# Patient Record
Sex: FEMALE | Race: WHITE | NOT HISPANIC OR LATINO | Employment: FULL TIME | URBAN - METROPOLITAN AREA
[De-identification: names, ages, dates, MRNs, and addresses within clinical notes are randomized per-mention and may not be internally consistent; named-entity substitution may affect disease eponyms.]

---

## 2017-01-13 ENCOUNTER — OFFICE VISIT (OUTPATIENT)
Dept: FAMILY MEDICINE | Facility: CLINIC | Age: 37
End: 2017-01-13
Payer: COMMERCIAL

## 2017-01-13 VITALS
TEMPERATURE: 99.4 F | WEIGHT: 178 LBS | HEART RATE: 95 BPM | SYSTOLIC BLOOD PRESSURE: 134 MMHG | OXYGEN SATURATION: 97 % | DIASTOLIC BLOOD PRESSURE: 84 MMHG | BODY MASS INDEX: 29.16 KG/M2

## 2017-01-13 DIAGNOSIS — M25.562 LEFT KNEE PAIN, UNSPECIFIED CHRONICITY: Primary | ICD-10-CM

## 2017-01-13 DIAGNOSIS — M54.50 RIGHT-SIDED LOW BACK PAIN WITHOUT SCIATICA, UNSPECIFIED CHRONICITY: ICD-10-CM

## 2017-01-13 PROBLEM — R01.1 HEART MURMUR: Status: ACTIVE | Noted: 2017-01-13

## 2017-01-13 PROCEDURE — 99213 OFFICE O/P EST LOW 20 MIN: CPT | Performed by: PHYSICIAN ASSISTANT

## 2017-01-13 RX ORDER — HYDROCODONE BITARTRATE AND ACETAMINOPHEN 5; 325 MG/1; MG/1
1 TABLET ORAL
Qty: 20 TABLET | Refills: 0 | Status: SHIPPED | OUTPATIENT
Start: 2017-01-13 | End: 2017-06-06

## 2017-01-13 RX ORDER — PREDNISONE 20 MG/1
40 TABLET ORAL DAILY
Qty: 10 TABLET | Refills: 0 | Status: SHIPPED | OUTPATIENT
Start: 2017-01-13 | End: 2017-01-18

## 2017-01-13 NOTE — PROGRESS NOTES
SUBJECTIVE:                                                    Christine Hu is a 36 year old female who presents to clinic today for the following health issues:        Musculoskeletal problem/pain      Duration: 3 weeks    Description  Location: left knee    Intensity:  moderate    Accompanying signs and symptoms: swelling    History  Previous similar problem: YES  Previous evaluation:  MRI    Precipitating or alleviating factors:  Trauma or overuse: YES- many years ago  Aggravating factors include: climbing stairs, exercise and overuse    Therapies tried and outcome: rest/inactivity, heat, ice and NSAID -          Had MRI in 2015 of left knee, nothing recent.  Mri 2015 showed IT band issues and ligament sprain and moderate joint effusion.     Follows at [facility] clinic for this. But has not seen them for awhile.   appt next week with them, already has MRI scheduled there also.    Has had that effusion drained previously in her knee.  Now its starting to build up again causing more pain. No fevers or redness to suggest infection.  strength is decreased.     Started again when she started exercising. This caused it before.    Took 2 norco last week that she had leftover, took advil also recently.   Swelling has become worse with walking. Stairs is worse.       Pain discussion --patient is requesting short term pain medications today. We discussed that these are used sparingly.  Last year per registry she filled only 2 Rx.  The year before however there were multiples.  If there is another request this year, she understands that we will send her to a pain clinic for best management.     Not sure if she has used prednisone previously.         Problem list and histories reviewed & adjusted, as indicated.  Additional history: as documented    Patient Active Problem List   Diagnosis     Degeneration of cervical intervertebral disc     Contraceptive management     History of abnormal Pap smear      Depressive disorder, not elsewhere classified     Thyrotoxicosis     Low back pain     Generalized anxiety disorder     Mastoiditis     Overdose     CARDIOVASCULAR SCREENING; LDL GOAL LESS THAN 160     ASCUS favor benign     Tobacco use     Primary insomnia     History of Clostridium difficile     Benign essential hypertension     Heart murmur     Past Surgical History   Procedure Laterality Date     C/section, low transverse  2005     , Low Transverse     Tonsillectomy       Mastoidectomy         Social History   Substance Use Topics     Smoking status: Current Some Day Smoker -- 0.50 packs/day     Smokeless tobacco: Never Used     Alcohol Use: Yes      Comment: 2 x a year     Family History   Problem Relation Age of Onset     C.A.D. No family hx of      DIABETES No family hx of      Hypertension No family hx of      CEREBROVASCULAR DISEASE No family hx of      Breast Cancer No family hx of      Cancer - colorectal No family hx of      Prostate Cancer No family hx of      Asthma Other      Allergies Mother      Allergies Sister      Allergies Sister      Psychotic Disorder Father      depression     Psychotic Disorder Sister      anxiety     Psychotic Disorder Sister      depression     Psychotic Disorder Maternal Uncle      anxiety         Current Outpatient Prescriptions   Medication Sig Dispense Refill     predniSONE (DELTASONE) 20 MG tablet Take 2 tablets (40 mg) by mouth daily for 5 days The in morning food 10 tablet 0     HYDROcodone-acetaminophen (NORCO) 5-325 MG per tablet Take 1 tablet by mouth nightly as needed for moderate to severe pain Do not mix with alcohol or drive after taking 20 tablet 0     cyclobenzaprine (FLEXERIL) 10 MG tablet Take 1 tablet (10 mg) by mouth 3 times daily as needed for muscle spasms 90 tablet 1     metoprolol (TOPROL-XL) 25 MG 24 hr tablet Take 1 tablet (25 mg) by mouth daily Take at night. 30 tablet 5     escitalopram (LEXAPRO) 20 MG tablet Take 1 tablet (20 mg) by  mouth daily 90 tablet 0     traZODone (DESYREL) 50 MG tablet 1-2 tabs as needed for sleep 60 tablet 5     ibuprofen (ADVIL,MOTRIN) 100 MG/5ML suspension Take 10 mg/kg by mouth every 4 hours as needed for fever or moderate pain       Allergies   Allergen Reactions     Codeine GI Disturbance     Nausea with use of Tylenol #3     No Clinical Screening - See Comments      Unknown Diuretic-patient complain about blisters on her legs.     Penicillins Rash       ROS:  Constitutional, HEENT, cardiovascular, pulmonary, gi and gu systems are negative, except as otherwise noted.    OBJECTIVE:                                                    /84 mmHg  Pulse 95  Temp(Src) 99.4  F (37.4  C) (Oral)  Wt 178 lb (80.74 kg)  SpO2 97%  Body mass index is 29.16 kg/(m^2).  GENERAL: healthy, alert and no distress  RESP: lungs clear to auscultation - no rales, rhonchi or wheezes  CV: regular rate and rhythm, normal S1 S2, no S3 or S4, no murmur, click or rub, no peripheral edema and peripheral pulses strong  MS: left knee-mild edema.  No erythema.  strength decreased some compared to right quad. Pain with extension mostly.  Tender everywhere on knee but mainly laterally and patellar insertion.   SKIN: no suspicious lesions or rashes  NEURO: Normal strength and tone, mentation intact and speech normal         ASSESSMENT/PLAN:                                                    ASSESSMENT / PLAN:  (M25.562) Left knee pain, unspecified chronicity  (primary encounter diagnosis)  Comment: already seeing specialist  Plan: predniSONE (DELTASONE) 20 MG tablet        See below  Stop ibuprofen, prednisone for anti-inflammatory until she sees ortho    Plan: HYDROcodone-acetaminophen (NORCO) 5-325 MG per         tablet       \  Take prednisone with food  In the morning  Side effects discussed    -Narcotic medications can be addicting and therefore are used for short term pain control only.  They are not intended for long-term use.    -Take  them only for severe pain as needed.    -Never mix with alcohol.    -Do not drive after taking this medication.    -No refills without an office visit. No replacements will be given.    -Keep these medications kept in a safe location.  You are responsible for them.  -Do not give them to anyone else.  They are prescribed to you only.     Angi Marroquin PA-C  [facility]

## 2017-01-13 NOTE — MR AVS SNAPSHOT
"              After Visit Summary   2017    Christine Hu    MRN: [id number]           Patient Information     Date Of Birth          1980        Visit Information        Provider Department      2017 2:40 PM Angi Marroquin PA-C [facility]        Today's Diagnoses     Left knee pain, unspecified chronicity    -  1     Right-sided low back pain without sciatica, unspecified chronicity            Follow-ups after your visit        Who to contact     If you have questions or need follow up information about today's clinic visit or your schedule please contact [facility] directly at [phone].  Normal or non-critical lab and imaging results will be communicated to you by MyChart, letter or phone within 4 business days after the clinic has received the results. If you do not hear from us within 7 days, please contact the clinic through MyChart or phone. If you have a critical or abnormal lab result, we will notify you by phone as soon as possible.  Submit refill requests through [employer] or call your pharmacy and they will forward the refill request to us. Please allow 3 business days for your refill to be completed.          Additional Information About Your Visit        MyChart Information     [employer] lets you send messages to your doctor, view your test results, renew your prescriptions, schedule appointments and more. To sign up, go to [email or web address] . Click on \"Log in\" on the left side of the screen, which will take you to the Welcome page. Then click on \"Sign up Now\" on the right side of the page.     You will be asked to enter the access code listed below, as well as some personal information. Please follow the directions to create your username and password.     Your access code is: [email or web address]  Expires: 2017  3:18 PM     Your access code will  in 90 days. If you need help or a new code, please call your [facility] or " [phone].        Care EveryWhere ID     This is your Care EveryWhere ID. This could be used by other organizations to access your [facility] medical records  [id number]        Your Vitals Were     Pulse Temperature Pulse Oximetry             95 99.4  F (37.4  C) (Oral) 97%          Blood Pressure from Last 3 Encounters:   01/13/17 134/84   10/14/16 138/86   04/01/16 150/88    Weight from Last 3 Encounters:   01/13/17 178 lb (80.74 kg)   10/14/16 177 lb (80.287 kg)   04/12/16 164 lb (74.39 kg)              Today, you had the following     No orders found for display         Today's Medication Changes          These changes are accurate as of: 1/13/17  3:18 PM.  If you have any questions, ask your nurse or doctor.               Start taking these medicines.        Dose/Directions    predniSONE 20 MG tablet   Commonly known as:  DELTASONE   Used for:  Left knee pain, unspecified chronicity        Dose:  40 mg   Take 2 tablets (40 mg) by mouth daily for 5 days The in morning food   Quantity:  10 tablet   Refills:  0            Where to get your medicines      These medications were sent to [facility]/pharmacy #[id number] - [place] - [address] [place] [address] [address] [address]     Phone:  [phone]    - predniSONE 20 MG tablet      Some of these will need a paper prescription and others can be bought over the counter.  Ask your nurse if you have questions.     Bring a paper prescription for each of these medications    - HYDROcodone-acetaminophen 5-325 MG per tablet             Primary Care Provider Office Phone # Fax #    Angi Marroquin PA-C [phone]       [facility] [address] [facility] [address]        Thank you!     Thank you for choosing [facility]  for your care. Our goal is always to provide you with excellent care. Hearing back from our patients is one way we can continue to improve our services. Please  take a few minutes to complete the written survey that you may receive in the mail after your visit with us. Thank you!             Your Updated Medication List - Protect others around you: Learn how to safely use, store and throw away your medicines at www.disposemymeds.org.          This list is accurate as of: 1/13/17  3:18 PM.  Always use your most recent med list.                   Brand Name Dispense Instructions for use    cyclobenzaprine 10 MG tablet    FLEXERIL    90 tablet    Take 1 tablet (10 mg) by mouth 3 times daily as needed for muscle spasms       escitalopram 20 MG tablet    LEXAPRO    90 tablet    Take 1 tablet (20 mg) by mouth daily       HYDROcodone-acetaminophen 5-325 MG per tablet    NORCO    20 tablet    Take 1 tablet by mouth nightly as needed for moderate to severe pain Do not mix with alcohol or drive after taking       ibuprofen 100 MG/5ML suspension    ADVIL/MOTRIN     Take 10 mg/kg by mouth every 4 hours as needed for fever or moderate pain       metoprolol 25 MG 24 hr tablet    TOPROL-XL    30 tablet    Take 1 tablet (25 mg) by mouth daily Take at night.       predniSONE 20 MG tablet    DELTASONE    10 tablet    Take 2 tablets (40 mg) by mouth daily for 5 days The in morning food       traZODone 50 MG tablet    DESYREL    60 tablet    1-2 tabs as needed for sleep

## 2017-01-13 NOTE — NURSING NOTE
"Chief Complaint   Patient presents with     Knee Pain     left knee - seen at [facility] - Had MRI - had fluid drained -        Initial /95 mmHg  Pulse 105  Temp(Src) 99.4  F (37.4  C) (Oral)  Wt 178 lb (80.74 kg)  SpO2 97% Estimated body mass index is 29.16 kg/(m^2) as calculated from the following:    Height as of 4/12/16: 5' 5.5\" (1.664 m).    Weight as of this encounter: 178 lb (80.74 kg)..  BP completed using cuff size: bunny Jain M.A.      "

## 2017-06-06 ENCOUNTER — OFFICE VISIT (OUTPATIENT)
Dept: FAMILY MEDICINE | Facility: CLINIC | Age: 37
End: 2017-06-06
Payer: COMMERCIAL

## 2017-06-06 VITALS
BODY MASS INDEX: 27.48 KG/M2 | HEIGHT: 66 IN | WEIGHT: 171 LBS | SYSTOLIC BLOOD PRESSURE: 126 MMHG | OXYGEN SATURATION: 98 % | HEART RATE: 105 BPM | TEMPERATURE: 99.1 F | DIASTOLIC BLOOD PRESSURE: 84 MMHG

## 2017-06-06 DIAGNOSIS — H72.91 PERFORATION OF RIGHT TYMPANIC MEMBRANE: ICD-10-CM

## 2017-06-06 DIAGNOSIS — J31.0 CHRONIC RHINITIS: ICD-10-CM

## 2017-06-06 DIAGNOSIS — H92.01 EAR PAIN, RIGHT: ICD-10-CM

## 2017-06-06 DIAGNOSIS — L02.92 BOIL: Primary | ICD-10-CM

## 2017-06-06 PROCEDURE — 99214 OFFICE O/P EST MOD 30 MIN: CPT | Performed by: PHYSICIAN ASSISTANT

## 2017-06-06 RX ORDER — HYDROCODONE BITARTRATE AND ACETAMINOPHEN 10; 325 MG/1; MG/1
TABLET ORAL
Qty: 30 TABLET | Refills: 0 | Status: SHIPPED | OUTPATIENT
Start: 2017-06-06 | End: 2018-01-18

## 2017-06-06 RX ORDER — FLUTICASONE PROPIONATE 50 MCG
1-2 SPRAY, SUSPENSION (ML) NASAL DAILY
Qty: 1 BOTTLE | Refills: 11 | Status: SHIPPED | OUTPATIENT
Start: 2017-06-06 | End: 2018-01-18

## 2017-06-06 RX ORDER — DOXYCYCLINE 100 MG/1
100 CAPSULE ORAL 2 TIMES DAILY
Qty: 20 CAPSULE | Refills: 0 | Status: SHIPPED | OUTPATIENT
Start: 2017-06-06 | End: 2017-06-16

## 2017-06-06 ASSESSMENT — ANXIETY QUESTIONNAIRES
1. FEELING NERVOUS, ANXIOUS, OR ON EDGE: SEVERAL DAYS
7. FEELING AFRAID AS IF SOMETHING AWFUL MIGHT HAPPEN: NOT AT ALL
2. NOT BEING ABLE TO STOP OR CONTROL WORRYING: NOT AT ALL
3. WORRYING TOO MUCH ABOUT DIFFERENT THINGS: SEVERAL DAYS
6. BECOMING EASILY ANNOYED OR IRRITABLE: NOT AT ALL
GAD7 TOTAL SCORE: 2
5. BEING SO RESTLESS THAT IT IS HARD TO SIT STILL: NOT AT ALL
IF YOU CHECKED OFF ANY PROBLEMS ON THIS QUESTIONNAIRE, HOW DIFFICULT HAVE THESE PROBLEMS MADE IT FOR YOU TO DO YOUR WORK, TAKE CARE OF THINGS AT HOME, OR GET ALONG WITH OTHER PEOPLE: NOT DIFFICULT AT ALL

## 2017-06-06 ASSESSMENT — PATIENT HEALTH QUESTIONNAIRE - PHQ9: 5. POOR APPETITE OR OVEREATING: NOT AT ALL

## 2017-06-06 NOTE — PATIENT INSTRUCTIONS
No swimming underneath water and use ear precautions in shower    Schedule with ENT as soon as possible

## 2017-06-06 NOTE — MR AVS SNAPSHOT
After Visit Summary   6/6/2017    Christine Hu    MRN: [id number]           Patient Information     Date Of Birth          1980        Visit Information        Provider Department      6/6/2017 12:40 PM Angi Marroquin PA-C [facility]        Today's Diagnoses     Boil    -  1    Chronic rhinitis        Perforation of right tympanic membrane        Ear pain, right          Care Instructions    No swimming underneath water and use ear precautions in shower    Schedule with ENT as soon as possible              Follow-ups after your visit        Additional Services     OTOLARYNGOLOGY REFERRAL       Your provider has referred you to: [facility]: [facility] [phone]   [email or web address]  Dr. Dalton    Please be aware that coverage of these services is subject to the terms and limitations of your health insurance plan.  Call member services at your health plan with any benefit or coverage questions.      Please bring the following with you to your appointment:    (1) Any X-Rays, CTs or MRIs which have been performed.  Contact the facility where they were done to arrange for  prior to your scheduled appointment.   (2) List of current medications  (3) This referral request   (4) Any documents/labs given to you for this referral                  Who to contact     If you have questions or need follow up information about today's clinic visit or your schedule please contact [facility] directly at [phone].  Normal or non-critical lab and imaging results will be communicated to you by MyChart, letter or phone within 4 business days after the clinic has received the results. If you do not hear from us within 7 days, please contact the clinic through MyChart or phone. If you have a critical or abnormal lab result, we will notify you by phone as soon as possible.  Submit refill requests through  "[employer] or call your pharmacy and they will forward the refill request to us. Please allow 3 business days for your refill to be completed.          Additional Information About Your Visit        Care EveryWhere ID     This is your Care EveryWhere ID. This could be used by other organizations to access your [facility] medical records  [id number]        Your Vitals Were     Pulse Temperature Height Pulse Oximetry Breastfeeding? BMI (Body Mass Index)    105 99.1  F (37.3  C) (Oral) 5' 6\" (1.676 m) 98% No 27.6 kg/m2       Blood Pressure from Last 3 Encounters:   06/06/17 126/84   01/13/17 134/84   10/14/16 138/86    Weight from Last 3 Encounters:   06/06/17 171 lb (77.6 kg)   01/13/17 178 lb (80.7 kg)   10/14/16 177 lb (80.3 kg)              We Performed the Following     DEPRESSION ACTION PLAN (DAP)     OTOLARYNGOLOGY REFERRAL          Today's Medication Changes          These changes are accurate as of: 6/6/17  1:42 PM.  If you have any questions, ask your nurse or doctor.               Start taking these medicines.        Dose/Directions    doxycycline Monohydrate 100 MG Caps   Used for:  Boil   Started by:  Angi Marroquin PA-C        Dose:  100 mg   Take 1 capsule (100 mg) by mouth 2 times daily for 10 days With food.   Quantity:  20 capsule   Refills:  0       fluticasone 50 MCG/ACT spray   Commonly known as:  FLONASE   Used for:  Chronic rhinitis   Started by:  Angi Marroquin PA-C        Dose:  1-2 spray   Spray 1-2 sprays into both nostrils daily   Quantity:  1 Bottle   Refills:  11       HYDROcodone-acetaminophen  MG per tablet   Commonly known as:  NORCO   Used for:  Perforation of right tympanic membrane, Ear pain, right   Started by:  Angi Marroquin PA-C        Take one at night as needed for ear pain.  Do not drive or drink alcohol with this.   Quantity:  30 tablet   Refills:  0            Where to get your medicines      These medications were sent to [facility]/pharmacy " #[id number] - [place] - [address]  [address], [place] [address]     Phone:  [phone]     doxycycline Monohydrate 100 MG Caps    fluticasone 50 MCG/ACT spray         Some of these will need a paper prescription and others can be bought over the counter.  Ask your nurse if you have questions.     Bring a paper prescription for each of these medications     HYDROcodone-acetaminophen  MG per tablet                Primary Care Provider Office Phone # Fax #    Angi Marroquin PA-C [phone]       [facility] [address] [facility] [address]        Thank you!     Thank you for choosing [facility]  for your care. Our goal is always to provide you with excellent care. Hearing back from our patients is one way we can continue to improve our services. Please take a few minutes to complete the written survey that you may receive in the mail after your visit with us. Thank you!             Your Updated Medication List - Protect others around you: Learn how to safely use, store and throw away your medicines at www.disposemymeds.org.          This list is accurate as of: 6/6/17  1:42 PM.  Always use your most recent med list.                   Brand Name Dispense Instructions for use    doxycycline Monohydrate 100 MG Caps     20 capsule    Take 1 capsule (100 mg) by mouth 2 times daily for 10 days With food.       fluticasone 50 MCG/ACT spray    FLONASE    1 Bottle    Spray 1-2 sprays into both nostrils daily       HYDROcodone-acetaminophen  MG per tablet    NORCO    30 tablet    Take one at night as needed for ear pain.  Do not drive or drink alcohol with this.       ibuprofen 100 MG/5ML suspension    ADVIL/MOTRIN     Take 10 mg/kg by mouth every 4 hours as needed for fever or moderate pain

## 2017-06-06 NOTE — NURSING NOTE
"Chief Complaint   Patient presents with     Ear Problem     R ear pain per pt x 1 week      Derm Problem     cyst on R leg inner thigh per pt x 2-4 months ago.       Initial BP (!) 150/92  Pulse 105  Temp 99.1  F (37.3  C) (Oral)  Ht 5' 6\" (1.676 m)  Wt 171 lb (77.6 kg)  SpO2 98%  Breastfeeding? No  BMI 27.6 kg/m2 Estimated body mass index is 27.6 kg/(m^2) as calculated from the following:    Height as of this encounter: 5' 6\" (1.676 m).    Weight as of this encounter: 171 lb (77.6 kg).  Medication Reconciliation: complete      Amisha Lion MA    "

## 2017-06-06 NOTE — PROGRESS NOTES
SUBJECTIVE:                                                    Christine Hu is a 36 year old female who presents to clinic today for the following health issues:    ENT Symptoms             Symptoms: cc Present Absent Comment   Fever/Chills  x  Low grade only off and on per patient   Fatigue   x    Muscle Aches   x    Eye Irritation  x     Sneezing  x     Nasal Kenn/Drg  x     Sinus Pressure/Pain  x     Loss of smell   x    Dental pain   x    Sore Throat  x  Tender to touch per pt   Swollen Glands  x     Ear Pain/Fullness  x  R ear pain more so when she leans back/reclines   Cough  x     Wheeze   x    Chest Pain   x    Shortness of breath   x    Rash   x    Other   x      Symptom duration:  about 10 days   Symptom severity:  moderate   Treatments tried:  none   Contacts:  none     Multiple complaints today, mainly severe right ear pain again and boil she wants to discuss:    1) recent allergies or cold possibly now making her ear worse again:  Has seasonal allergies.  Has tried claritin/zyrtec which is what she usually takes. Has helped some.  Does have a tickle in her throat. Worse after mowing lawn.   Cough started on Friday.  Some head pressure.  [place] ill on Saturday, body aches etc.  Is still tired. No green or yellow drainage.  Lots of PND,  No green or yellow.    Has to sleep upright in a chair otherwise her ear hurts her too much.  Takes advil for pain also.  Also took leftover pain medications which were 5 mg hydrocodone and she felt the 10 mg worked better for her prreviously.  She is asking for more today.   Previous pain medication discussion---please see last OV for our discussion. Patient has only filled 2 narcotics in the past year, but prior to that she had filled several the year before. I do not suspect her being a seeker or abuser, but if her pain becomes more chronic then we will need to have her see pain management for best management.        Long-standing History of ear issues:  Saw  "Dr. Dalton in ENT last year for recurrent chronic ear pain. He diagnosed her with TMJ but ordered CT scans of face/sinuses as she was concerned.  She did not have these scans performed.  \"Feels like someone is poking knife or pin in her ear\" for the past week per patient, no new trauma to her ear.  Also has history of tonsillectomy and mastoidectomy following right ear pain after a bad sinus infection in , she followed at a different ENT specialty clinic for that. No mastoid pain today.       Daily smoker.       2)  Cyst on R Inner leg per pt x 2-4 months, would like to have that looked at.  Thought it was an ingrown hair at first. Looked like a pimple, tried warm compress initially. Was small at first. Got better but yesterday morning it was much worse.  Is painful also.  Is draining  A small amount of pus.  She is worried about infection.  Is red in color.  No antibiotics in several months.  Low grade fevers lately. No h/o boils.          Problem list and histories reviewed & adjusted, as indicated.  Additional history: as documented    Patient Active Problem List   Diagnosis     Degeneration of cervical intervertebral disc     Contraceptive management     History of abnormal Pap smear     Depressive disorder, not elsewhere classified     Thyrotoxicosis     Low back pain     Generalized anxiety disorder     Mastoiditis     Overdose     CARDIOVASCULAR SCREENING; LDL GOAL LESS THAN 160     ASCUS favor benign     Tobacco use     Primary insomnia     History of Clostridium difficile     Benign essential hypertension     Heart murmur     Past Surgical History:   Procedure Laterality Date     C/SECTION, LOW TRANSVERSE  2005    , Low Transverse     MASTOIDECTOMY       TONSILLECTOMY         Social History   Substance Use Topics     Smoking status: Current Some Day Smoker     Packs/day: 0.50     Smokeless tobacco: Never Used     Alcohol use Yes      Comment: 2 x a year     Family History   Problem Relation Age " "of Onset     Allergies Mother      Psychotic Disorder Father      depression     Heart Failure Father      heart attack in NOV. 2015?     Allergies Sister      Allergies Sister      Asthma Other      Psychotic Disorder Sister      anxiety     Psychotic Disorder Sister      depression     Psychotic Disorder Maternal Uncle      anxiety     C.A.D. No family hx of      DIABETES No family hx of      Hypertension No family hx of      CEREBROVASCULAR DISEASE No family hx of      Breast Cancer No family hx of      Cancer - colorectal No family hx of      Prostate Cancer No family hx of          Current Outpatient Prescriptions   Medication Sig Dispense Refill     ibuprofen (ADVIL,MOTRIN) 100 MG/5ML suspension Take 10 mg/kg by mouth every 4 hours as needed for fever or moderate pain       Allergies   Allergen Reactions     Codeine GI Disturbance     Nausea with use of Tylenol #3     No Clinical Screening - See Comments      Unknown Diuretic-patient complain about blisters on her legs.     Penicillins Rash       ROS:  Constitutional, HEENT, cardiovascular, pulmonary, GI, , musculoskeletal, neuro, skin, endocrine and psych systems are negative, except as otherwise noted.    OBJECTIVE:                                                    BP (!) 150/92  Pulse 105  Temp 99.1  F (37.3  C) (Oral)  Ht 5' 6\" (1.676 m)  Wt 171 lb (77.6 kg)  SpO2 98%  Breastfeeding? No  BMI 27.6 kg/m2  Body mass index is 27.6 kg/(m^2).  GENERAL:  No acute distress.  Interacts appropriately.  Breathing without difficulty.  Alert.  HEENT:  L TM-appears WNL.  R TM--? Perforation with bright blue ? Tube located in middle ear (not in canal).  Canals looks ok but she does have tragal tenderness. No odor or drainage.  Oral mucosa moist. Posterior pharynx has mild erythema.  Posterior pharynx has no exudate. Without edema. Some PND present.   NECK:  Soft and supple.  with tenderness.  Without lymphadenopathy.  Normal range of motion.    CARDIAC:   " Regular rate and rhythm.  No murmurs, rubs, or gallops.   PULMONARY: Clear to auscultation bilaterally.  No  wheezes, crackles, or rhonchi.  Normal air exchange/breath sounds.  No use of accessory muscles.    PSYCH: Normal affect.  SKIN: R inner thigh-dime sized pustule present, warm and tender to touch. Does not seem amenable to I and D.  Look consistent with Early boil. No evidence of cellulitis. Is not actively draining.                ASSESSMENT/PLAN:                                                    ASSESSMENT / PLAN:  (L02.92) Boil  (primary encounter diagnosis)  Comment:   Plan: doxycycline Monohydrate 100 MG CAPS        Take with food. Side effects discussed.  Call with worsening symptoms or if no improvement in 3 days.  Analgesics for pain with food as needed.  Warm compresses multiple times a day  Return for I and D if needed/not improving  To emergency room if high fevers/sigificant worsening symptoms    (J31.0) Chronic rhinitis  Comment:   Plan: fluticasone (FLONASE) 50 MCG/ACT spray            (H72.91) Perforation of right tympanic membrane  Comment:  Per patient her tube was removed from this ear, however that is not what I see on exam today.  I will message Dr. Dalton also regarding this. I am wondering if the retained tube went inside/behind her TM instead of out.     Plan: OTOLARYNGOLOGY REFERRAL,         HYDROcodone-acetaminophen (NORCO)  MG per        tablet            (H92.01) Ear pain, right  Comment:   Plan: HYDROcodone-acetaminophen (NORCO)  MG per        tablet            If you have ongoing pain issues, we will have to have you see pain clinic as discussed    -Narcotic medications can be addicting and therefore are used for short term pain control only.  They are not intended for long-term use.    -Take them only for severe pain as needed.    -Never mix with alcohol.    -Do not drive after taking this medication.    -No refills without an office visit. No replacements will be  given.    -Keep these medications kept in a safe location.  You are responsible for them.  -Do not give them to anyone else.  They are prescribed to you only.       Patient Instructions   No swimming underneath water and use ear precautions in shower    Schedule with ENT as soon as possible                Angi Marroquin PA-C  [facility]

## 2017-06-07 ASSESSMENT — PATIENT HEALTH QUESTIONNAIRE - PHQ9: SUM OF ALL RESPONSES TO PHQ QUESTIONS 1-9: 3

## 2017-06-07 ASSESSMENT — ANXIETY QUESTIONNAIRES: GAD7 TOTAL SCORE: 2

## 2017-06-08 ENCOUNTER — TELEPHONE (OUTPATIENT)
Dept: FAMILY MEDICINE | Facility: CLINIC | Age: 37
End: 2017-06-08

## 2017-06-08 NOTE — TELEPHONE ENCOUNTER
Please call patient, I see she has not scheduled with ENT yet.  I do want her to see them asap. Please help her schedule with Dr. Dalton or if he is booked very far out another ENT provider in the next 1-2 weeks.     Thanks, yudi

## 2017-06-15 ENCOUNTER — TELEPHONE (OUTPATIENT)
Dept: FAMILY MEDICINE | Facility: CLINIC | Age: 37
End: 2017-06-15

## 2017-06-15 NOTE — TELEPHONE ENCOUNTER
Please call patient,    I see that she unfortunately did not show to her ENT appt (no showed Dr. Hernandez).  I do not see her scheduled with any ENT at this time.  I would like an update on her symptoms and her next plan of action. Has she scheduled with ent outside of [place] perhaps?  She needs to see one asap even if her pain is better.     Thanksyudi

## 2017-06-16 NOTE — TELEPHONE ENCOUNTER
Left message on answering machine for patient to call back to [phone].  Sofia Pierce RN

## 2017-06-19 NOTE — TELEPHONE ENCOUNTER
Left message on answering machine for patient to call back to [phone].  Sofia Pierce RN

## 2017-06-20 NOTE — TELEPHONE ENCOUNTER
Patient informed of provider note as below. Patient states something had come up and was not able to make to appointment. Patient still having symptoms and will reschedule with Dr. Frederick. .Antonella SANCHEZN, RN, CPN

## 2017-06-23 ENCOUNTER — TELEPHONE (OUTPATIENT)
Dept: FAMILY MEDICINE | Facility: CLINIC | Age: 37
End: 2017-06-23

## 2017-06-23 NOTE — TELEPHONE ENCOUNTER
"Received note that \"something came up\" for her ENT appointment.  Please make a note that patient will not be receiving any more narcotics for her ear pain, do not send me refill requests for this as they will be declined.  I would recommend other providers not give her narcotics for ear pain as well.    She needs to address the problem and not mask symptoms with pain medications.     Angi      "

## 2017-07-21 ENCOUNTER — TELEPHONE (OUTPATIENT)
Dept: FAMILY MEDICINE | Facility: CLINIC | Age: 37
End: 2017-07-21

## 2017-07-21 NOTE — TELEPHONE ENCOUNTER
See ov notes from 1-13-17 and 6-6-17.      Controlled Substance Refill Request for hydrocodone  Problem List Complete:  No     PROVIDER TO CONSIDER COMPLETION OF PROBLEM LIST AND OVERVIEW/CONTROLLED SUBSTANCE AGREEMENT    Last Written Prescription Date:  6-6-17  Last Fill Quantity: 30,   # refills: 0    Last Office Visit with [facility] primary care provider: 6-26-17  Pt advised multiple times to see ENT.     Future Office visit:   Next 5 appointments (look out 90 days)     Aug 08, 2017  9:30 AM CDT   Return Visit with Yves Dalton MD   [facility] ([facility]    [address] [place] [id number]   [phone]                  Controlled substance agreement on file: No.     Processing:  Patient will  in clinic     checked in past 6 months?  Yes 7/21/17   Sofia Pierce RN

## 2017-07-21 NOTE — TELEPHONE ENCOUNTER
Patient states she would like a refill on hydrocodone until she can see Dr Dalton on 8-8-17.  Please call when ready for .    Thank you.

## 2018-01-09 NOTE — PROGRESS NOTES
SUBJECTIVE:                                                    Christine Hu is a 37 year old female who presents to clinic today for the following health issues:    Family HX of liver issues and would like to have livers check, blood work or any testing that needs to be done.  norweigen decent family per patient.  Autoimmune disease/liver per patient in her family.   Not alcohol induced she states.  28 year old cousin had liver transplant, he tested for positive for a gene but she is not sure which one. Cousin also had hep C at the same time.   Sister got tested also. Sister is a carrier for this. She  Had genetic testing.  Patient may benefit from this, she will call and let me know what gene specifically her family has tested positive for.     Tylenol---not much, takes more advil.   Alcohol---once every 6 months or so, not much per patient.    PAP is due per pt have not had it done elsewhere. She will return for this.       Problem list and histories reviewed & adjusted, as indicated.  Additional history: as documented    Patient Active Problem List   Diagnosis     Degeneration of cervical intervertebral disc     Contraceptive management     History of abnormal Pap smear     Depressive disorder, not elsewhere classified     Thyrotoxicosis     Low back pain     Generalized anxiety disorder     Mastoiditis     Overdose     CARDIOVASCULAR SCREENING; LDL GOAL LESS THAN 160     ASCUS favor benign     Tobacco use     Primary insomnia     History of Clostridium difficile     Benign essential hypertension     Heart murmur     Past Surgical History:   Procedure Laterality Date     C/SECTION, LOW TRANSVERSE  2005    , Low Transverse     MASTOIDECTOMY       TONSILLECTOMY         Social History   Substance Use Topics     Smoking status: Current Some Day Smoker     Packs/day: 0.50     Smokeless tobacco: Never Used     Alcohol use Yes      Comment: 2 x a year     Family History   Problem Relation Age of  "Onset     Allergies Mother      Psychotic Disorder Father      depression     Heart Failure Father      heart attack in NOV. 2015?     Allergies Sister      Allergies Sister      Asthma Other      Psychotic Disorder Sister      anxiety     Psychotic Disorder Sister      depression     Psychotic Disorder Maternal Uncle      anxiety     C.A.D. No family hx of      DIABETES No family hx of      Hypertension No family hx of      CEREBROVASCULAR DISEASE No family hx of      Breast Cancer No family hx of      Cancer - colorectal No family hx of      Prostate Cancer No family hx of          Current Outpatient Prescriptions   Medication Sig Dispense Refill     ibuprofen (ADVIL,MOTRIN) 100 MG/5ML suspension Take 10 mg/kg by mouth every 4 hours as needed for fever or moderate pain       Allergies   Allergen Reactions     Codeine GI Disturbance     Nausea with use of Tylenol #3     Clindamycin Other (See Comments)     C diff, hospitalized      No Clinical Screening - See Comments      Unknown Diuretic-patient complain about blisters on her legs.     Penicillins Rash         ROS:  Constitutional, HEENT, cardiovascular, pulmonary, gi and gu systems are negative, except as otherwise noted.      OBJECTIVE:   /86  Pulse 89  Temp 98.7  F (37.1  C) (Oral)  Ht 5' 6\" (1.676 m)  Wt 163 lb (73.9 kg)  SpO2 98%  Breastfeeding? No  BMI 26.31 kg/m2  Body mass index is 26.31 kg/(m^2).  GENERAL: healthy, alert and no distress  RESP: {non-labored  MS: no gross musculoskeletal defects noted, no edema  SKIN: no suspicious lesions or rashes  PSYCH: mentation appears normal, affect normal/bright    Labs-pending    ASSESSMENT/PLAN:   ASSESSMENT / PLAN:  (Z83.79) Family history of liver disease  (primary encounter diagnosis)  Comment:    Patient with no h/o this, will start with testing and consider US and/or gastroenterology referral after that depending on results.   Plan: CBC with platelets differential, Comprehensive         " "metabolic panel, ESR: Erythrocyte sedimentation        rate, CRP inflammation, Anti Nuclear Nichole IgG by        IFA with Reflex, Hepatitis C antibody,         Hepatitis B surface antigen, Hepatitis C RNA,         quantitative, GGT            (H92.09) Otalgia, unspecified laterality  Comment:   Plan: OTOLARYNGOLOGY REFERRAL        At end of visit, patient casually request refill of vicodin for her on/off ear pain where she can't sleep at night.  She states she has a \"mild cold and ear has some pressure again\".  She is sitting comfortably in chair. Ibuprofen helps some, we discussed she can take 800 mg of this PRN every 8 hours with food. She has tried sleep medications in the past to take when her ear \"flares\" but these failed and she wants the pain medication she states.  We discussed that SEVERAL times she needs to go back to ENT if the pain is that bad, she no-showed her last ENT appointment and never rescheduled and has requested and been declined narcotics by me since then.  I again discussed the reasoning I cannot keep giving this to her.  If ENT does not believe she needs them for her ear issue I will not be Rx them.  We could consider pain clinic referral if she has further pain concerns.     Patient was scheduled with Dr. Dalton at the end of the month.      Billin min spent face-to-face with patient. 15 min on history, 5 on exam, 5 on discussing diagnosis and treatment plan.       Angi Marroquin PA-C  [facility]    "

## 2018-01-18 ENCOUNTER — OFFICE VISIT (OUTPATIENT)
Dept: FAMILY MEDICINE | Facility: CLINIC | Age: 38
End: 2018-01-18
Payer: COMMERCIAL

## 2018-01-18 VITALS
BODY MASS INDEX: 26.2 KG/M2 | SYSTOLIC BLOOD PRESSURE: 138 MMHG | DIASTOLIC BLOOD PRESSURE: 86 MMHG | HEIGHT: 66 IN | WEIGHT: 163 LBS | OXYGEN SATURATION: 98 % | TEMPERATURE: 98.7 F | HEART RATE: 89 BPM

## 2018-01-18 DIAGNOSIS — H92.09 OTALGIA, UNSPECIFIED LATERALITY: ICD-10-CM

## 2018-01-18 DIAGNOSIS — Z83.79 FAMILY HISTORY OF LIVER DISEASE: Primary | ICD-10-CM

## 2018-01-18 LAB
ALBUMIN SERPL-MCNC: 4.2 G/DL (ref 3.4–5)
ALP SERPL-CCNC: 56 U/L (ref 40–150)
ALT SERPL W P-5'-P-CCNC: 20 U/L (ref 0–50)
ANION GAP SERPL CALCULATED.3IONS-SCNC: 9 MMOL/L (ref 3–14)
AST SERPL W P-5'-P-CCNC: 16 U/L (ref 0–45)
BASOPHILS # BLD AUTO: 0 10E9/L (ref 0–0.2)
BASOPHILS NFR BLD AUTO: 0.4 %
BILIRUB SERPL-MCNC: 0.2 MG/DL (ref 0.2–1.3)
BUN SERPL-MCNC: 8 MG/DL (ref 7–30)
CALCIUM SERPL-MCNC: 9 MG/DL (ref 8.5–10.1)
CHLORIDE SERPL-SCNC: 104 MMOL/L (ref 94–109)
CO2 SERPL-SCNC: 26 MMOL/L (ref 20–32)
CREAT SERPL-MCNC: 0.58 MG/DL (ref 0.52–1.04)
CRP SERPL-MCNC: <2.9 MG/L (ref 0–8)
DIFFERENTIAL METHOD BLD: ABNORMAL
EOSINOPHIL # BLD AUTO: 0 10E9/L (ref 0–0.7)
EOSINOPHIL NFR BLD AUTO: 0.6 %
ERYTHROCYTE [DISTWIDTH] IN BLOOD BY AUTOMATED COUNT: 12.4 % (ref 10–15)
ERYTHROCYTE [SEDIMENTATION RATE] IN BLOOD BY WESTERGREN METHOD: 10 MM/H (ref 0–20)
GFR SERPL CREATININE-BSD FRML MDRD: >90 ML/MIN/1.7M2
GGT SERPL-CCNC: 27 U/L (ref 0–40)
GLUCOSE SERPL-MCNC: 97 MG/DL (ref 70–99)
HCT VFR BLD AUTO: 40.3 % (ref 35–47)
HGB BLD-MCNC: 13.6 G/DL (ref 11.7–15.7)
LYMPHOCYTES # BLD AUTO: 1.4 10E9/L (ref 0.8–5.3)
LYMPHOCYTES NFR BLD AUTO: 27 %
MCH RBC QN AUTO: 33.5 PG (ref 26.5–33)
MCHC RBC AUTO-ENTMCNC: 33.7 G/DL (ref 31.5–36.5)
MCV RBC AUTO: 99 FL (ref 78–100)
MONOCYTES # BLD AUTO: 0.6 10E9/L (ref 0–1.3)
MONOCYTES NFR BLD AUTO: 10.6 %
NEUTROPHILS # BLD AUTO: 3.2 10E9/L (ref 1.6–8.3)
NEUTROPHILS NFR BLD AUTO: 61.4 %
PLATELET # BLD AUTO: 277 10E9/L (ref 150–450)
POTASSIUM SERPL-SCNC: 3.7 MMOL/L (ref 3.4–5.3)
PROT SERPL-MCNC: 7.3 G/DL (ref 6.8–8.8)
RBC # BLD AUTO: 4.06 10E12/L (ref 3.8–5.2)
SODIUM SERPL-SCNC: 139 MMOL/L (ref 133–144)
WBC # BLD AUTO: 5.3 10E9/L (ref 4–11)

## 2018-01-18 PROCEDURE — 86803 HEPATITIS C AB TEST: CPT | Performed by: PHYSICIAN ASSISTANT

## 2018-01-18 PROCEDURE — 85025 COMPLETE CBC W/AUTO DIFF WBC: CPT | Performed by: PHYSICIAN ASSISTANT

## 2018-01-18 PROCEDURE — 87522 HEPATITIS C REVRS TRNSCRPJ: CPT | Performed by: PHYSICIAN ASSISTANT

## 2018-01-18 PROCEDURE — 87340 HEPATITIS B SURFACE AG IA: CPT | Performed by: PHYSICIAN ASSISTANT

## 2018-01-18 PROCEDURE — 85652 RBC SED RATE AUTOMATED: CPT | Performed by: PHYSICIAN ASSISTANT

## 2018-01-18 PROCEDURE — 86140 C-REACTIVE PROTEIN: CPT | Performed by: PHYSICIAN ASSISTANT

## 2018-01-18 PROCEDURE — 99214 OFFICE O/P EST MOD 30 MIN: CPT | Performed by: PHYSICIAN ASSISTANT

## 2018-01-18 PROCEDURE — 80053 COMPREHEN METABOLIC PANEL: CPT | Performed by: PHYSICIAN ASSISTANT

## 2018-01-18 PROCEDURE — 86038 ANTINUCLEAR ANTIBODIES: CPT | Performed by: PHYSICIAN ASSISTANT

## 2018-01-18 PROCEDURE — 36415 COLL VENOUS BLD VENIPUNCTURE: CPT | Performed by: PHYSICIAN ASSISTANT

## 2018-01-18 PROCEDURE — 82977 ASSAY OF GGT: CPT | Performed by: PHYSICIAN ASSISTANT

## 2018-01-18 ASSESSMENT — ANXIETY QUESTIONNAIRES
2. NOT BEING ABLE TO STOP OR CONTROL WORRYING: SEVERAL DAYS
1. FEELING NERVOUS, ANXIOUS, OR ON EDGE: SEVERAL DAYS
7. FEELING AFRAID AS IF SOMETHING AWFUL MIGHT HAPPEN: NOT AT ALL
GAD7 TOTAL SCORE: 4
IF YOU CHECKED OFF ANY PROBLEMS ON THIS QUESTIONNAIRE, HOW DIFFICULT HAVE THESE PROBLEMS MADE IT FOR YOU TO DO YOUR WORK, TAKE CARE OF THINGS AT HOME, OR GET ALONG WITH OTHER PEOPLE: NOT DIFFICULT AT ALL
3. WORRYING TOO MUCH ABOUT DIFFERENT THINGS: SEVERAL DAYS
6. BECOMING EASILY ANNOYED OR IRRITABLE: NOT AT ALL
5. BEING SO RESTLESS THAT IT IS HARD TO SIT STILL: NOT AT ALL

## 2018-01-18 ASSESSMENT — PATIENT HEALTH QUESTIONNAIRE - PHQ9
SUM OF ALL RESPONSES TO PHQ QUESTIONS 1-9: 1
5. POOR APPETITE OR OVEREATING: SEVERAL DAYS

## 2018-01-18 NOTE — NURSING NOTE
"Chief Complaint   Patient presents with     Liver     Family Hx of liver issues, would like to get blood or lab test done to check       Initial /86  Pulse 89  Temp 98.7  F (37.1  C) (Oral)  Ht 5' 6\" (1.676 m)  Wt 163 lb (73.9 kg)  SpO2 98%  Breastfeeding? No  BMI 26.31 kg/m2 Estimated body mass index is 26.31 kg/(m^2) as calculated from the following:    Height as of this encounter: 5' 6\" (1.676 m).    Weight as of this encounter: 163 lb (73.9 kg).  Medication Reconciliation: complete      Amisha Lion MA    "

## 2018-01-18 NOTE — MR AVS SNAPSHOT
After Visit Summary   1/18/2018    Christine Hu    MRN: [id number]           Patient Information     Date Of Birth          1980        Visit Information        Provider Department      1/18/2018 11:00 AM Angi Marroquin PA-C [facility]        Today's Diagnoses     Family history of liver disease    -  1    Otalgia, unspecified laterality           Follow-ups after your visit        Additional Services     OTOLARYNGOLOGY REFERRAL       Your provider has referred you to: [facility]: [facility] [phone]  [email or web address]    Please be aware that coverage of these services is subject to the terms and limitations of your health insurance plan.  Call member services at your health plan with any benefit or coverage questions.      Please bring the following with you to your appointment:    (1) Any X-Rays, CTs or MRIs which have been performed.  Contact the facility where they were done to arrange for  prior to your scheduled appointment.   (2) List of current medications  (3) This referral request   (4) Any documents/labs given to you for this referral                  Who to contact     If you have questions or need follow up information about today's clinic visit or your schedule please contact [facility] directly at [phone].  Normal or non-critical lab and imaging results will be communicated to you by MyChart, letter or phone within 4 business days after the clinic has received the results. If you do not hear from us within 7 days, please contact the clinic through MyChart or phone. If you have a critical or abnormal lab result, we will notify you by phone as soon as possible.  Submit refill requests through [employer] or call your pharmacy and they will forward the refill request to us. Please allow 3 business days for your refill to be completed.          Additional Information About Your Visit    "     Care EveryWhere ID     This is your Care EveryWhere ID. This could be used by other organizations to access your [facility] medical records  [id number]        Your Vitals Were     Pulse Temperature Height Pulse Oximetry Breastfeeding? BMI (Body Mass Index)    89 98.7  F (37.1  C) (Oral) 5' 6\" (1.676 m) 98% No 26.31 kg/m2       Blood Pressure from Last 3 Encounters:   01/18/18 138/86   06/06/17 126/84   01/13/17 134/84    Weight from Last 3 Encounters:   01/18/18 163 lb (73.9 kg)   06/06/17 171 lb (77.6 kg)   01/13/17 178 lb (80.7 kg)              We Performed the Following     Anti Nuclear Nichole IgG by IFA with Reflex     CBC with platelets differential     Comprehensive metabolic panel     CRP inflammation     DEPRESSION ACTION PLAN (DAP)     ESR: Erythrocyte sedimentation rate     Hepatitis B surface antigen     Hepatitis C antibody     Hepatitis C RNA, quantitative     OTOLARYNGOLOGY REFERRAL        Primary Care Provider Office Phone # Fax #    Angi Marroquin PA-C [phone] [id number] [facility] [address]        Equal Access to Services     [facility] : Hadii aad ku hadasho Soomaali, waaxda luqadaha, qaybta kaalmada adeegyada, simona thorne . So [facility] [phone].    ATENCIÓN: Si habla español, tiene a gallo disposición servicios gratuitos de asistencia lingüística. Llame al [phone].    We comply with applicable federal civil rights laws and [place] laws. We do not discriminate on the basis of race, color, national origin, age, disability, sex, sexual orientation, or gender identity.            Thank you!     Thank you for choosing [facility]  for your care. Our goal is always to provide you with excellent care. Hearing back from our patients is one way we can continue to improve our services. Please take a few minutes to complete the written survey that you may receive in the mail after your visit with us. Thank you!      "        Your Updated Medication List - Protect others around you: Learn how to safely use, store and throw away your medicines at www.disposemymeds.org.          This list is accurate as of: 1/18/18 11:36 AM.  Always use your most recent med list.                   Brand Name Dispense Instructions for use Diagnosis    ibuprofen 100 MG/5ML suspension    ADVIL/MOTRIN     Take 10 mg/kg by mouth every 4 hours as needed for fever or moderate pain

## 2018-01-18 NOTE — LETTER
January 22, 2018    Christine HEARN Shruti Hu  [address] [place] [address]        Dear Christine,     It was a pleasure to see you at your recent office visit.  Your test results are listed below.  All labs normal. No autoimmune markers, hepatitis C, B, or inflammation. Kidney and liver function normal.  Liver enzymes normal.        If you have any questions or concerns, please call the clinic at [phone].    Sincerely,  Angi Marroquin PA-C    Results for orders placed or performed in visit on 01/18/18   CBC with platelets differential   Result Value Ref Range    WBC 5.3 4.0 - 11.0 10e9/L    RBC Count 4.06 3.8 - 5.2 10e12/L    Hemoglobin 13.6 11.7 - 15.7 g/dL    Hematocrit 40.3 35.0 - 47.0 %    MCV 99 78 - 100 fl    MCH 33.5 (H) 26.5 - 33.0 pg    MCHC 33.7 31.5 - 36.5 g/dL    RDW 12.4 10.0 - 15.0 %    Platelet Count 277 150 - 450 10e9/L    Diff Method Automated Method     % Neutrophils 61.4 %    % Lymphocytes 27.0 %    % Monocytes 10.6 %    % Eosinophils 0.6 %    % Basophils 0.4 %    Absolute Neutrophil 3.2 1.6 - 8.3 10e9/L    Absolute Lymphocytes 1.4 0.8 - 5.3 10e9/L    Absolute Monocytes 0.6 0.0 - 1.3 10e9/L    Absolute Eosinophils 0.0 0.0 - 0.7 10e9/L    Absolute Basophils 0.0 0.0 - 0.2 10e9/L   Comprehensive metabolic panel   Result Value Ref Range    Sodium 139 133 - 144 mmol/L    Potassium 3.7 3.4 - 5.3 mmol/L    Chloride 104 94 - 109 mmol/L    Carbon Dioxide 26 20 - 32 mmol/L    Anion Gap 9 3 - 14 mmol/L    Glucose 97 70 - 99 mg/dL    Urea Nitrogen 8 7 - 30 mg/dL    Creatinine 0.58 0.52 - 1.04 mg/dL    GFR Estimate >90 >60 mL/min/1.7m2    GFR Estimate If Black >90 >60 mL/min/1.7m2    Calcium 9.0 8.5 - 10.1 mg/dL    Bilirubin Total 0.2 0.2 - 1.3 mg/dL    Albumin 4.2 3.4 - 5.0 g/dL    Protein Total 7.3 6.8 - 8.8 g/dL    Alkaline Phosphatase 56 40 - 150 U/L    ALT 20 0 - 50 U/L    AST 16 0 - 45 U/L   ESR: Erythrocyte sedimentation rate   Result Value Ref Range    Sed Rate 10 0 - 20 mm/h   CRP  inflammation   Result Value Ref Range    CRP Inflammation <2.9 0.0 - 8.0 mg/L   Anti Nuclear Nichole IgG by IFA with Reflex   Result Value Ref Range    RONAL interpretation Negative NEG^Negative   Hepatitis C antibody   Result Value Ref Range    Hepatitis C Antibody Nonreactive NR^Nonreactive   Hepatitis B surface antigen   Result Value Ref Range    Hep B Surface Agn Nonreactive NR^Nonreactive   Hepatitis C RNA, quantitative   Result Value Ref Range    HCV RNA Quant IU/ml HCV RNA Not Detected HCVND^HCV RNA Not Detected [IU]/mL    Log of HCV RNA Qt Not Calculated <1.2 Log IU/mL   GGT   Result Value Ref Range    GGT 27 0 - 40 U/L

## 2018-01-19 LAB
ANA SER QL IF: NEGATIVE
HBV SURFACE AG SERPL QL IA: NONREACTIVE
HCV AB SERPL QL IA: NONREACTIVE

## 2018-01-19 ASSESSMENT — ANXIETY QUESTIONNAIRES: GAD7 TOTAL SCORE: 4

## 2018-01-22 LAB
HCV RNA SERPL NAA+PROBE-ACNC: NORMAL [IU]/ML
HCV RNA SERPL NAA+PROBE-LOG IU: NORMAL LOG IU/ML

## 2018-01-22 NOTE — PROGRESS NOTES
Dear Christine,      It was a pleasure to see you at your recent office visit.  Your test results are listed below.  All labs normal. No autoimmune markers, hepatitis C, B, or inflammation. Kidney and liver function normal.  Liver enzymes normal.        If you have any questions or concerns, please call the clinic at [phone].    Sincerely,  Angi Marroquin PA-C

## 2018-04-01 ENCOUNTER — HEALTH MAINTENANCE LETTER (OUTPATIENT)
Age: 38
End: 2018-04-01

## 2018-05-22 NOTE — PATIENT INSTRUCTIONS
Call [facility] to schedule breast imaging.  Call .    Please return fasting for cholesterol recheck, you can make a lab only appointment for this.  No food or drink other than water for 10 hours.        Preventive Health Recommendations  Female Ages 26 - 39  Yearly exam:   See your health care provider every year in order to    Review health changes.     Discuss preventive care.      Review your medicines if you your doctor has prescribed any.    Until age 30: Get a Pap test every three years (more often if you have had an abnormal result).    After age 30: Talk to your doctor about whether you should have a Pap test every 3 years or have a Pap test with HPV screening every 5 years.   You do not need a Pap test if your uterus was removed (hysterectomy) and you have not had cancer.  You should be tested each year for STDs (sexually transmitted diseases), if you're at risk.   Talk to your provider about how often to have your cholesterol checked.  If you are at risk for diabetes, you should have a diabetes test (fasting glucose).  Shots: Get a flu shot each year. Get a tetanus shot every 10 years.   Nutrition:     Eat at least 5 servings of fruits and vegetables each day.    Eat whole-grain bread, whole-wheat pasta and brown rice instead of white grains and rice.    Talk to your provider about Calcium and Vitamin D.     Lifestyle    Exercise at least 150 minutes a week (30 minutes a day, 5 days of the week). This will help you control your weight and prevent disease.    Limit alcohol to one drink per day.    No smoking.     Wear sunscreen to prevent skin cancer.    See your dentist every six months for an exam and cleaning.

## 2018-05-22 NOTE — PROGRESS NOTES
SUBJECTIVE:   CC: Christine NADIYA Hu is an 37 year old woman who presents for preventive health visit.     Healthy Habits:    Answers for HPI/ROS submitted by the patient on 5/31/2018   Annual Exam:  Getting at least 3 servings of Calcium per day:: Yes  Bi-annual eye exam:: Yes  Dental care twice a year:: Yes  Sleep apnea or symptoms of sleep apnea:: None  Diet:: Regular (no restrictions)  Frequency of exercise:: 2-3 days/week  Taking medications regularly:: Not Applicable  Medication side effects:: Not applicable  Additional concerns today:: YES  PHQ-2 Score: 0  Duration of exercise:: 30-45 minutes    Not fasting.     Only concern is bilateral breast pain off and on. No nipple drainage, constitutional symptoms, or lumps palpated. Does drink caffeine.   Has no pain today.     [id number]}    Today's PHQ-2 Score:   PHQ-2 ( 1999 Pf[employer]) 5/31/2018 1/18/2018   Q1: Little interest or pleasure in doing things 0 -   Q2: Feeling down, depressed or hopeless 0 0   PHQ-2 Score 0 -   Q1: Little interest or pleasure in doing things Not at all -   Q2: Feeling down, depressed or hopeless Not at all -   PHQ-2 Score 0 -       Abuse: Current or Past(Physical, Sexual or Emotional)- No  Do you feel safe in your environment - Yes    Social History   Substance Use Topics     Smoking status: Current Some Day Smoker     Packs/day: 0.50     Smokeless tobacco: Never Used     Alcohol use Yes      Comment: 2 x a year     If you drink alcohol do you typically have >3 drinks per day or >7 drinks per week? No                     Reviewed orders with patient.  Reviewed health maintenance and updated orders accordingly - Yes  Labs reviewed in EPIC  BP Readings from Last 3 Encounters:   05/31/18 137/74   01/18/18 138/86   06/06/17 126/84    Wt Readings from Last 3 Encounters:   05/31/18 151 lb (68.5 kg)   01/18/18 163 lb (73.9 kg)   06/06/17 171 lb (77.6 kg)                  Patient Active Problem List   Diagnosis     Degeneration of cervical  intervertebral disc     Contraceptive management     History of abnormal Pap smear     Depressive disorder, not elsewhere classified     Thyrotoxicosis     Low back pain     Generalized anxiety disorder     Mastoiditis     Overdose     CARDIOVASCULAR SCREENING; LDL GOAL LESS THAN 160     ASCUS favor benign     Tobacco use     Primary insomnia     History of Clostridium difficile     Benign essential hypertension     Heart murmur     Past Surgical History:   Procedure Laterality Date     C/SECTION, LOW TRANSVERSE  2005    , Low Transverse     COLONOSCOPY      5 yrs     MASTOIDECTOMY       TONSILLECTOMY         Social History   Substance Use Topics     Smoking status: Current Some Day Smoker     Packs/day: 0.50     Smokeless tobacco: Never Used     Alcohol use Yes      Comment: 2 x a year     Family History   Problem Relation Age of Onset     Allergies Mother      Psychotic Disorder Father      depression     Heart Failure Father      heart attack in 2015?     Allergies Sister      Allergies Sister      Asthma Other      Psychotic Disorder Sister      anxiety     Psychotic Disorder Sister      depression     Psychotic Disorder Maternal Uncle      anxiety     C.A.D. No family hx of      DIABETES No family hx of      Hypertension No family hx of      CEREBROVASCULAR DISEASE No family hx of      Breast Cancer No family hx of      Cancer - colorectal No family hx of      Prostate Cancer No family hx of                Pertinent mammograms are reviewed under the imaging tab.  History of abnormal Pap smear: YES - updated in Problem List and Health Maintenance accordingly    Reviewed and updated as needed this visit by clinical staff  Tobacco  Allergies  Meds  Med Hx  Surg Hx  Fam Hx  Soc Hx        Reviewed and updated as needed this visit by Provider        Past Medical History:   Diagnosis Date     ASCUS favor benign 2015    Neg HPV     Degeneration of cervical intervertebral disc      Knee effusion,  left      Lumbago      Ménière's disease      Overdose     TRAZADONE      Past Surgical History:   Procedure Laterality Date     C/SECTION, LOW TRANSVERSE  2005    , Low Transverse     COLONOSCOPY      5 yrs     MASTOIDECTOMY       TONSILLECTOMY       Obstetric History       T2      L2     SAB0   TAB0   Ectopic0   Multiple0   Live Births0       # Outcome Date GA Lbr Que/2nd Weight Sex Delivery Anes PTL Lv   2 Term 07 37w0d  6 lb 6 oz (2.892 kg) M Vag-Spont         Name: Anastacio   1 Term 05 41w2d  7 lb 6 oz (3.345 kg) F CS-Unspec         Name: Monet          ROS:  CONSTITUTIONAL: NEGATIVE for fever, chills, change in weight  INTEGUMENTARY/SKIN: NEGATIVE for worrisome rashes, moles or lesions  EYES: NEGATIVE for vision changes or irritation  ENT: NEGATIVE for ear, mouth and throat problems  RESP: NEGATIVE for significant cough or SOB  CV: NEGATIVE for chest pain, palpitations or peripheral edema  GI: NEGATIVE for nausea, abdominal pain, heartburn, or change in bowel habits  : NEGATIVE for unusual urinary or vaginal symptoms. No vaginal bleeding.  MUSCULOSKELETAL: NEGATIVE for significant arthralgias or myalgia  NEURO: NEGATIVE for weakness, dizziness or paresthesias  PSYCHIATRIC: NEGATIVE for changes in mood or affect     OBJECTIVE:   /74  Pulse 91  Temp 99.1  F (37.3  C) (Oral)  Resp 16  Wt 151 lb (68.5 kg)  LMP 2018 (Approximate)  SpO2 98%  Breastfeeding? No  BMI 24.37 kg/m2  EXAM:  GENERAL: healthy, alert and no distress  EYES: Eyes grossly normal to inspection, PERRL and conjunctivae and sclerae normal  HENT: ear canals and TM's normal, nose and mouth without ulcers or lesions  NECK: no adenopathy, no asymmetry, masses, or scars and thyroid normal to palpation  RESP: lungs clear to auscultation - no rales, rhonchi or wheezes  BREAST: normal without masses, nipple discharge and no palpable axillary masses or adenopathy. Tender mildly left breast 3 oclock  "position.   CV: regular rate and rhythm, normal S1 S2, no S3 or S4, no murmur, click or rub, no peripheral edema and peripheral pulses strong  ABDOMEN: soft, nontender, no hepatosplenomegaly, no masses and bowel sounds normal   (female): normal female external genitalia, normal urethral meatus, vaginal mucosa pink, moist, well rugated, and normal cervix/adnexa/uterus without masses or discharge  MS: no gross musculoskeletal defects noted, no edema  SKIN: no suspicious lesions or rashes  NEURO: Normal strength and tone, mentation intact and speech normal  PSYCH: mentation appears normal, affect normal/bright    ASSESSMENT/PLAN:   1. Encounter for routine adult health examination with abnormal findings      2. Screening for malignant neoplasm of cervix      3. Mastodynia    - MA Diagnostic Digital Bilateral; Future    4. Lipid screening    - Lipid panel reflex to direct LDL Fasting; Future    5. Screening for diabetes mellitus    - Comprehensive metabolic panel; Future    COUNSELING:   Reviewed preventive health counseling, as reflected in patient instructions       Regular exercise       Healthy diet/nutrition       Vision screening       Hearing screening         reports that she has been smoking.  She has been smoking about 0.50 packs per day. She has never used smokeless tobacco.  Tobacco Cessation Action Plan: Information offered: Patient not interested at this time  Estimated body mass index is 24.37 kg/(m^2) as calculated from the following:    Height as of 1/18/18: 5' 6\" (1.676 m).    Weight as of this encounter: 151 lb (68.5 kg).         Patient Instructions   [facility].  Call .    Please return fasting for cholesterol recheck, you can make a lab only appointment for this.  No food or drink other than water for 10 hours.        Preventive Health Recommendations  Female Ages 26 - 39  Yearly exam:   See your health care provider every year in order " to    Review health changes.     Discuss preventive care.      Review your medicines if you your doctor has prescribed any.    Until age 30: Get a Pap test every three years (more often if you have had an abnormal result).    After age 30: Talk to your doctor about whether you should have a Pap test every 3 years or have a Pap test with HPV screening every 5 years.   You do not need a Pap test if your uterus was removed (hysterectomy) and you have not had cancer.  You should be tested each year for STDs (sexually transmitted diseases), if you're at risk.   Talk to your provider about how often to have your cholesterol checked.  If you are at risk for diabetes, you should have a diabetes test (fasting glucose).  Shots: Get a flu shot each year. Get a tetanus shot every 10 years.   Nutrition:     Eat at least 5 servings of fruits and vegetables each day.    Eat whole-grain bread, whole-wheat pasta and brown rice instead of white grains and rice.    Talk to your provider about Calcium and Vitamin D.     Lifestyle    Exercise at least 150 minutes a week (30 minutes a day, 5 days of the week). This will help you control your weight and prevent disease.    Limit alcohol to one drink per day.    No smoking.     Wear sunscreen to prevent skin cancer.    See your dentist every six months for an exam and cleaning.        Counseling Resources:  ATP IV Guidelines  Pooled Cohorts Equation Calculator  Breast Cancer Risk Calculator  FRAX Risk Assessment  ICSI Preventive Guidelines  Dietary Guidelines for Americans, 2010  USDA's MyPlate  ASA Prophylaxis  Lung CA Screening    Angi Marroquin PA-C  [facility]

## 2018-05-31 ENCOUNTER — DOCUMENTATION ONLY (OUTPATIENT)
Dept: LAB | Facility: CLINIC | Age: 38
End: 2018-05-31

## 2018-05-31 ENCOUNTER — OFFICE VISIT (OUTPATIENT)
Dept: FAMILY MEDICINE | Facility: CLINIC | Age: 38
End: 2018-05-31
Payer: COMMERCIAL

## 2018-05-31 VITALS
SYSTOLIC BLOOD PRESSURE: 137 MMHG | WEIGHT: 151 LBS | TEMPERATURE: 99.1 F | HEART RATE: 91 BPM | BODY MASS INDEX: 24.37 KG/M2 | RESPIRATION RATE: 16 BRPM | DIASTOLIC BLOOD PRESSURE: 74 MMHG | OXYGEN SATURATION: 98 %

## 2018-05-31 DIAGNOSIS — Z00.01 ENCOUNTER FOR ROUTINE ADULT HEALTH EXAMINATION WITH ABNORMAL FINDINGS: Primary | ICD-10-CM

## 2018-05-31 DIAGNOSIS — Z12.4 SCREENING FOR MALIGNANT NEOPLASM OF CERVIX: ICD-10-CM

## 2018-05-31 DIAGNOSIS — Z13.1 SCREENING FOR DIABETES MELLITUS: ICD-10-CM

## 2018-05-31 DIAGNOSIS — Z13.220 LIPID SCREENING: ICD-10-CM

## 2018-05-31 DIAGNOSIS — N64.4 MASTODYNIA: ICD-10-CM

## 2018-05-31 PROCEDURE — 87624 HPV HI-RISK TYP POOLED RSLT: CPT | Performed by: PHYSICIAN ASSISTANT

## 2018-05-31 PROCEDURE — 99395 PREV VISIT EST AGE 18-39: CPT | Performed by: PHYSICIAN ASSISTANT

## 2018-05-31 NOTE — PROGRESS NOTES
Patient was seen on 5.31.2018. Lab needs orders for PAP sample received.     Thank you,   Laura Perry MLT ([facility])

## 2018-05-31 NOTE — LETTER
June 8, 2018    Christine HEARN Shruti Hu  [address] [place] [address]    Dear Christine,  We are happy to inform you that your PAP smear result from 5/31/18 is normal.  We are now able to do a follow up test on PAP smears. The DNA test is for HPV (Human Papilloma Virus). Cervical cancer is closely linked with certain types of HPV. Your results showed no evidence of high risk HPV.  Therefore we recommend you return in 3 years for your next pap smear.  You will still need to return to the clinic every year for an annual exam and other preventive tests.  Please contact the clinic at [phone] with any questions.  Sincerely,    Angi Marroquin PA-C/katja

## 2018-05-31 NOTE — NURSING NOTE
"Chief Complaint   Patient presents with     Physical     AFE, Pt is not fasting but did have some labs done not long ago unsure if that is good enough.     Health Maintenance     orders pended       Initial /74  Pulse 91  Temp 99.1  F (37.3  C) (Oral)  Resp 16  Wt 151 lb (68.5 kg)  LMP 05/17/2018 (Approximate)  SpO2 98%  Breastfeeding? No  BMI 24.37 kg/m2 Estimated body mass index is 24.37 kg/(m^2) as calculated from the following:    Height as of 1/18/18: 5' 6\" (1.676 m).    Weight as of this encounter: 151 lb (68.5 kg).  Medication Reconciliation: complete      Amisha Lion MA    "

## 2018-05-31 NOTE — MR AVS SNAPSHOT
After Visit Summary   5/31/2018    Christine Hu    MRN: [id number]           Patient Information     Date Of Birth          1980        Visit Information        Provider Department      5/31/2018 2:40 PM Angi Marroquin PA-C [facility]        Today's Diagnoses     Encounter for routine adult health examination with abnormal findings    -  1    Screening for malignant neoplasm of cervix        Mastodynia        Lipid screening        Screening for diabetes mellitus          Care Instructions    [facility] to [facility].  Call .    Please return fasting for cholesterol recheck, you can make a lab only appointment for this.  No food or drink other than water for 10 hours.        Preventive Health Recommendations  Female Ages 26 - 39  Yearly exam:   See your health care provider every year in order to    Review health changes.     Discuss preventive care.      Review your medicines if you your doctor has prescribed any.    Until age 30: Get a Pap test every three years (more often if you have had an abnormal result).    After age 30: Talk to your doctor about whether you should have a Pap test every 3 years or have a Pap test with HPV screening every 5 years.   You do not need a Pap test if your uterus was removed (hysterectomy) and you have not had cancer.  You should be tested each year for STDs (sexually transmitted diseases), if you're at risk.   Talk to your provider about how often to have your cholesterol checked.  If you are at risk for diabetes, you should have a diabetes test (fasting glucose).  Shots: Get a flu shot each year. Get a tetanus shot every 10 years.   Nutrition:     Eat at least 5 servings of fruits and vegetables each day.    Eat whole-grain bread, whole-wheat pasta and brown rice instead of white grains and rice.    Talk to your provider about Calcium and Vitamin D.     Lifestyle    Exercise at least  150 minutes a week (30 minutes a day, 5 days of the week). This will help you control your weight and prevent disease.    Limit alcohol to one drink per day.    No smoking.     Wear sunscreen to prevent skin cancer.    See your dentist every six months for an exam and cleaning.            Follow-ups after your visit        Future tests that were ordered for you today     Open Future Orders        Priority Expected Expires Ordered    Lipid panel reflex to direct LDL Fasting Routine  5/31/2019 5/31/2018    Comprehensive metabolic panel Routine  5/31/2019 5/31/2018    MA Diagnostic Digital Bilateral Routine  5/31/2019 5/31/2018            Who to contact     If you have questions or need follow up information about today's clinic visit or your schedule please contact [facility] directly at [phone].  Normal or non-critical lab and imaging results will be communicated to you by MyChart, letter or phone within 4 business days after the clinic has received the results. If you do not hear from us within 7 days, please contact the clinic through MyChart or phone. If you have a critical or abnormal lab result, we will notify you by phone as soon as possible.  Submit refill requests through [employer] or call your pharmacy and they will forward the refill request to us. Please allow 3 business days for your refill to be completed.          Additional Information About Your Visit        Care EveryWhere ID     This is your Care EveryWhere ID. This could be used by other organizations to access your [facility] medical records  [id number]        Your Vitals Were     Pulse Temperature Respirations Last Period Pulse Oximetry Breastfeeding?    91 99.1  F (37.3  C) (Oral) 16 05/17/2018 (Approximate) 98% No    BMI (Body Mass Index)                   24.37 kg/m2            Blood Pressure from Last 3 Encounters:   05/31/18 137/74   01/18/18 138/86   06/06/17 126/84    Weight from Last 3 Encounters:   05/31/18 151 lb (68.5  kg)   01/18/18 163 lb (73.9 kg)   06/06/17 171 lb (77.6 kg)               Primary Care Provider Office Phone # Fax #    Angi Marroquin PA-C [phone] [address] [facility] [address]        Equal Access to Services     JOSE SAUL : Hadii aad ku hadasho Soomaali, waaxda luqadaha, qaybta kaalmada adeegyada, waxay idiin hayaan adeeg kharash lafinnn . So [facility] [phone].    ATENCIÓN: Si habla español, tiene a gallo disposición servicios gratuitos de asistencia lingüística. Llame al [phone].    We comply with applicable federal civil rights laws and [place] laws. We do not discriminate on the basis of race, color, national origin, age, disability, sex, sexual orientation, or gender identity.            Thank you!     Thank you for choosing [facility]  for your care. Our goal is always to provide you with excellent care. Hearing back from our patients is one way we can continue to improve our services. Please take a few minutes to complete the written survey that you may receive in the mail after your visit with us. Thank you!             Your Updated Medication List - Protect others around you: Learn how to safely use, store and throw away your medicines at www.disposemymeds.org.          This list is accurate as of 5/31/18  3:15 PM.  Always use your most recent med list.                   Brand Name Dispense Instructions for use Diagnosis    ibuprofen 100 MG/5ML suspension    ADVIL/MOTRIN     Take 10 mg/kg by mouth every 4 hours as needed for fever or moderate pain

## 2018-06-01 PROCEDURE — G0145 SCR C/V CYTO,THINLAYER,RESCR: HCPCS | Performed by: PHYSICIAN ASSISTANT

## 2018-06-05 LAB
COPATH REPORT: NORMAL
PAP: NORMAL

## 2018-06-07 LAB
FINAL DIAGNOSIS: NORMAL
HPV HR 12 DNA CVX QL NAA+PROBE: NEGATIVE
HPV16 DNA SPEC QL NAA+PROBE: NEGATIVE
HPV18 DNA SPEC QL NAA+PROBE: NEGATIVE
SPECIMEN DESCRIPTION: NORMAL
SPECIMEN SOURCE CVX/VAG CYTO: NORMAL

## 2018-10-16 ENCOUNTER — OFFICE VISIT (OUTPATIENT)
Dept: FAMILY MEDICINE | Facility: CLINIC | Age: 38
End: 2018-10-16
Payer: COMMERCIAL

## 2018-10-16 ENCOUNTER — RADIANT APPOINTMENT (OUTPATIENT)
Dept: GENERAL RADIOLOGY | Facility: CLINIC | Age: 38
End: 2018-10-16
Attending: PHYSICIAN ASSISTANT
Payer: COMMERCIAL

## 2018-10-16 VITALS
HEIGHT: 66 IN | RESPIRATION RATE: 18 BRPM | WEIGHT: 157 LBS | BODY MASS INDEX: 25.23 KG/M2 | SYSTOLIC BLOOD PRESSURE: 139 MMHG | TEMPERATURE: 100.1 F | DIASTOLIC BLOOD PRESSURE: 80 MMHG | HEART RATE: 99 BPM | OXYGEN SATURATION: 97 %

## 2018-10-16 DIAGNOSIS — Z20.89 EXPOSURE TO PNEUMONIA: ICD-10-CM

## 2018-10-16 DIAGNOSIS — R05.9 COUGH: ICD-10-CM

## 2018-10-16 DIAGNOSIS — R50.9 FEVER AND CHILLS: ICD-10-CM

## 2018-10-16 DIAGNOSIS — R05.9 COUGH: Primary | ICD-10-CM

## 2018-10-16 LAB
FLUAV+FLUBV AG SPEC QL: NEGATIVE
FLUAV+FLUBV AG SPEC QL: NEGATIVE
SPECIMEN SOURCE: NORMAL

## 2018-10-16 PROCEDURE — 71046 X-RAY EXAM CHEST 2 VIEWS: CPT | Mod: FY

## 2018-10-16 PROCEDURE — 87804 INFLUENZA ASSAY W/OPTIC: CPT | Performed by: PHYSICIAN ASSISTANT

## 2018-10-16 PROCEDURE — 99214 OFFICE O/P EST MOD 30 MIN: CPT | Performed by: PHYSICIAN ASSISTANT

## 2018-10-16 RX ORDER — BENZONATATE 200 MG/1
200 CAPSULE ORAL 3 TIMES DAILY PRN
Qty: 30 CAPSULE | Refills: 0 | Status: SHIPPED | OUTPATIENT
Start: 2018-10-16 | End: 2019-06-27

## 2018-10-16 RX ORDER — AZITHROMYCIN 250 MG/1
TABLET, FILM COATED ORAL
Qty: 6 TABLET | Refills: 0 | Status: SHIPPED | OUTPATIENT
Start: 2018-10-16 | End: 2019-06-27

## 2018-10-16 ASSESSMENT — ANXIETY QUESTIONNAIRES
GAD7 TOTAL SCORE: 0
6. BECOMING EASILY ANNOYED OR IRRITABLE: NOT AT ALL
IF YOU CHECKED OFF ANY PROBLEMS ON THIS QUESTIONNAIRE, HOW DIFFICULT HAVE THESE PROBLEMS MADE IT FOR YOU TO DO YOUR WORK, TAKE CARE OF THINGS AT HOME, OR GET ALONG WITH OTHER PEOPLE: NOT DIFFICULT AT ALL
3. WORRYING TOO MUCH ABOUT DIFFERENT THINGS: NOT AT ALL
2. NOT BEING ABLE TO STOP OR CONTROL WORRYING: NOT AT ALL
7. FEELING AFRAID AS IF SOMETHING AWFUL MIGHT HAPPEN: NOT AT ALL
5. BEING SO RESTLESS THAT IT IS HARD TO SIT STILL: NOT AT ALL
1. FEELING NERVOUS, ANXIOUS, OR ON EDGE: NOT AT ALL

## 2018-10-16 ASSESSMENT — PATIENT HEALTH QUESTIONNAIRE - PHQ9: 5. POOR APPETITE OR OVEREATING: NOT AT ALL

## 2018-10-16 NOTE — PROGRESS NOTES
SUBJECTIVE:                                                    Christine Hu is a 38 year old female who presents to clinic today for the following health issues:    ENT Symptoms             Symptoms: cc Present Absent Comment   Fever/Chills  x  Temp 100.1 today. Has been 102 at home.    Fatigue  x     Muscle Aches  x     Eye Irritation  x     Sneezing  x     Nasal Kenn/Drg   x    Sinus Pressure/Pain   x    Loss of smell   x    Dental pain   x    Sore Throat  x     Swollen Glands  x     Ear Pain/Fullness  x     Cough  x     Wheeze  x     Chest Pain   x    Shortness of breath   x Oxygen is 97 percent   Rash   x    Other  x  Just full achy feeling all around body per pt.     Symptom duration:  x 6-7 days   Symptom severity:  severe   Treatments tried:  OTC and thera flu given from pt mother?   Contacts:  Possibly kids---child has pneumonia     Fever started on Friday.   Had body aches all over.   Has been eating and drinking okay.   Fatigue-yes.   theraflu helps some.   Cough started on .   Some day smoker.   Has been sleeping a lot.   Cough keeps her up at night.         History of c diff from clindamycin.   Has allergies to codeine.   Had hives from penicillin.       Problem list and histories reviewed & adjusted, as indicated.  Additional history: as documented    Patient Active Problem List   Diagnosis     Degeneration of cervical intervertebral disc     Contraceptive management     History of abnormal Pap smear     Depressive disorder, not elsewhere classified     Thyrotoxicosis     Low back pain     Generalized anxiety disorder     Mastoiditis     Overdose     CARDIOVASCULAR SCREENING; LDL GOAL LESS THAN 160     Tobacco use     Primary insomnia     History of Clostridium difficile     Benign essential hypertension     Heart murmur     Past Surgical History:   Procedure Laterality Date     C/SECTION, LOW TRANSVERSE  2005    , Low Transverse     COLONOSCOPY      5 yrs     MASTOIDECTOMY   "     TONSILLECTOMY         Social History   Substance Use Topics     Smoking status: Current Some Day Smoker     Packs/day: 0.50     Smokeless tobacco: Never Used     Alcohol use Yes      Comment: 2 x a year     Family History   Problem Relation Age of Onset     Allergies Mother      Psychotic Disorder Father      depression     Heart Failure Father      heart attack in NOV. 2015?     Allergies Sister      Allergies Sister      Asthma Other      Psychotic Disorder Sister      anxiety     Psychotic Disorder Sister      depression     Psychotic Disorder Maternal Uncle      anxiety     C.A.D. No family hx of      Diabetes No family hx of      Hypertension No family hx of      Cerebrovascular Disease No family hx of      Breast Cancer No family hx of      Cancer - colorectal No family hx of      Prostate Cancer No family hx of          Current Outpatient Prescriptions   Medication Sig Dispense Refill     ibuprofen (ADVIL,MOTRIN) 100 MG/5ML suspension Take 10 mg/kg by mouth every 4 hours as needed for fever or moderate pain       Allergies   Allergen Reactions     Codeine GI Disturbance     Nausea with use of Tylenol #3     Clindamycin Other (See Comments)     C diff, hospitalized      No Clinical Screening - See Comments      Unknown Diuretic-patient complain about blisters on her legs.     Penicillins Rash       ROS:  Constitutional, HEENT, cardiovascular, pulmonary, GI, , musculoskeletal, neuro, skin, endocrine and psych systems are negative, except as otherwise noted.    OBJECTIVE:     /80  Pulse 99  Temp 100.1  F (37.8  C) (Tympanic)  Resp 18  Ht 5' 6\" (1.676 m)  Wt 157 lb (71.2 kg)  SpO2 97%  Breastfeeding? No  BMI 25.34 kg/m2  Body mass index is 25.34 kg/(m^2).  GENERAL:  No acute distress.  Interacts appropriately.  Breathing without difficulty.  Alert.  HEENT:  Tympanic membranes intact without effusion or erythema.  Oral mucosa moist. Posterior pharynx has no erythema.  Posterior pharynx has no " exudate. no edema.  NECK:  Soft and supple.  without tenderness.  no lymphadenopathy.  Normal range of motion.    CARDIAC:   Regular rate and rhythm.  No murmurs, rubs, or gallops.   PULMONARY: Clear to auscultation bilaterally.  No  wheezes, crackles, or rhonchi.  Normal air exchange/breath sounds.  No use of accessory muscles.    PSYCH: Normal affect.  SKIN: No rashes.        Results for orders placed or performed in visit on 10/16/18 (from the past 24 hour(s))   Influenza A/B antigen   Result Value Ref Range    Influenza A/B Agn Specimen Nasal     Influenza A Negative NEG^Negative    Influenza B Negative NEG^Negative     Chest xray-negative for pneumonia, suggestive of bronchitis per radiologist    ASSESSMENT/PLAN:     ASSESSMENT / PLAN:  (R05) Cough  (primary encounter diagnosis)  Comment: concern for pneumonia  Plan: XR Chest 2 Views, Influenza A/B antigen,         azithromycin (ZITHROMAX) 250 MG tablet,         benzonatate (TESSALON) 200 MG capsule          Flu negative  Exposed to pneumonia  cxr not helpful  Take with food. Side effects discussed.  Call with worsening symptoms or if no improvement in 3 days.  Analgesics for pain with food as needed.    To emergency room with worsening symptoms    (R50.9) Fever and chills  Comment:   Plan: XR Chest 2 Views, Influenza A/B antigen,         azithromycin (ZITHROMAX) 250 MG tablet,         benzonatate (TESSALON) 200 MG capsule            (Z20.828) Exposure to pneumonia  Comment:   Plan:               Angi Marroquin PA-C  [facility]

## 2018-10-16 NOTE — MR AVS SNAPSHOT
"              After Visit Summary   10/16/2018    Christine Hu    MRN: [id number]           Patient Information     Date Of Birth          1980        Visit Information        Provider Department      10/16/2018 11:40 AM Angi Marroquin PA-C [facility]        Today's Diagnoses     Cough    -  1    Fever and chills        Exposure to pneumonia           Follow-ups after your visit        Follow-up notes from your care team     Return in about 5 days (around 10/21/2018) for if not improving.      Who to contact     If you have questions or need follow up information about today's clinic visit or your schedule please contact [facility] directly at [phone].  Normal or non-critical lab and imaging results will be communicated to you by MyChart, letter or phone within 4 business days after the clinic has received the results. If you do not hear from us within 7 days, please contact the clinic through MyChart or phone. If you have a critical or abnormal lab result, we will notify you by phone as soon as possible.  Submit refill requests through MagTag or call your pharmacy and they will forward the refill request to us. Please allow 3 business days for your refill to be completed.          Additional Information About Your Visit        Care EveryWhere ID     This is your Care EveryWhere ID. This could be used by other organizations to access your [facility] medical records  [id number]        Your Vitals Were     Pulse Temperature Respirations Height Pulse Oximetry Breastfeeding?    99 100.1  F (37.8  C) (Tympanic) 18 5' 6\" (1.676 m) 97% No    BMI (Body Mass Index)                   25.34 kg/m2            Blood Pressure from Last 3 Encounters:   10/16/18 139/80   05/31/18 137/74   01/18/18 138/86    Weight from Last 3 Encounters:   10/16/18 157 lb (71.2 kg)   05/31/18 151 lb (68.5 kg)   01/18/18 163 lb (73.9 kg)              We Performed the Following     Influenza " A/B antigen          Today's Medication Changes          These changes are accurate as of 10/16/18 12:52 PM.  If you have any questions, ask your nurse or doctor.               Start taking these medicines.        Dose/Directions    azithromycin 250 MG tablet   Commonly known as:  ZITHROMAX   Used for:  Cough, Fever and chills   Started by:  Angi Marroquin PA-C        Two tablets first day, then one tablet daily for four days. Yogurt daily.   Quantity:  6 tablet   Refills:  0       benzonatate 200 MG capsule   Commonly known as:  TESSALON   Used for:  Cough, Fever and chills   Started by:  Angi Marroquin PA-C        Dose:  200 mg   Take 1 capsule (200 mg) by mouth 3 times daily as needed for cough   Quantity:  30 capsule   Refills:  0            Where to get your medicines      These medications were sent to [facility]/pharmacy #[id number] - [place] - [address] [facility]  [address], [place] [address]     Phone:  [phone]     azithromycin 250 MG tablet    benzonatate 200 MG capsule                Primary Care Provider Office Phone # Fax #    Angi Marroquin PA-C [phone] [address] [facility] [address]        Equal Access to Services     JOSE SUAL : Hadii aad ku hadasho Soomaali, waaxda luqadaha, qaybta kaalmada adeegyada, waxay idiin hayoracion fransisco khharish laeugenia schmitz. So [facility] [phone].    ATENCIÓN: Si habla español, tiene a gallo disposición servicios gratuitos de asistencia lingüística. ame al [phone].    We comply with applicable federal civil rights laws and [place] laws. We do not discriminate on the basis of race, color, national origin, age, disability, sex, sexual orientation, or gender identity.            Thank you!     Thank you for choosing [facility]  for your care. Our goal is always to provide you with excellent care. Hearing back from our patients is one way we can continue to  improve our services. Please take a few minutes to complete the written survey that you may receive in the mail after your visit with us. Thank you!             Your Updated Medication List - Protect others around you: Learn how to safely use, store and throw away your medicines at www.disposemymeds.org.          This list is accurate as of 10/16/18 12:52 PM.  Always use your most recent med list.                   Brand Name Dispense Instructions for use Diagnosis    azithromycin 250 MG tablet    ZITHROMAX    6 tablet    Two tablets first day, then one tablet daily for four days. Yogurt daily.    Cough, Fever and chills       benzonatate 200 MG capsule    TESSALON    30 capsule    Take 1 capsule (200 mg) by mouth 3 times daily as needed for cough    Cough, Fever and chills       ibuprofen 100 MG/5ML suspension    ADVIL/MOTRIN     Take 10 mg/kg by mouth every 4 hours as needed for fever or moderate pain

## 2018-10-16 NOTE — NURSING NOTE
"Chief Complaint   Patient presents with     Cough     Possible pneumonia per pt x 6-7 days now kids have been sick at home     Health Maintenance     orders pended       Initial /80  Pulse 99  Temp 100.1  F (37.8  C) (Tympanic)  Resp 18  Ht 5' 6\" (1.676 m)  Wt 157 lb (71.2 kg)  SpO2 97%  Breastfeeding? No  BMI 25.34 kg/m2 Estimated body mass index is 25.34 kg/(m^2) as calculated from the following:    Height as of this encounter: 5' 6\" (1.676 m).    Weight as of this encounter: 157 lb (71.2 kg).  Medication Reconciliation: complete      Amisha Lion MA    "

## 2018-10-17 ASSESSMENT — PATIENT HEALTH QUESTIONNAIRE - PHQ9: SUM OF ALL RESPONSES TO PHQ QUESTIONS 1-9: 3

## 2018-10-17 ASSESSMENT — ANXIETY QUESTIONNAIRES: GAD7 TOTAL SCORE: 0

## 2018-10-19 ENCOUNTER — OFFICE VISIT (OUTPATIENT)
Dept: FAMILY MEDICINE | Facility: CLINIC | Age: 38
End: 2018-10-19
Payer: COMMERCIAL

## 2018-10-19 ENCOUNTER — TELEPHONE (OUTPATIENT)
Dept: FAMILY MEDICINE | Facility: CLINIC | Age: 38
End: 2018-10-19

## 2018-10-19 VITALS
RESPIRATION RATE: 16 BRPM | DIASTOLIC BLOOD PRESSURE: 88 MMHG | SYSTOLIC BLOOD PRESSURE: 133 MMHG | HEART RATE: 96 BPM | TEMPERATURE: 99.3 F | WEIGHT: 158 LBS | BODY MASS INDEX: 25.5 KG/M2 | OXYGEN SATURATION: 97 %

## 2018-10-19 DIAGNOSIS — J18.9 PNEUMONIA OF RIGHT LOWER LOBE DUE TO INFECTIOUS ORGANISM: Primary | ICD-10-CM

## 2018-10-19 PROCEDURE — 99214 OFFICE O/P EST MOD 30 MIN: CPT | Performed by: PHYSICIAN ASSISTANT

## 2018-10-19 RX ORDER — ALBUTEROL SULFATE 90 UG/1
2 AEROSOL, METERED RESPIRATORY (INHALATION) EVERY 4 HOURS PRN
Qty: 1 INHALER | Refills: 0 | Status: SHIPPED | OUTPATIENT
Start: 2018-10-19 | End: 2018-11-13

## 2018-10-19 RX ORDER — CODEINE PHOSPHATE AND GUAIFENESIN 10; 100 MG/5ML; MG/5ML
1 SOLUTION ORAL EVERY 4 HOURS PRN
Qty: 120 ML | Refills: 0 | Status: SHIPPED | OUTPATIENT
Start: 2018-10-19 | End: 2019-06-27

## 2018-10-19 RX ORDER — LEVOFLOXACIN 500 MG/1
500 TABLET, FILM COATED ORAL DAILY
Qty: 10 TABLET | Refills: 0 | Status: SHIPPED | OUTPATIENT
Start: 2018-10-19 | End: 2018-10-29

## 2018-10-19 ASSESSMENT — ENCOUNTER SYMPTOMS
WEIGHT LOSS: 0
CARDIOVASCULAR NEGATIVE: 1
SPUTUM PRODUCTION: 1
COUGH: 1
GASTROINTESTINAL NEGATIVE: 1
SHORTNESS OF BREATH: 1
FEVER: 1
HEMOPTYSIS: 0
EYE PAIN: 0
PALPITATIONS: 0
DIAPHORESIS: 0
WHEEZING: 1

## 2018-10-19 ASSESSMENT — PAIN SCALES - GENERAL: PAINLEVEL: MODERATE PAIN (4)

## 2018-10-19 NOTE — PROGRESS NOTES
SUBJECTIVE:     HPI  Christine NADIYA Hu is a 38 year old female who presents to clinic today for the following health issues:  RESPIRATORY SYMPTOMS    Duration: 8days.  Was initially seen 4days ago in which CXR shows possible RLL pneumonia.  Influenza was negative.  Was given zpack and tessalon perles with minimal relief.    Description  Productive cough along with shortness of breath, wheezing, fever and myalgias.  No hemoptysis.    Severity: moderate    Accompanying signs and symptoms:  Also reports chest and abdominal wall pain from coughing too much.  No abdominal pain, n/v, constipation, diarrhea, bloody or black tarry stools.     History (predisposing factors):  Asthma as a kids and has been around sick child with pneumonia at home.  Non-smoker.    Precipitating or alleviating factors: worse with coughing and exertion.    Therapies tried and outcome:  rest and fluids, antibiotics and tessalon perles with minimal relief       Reviewed PMH, FMH and SOH.  Patient Active Problem List   Diagnosis     Degeneration of cervical intervertebral disc     Contraceptive management     History of abnormal Pap smear     Depressive disorder, not elsewhere classified     Thyrotoxicosis     Low back pain     Generalized anxiety disorder     Mastoiditis     Overdose     CARDIOVASCULAR SCREENING; LDL GOAL LESS THAN 160     Tobacco use     Primary insomnia     History of Clostridium difficile     Benign essential hypertension     Heart murmur     Current Outpatient Prescriptions   Medication Sig Dispense Refill     albuterol (PROAIR HFA/PROVENTIL HFA/VENTOLIN HFA) 108 (90 Base) MCG/ACT inhaler Inhale 2 puffs into the lungs every 4 hours as needed for shortness of breath / dyspnea or wheezing 1 Inhaler 0     azithromycin (ZITHROMAX) 250 MG tablet Two tablets first day, then one tablet daily for four days. Yogurt daily. 6 tablet 0     benzonatate (TESSALON) 200 MG capsule Take 1 capsule (200 mg) by mouth 3 times daily as  needed for cough 30 capsule 0     Allergies   Allergen Reactions     Codeine GI Disturbance     Nausea with use of Tylenol #3     Clindamycin Other (See Comments)     C diff, hospitalized      No Clinical Screening - See Comments      Unknown Diuretic-patient complain about blisters on her legs.     Penicillins Rash       Review of Systems   Constitutional: Positive for fever and malaise/fatigue. Negative for diaphoresis and weight loss.   HENT: Positive for congestion.    Eyes: Negative for pain.   Respiratory: Positive for cough, sputum production, shortness of breath and wheezing. Negative for hemoptysis.    Cardiovascular: Negative.  Negative for chest pain and palpitations.   Gastrointestinal: Negative.    Skin: Negative.    All other systems reviewed and are negative.      /88  Pulse 96  Temp 99.3  F (37.4  C) (Tympanic)  Resp 16  Wt 158 lb (71.7 kg)  SpO2 97%  Breastfeeding? No  BMI 25.5 kg/m2  Physical Exam   Constitutional: She is oriented to person, place, and time and well-developed, well-nourished, and in no distress. No distress.   HENT:   Head: Normocephalic and atraumatic.   Nose: Nose normal.   Mouth/Throat: Oropharynx is clear and moist. No oropharyngeal exudate.   TMs are intact without any erythema or bulging bilaterally.  Airway is patent.   Eyes: Conjunctivae and EOM are normal. Pupils are equal, round, and reactive to light. No scleral icterus.   Neck: Normal range of motion. Neck supple. No thyromegaly present.   Cardiovascular: Normal rate, regular rhythm, normal heart sounds and intact distal pulses.  Exam reveals no gallop and no friction rub.    No murmur heard.  Pulmonary/Chest: Effort normal. No accessory muscle usage. No respiratory distress. She has no decreased breath sounds. She has no wheezes. She has no rhonchi. She has rales in the right middle field and the right lower field.   Lymphadenopathy:     She has no cervical adenopathy.   Neurological: She is alert and  oriented to person, place, and time.   Skin: Skin is warm and dry. No cyanosis. Nails show no clubbing.   Psychiatric: Mood and affect normal.   Nursing note and vitals reviewed.        Assessment/Plan:  Pneumonia of right lower lobe due to infectious organism (H):  Recent CXR showed RLL infiltrate present.  She has failed zithromax.  Will switch zithromax to levaquin [id number].  Will also give robitussin AC and albuterol inh as needed for symptoms.  Discussed risks and benefits of medication along with side effects, direction for use, risk of tendon rupture.  No driving or operating machinery due to sedation.  Recommend treatment with rest, fluids and chicken soup. Tylenol/ibuprofen prn fever/pain.  Recheck in clinic if symptoms worsen or if symptoms do not improve.  To the ER if he develops hemoptysis, chest pain, fevers>102, worsening shortness of breath/wheezing.    -     levofloxacin (LEVAQUIN) 500 MG tablet; Take 1 tablet (500 mg) by mouth daily for 10 days  -     guaiFENesin-codeine (ROBITUSSIN AC) 100-10 MG/5ML SOLN solution; Take 5 mLs by mouth every 4 hours as needed for cough  -     albuterol (PROAIR HFA/PROVENTIL HFA/VENTOLIN HFA) 108 (90 Base) MCG/ACT inhaler; Inhale 2 puffs into the lungs every 4 hours as needed for shortness of breath / dyspnea or wheezing          Jody See PRAVIN Felder

## 2018-10-19 NOTE — TELEPHONE ENCOUNTER
Recommend she go to urgent care when they open if its worse.  can't give anything more for cough as she is allergic to codeine.     Angi

## 2018-10-19 NOTE — TELEPHONE ENCOUNTER
Patient informed needs to be seen for further evaluation.   Appointment today with Jody See PRAVIN FelderN, RN, CPN

## 2018-10-19 NOTE — MR AVS SNAPSHOT
After Visit Summary   10/19/2018    Christine Hu    MRN: [id number]           Patient Information     Date Of Birth          1980        Visit Information        Provider Department      10/19/2018 1:00 PM Jody Felder PA-C [facility]        Today's Diagnoses     Pneumonia of right lower lobe due to infectious organism (H)    -  1       Follow-ups after your visit        Who to contact     If you have questions or need follow up information about today's clinic visit or your schedule please contact [facility] directly at [phone].  Normal or non-critical lab and imaging results will be communicated to you by MyChart, letter or phone within 4 business days after the clinic has received the results. If you do not hear from us within 7 days, please contact the clinic through MyChart or phone. If you have a critical or abnormal lab result, we will notify you by phone as soon as possible.  Submit refill requests through roomlinx or call your pharmacy and they will forward the refill request to us. Please allow 3 business days for your refill to be completed.          Additional Information About Your Visit        Care EveryWhere ID     This is your Care EveryWhere ID. This could be used by other organizations to access your [place] medical records  [id number]        Your Vitals Were     Pulse Temperature Respirations Pulse Oximetry Breastfeeding? BMI (Body Mass Index)    96 99.3  F (37.4  C) (Tympanic) 16 97% No 25.5 kg/m2       Blood Pressure from Last 3 Encounters:   10/19/18 133/88   10/16/18 139/80   05/31/18 137/74    Weight from Last 3 Encounters:   10/19/18 158 lb (71.7 kg)   10/16/18 157 lb (71.2 kg)   05/31/18 151 lb (68.5 kg)              Today, you had the following     No orders found for display         Today's Medication Changes          These changes are accurate as of 10/19/18  1:21 PM.  If you have any questions, ask your nurse or doctor.                Start taking these medicines.        Dose/Directions    albuterol 108 (90 Base) MCG/ACT inhaler   Commonly known as:  PROAIR HFA/PROVENTIL HFA/VENTOLIN HFA   Used for:  Pneumonia of right lower lobe due to infectious organism (H)   Started by:  Jody Felder PA-C        Dose:  2 puff   Inhale 2 puffs into the lungs every 4 hours as needed for shortness of breath / dyspnea or wheezing   Quantity:  1 Inhaler   Refills:  0       guaiFENesin-codeine 100-10 MG/5ML Soln solution   Commonly known as:  ROBITUSSIN AC   Used for:  Pneumonia of right lower lobe due to infectious organism (H)   Started by:  Jody Felder PA-C        Dose:  1 tsp.   Take 5 mLs by mouth every 4 hours as needed for cough   Quantity:  120 mL   Refills:  0       levofloxacin 500 MG tablet   Commonly known as:  LEVAQUIN   Used for:  Pneumonia of right lower lobe due to infectious organism (H)   Started by:  Jody Felder PA-C        Dose:  500 mg   Take 1 tablet (500 mg) by mouth daily for 10 days   Quantity:  10 tablet   Refills:  0            Where to get your medicines      These medications were sent to [facility]/pharmacy #[id number] - [place] - [address] [place] [address] [address] [place] [address]     Phone:  [phone]     albuterol 108 (90 Base) MCG/ACT inhaler    levofloxacin 500 MG tablet         Some of these will need a paper prescription and others can be bought over the counter.  Ask your nurse if you have questions.     Bring a paper prescription for each of these medications     guaiFENesin-codeine 100-10 MG/5ML Soln solution                Primary Care Provider Office Phone # Fax #    Angi Marroquin PA-C [phone] [address] [facility] [address]        Equal Access to Services     JOSE SAUL : Gareth Arriaga, waraissada luqadaha, qaybta kaalmada julius, simona schmitz. So [facility] [phone].    ATENCIÓN:  Si habla xochilt, tiene a gallo disposición servicios gratuitos de asistencia lingüística. Emilia sy [phone].    We comply with applicable federal civil rights laws and [place] laws. We do not discriminate on the basis of race, color, national origin, age, disability, sex, sexual orientation, or gender identity.            Thank you!     Thank you for choosing [facility]  [facility]. Our goal is always to provide you with excellent care. Hearing back from our patients is one way we can continue to improve our services. Please take a few minutes to complete the written survey that you may receive in the mail after your visit with us. Thank you!             Your Updated Medication List - Protect others around you: Learn how to safely use, store and throw away your medicines at www.disposemymeds.org.          This list is accurate as of 10/19/18  1:21 PM.  Always use your most recent med list.                   Brand Name Dispense Instructions for use Diagnosis    albuterol 108 (90 Base) MCG/ACT inhaler    PROAIR HFA/PROVENTIL HFA/VENTOLIN HFA    1 Inhaler    Inhale 2 puffs into the lungs every 4 hours as needed for shortness of breath / dyspnea or wheezing    Pneumonia of right lower lobe due to infectious organism (H)       azithromycin 250 MG tablet    ZITHROMAX    6 tablet    Two tablets first day, then one tablet daily for four days. Yogurt daily.    Cough, Fever and chills       benzonatate 200 MG capsule    TESSALON    30 capsule    Take 1 capsule (200 mg) by mouth 3 times daily as needed for cough    Cough, Fever and chills       guaiFENesin-codeine 100-10 MG/5ML Soln solution    ROBITUSSIN AC    120 mL    Take 5 mLs by mouth every 4 hours as needed for cough    Pneumonia of right lower lobe due to infectious organism (H)       ibuprofen 100 MG/5ML suspension    ADVIL/MOTRIN     Take 10 mg/kg by mouth every 4 hours as needed for fever or moderate pain        levofloxacin 500 MG tablet     LEVAQUIN    10 tablet    Take 1 tablet (500 mg) by mouth daily for 10 days    Pneumonia of right lower lobe due to infectious organism (H)

## 2018-10-19 NOTE — TELEPHONE ENCOUNTER
Patient seen Tuesday and diagnosed with pneumonia.  She feels the antibiotic is not working, and she only has one pill left. She has bad coughing attacks that leave her whole midsection sore and almost feels worse, Is there something she can get for the cough, and a new antibiotic? Would taking a steroid help? Ok to leave a message.

## 2018-10-19 NOTE — PROGRESS NOTES
"  SUBJECTIVE:   Christine HEARN Shruti Hu is a 38 year old female who presents to clinic today for the following health issues:      Patient presents with:  RECHECK: pt was seen 10/16/18, sice x 8 days but states her cough and congestion is worse. She is still taking Zithromax and Tessalon caps        {additional problems for provider to add:963696}    Problem list and histories reviewed & adjusted, as indicated.  Additional history: {NONE - AS DOCUMENTED:509303::\"as documented\"}    {HIST REVIEW/ LINKS 2:975454}    Reviewed and updated as needed this visit by clinical staff  Tobacco  Allergies  Meds  Med Hx  Surg Hx  Fam Hx  Soc Hx      Reviewed and updated as needed this visit by Provider         {PROVIDER CHARTING PREFERENCE:954529}  "

## 2018-10-19 NOTE — TELEPHONE ENCOUNTER
Cough is worse, taking antibiotic and tessalon, no relief with cough. Body aches and fever have improved. Feels more pressure in chest. Cough attacks throughout night, also during the day. Abdominal muscles very sore. Cough until can't catch breath. Does hear some slight occasional wheezing, also more out of breath.     To provider to advise.    Antonella SANCHEZN, RN, CPN

## 2018-10-22 ENCOUNTER — TELEPHONE (OUTPATIENT)
Dept: FAMILY MEDICINE | Facility: CLINIC | Age: 38
End: 2018-10-22

## 2018-10-22 DIAGNOSIS — R05.9 COUGH: Primary | ICD-10-CM

## 2018-10-22 RX ORDER — CEFDINIR 300 MG/1
300 CAPSULE ORAL 2 TIMES DAILY
Qty: 20 CAPSULE | Refills: 0 | Status: SHIPPED | OUTPATIENT
Start: 2018-10-22 | End: 2019-06-27

## 2018-10-22 NOTE — TELEPHONE ENCOUNTER
levoquin is the best medication we have for pneumonia. It does have the rare but not good side effect of tendon rupture.  It is not common.     We can try omnicef since she already took azithromycin, this will provide her with very good coverage. If that does not help we will have to have her see specialist and get imaging and have he re-checked.     Angi

## 2018-10-22 NOTE — TELEPHONE ENCOUNTER
Saw Angi SMITH on 10/16/18 for cough and pneumonia and treated with KARLEE fleming.  Pt cough and chest congestion was worse so she saw Jody SMITH on 10/19/18.  Jody SMITH said pt chest sounded worse at ov and gave her Levaquin to start.  Pt discussed med with her sister and her brother in law developed a ruptured tendon from taking this medication so she does not want to take it.     Pt states she is still very tired, wheezing,and is having massive cough fits-especially at night when she lays down.  Her fever and body aches are resolved.  She states her sx have not worsened or improved in the last few days.      Pt requesting alternate antibiotic.    Sofia Pierce BSN, RN

## 2018-10-22 NOTE — TELEPHONE ENCOUNTER
Patient is calling stating pneumonia/cough has not gotten better was seen by another provider Friday who prescribed RX: levofloxacin (LEVAQUIN) 500 MG tablet, but doesn't want to take it and would like an alternative. Please call to discuss. Thank you.

## 2018-10-22 NOTE — TELEPHONE ENCOUNTER
Pt notified of provider message as written.  Pt verbalized good understanding.  Sofia Pierce BSN, RN

## 2018-11-13 DIAGNOSIS — J18.9 PNEUMONIA OF RIGHT LOWER LOBE DUE TO INFECTIOUS ORGANISM: ICD-10-CM

## 2018-11-15 NOTE — TELEPHONE ENCOUNTER
Routing refill request to provider for review/approval because:  Albuterol inhaler was given 1 month ago for pneumonia    Routing to provider to advise.  DANIEL DunbarN RN

## 2018-11-16 RX ORDER — ALBUTEROL SULFATE 90 UG/1
2 AEROSOL, METERED RESPIRATORY (INHALATION) EVERY 4 HOURS PRN
Qty: 1 INHALER | Refills: 0 | Status: SHIPPED | OUTPATIENT
Start: 2018-11-16 | End: 2019-06-27

## 2019-06-20 NOTE — PROGRESS NOTES
Subjective     Christine Hu is a 38 year old female who presents to clinic today for the following health issues:    HPI     Vaginal Bleeding (Dysmenorrhea)  Onset: x 2 months    Description:   Duration of bleeding episodes: 28 days  Frequency between periods:  Unsure per pt  Describe bleeding/flow:   Clots: no  Number of pads/hour: 1  Cramping: mild and during    Accompanying Signs & Symptoms: Weight gain  Weakness: YES  Lightheadedness: YES  Hot flashes: no  Nosebleeds/Easy bruising: no  Vaginal Discharge: no    History:  Patient's last menstrual period was 06/22/2019 (approximate).  Previous normal periods: YES  Contraceptive use: NO  Possibility of Pregnancy: YES  Any bleeding after intercourse: no  Age of first period (menarche): 12 yrs old  Abnormal PAP Smears: no    Therapies Tried and outcome: None    Irregular periods x 2 months. Never had this issue before.     Patient already had normal labs, normal thyroid and estrogen, fsh and progesterone. Normal cmp.  Pregnancy test was negative.       Wants to get pregnant in the future. Not in relationship yet.       Has not had pelvic US at this point. Will order.     No FH of early menopausal issues.   No bad cramps but gets cramps.   Bleeding is not heavier.     Sister--has endometriosis.     Still smokes.     BMI is 27.     Has been exercising more but nothing intense.       Unsure if more stress.     Is unsure why she is gaining weight also.         Patient Active Problem List   Diagnosis     Degeneration of cervical intervertebral disc     Contraceptive management     History of abnormal Pap smear     Depressive disorder, not elsewhere classified     Thyrotoxicosis     Low back pain     Generalized anxiety disorder     Mastoiditis     Overdose     CARDIOVASCULAR SCREENING; LDL GOAL LESS THAN 160     Tobacco use     Primary insomnia     History of Clostridium difficile     Benign essential hypertension     Heart murmur     Past Surgical History:  "  Procedure Laterality Date     C/SECTION, LOW TRANSVERSE  2005    , Low Transverse     COLONOSCOPY      5 yrs     MASTOIDECTOMY       TONSILLECTOMY         Social History     Tobacco Use     Smoking status: Current Some Day Smoker     Packs/day: 0.50     Smokeless tobacco: Never Used   Substance Use Topics     Alcohol use: Not Currently     Comment: 2 x a year     Family History   Problem Relation Age of Onset     Allergies Mother      Psychotic Disorder Father         depression     Heart Failure Father         heart attack in 2015?     Allergies Sister      Allergies Sister      Asthma Other      Psychotic Disorder Sister         anxiety     Psychotic Disorder Sister         depression     Psychotic Disorder Maternal Uncle         anxiety     C.A.D. No family hx of      Diabetes No family hx of      Hypertension No family hx of      Cerebrovascular Disease No family hx of      Breast Cancer No family hx of      Cancer - colorectal No family hx of      Prostate Cancer No family hx of          No current outpatient medications on file.     Allergies   Allergen Reactions     Codeine GI Disturbance     Nausea with use of Tylenol #3     Clindamycin Other (See Comments)     C diff, hospitalized      No Clinical Screening - See Comments      Unknown Diuretic-patient complain about blisters on her legs.     Penicillins Rash       Reviewed and updated as needed this visit by Provider         Review of Systems   ROS COMP: Constitutional, HEENT, cardiovascular, pulmonary, GI, , musculoskeletal, neuro, skin, endocrine and psych systems are negative, except as otherwise noted.      Objective    /80   Pulse 80   Temp 98.1  F (36.7  C) (Oral)   Resp 14   Ht 1.676 m (5' 6\")   Wt 78 kg (172 lb)   LMP 2019 (Approximate)   SpO2 99%   Breastfeeding? No   BMI 27.76 kg/m    Body mass index is 27.76 kg/m .  Physical Exam   GENERAL: healthy, alert and no distress  NECK: no adenopathy, no asymmetry, " masses, or scars and thyroid normal to palpation  RESP: lungs clear to auscultation - no rales, rhonchi or wheezes  CV: regular rate and rhythm, normal S1 S2, no S3 or S4, no murmur, click or rub, no peripheral edema and peripheral pulses strong  MS: no gross musculoskeletal defects noted, no edema  PSYCH: mentation appears normal, affect normal/bright            Assessment & Plan     1. Irregular periods  See below  Could be from stress, exercising, ovarian issue, less likely fibroid or endometriosis as she doesn't have pain or heavy bleeding  - US Pelvic Complete w Transvaginal; Future  - OB/GYN REFERRAL     Tobacco Cessation:   reports that she has been smoking.  She has been smoking about 0.50 packs per day. She has never used smokeless tobacco.  Tobacco Cessation Action Plan: Information offered: Patient not interested at this time        Patient Instructions   Call Diogenes alfaro to schedule Ultrasound [phone]     I will follow up with this    See obgyn if this continues      Billin min spent face-to-face with patient. 15 min on history, 5 on exam, 5 on discussing diagnosis and treatment plan.     Angi Marroquin PA-C  [facility]

## 2019-06-21 ENCOUNTER — TELEPHONE (OUTPATIENT)
Dept: FAMILY MEDICINE | Facility: CLINIC | Age: 39
End: 2019-06-21

## 2019-06-21 DIAGNOSIS — Z13.1 SCREENING FOR DIABETES MELLITUS: ICD-10-CM

## 2019-06-21 DIAGNOSIS — Z13.220 LIPID SCREENING: ICD-10-CM

## 2019-06-21 DIAGNOSIS — N92.6 MISSED PERIOD: Primary | ICD-10-CM

## 2019-06-21 DIAGNOSIS — N92.6 MISSED PERIOD: ICD-10-CM

## 2019-06-21 LAB
ALBUMIN SERPL-MCNC: 4 G/DL (ref 3.4–5)
ALP SERPL-CCNC: 72 U/L (ref 40–150)
ALT SERPL W P-5'-P-CCNC: 19 U/L (ref 0–50)
ANION GAP SERPL CALCULATED.3IONS-SCNC: 7 MMOL/L (ref 3–14)
AST SERPL W P-5'-P-CCNC: 17 U/L (ref 0–45)
B-HCG SERPL-ACNC: <1 IU/L (ref 0–5)
BILIRUB SERPL-MCNC: 0.5 MG/DL (ref 0.2–1.3)
BUN SERPL-MCNC: 10 MG/DL (ref 7–30)
CALCIUM SERPL-MCNC: 9.3 MG/DL (ref 8.5–10.1)
CHLORIDE SERPL-SCNC: 106 MMOL/L (ref 94–109)
CO2 SERPL-SCNC: 26 MMOL/L (ref 20–32)
CREAT SERPL-MCNC: 0.67 MG/DL (ref 0.52–1.04)
ESTRADIOL SERPL-MCNC: 41 PG/ML
FSH SERPL-ACNC: 7.3 IU/L
GFR SERPL CREATININE-BSD FRML MDRD: >90 ML/MIN/{1.73_M2}
GLUCOSE SERPL-MCNC: 87 MG/DL (ref 70–99)
POTASSIUM SERPL-SCNC: 4.8 MMOL/L (ref 3.4–5.3)
PROT SERPL-MCNC: 7.3 G/DL (ref 6.8–8.8)
SODIUM SERPL-SCNC: 139 MMOL/L (ref 133–144)
TSH SERPL DL<=0.005 MIU/L-ACNC: 1.28 MU/L (ref 0.4–4)

## 2019-06-21 PROCEDURE — 84443 ASSAY THYROID STIM HORMONE: CPT | Performed by: PHYSICIAN ASSISTANT

## 2019-06-21 PROCEDURE — 82670 ASSAY OF TOTAL ESTRADIOL: CPT | Performed by: PHYSICIAN ASSISTANT

## 2019-06-21 PROCEDURE — 83001 ASSAY OF GONADOTROPIN (FSH): CPT | Performed by: PHYSICIAN ASSISTANT

## 2019-06-21 PROCEDURE — 36415 COLL VENOUS BLD VENIPUNCTURE: CPT | Performed by: PHYSICIAN ASSISTANT

## 2019-06-21 PROCEDURE — 84702 CHORIONIC GONADOTROPIN TEST: CPT | Performed by: PHYSICIAN ASSISTANT

## 2019-06-21 PROCEDURE — 80053 COMPREHEN METABOLIC PANEL: CPT | Performed by: PHYSICIAN ASSISTANT

## 2019-06-21 NOTE — TELEPHONE ENCOUNTER
Pt states her period was two weeks late last month and she is already 2 weeks late again this month.  Pt has had weight gain also.  Pt states her sister has a thyroid issue.  She is requesting labs to check on perimenopause and thyroid.   Sofia SANCHEZN, RN

## 2019-06-21 NOTE — LETTER
June 24, 2019    Christine HEARN Shruti Hu  [address] [place] [address]        Dear Christine,    It was a pleasure to see you at your recent office visit.  Your test results are listed below.  Hormones are normal. Pregnancy negative. Please see OBGYN if you have further concerns.       If you have any questions or concerns, please call the clinic at [phone].    Sincerely,  Angi Marroquin PA-C    Results for orders placed or performed in visit on 06/21/19   Estradiol   Result Value Ref Range    Estradiol 41 pg/mL   HCG quantitative pregnancy   Result Value Ref Range    HCG Quantitative Serum <1 0 - 5 IU/L   Follicle stimulating hormone   Result Value Ref Range    FSH 7.3 IU/L   TSH with free T4 reflex   Result Value Ref Range    TSH 1.28 0.40 - 4.00 mU/L   Comprehensive metabolic panel   Result Value Ref Range    Sodium 139 133 - 144 mmol/L    Potassium 4.8 3.4 - 5.3 mmol/L    Chloride 106 94 - 109 mmol/L    Carbon Dioxide 26 20 - 32 mmol/L    Anion Gap 7 3 - 14 mmol/L    Glucose 87 70 - 99 mg/dL    Urea Nitrogen 10 7 - 30 mg/dL    Creatinine 0.67 0.52 - 1.04 mg/dL    GFR Estimate >90 >60 mL/min/[1.73_m2]    GFR Estimate If Black >90 >60 mL/min/[1.73_m2]    Calcium 9.3 8.5 - 10.1 mg/dL    Bilirubin Total 0.5 0.2 - 1.3 mg/dL    Albumin 4.0 3.4 - 5.0 g/dL    Protein Total 7.3 6.8 - 8.8 g/dL    Alkaline Phosphatase 72 40 - 150 U/L    ALT 19 0 - 50 U/L    AST 17 0 - 45 U/L

## 2019-06-21 NOTE — TELEPHONE ENCOUNTER
Patient calling has lab appointment scheduled for today at 8:30 am, would like hormone levels added to blood draw as well. Has a follow up appointment scheduled next week.

## 2019-06-23 NOTE — RESULT ENCOUNTER NOTE
Dear Christine,      It was a pleasure to see you at your recent office visit.  Your test results are listed below.  Hormones are normal. Pregnancy negative. Please see OBGYN if you have further concerns.       If you have any questions or concerns, please call the clinic at [phone].    Sincerely,  Angi Marroquin PA-C

## 2019-06-27 ENCOUNTER — OFFICE VISIT (OUTPATIENT)
Dept: FAMILY MEDICINE | Facility: CLINIC | Age: 39
End: 2019-06-27
Payer: COMMERCIAL

## 2019-06-27 VITALS
WEIGHT: 172 LBS | HEART RATE: 80 BPM | SYSTOLIC BLOOD PRESSURE: 135 MMHG | BODY MASS INDEX: 27.64 KG/M2 | DIASTOLIC BLOOD PRESSURE: 80 MMHG | TEMPERATURE: 98.1 F | OXYGEN SATURATION: 99 % | RESPIRATION RATE: 14 BRPM | HEIGHT: 66 IN

## 2019-06-27 DIAGNOSIS — N92.6 IRREGULAR PERIODS: Primary | ICD-10-CM

## 2019-06-27 PROCEDURE — 99214 OFFICE O/P EST MOD 30 MIN: CPT | Performed by: PHYSICIAN ASSISTANT

## 2019-06-27 ASSESSMENT — MIFFLIN-ST. JEOR: SCORE: 1471.94

## 2019-06-27 NOTE — PATIENT INSTRUCTIONS
Call Diogenes alfaro to schedule Ultrasound [phone]     I will follow up with this    See obgyn if this continues

## 2019-07-02 ENCOUNTER — ANCILLARY PROCEDURE (OUTPATIENT)
Dept: ULTRASOUND IMAGING | Facility: CLINIC | Age: 39
End: 2019-07-02
Attending: PHYSICIAN ASSISTANT
Payer: COMMERCIAL

## 2019-07-02 DIAGNOSIS — N92.6 IRREGULAR PERIODS: ICD-10-CM

## 2019-07-02 PROCEDURE — 76856 US EXAM PELVIC COMPLETE: CPT | Mod: 59

## 2019-07-02 PROCEDURE — 76830 TRANSVAGINAL US NON-OB: CPT

## 2019-07-02 NOTE — RESULT ENCOUNTER NOTE
PLEASE CALL PATIENT: Dear Christine,      It was a pleasure to see you at your recent office visit.  Your test results are listed below.  I WOULD like you to see OBGYn to f/u for your symptoms. Your ultrasound showed a myoma which is a benign (noncancerous) tumor (collectinon of) muscle tissue in your uterus.  You also have a predominant endometrium (lining of the uterus) that is said to be likely normal but we should get OBGYNs opinion.         If you have any questions or concerns, please call the clinic at [phone].    Sincerely,  Angi Marroquin PA-C

## 2019-07-03 ENCOUNTER — OFFICE VISIT (OUTPATIENT)
Dept: OBGYN | Facility: CLINIC | Age: 39
End: 2019-07-03
Payer: COMMERCIAL

## 2019-07-03 VITALS
HEIGHT: 66 IN | DIASTOLIC BLOOD PRESSURE: 82 MMHG | OXYGEN SATURATION: 98 % | TEMPERATURE: 98.3 F | WEIGHT: 170.2 LBS | SYSTOLIC BLOOD PRESSURE: 133 MMHG | HEART RATE: 88 BPM | BODY MASS INDEX: 27.35 KG/M2

## 2019-07-03 DIAGNOSIS — N92.6 IRREGULAR MENSES: Primary | ICD-10-CM

## 2019-07-03 DIAGNOSIS — D25.1 INTRAMURAL LEIOMYOMA OF UTERUS: ICD-10-CM

## 2019-07-03 PROCEDURE — 99203 OFFICE O/P NEW LOW 30 MIN: CPT | Performed by: NURSE PRACTITIONER

## 2019-07-03 RX ORDER — LEVONORGESTREL/ETHIN.ESTRADIOL 0.1-0.02MG
1 TABLET ORAL DAILY
Qty: 84 TABLET | Refills: 1 | Status: SHIPPED | OUTPATIENT
Start: 2019-07-03 | End: 2020-12-09

## 2019-07-03 ASSESSMENT — MIFFLIN-ST. JEOR: SCORE: 1455.83

## 2019-07-03 ASSESSMENT — PAIN SCALES - GENERAL: PAINLEVEL: NO PAIN (0)

## 2019-07-03 NOTE — PROGRESS NOTES
Subjective     Christine Hu is a 38 year old female who presents to clinic today for the following health issues:    HPI   Discuss ultrasound results    Patient had called her PCP in  as her cycle was 2 weeks late and her previous cycle was 2 weeks late as well. She had been having weight gain as well. Labs were normal and Quant HCG negative. Patient did then have a pelvic ultrasound that showed an intramural uterine fibroid approximately 5 cm.  Other than the 2 longer cycles, no other menstrual cycle changes. Periods have always been Q 28-29 days and last 4-7 with lighter to occasional moderate bleeding, rare cramping.   Both last 2 cycles were 15 days late each, but normal flow and duration.   She is just starting a new relationship. She is hoping for another pregnancy.   She has been cutting down on her smoking over the last few months. Down to 4-5 daily. Goal by the end of July is to cut out her after dinner and before bed cigarettes.   Sister has a history of endometriosis. Mother had uterine fibroids and ovarian cysts.    Patient Active Problem List   Diagnosis     Degeneration of cervical intervertebral disc     Contraceptive management     History of abnormal Pap smear     Depressive disorder, not elsewhere classified     Thyrotoxicosis     Low back pain     Generalized anxiety disorder     Mastoiditis     Overdose     CARDIOVASCULAR SCREENING; LDL GOAL LESS THAN 160     Tobacco use     Primary insomnia     History of Clostridium difficile     Benign essential hypertension     Heart murmur     Past Surgical History:   Procedure Laterality Date     C/SECTION, LOW TRANSVERSE  2005    , Low Transverse     COLONOSCOPY      5 yrs     MASTOIDECTOMY       TONSILLECTOMY         Social History     Tobacco Use     Smoking status: Current Every Day Smoker     Packs/day: 0.25     Smokeless tobacco: Never Used   Substance Use Topics     Alcohol use: Not Currently     Family History   Problem  "Relation Age of Onset     Allergies Mother      Psychotic Disorder Father         depression     Heart Failure Father         heart attack in NOV. 2015?     Allergies Sister      Allergies Sister      Asthma Other      Psychotic Disorder Sister         anxiety     Psychotic Disorder Sister         depression     Psychotic Disorder Maternal Uncle         anxiety     C.A.D. No family hx of      Diabetes No family hx of      Hypertension No family hx of      Cerebrovascular Disease No family hx of      Breast Cancer No family hx of      Cancer - colorectal No family hx of      Prostate Cancer No family hx of          Reviewed and updated as needed this visit by Provider         Review of Systems   ROS COMP: Constitutional, HEENT, cardiovascular, pulmonary, gi and gu systems are negative, except as otherwise noted.      Objective    /82 (BP Location: Right arm, Patient Position: Sitting, Cuff Size: Adult Regular)   Pulse 88   Temp 98.3  F (36.8  C) (Oral)   Ht 1.664 m (5' 5.5\")   Wt 77.2 kg (170 lb 3.2 oz)   LMP 06/22/2019 (Approximate)   SpO2 98%   BMI 27.89 kg/m    Body mass index is 27.89 kg/m .  Physical Exam   GENERAL: healthy, alert and no distress  RESP: lungs clear to auscultation - no rales, rhonchi or wheezes  CV: regular rate and rhythm, normal S1 S2, no S3 or S4, no murmur, click or rub, no peripheral edema and peripheral pulses strong  ABDOMEN: soft, nontender, no hepatosplenomegaly, no masses and bowel sounds normal   (female): patient declined today  MS: no gross musculoskeletal defects noted, no edema  SKIN: no suspicious lesions or rashes  PSYCH: mentation appears normal, affect normal/bright    Diagnostic Test Results:  Labs reviewed in Epic  Results for orders placed or performed in visit on 07/02/19   US Pelvic Complete w Transvaginal    Narrative    PELVIC ULTRASOUND TRANSABDOMINAL IMAGING AND TRANSVAGINAL IMAGING   7/2/2019 10:51 AM    HISTORY: Irregular periods.    COMPARISON: " None.    TECHNIQUE: Transabdominal imaging was performed. Transvaginal imaging  was also performed.     FINDING: The uterus measures 10.0 x 7.6 x 5.7 cm. There is an  intramural myoma in the right aspect of the mid to upper uterine body  measuring 4.9 x 5.4 x 4.6 cm. No other myometrial abnormality is seen.  The endometrium is somewhat prominent measuring 1.7 cm. However, no  focal endometrial mass is identified. The right ovary is normal. The  left ovary contains a 1.0 cm follicle. Normal blood flow is seen in  both ovaries. No adnexal pathology is seen. There is a small amount of  anechoic free fluid in the cul-de-sac.      Impression    IMPRESSION:   1. There is a 5.4 cm intramural myoma in the right uterine body.  2. The endometrium is somewhat prominent measuring 1.7 cm. This could  be within the normal range depending on the stage of the patient's  menstrual cycle. No focal mass is seen.    ALAINA MATHIS MD     Assessment & Plan     1. Irregular menses  We discussed her cycle changes, lab results and ultrasound. Discussed her desire for future pregnancy. Discussed fibroids. We discussed their origin, the fact that while they are benign, they do tend to grow slowly in response to estrogen.  We discussed the normal resolution that gradually occurs after menopause.  I explained that fibroids are very common and in many cases do not cause symptoms.  We discussed these symptoms, including menorrhagia, metrorrhagia, dysmenorrhea as well as the mass effect that fibroids can cause. Discussed additional possible etiologies of her change in cycles-anovulatory cycle.  We discussed options for treatment. Options include conservative observation, symptomatic hormonal control, myomectomy, uterine artery embolization, and hysterectomy. Does desire pregnancy in the future, so does not want surgical intervention or anything long term.   We discussed the RISKS, BENEFITS, AND ALTERNATIVES of each of these options.    At  this time, she would like to do combined oral contraceptive pills for maybe 3-6 months. We discussed risks of combined oral contraceptive pill given she smokes. Advised that if she increases her amount of smoking, would need to change to progesterone only.   We also reviewed the endometrial stripe thickness. Plan oral contraceptive pills for 6 months and can then repeat ultrasound. If pregnancy not desired at that time, can continue longer if desired. Patient is given an opportunity to ask questions and have them answered.  - US Pelvic Complete with Transvaginal; Future  - levonorgestrel-ethinyl estradiol (AVIANE/ALESSE/LESSINA) 0.1-20 MG-MCG tablet; Take 1 tablet by mouth daily  Dispense: 84 tablet; Refill: 1    2. Intramural leiomyoma of uterus  See above  - US Pelvic Complete with Transvaginal; Future  - levonorgestrel-ethinyl estradiol (AVIANE/ALESSE/LESSINA) 0.1-20 MG-MCG tablet; Take 1 tablet by mouth daily  Dispense: 84 tablet; Refill: 1     Tobacco Cessation:   reports that she has been smoking.  She has been smoking about 0.25 packs per day. She has never used smokeless tobacco.  Tobacco Cessation Action Plan: Pt is successfully decreasing amount daily    MIO [facility]

## 2019-07-23 ENCOUNTER — OFFICE VISIT (OUTPATIENT)
Dept: FAMILY MEDICINE | Facility: CLINIC | Age: 39
End: 2019-07-23
Payer: COMMERCIAL

## 2019-07-23 VITALS
SYSTOLIC BLOOD PRESSURE: 161 MMHG | OXYGEN SATURATION: 97 % | TEMPERATURE: 98.7 F | BODY MASS INDEX: 28.35 KG/M2 | HEART RATE: 96 BPM | DIASTOLIC BLOOD PRESSURE: 79 MMHG | WEIGHT: 173 LBS

## 2019-07-23 DIAGNOSIS — R53.83 FATIGUE, UNSPECIFIED TYPE: Primary | ICD-10-CM

## 2019-07-23 LAB
BASOPHILS # BLD AUTO: 0 10E9/L (ref 0–0.2)
BASOPHILS NFR BLD AUTO: 0.2 %
DIFFERENTIAL METHOD BLD: ABNORMAL
EOSINOPHIL # BLD AUTO: 0.1 10E9/L (ref 0–0.7)
EOSINOPHIL NFR BLD AUTO: 1.4 %
ERYTHROCYTE [DISTWIDTH] IN BLOOD BY AUTOMATED COUNT: 12.6 % (ref 10–15)
HCT VFR BLD AUTO: 42 % (ref 35–47)
HGB BLD-MCNC: 13.6 G/DL (ref 11.7–15.7)
LYMPHOCYTES # BLD AUTO: 1.8 10E9/L (ref 0.8–5.3)
LYMPHOCYTES NFR BLD AUTO: 28.1 %
MCH RBC QN AUTO: 32.5 PG (ref 26.5–33)
MCHC RBC AUTO-ENTMCNC: 32.4 G/DL (ref 31.5–36.5)
MCV RBC AUTO: 101 FL (ref 78–100)
MONOCYTES # BLD AUTO: 0.6 10E9/L (ref 0–1.3)
MONOCYTES NFR BLD AUTO: 9.7 %
NEUTROPHILS # BLD AUTO: 3.8 10E9/L (ref 1.6–8.3)
NEUTROPHILS NFR BLD AUTO: 60.6 %
PLATELET # BLD AUTO: 286 10E9/L (ref 150–450)
RBC # BLD AUTO: 4.18 10E12/L (ref 3.8–5.2)
WBC # BLD AUTO: 6.3 10E9/L (ref 4–11)

## 2019-07-23 PROCEDURE — 36415 COLL VENOUS BLD VENIPUNCTURE: CPT | Performed by: PHYSICIAN ASSISTANT

## 2019-07-23 PROCEDURE — 85025 COMPLETE CBC W/AUTO DIFF WBC: CPT | Performed by: PHYSICIAN ASSISTANT

## 2019-07-23 PROCEDURE — 99213 OFFICE O/P EST LOW 20 MIN: CPT | Performed by: PHYSICIAN ASSISTANT

## 2019-07-23 PROCEDURE — 86618 LYME DISEASE ANTIBODY: CPT | Performed by: PHYSICIAN ASSISTANT

## 2019-07-23 RX ORDER — DOXYCYCLINE HYCLATE 100 MG
100 TABLET ORAL 2 TIMES DAILY
Qty: 20 TABLET | Refills: 0 | Status: SHIPPED | OUTPATIENT
Start: 2019-07-23 | End: 2019-09-11

## 2019-07-23 NOTE — PROGRESS NOTES
Subjective     Christine Hu is a 38 year old female who presents to clinic today for the following health issues:    HPI   Extreme fatigue X 1 month worsening in last 6 days. Patient concerned of Lyme's Disease. Patient also noticed frequent headaches.  Possible low-grade fever.    Patient had blood work done during visit on 6/21.  Normal FSH, estradiol, TSH and comprehensive profile.  Negative quantitative hCG.    No cough or cold sxs. LMP last week  Not sexually active.    No rash.  Wants testing and treatment for Lyme's.  She [place] her [place].  Her relatives had no symptoms other than fatigue and was positive for Lyme's.      Allergies   Allergen Reactions     Codeine GI Disturbance     Nausea with use of Tylenol #3     Clindamycin Other (See Comments)     C diff, hospitalized      No Clinical Screening - See Comments      Unknown Diuretic-patient complain about blisters on her legs.     Penicillins Rash       Past Medical History:   Diagnosis Date     ASCUS favor benign 1/2015    Neg HPV     Degeneration of cervical intervertebral disc      Knee effusion, left      Lumbago      Ménière's disease      Overdose 6/09    TRAZADONE         Current Outpatient Medications on File Prior to Visit:  levonorgestrel-ethinyl estradiol (AVIANE/ALESSE/LESSINA) 0.1-20 MG-MCG tablet Take 1 tablet by mouth daily     No current facility-administered medications on file prior to visit.     Social History     Tobacco Use     Smoking status: Current Every Day Smoker     Packs/day: 0.25     Smokeless tobacco: Never Used   Substance Use Topics     Alcohol use: Not Currently       ROS:  CONSTITUTIONAL: as above.  EYES: Negative for eye problems.  ENT: neg.  RESP: neg.  CV: Negative for chest pains.  GI: Negative for vomiting.  MUSCULOSKELETAL:  Negative for significant muscle or joint pains.  NEUROLOGIC: mild HA.  SKIN: Negative for rash.  Psych- nl mentation    OBJECTIVE:  /79   Pulse  96   Temp 98.7  F (37.1  C) (Oral)   Wt 78.5 kg (173 lb)   SpO2 97%   BMI 28.35 kg/m    GENERAL APPEARANCE: Healthy, alert and no distress.  EYES:Conjunctiva/sclera clear.  EARS: No cerumen.   Ear canals w/o erythema.  TM's intact w/o erythema.    NOSE/MOUTH: Nose without ulcers, erythema or lesions.  SINUSES: No maxillary sinus tenderness.  THROAT: No erythema w/o tonsillar enlargement . No exudates.  NECK: Supple, nontender, no lymphadenopathy.  RESP: Lungs clear to auscultation - no rales, rhonchi or wheezes  CV: Regular rate and rhythm, normal S1 S2, no murmur noted.  NEURO: Awake, alert    SKIN: No rashes  abd- soft, NT no HSM      ASSESSMENT:     ICD-10-CM    1. Fatigue, unspecified type R53.83 CBC with platelets differential     Lyme Disease Nichole with reflex to WB Serum     doxycycline hyclate (VIBRA-TABS) 100 MG tablet         PLAN: Insists that she starts doxy.  If Lyme's test is negative she can then stop the doxycycline.  Also will obtain CBC to make sure she is not anemic.  Lots of rest and fluids.  RTC 2 weeks if any worsening symptoms or if not improving.    Ana Lilia Hinotn PA-C

## 2019-07-23 NOTE — LETTER
July 24, 2019    Christine HEARN Shruti Hu  [address] [place] [address]            Dear Christine,    Complete blood cell counts were normal. Normal hemoglobin/iron- not anemic.     Below is a copy of the results.  It was a pleasure to see you at your last appointment.    If you have any questions or concerns, please call myself or my nurse at [phone].    Sincerely,    Ana Lilia Hinton PA-C /lynnette    Results for orders placed or performed in visit on 07/23/19   CBC with platelets differential   Result Value Ref Range    WBC 6.3 4.0 - 11.0 10e9/L    RBC Count 4.18 3.8 - 5.2 10e12/L    Hemoglobin 13.6 11.7 - 15.7 g/dL    Hematocrit 42.0 35.0 - 47.0 %     (H) 78 - 100 fl    MCH 32.5 26.5 - 33.0 pg    MCHC 32.4 31.5 - 36.5 g/dL    RDW 12.6 10.0 - 15.0 %    Platelet Count 286 150 - 450 10e9/L    % Neutrophils 60.6 %    % Lymphocytes 28.1 %    % Monocytes 9.7 %    % Eosinophils 1.4 %    % Basophils 0.2 %    Absolute Neutrophil 3.8 1.6 - 8.3 10e9/L    Absolute Lymphocytes 1.8 0.8 - 5.3 10e9/L    Absolute Monocytes 0.6 0.0 - 1.3 10e9/L    Absolute Eosinophils 0.1 0.0 - 0.7 10e9/L    Absolute Basophils 0.0 0.0 - 0.2 10e9/L    Diff Method Automated Method    Lyme Disease Nichole with reflex to WB Serum   Result Value Ref Range    Lyme Disease Antibodies Serum 0.05 0.00 - 0.89

## 2019-07-24 LAB — B BURGDOR IGG+IGM SER QL: 0.05 (ref 0–0.89)

## 2019-07-30 ENCOUNTER — TELEPHONE (OUTPATIENT)
Dept: FAMILY MEDICINE | Facility: CLINIC | Age: 39
End: 2019-07-30

## 2019-07-30 NOTE — LETTER
[facility]  [address] [address] [address]  Phone: [phone]    July 30, 2019        Christine Hu  [address] [place] [address]          To whom it may concern:    RE: Christine Hu    Patient has been ill for the past several weeks and unable to attend [employer].  She should be able to cancel her membership.     Please contact me for questions or concerns.      Sincerely,        Angi Marroquin PA-C

## 2019-07-30 NOTE — TELEPHONE ENCOUNTER
Patient is requesting a note from pcp for [employer] to cancel her membership, patient has not been feeling good enough to utilize the membership. Patient stated that she needs a note from provider stating she has been seen and unable to continue this.    Patient would like this mailed to home address which has been verified    Thank you

## 2019-08-01 NOTE — TELEPHONE ENCOUNTER
I had Dr Liu sign the form since Angi Marroquin called in today. Form brought to the  and called and informed patient that this was done.Sharlene Stevenson MA/LOVE

## 2019-08-01 NOTE — TELEPHONE ENCOUNTER
letter was picked up from  by Christine Hu. ID was checked and patient  label was attached to patient  log.     Janell Toussaint

## 2019-09-11 ENCOUNTER — OFFICE VISIT (OUTPATIENT)
Dept: FAMILY MEDICINE | Facility: CLINIC | Age: 39
End: 2019-09-11
Payer: COMMERCIAL

## 2019-09-11 VITALS
SYSTOLIC BLOOD PRESSURE: 139 MMHG | OXYGEN SATURATION: 98 % | TEMPERATURE: 98.1 F | BODY MASS INDEX: 27.7 KG/M2 | HEART RATE: 78 BPM | DIASTOLIC BLOOD PRESSURE: 76 MMHG | WEIGHT: 169 LBS

## 2019-09-11 DIAGNOSIS — J01.90 ACUTE SINUSITIS WITH COEXISTING CONDITION REQUIRING PROPHYLACTIC TREATMENT: Primary | ICD-10-CM

## 2019-09-11 PROCEDURE — 99213 OFFICE O/P EST LOW 20 MIN: CPT | Performed by: FAMILY MEDICINE

## 2019-09-11 RX ORDER — DOXYCYCLINE 100 MG/1
100 CAPSULE ORAL 2 TIMES DAILY WITH MEALS
Qty: 20 CAPSULE | Refills: 0 | Status: SHIPPED | OUTPATIENT
Start: 2019-09-11 | End: 2019-11-01

## 2019-09-11 ASSESSMENT — PATIENT HEALTH QUESTIONNAIRE - PHQ9: SUM OF ALL RESPONSES TO PHQ QUESTIONS 1-9: 1

## 2019-09-11 NOTE — PROGRESS NOTES
Chief complaint: sinus congestion    Last year had a similar episode and ended up with pneumonia   Denies any possibility of pregnancy declined a pregnancy test    3-4  days now   Started with a little bit of congestion and sneezing  Some cough   Everyday since having more sinus congestion throat hurting and ears are popping     centor zero  Colds, sinus congestion, facial pain  Cough Yes  Greenish discharge  Fever No  Progressively getting worse: YES  Thought was getting better then started getting worse: YES  Getting better:  No  Exposure to pertussis or pertussis like symptoms: No    Problem list and histories reviewed & adjusted, as indicated.  Additional history: as documented    Problem list, Medication list, Allergies, and Medical/Social/Surgical histories reviewed in [facility] and updated as appropriate.    ROS:  Constitutional, HEENT, cardiovascular, pulmonary, gi and gu systems are negative, except as otherwise noted.    OBJECTIVE:                                                    /76   Pulse 78   Temp 98.1  F (36.7  C) (Oral)   Wt 76.7 kg (169 lb)   SpO2 98%   Breastfeeding? No   BMI 27.70 kg/m    Body mass index is 27.7 kg/m .  GENERAL: healthy, alert and no distress  EYES: Eyes grossly normal to inspection, PERRL and conjunctivae and sclerae normal  HENT: ear canals and TM's normal, nose and mouth without ulcers or lesions  Sinuses: turbinates erythematous   RETAINED TYMPANOSTOMY TUBE IN THE RIGHT EAR - patient to schedule with ENT to have it removed.   NECK: no adenopathy, no asymmetry, masses, or scars and thyroid normal to palpation  RESP: lungs clear to auscultation - no rales, rhonchi or wheezes   CV: regular rate and rhythm, normal S1 S2, no S3 or S4, no murmur, click or rub, no peripheral edema and peripheral pulses strong  ABDOMEN: soft, nontender, no hepatosplenomegaly, no masses and bowel sounds normal  MS: no gross musculoskeletal defects noted, no edema  SKIN: no suspicious lesions  or rashes  NEURO: Normal strength and tone, mentation intact and speech normal  PSYCH: mentation appears normal, affect normal/bright    Diagnostic Test Results:  No results found for this or any previous visit (from the past 24 hour(s)).     ASSESSMENT/PLAN:                                                        ICD-10-CM    1. Acute sinusitis with coexisting condition requiring prophylactic treatment J01.90 doxycycline monohydrate (MONODOX) 100 MG capsule     Advised may still be viral.  Patient frustrated and did not want to come back for another appointment if symptoms persist  Prescription for doxy - only to take if symptoms last longer than 1 week  Prescribed with doxycycline  Aware of the risks of GI intolerance, GERD, GI complications and photosensitivity with doxycycline.  Warned contraindicated in pregnancy   Recommend follow up with primary care provider if no relief, sooner if worse  Adverse reactions of medications discussed.  Over the counter medications discussed.   Aware to come back in if with worsening symptoms or if no relief despite treatment plan  Patient voiced understanding and had no further questions.     MD Kendra Fox MD  [facility]

## 2019-09-11 NOTE — LETTER
[facility]  [id number] [place] [id number]  Phone: [phone]    September 11, 2019        Christine Hu  [address] [place] [address]          To whom it may concern:    RE: Christine Hu    Patient was seen and treated today at our clinic and missed work.    Please contact me for questions or concerns.      Sincerely,        Kendra Ochoa MD

## 2019-10-29 NOTE — PROGRESS NOTES
Subjective     Christine Hu is a 39 year old female who presents to clinic today for the following health issues:    HPI   Follow up irregular menses    Patient was seen in July for changes in her menstrual cycles. At that time, they had been contemplating pregnancy in the near future and she did not want any management that was long term or permanent. Started on a low dose combined oral contraceptive pill as she did continue to smoke a few cigarettes daily. Did have a pelvic ultrasound that showed a 5.4 cm intramural fibroid.   Patient has done 4 packs on the pills. Can't remember her July cycle. Had one cycle 19 that started with 2 active pills remaining. No cycle in September, then cycle 10/25 that was late in week 3 of her pack as well.   They are still thinking pregnancy but have not made a decision. Plan was to do 3-6 months on the pills, repeat ultrasound and then move forward based on their pregnancy plans.    Patient Active Problem List   Diagnosis     Degeneration of cervical intervertebral disc     Contraceptive management     History of abnormal Pap smear     Depressive disorder, not elsewhere classified     Thyrotoxicosis     Low back pain     Generalized anxiety disorder     Mastoiditis     Overdose     CARDIOVASCULAR SCREENING; LDL GOAL LESS THAN 160     Tobacco use     Primary insomnia     History of Clostridium difficile     Benign essential hypertension     Heart murmur     Past Surgical History:   Procedure Laterality Date     C/SECTION, LOW TRANSVERSE  2005    , Low Transverse     COLONOSCOPY      5 yrs     MASTOIDECTOMY       TONSILLECTOMY         Social History     Tobacco Use     Smoking status: Current Every Day Smoker     Packs/day: 0.25     Smokeless tobacco: Never Used   Substance Use Topics     Alcohol use: Not Currently     Family History   Problem Relation Age of Onset     Allergies Mother      Psychotic Disorder Father         depression     Heart Failure  "Father         heart attack in NOV. 2015?     Allergies Sister      Allergies Sister      Asthma Other      Psychotic Disorder Sister         anxiety     Psychotic Disorder Sister         depression     Psychotic Disorder Maternal Uncle         anxiety     C.A.D. No family hx of      Diabetes No family hx of      Hypertension No family hx of      Cerebrovascular Disease No family hx of      Breast Cancer No family hx of      Cancer - colorectal No family hx of      Prostate Cancer No family hx of          Reviewed and updated as needed this visit by Provider         Review of Systems   ROS COMP: Constitutional, HEENT, cardiovascular, pulmonary, gi and gu systems are negative, except as otherwise noted.      Objective    BP (!) 152/73 (BP Location: Right arm, Patient Position: Sitting, Cuff Size: Adult Regular)   Pulse 83   Temp 98.3  F (36.8  C) (Oral)   Ht 1.664 m (5' 5.5\")   Wt 74.6 kg (164 lb 6.4 oz)   LMP 10/25/2019 (Exact Date)   SpO2 98%   BMI 26.94 kg/m    Body mass index is 26.94 kg/m .  Physical Exam   GENERAL: healthy, alert and no distress  ABDOMEN: soft, nontender, no hepatosplenomegaly, no masses and bowel sounds normal   (female): Declined again by patient   MS: no gross musculoskeletal defects noted, no edema  SKIN: no suspicious lesions or rashes  PSYCH: mentation appears normal, affect normal/bright    Assessment & Plan     1. Irregular menses  We discussed her cycles. Strongly encouraged her to make a decision soon regarding pregnancy. She has 2 pill packs remaining, so would like to use those, will plan repeat ultrasound at end of her last pack and by then will have made a decision regarding pregnancy. If they decide against it, she may consider alternate longer term management options for her cycles. If she chooses to move forward, will stop pills-watch a few cycles and if they continue to be irregular, may recommend RE referral as next step due to her age. Patient is given an " opportunity to ask questions and have them answered.    2. Lipid screening  Future orders from PCP released as she is fasting  - Lipid panel reflex to direct LDL Fasting     Renetta MIO [facility]

## 2019-10-31 ENCOUNTER — APPOINTMENT (OUTPATIENT)
Age: 39
Setting detail: DERMATOLOGY
End: 2019-11-02

## 2019-10-31 VITALS — HEIGHT: 66 IN | WEIGHT: 160 LBS | RESPIRATION RATE: 16 BRPM

## 2019-10-31 DIAGNOSIS — L72.0 EPIDERMAL CYST: ICD-10-CM

## 2019-10-31 DIAGNOSIS — L91.8 OTHER HYPERTROPHIC DISORDERS OF THE SKIN: ICD-10-CM

## 2019-10-31 DIAGNOSIS — D485 NEOPLASM OF UNCERTAIN BEHAVIOR OF SKIN: ICD-10-CM

## 2019-10-31 DIAGNOSIS — L71.8 OTHER ROSACEA: ICD-10-CM

## 2019-10-31 DIAGNOSIS — L70.0 ACNE VULGARIS: ICD-10-CM

## 2019-10-31 PROBLEM — D23.39 OTHER BENIGN NEOPLASM OF SKIN OF OTHER PARTS OF FACE: Status: ACTIVE | Noted: 2019-10-31

## 2019-10-31 PROBLEM — D48.5 NEOPLASM OF UNCERTAIN BEHAVIOR OF SKIN: Status: ACTIVE | Noted: 2019-10-31

## 2019-10-31 PROCEDURE — 11102 TANGNTL BX SKIN SINGLE LES: CPT

## 2019-10-31 PROCEDURE — OTHER PATHOLOGY BILLING: OTHER

## 2019-10-31 PROCEDURE — OTHER COUNSELING: OTHER

## 2019-10-31 PROCEDURE — OTHER PRESCRIPTION: OTHER

## 2019-10-31 PROCEDURE — OTHER BIOPSY BY SHAVE METHOD: OTHER

## 2019-10-31 PROCEDURE — 99213 OFFICE O/P EST LOW 20 MIN: CPT | Mod: 25

## 2019-10-31 PROCEDURE — 88305 TISSUE EXAM BY PATHOLOGIST: CPT

## 2019-10-31 PROCEDURE — OTHER PRESCRIPTION SAMPLES PROVIDED: OTHER

## 2019-10-31 RX ORDER — AZELAIC ACID 0.15 G/G
15% GEL TOPICAL QD
Qty: 1 | Refills: 1 | Status: ERX | COMMUNITY
Start: 2019-10-31

## 2019-10-31 RX ORDER — SPIRONOLACTONE 50 MG/1
50MG TABLET, FILM COATED ORAL TWICE DAILY
Qty: 60 | Refills: 1 | Status: ERX | COMMUNITY
Start: 2019-10-31

## 2019-10-31 ASSESSMENT — LOCATION SIMPLE DESCRIPTION DERM
LOCATION SIMPLE: LEFT EYEBROW
LOCATION SIMPLE: RIGHT FOREHEAD
LOCATION SIMPLE: LEFT CHEEK
LOCATION SIMPLE: RIGHT CHEEK
LOCATION SIMPLE: RIGHT EYELID

## 2019-10-31 ASSESSMENT — LOCATION DETAILED DESCRIPTION DERM
LOCATION DETAILED: RIGHT CENTRAL MALAR CHEEK
LOCATION DETAILED: RIGHT FOREHEAD
LOCATION DETAILED: LEFT MEDIAL MANDIBULAR CHEEK
LOCATION DETAILED: LEFT LATERAL EYEBROW
LOCATION DETAILED: LEFT LATERAL SUBMANDIBULAR CHEEK
LOCATION DETAILED: RIGHT CENTRAL MANDIBULAR CHEEK
LOCATION DETAILED: RIGHT MEDIAL CANTHUS
LOCATION DETAILED: RIGHT CENTRAL SUBMANDIBULAR CHEEK

## 2019-10-31 ASSESSMENT — LOCATION ZONE DERM
LOCATION ZONE: EYELID
LOCATION ZONE: FACE

## 2019-10-31 NOTE — PROCEDURE: PATHOLOGY BILLING
Immunohistochemistry ([id number] and [id number]) billing is not performed here. Please use the Immunohistochemistry Stain Billing plan to accomplish this. Immunohistochemistry ([id number] and [id number]) billing is not performed here. Please use the Immunohistochemistry Stain Billing plan to accomplish this.

## 2019-10-31 NOTE — PROCEDURE: BIOPSY BY SHAVE METHOD
Biopsy Type: H and E
Bill 16664 For Specimen Handling/Conveyance To Laboratory?: no
Post-Care Instructions: WOUND CARE:\\nDo NOT submerge wound in a bath, swimming pool, or hot tub for at least 1 week or for as long as there is an open wound. Gently cleanse the site daily with mild soap and water. Be careful NOT to allow a forceful stream of water to hit the biopsy site. After cleaning/showering, apply a thin layer of petrolatum ointment or Aquaphor in the wound followed by an adhesive bandage. Continue this process daily until healed. \\n\\nBLEEDING:\\nIf you develop persistent bleeding, apply firm and steady pressure over the dressing with gauze for 15 minutes. If bleeding persists, reapply pressure with an ice pack over the gauze for 15 minutes. NEVER APPLY ICE DIRECTLY TO THE SKIN. Do NOT peek under the gauze during these 15 minutes of pressure.  Call our office at [phone] if you cannot get the bleeding to stop. \\n\\nINFECTION:\\nSigns of infection may include increasing rather than decreasing pain (after the first few days), increasing redness/swelling/heat, draining pus, pink/red streaks around the wound, and fever or chills.  Please call our office immediately at [phone] if infection is suspected. It is normal to have some mild redness on or around the wound edges; this will lighten day by day and will become less tender.\\n\\nPAIN:\\nPain is usually minimal, but if needed you may take acetaminophen (Tylenol) every four hours as needed. Applying an ice pack over the dressing for 15-20 minutes every 2-3 hours will relieve swelling, lessen pain, and help minimize bruising. NEVER APPLY ICE DIRECTLY TO THE SKIN. Avoid ibuprofen (Advil, Motrin) and naproxen (Aleve) for the first 48 hours as these can increase bleeding.\\n\\nSWELLIG AND BRUISING:\\nSwelling and bruising are common and temporary, usually lasting 1 - 2 weeks. It is more common in areas treated around the eyes, hands, and feet. In the days following the procedure, swelling and bruising can be minimized by keeping the affected areas elevated when possible, reducing salty foods, and applying ice packs over the dressing for 15-20 minutes every 2-3 hours. NEVER APPLY ICE DIRECTLY TO THE SKIN.\\n\\nITCHING:\\nItchiness on and around the wound is very common and can last several days to weeks after surgery. Mild itch is normal as the wound is healing. \\n\\nNERVE CHANGES:\\nNumbness is usually temporary, but it may last for several weeks to months. You may also experience sharp pains at the wound sight as it heals.  Mild pain is normal and will gradually improve with time.\\n \\nNO SMOKING:\\nSmoking will delay the healing process. If you smoke, we recommend trying to quit or at minimum reduce how much you smoke following a procedure.
Consent: - Verbal and written consent was obtained and risks were reviewed prior to procedure today. \\n- Risks discussed include but are not limited to scarring, infection, bleeding, scabbing, incomplete removal, nerve damage, pain, and allergy to anesthesia.
Hemostasis: Drysol
Render Path Notes In Note?: Yes
Detail Level: Detailed
Electrodesiccation And Curettage Text: The wound bed was treated with electrodesiccation and curettage after the biopsy was performed.
Type Of Destruction Used: Curettage
Anesthesia Volume In Cc (Will Not Render If 0): 0.4
X Size Of Lesion In Cm: 0
Dressing: bandage
Depth Of Biopsy: dermis
Notification Instructions: - It can take up to 2 weeks to get a biopsy result. \\n- Please refrain from calling to request results until after 2 weeks.
Biopsy Method: Dermablade
Billing Type: Client Bill
Wound Care: Petrolatum
Curettage Text: The wound bed was treated with curettage after the biopsy was performed.
Anesthesia Type: 2% lidocaine with epinephrine
Silver Nitrate Text: The wound bed was treated with silver nitrate after the biopsy was performed.
Cryotherapy Text: The wound bed was treated with cryotherapy after the biopsy was performed.
Electrodesiccation Text: The wound bed was treated with electrodesiccation after the biopsy was performed.

## 2019-10-31 NOTE — PROCEDURE: COUNSELING
Tetracycline Counseling: Patient counseled regarding possible photosensitivity and increased risk for sunburn.  Patient instructed to avoid sunlight, if possible.  When exposed to sunlight, patients should wear protective clothing, sunglasses, and sunscreen.  The patient was instructed to call the office immediately if the following severe adverse effects occur:  hearing changes, easy bruising/bleeding, severe headache, or vision changes.  The patient verbalized understanding of the proper use and possible adverse effects of tetracycline.  All of the patient's questions and concerns were addressed. Patient understands to avoid pregnancy while on therapy due to potential birth defects.
Dapsone Counseling: I discussed with the patient the risks of dapsone including but not limited to hemolytic anemia, agranulocytosis, rashes, methemoglobinemia, kidney failure, peripheral neuropathy, headaches, GI upset, and liver toxicity.  Patients who start dapsone require monitoring including baseline LFTs and weekly CBCs for the first month, then every month thereafter.  The patient verbalized understanding of the proper use and possible adverse effects of dapsone.  All of the patient's questions and concerns were addressed.
Topical Sulfur Applications Pregnancy And Lactation Text: This medication is Pregnancy Category C and has an unknown safety profile during pregnancy. It is unknown if this topical medication is excreted in breast milk.
Birth Control Pills Pregnancy And Lactation Text: This medication should be avoided if pregnant and for the first 30 days post-partum.
Isotretinoin Pregnancy And Lactation Text: This medication is Pregnancy Category X and is considered extremely dangerous during pregnancy. It is unknown if it is excreted in breast milk.
Tetracycline Pregnancy And Lactation Text: This medication is Pregnancy Category D and not consider safe during pregnancy. It is also excreted in breast milk.
Azithromycin Counseling:  I discussed with the patient the risks of azithromycin including but not limited to GI upset, allergic reaction, drug rash, diarrhea, and yeast infections.
Spironolactone Pregnancy And Lactation Text: This medication can cause feminization of the male fetus and should be avoided during pregnancy. The active metabolite is also found in breast milk.
Detail Level: Detailed
Azithromycin Pregnancy And Lactation Text: This medication is considered safe during pregnancy and is also secreted in breast milk.
High Dose Vitamin A Pregnancy And Lactation Text: High dose vitamin A therapy is contraindicated during pregnancy and breast feeding.
Detail Level: Zone
Bactrim Pregnancy And Lactation Text: This medication is Pregnancy Category D and is known to cause fetal risk.  It is also excreted in breast milk.
Benzoyl Peroxide Counseling: Patient counseled that medicine may cause skin irritation and bleach clothing.  In the event of skin irritation, the patient was advised to reduce the amount of the drug applied or use it less frequently.   The patient verbalized understanding of the proper use and possible adverse effects of benzoyl peroxide.  All of the patient's questions and concerns were addressed.
Use Enhanced Medication Counseling?: No
Minocycline Counseling: Patient advised regarding possible photosensitivity and discoloration of the teeth, skin, lips, tongue and gums.  Patient instructed to avoid sunlight, if possible.  When exposed to sunlight, patients should wear protective clothing, sunglasses, and sunscreen.  The patient was instructed to call the office immediately if the following severe adverse effects occur:  hearing changes, easy bruising/bleeding, severe headache, or vision changes.  The patient verbalized understanding of the proper use and possible adverse effects of minocycline.  All of the patient's questions and concerns were addressed.
Tazorac Counseling:  Patient advised that medication is irritating and drying.  Patient may need to apply sparingly and wash off after an hour before eventually leaving it on overnight.  The patient verbalized understanding of the proper use and possible adverse effects of tazorac.  All of the patient's questions and concerns were addressed.
Erythromycin Counseling:  I discussed with the patient the risks of erythromycin including but not limited to GI upset, allergic reaction, drug rash, diarrhea, increase in liver enzymes, and yeast infections.
Topical Retinoid counseling:  Patient advised to apply a pea-sized amount only at bedtime and wait 30 minutes after washing their face before applying.  If too drying, patient may add a non-comedogenic moisturizer. The patient verbalized understanding of the proper use and possible adverse effects of retinoids.  All of the patient's questions and concerns were addressed.
Topical Retinoid Pregnancy And Lactation Text: This medication is Pregnancy Category C. It is unknown if this medication is excreted in breast milk.
Birth Control Pills Counseling: Birth Control Pill Counseling: I discussed with the patient the potential side effects of OCPs including but not limited to increased risk of stroke, heart attack, thrombophlebitis, deep venous thrombosis, hepatic adenomas, breast changes, GI upset, headaches, and depression.  The patient verbalized understanding of the proper use and possible adverse effects of OCPs. All of the patient's questions and concerns were addressed.
Isotretinoin Counseling: Patient should get monthly blood tests, not donate blood, not drive at night if vision affected, not share medication, and not undergo elective surgery for 6 months after tx completed. Side effects reviewed, pt to contact office should one occur.
Erythromycin Pregnancy And Lactation Text: This medication is Pregnancy Category B and is considered safe during pregnancy. It is also excreted in breast milk.
Benzoyl Peroxide Pregnancy And Lactation Text: This medication is Pregnancy Category C. It is unknown if benzoyl peroxide is excreted in breast milk.
Topical Sulfur Applications Counseling: Topical Sulfur Counseling: Patient counseled that this medication may cause skin irritation or allergic reactions.  In the event of skin irritation, the patient was advised to reduce the amount of the drug applied or use it less frequently.   The patient verbalized understanding of the proper use and possible adverse effects of topical sulfur application.  All of the patient's questions and concerns were addressed.
Dapsone Pregnancy And Lactation Text: This medication is Pregnancy Category C and is not considered safe during pregnancy or breast feeding.
Doxycycline Pregnancy And Lactation Text: This medication is Pregnancy Category D and not consider safe during pregnancy. It is also excreted in breast milk but is considered safe for shorter treatment courses.
Topical Clindamycin Pregnancy And Lactation Text: This medication is Pregnancy Category B and is considered safe during pregnancy. It is unknown if it is excreted in breast milk.
Doxycycline Counseling:  Patient counseled regarding possible photosensitivity and increased risk for sunburn.  Patient instructed to avoid sunlight, if possible.  When exposed to sunlight, patients should wear protective clothing, sunglasses, and sunscreen.  The patient was instructed to call the office immediately if the following severe adverse effects occur:  hearing changes, easy bruising/bleeding, severe headache, or vision changes.  The patient verbalized understanding of the proper use and possible adverse effects of doxycycline.  All of the patient's questions and concerns were addressed.
Topical Clindamycin Counseling: Patient counseled that this medication may cause skin irritation or allergic reactions.  In the event of skin irritation, the patient was advised to reduce the amount of the drug applied or use it less frequently.   The patient verbalized understanding of the proper use and possible adverse effects of clindamycin.  All of the patient's questions and concerns were addressed.
High Dose Vitamin A Counseling: Side effects reviewed, pt to contact office should one occur.
Spironolactone Counseling: Patient advised regarding risks of diarrhea, abdominal pain, hyperkalemia, birth defects (for female patients), liver toxicity and renal toxicity. The patient may need blood work to monitor liver and kidney function and potassium levels while on therapy. The patient verbalized understanding of the proper use and possible adverse effects of spironolactone.  All of the patient's questions and concerns were addressed.
Bactrim Counseling:  I discussed with the patient the risks of sulfa antibiotics including but not limited to GI upset, allergic reaction, drug rash, diarrhea, dizziness, photosensitivity, and yeast infections.  Rarely, more serious reactions can occur including but not limited to aplastic anemia, agranulocytosis, methemoglobinemia, blood dyscrasias, liver or kidney failure, lung infiltrates or desquamative/blistering drug rashes.
Tazorac Pregnancy And Lactation Text: This medication is not safe during pregnancy. It is unknown if this medication is excreted in breast milk.

## 2019-11-01 ENCOUNTER — OFFICE VISIT (OUTPATIENT)
Dept: OBGYN | Facility: CLINIC | Age: 39
End: 2019-11-01
Payer: COMMERCIAL

## 2019-11-01 ENCOUNTER — TELEPHONE (OUTPATIENT)
Dept: FAMILY MEDICINE | Facility: CLINIC | Age: 39
End: 2019-11-01

## 2019-11-01 VITALS
SYSTOLIC BLOOD PRESSURE: 152 MMHG | OXYGEN SATURATION: 98 % | BODY MASS INDEX: 26.42 KG/M2 | HEART RATE: 83 BPM | TEMPERATURE: 98.3 F | DIASTOLIC BLOOD PRESSURE: 73 MMHG | HEIGHT: 66 IN | WEIGHT: 164.4 LBS

## 2019-11-01 DIAGNOSIS — N92.6 IRREGULAR MENSES: Primary | ICD-10-CM

## 2019-11-01 DIAGNOSIS — Z13.220 LIPID SCREENING: ICD-10-CM

## 2019-11-01 LAB
CHOLEST SERPL-MCNC: 167 MG/DL
HDLC SERPL-MCNC: 66 MG/DL
LDLC SERPL CALC-MCNC: 92 MG/DL
NONHDLC SERPL-MCNC: 101 MG/DL
TRIGL SERPL-MCNC: 46 MG/DL

## 2019-11-01 PROCEDURE — 99213 OFFICE O/P EST LOW 20 MIN: CPT | Performed by: NURSE PRACTITIONER

## 2019-11-01 PROCEDURE — 80061 LIPID PANEL: CPT | Performed by: NURSE PRACTITIONER

## 2019-11-01 PROCEDURE — 36415 COLL VENOUS BLD VENIPUNCTURE: CPT | Performed by: NURSE PRACTITIONER

## 2019-11-01 ASSESSMENT — PAIN SCALES - GENERAL: PAINLEVEL: NO PAIN (0)

## 2019-11-01 ASSESSMENT — MIFFLIN-ST. JEOR: SCORE: 1424.52

## 2019-11-01 NOTE — TELEPHONE ENCOUNTER
Pt is requesting a  a note stating how she has been under your care and has been unable to participate in activies and cant be held accountable to pay any thing for not being able to participate. Pt was to sick starting in March 2019.

## 2019-11-01 NOTE — RESULT ENCOUNTER NOTE
Dear Christine,      It was a pleasure to see you at your recent office visit.  Your test results are listed below.  Normal cholesterol.         If you have any questions or concerns, please call the clinic at [phone].    Sincerely,  Angi Marroquin PA-C

## 2019-11-01 NOTE — TELEPHONE ENCOUNTER
Pt states she was given a letter in July but it said she should be allowed to cancel it.  She needs it to say she should be released form the contract.  Rewritten and given to Angi SMITH to review and sign.  Letter signed and on my desk.    Left message on pt vm asking what she would like us to do with the letter.  To provider to cosign.  Sofia SANCHEZN, RN

## 2019-11-01 NOTE — TELEPHONE ENCOUNTER
Left message on answering machine for patient to call back to [phone].  See message and letter from 7/30/19.  Sofia SANCHEZN, RN

## 2019-11-01 NOTE — TELEPHONE ENCOUNTER
Pt called back and would like letter left at the .  Letter left at .    To provider to cosign for letter.  Sofia SANCHEZN, RN

## 2019-11-01 NOTE — LETTER
November 1, 2019           Christine Hu  [address]  [place] [address]              To whom it may concern:     RE: Christine Hu     Patient has been ill for the past several months and unable to attend Events and Adventures.  Please allow her to cancel her membership.      Please contact me for questions or concerns.        Sincerely,           Angi Marroquin PA-C

## 2019-11-05 ENCOUNTER — TELEPHONE (OUTPATIENT)
Dept: FAMILY MEDICINE | Facility: CLINIC | Age: 39
End: 2019-11-05

## 2019-11-05 NOTE — LETTER
November 5, 2019      Christine Hu  [address]  [place] [address]        To Whom It May Concern:    RE: Christine Hu     Patient has been ill for the past several months and is PERMANENTLY unable to participate in Events and Adventures.  Please allow her to cancel her membership.      Please contact me for questions or concerns.            Sincerely,        Angi Marroquin PA-C

## 2019-11-05 NOTE — TELEPHONE ENCOUNTER
Pt picked up letter today  id    Called ans spoke to patient. She would like note brought to the  for , done.Sharlene Stevenson MA/TC

## 2019-11-05 NOTE — TELEPHONE ENCOUNTER
"Patient called and left detailed message on team RN's voice mail very upset and apologizing as still cannot get out of [employer] with most recent letter written   Patient is unable to attend the activities in the evenings due to her illness  Patient states letter needs to be reworded to add the word \"PERMANENTLY\" unable to participate  Patient again apologized and expressed how upset she is about having to do yet another letter      To provider to print/sign letter. Letter pended with correct wording patient is requesting      Antonella SANCHEZN, RN, CPN    "

## 2019-12-10 ENCOUNTER — ANCILLARY PROCEDURE (OUTPATIENT)
Dept: ULTRASOUND IMAGING | Facility: CLINIC | Age: 39
End: 2019-12-10
Attending: NURSE PRACTITIONER
Payer: COMMERCIAL

## 2019-12-10 DIAGNOSIS — D25.1 INTRAMURAL LEIOMYOMA OF UTERUS: ICD-10-CM

## 2019-12-10 DIAGNOSIS — N92.6 IRREGULAR MENSES: ICD-10-CM

## 2019-12-10 PROCEDURE — 76830 TRANSVAGINAL US NON-OB: CPT

## 2019-12-10 PROCEDURE — 76856 US EXAM PELVIC COMPLETE: CPT

## 2019-12-10 RX ORDER — LEVONORGESTREL AND ETHINYL ESTRADIOL 0.1-0.02MG
KIT ORAL
Qty: 84 TABLET | Refills: 1 | OUTPATIENT
Start: 2019-12-10

## 2019-12-10 NOTE — TELEPHONE ENCOUNTER
Per 11/1/19:  1. Irregular menses  We discussed her cycles. Strongly encouraged her to make a decision soon regarding pregnancy. She has 2 pill packs remaining, so would like to use those, will plan repeat ultrasound at end of her last pack and by then will have made a decision regarding pregnancy. If they decide against it, she may consider alternate longer term management options for her cycles. If she chooses to move forward, will stop pills-watch a few cycles and if they continue to be irregular, may recommend RE referral as next step due to her age. Patient is given an opportunity to ask questions and have them answered.    Attempted to reach pt to see if she has any pill left as she had 2 packs left at the beginning of November.  Also to ask what her plans are as the 11/1/19 OV note states she was going to make a decision regarding pregnancy after her 2 packs of BCP.  If she does want to try for pregnancy she was going to repeat an US.  If she does not want to consider pregnancy Renetta mentioned she may consider an alternate longer term management option for her cycles.    Left message for pt to return call to clinic.    Lizett Bryant, RN

## 2019-12-10 NOTE — TELEPHONE ENCOUNTER
Spoke to pt.  She did not request the refill.  She states she had an US today.  She is waiting to hear from results. She will also schedule an appt with MIO Turner CNP, to discuss her next steps and if she will even want to continue this birth control.  Denied med refill request at this time as pt does not need it and she needs to be further evaluated in clinic.    Lizett Bryant RN

## 2020-02-18 ENCOUNTER — RX ONLY (RX ONLY)
Age: 40
End: 2020-02-18

## 2020-02-18 ENCOUNTER — TRANSFERRED RECORDS (OUTPATIENT)
Dept: HEALTH INFORMATION MANAGEMENT | Facility: CLINIC | Age: 40
End: 2020-02-18

## 2020-11-18 ENCOUNTER — APPOINTMENT (OUTPATIENT)
Dept: URBAN - METROPOLITAN AREA CLINIC 252 | Age: 40
Setting detail: DERMATOLOGY
End: 2020-11-20

## 2020-11-18 VITALS — HEIGHT: 66 IN | RESPIRATION RATE: 16 BRPM | WEIGHT: 130 LBS

## 2020-11-18 DIAGNOSIS — L57.0 ACTINIC KERATOSIS: ICD-10-CM

## 2020-11-18 DIAGNOSIS — L70.0 ACNE VULGARIS: ICD-10-CM

## 2020-11-18 PROCEDURE — 99213 OFFICE O/P EST LOW 20 MIN: CPT

## 2020-11-18 PROCEDURE — OTHER COUNSELING: OTHER

## 2020-11-18 PROCEDURE — OTHER PRESCRIPTION: OTHER

## 2020-11-18 RX ORDER — SPIRONOLACTONE 100 MG/1
100MG TABLET, FILM COATED ORAL ONCE DAILY
Qty: 90 | Refills: 4 | Status: ERX | COMMUNITY
Start: 2020-11-18

## 2020-11-18 RX ORDER — FLUOROURACIL 2 G/40G
5% CREAM TOPICAL BID
Qty: 1 | Refills: 0 | Status: ERX | COMMUNITY
Start: 2020-11-18

## 2020-11-18 ASSESSMENT — LOCATION DETAILED DESCRIPTION DERM
LOCATION DETAILED: RIGHT CHIN
LOCATION DETAILED: RIGHT SUPERIOR FOREHEAD

## 2020-11-18 ASSESSMENT — LOCATION SIMPLE DESCRIPTION DERM
LOCATION SIMPLE: RIGHT FOREHEAD
LOCATION SIMPLE: CHIN

## 2020-11-18 ASSESSMENT — LOCATION ZONE DERM: LOCATION ZONE: FACE

## 2020-11-18 NOTE — PROCEDURE: COUNSELING
Detail Level: Detailed
Benzoyl Peroxide Counseling: Patient counseled that medicine may cause skin irritation and bleach clothing.  In the event of skin irritation, the patient was advised to reduce the amount of the drug applied or use it less frequently.   The patient verbalized understanding of the proper use and possible adverse effects of benzoyl peroxide.  All of the patient's questions and concerns were addressed.
Tazorac Pregnancy And Lactation Text: This medication is not safe during pregnancy. It is unknown if this medication is excreted in breast milk.
Topical Retinoid counseling:  Patient advised to apply a pea-sized amount only at bedtime and wait 30 minutes after washing their face before applying.  If too drying, patient may add a non-comedogenic moisturizer. The patient verbalized understanding of the proper use and possible adverse effects of retinoids.  All of the patient's questions and concerns were addressed.
Doxycycline Counseling:  Patient counseled regarding possible photosensitivity and increased risk for sunburn.  Patient instructed to avoid sunlight, if possible.  When exposed to sunlight, patients should wear protective clothing, sunglasses, and sunscreen.  The patient was instructed to call the office immediately if the following severe adverse effects occur:  hearing changes, easy bruising/bleeding, severe headache, or vision changes.  The patient verbalized understanding of the proper use and possible adverse effects of doxycycline.  All of the patient's questions and concerns were addressed.
High Dose Vitamin A Counseling: Side effects reviewed, pt to contact office should one occur.
Topical Clindamycin Pregnancy And Lactation Text: This medication is Pregnancy Category B and is considered safe during pregnancy. It is unknown if it is excreted in breast milk.
Detail Level: Zone
Topical Sulfur Applications Counseling: Topical Sulfur Counseling: Patient counseled that this medication may cause skin irritation or allergic reactions.  In the event of skin irritation, the patient was advised to reduce the amount of the drug applied or use it less frequently.   The patient verbalized understanding of the proper use and possible adverse effects of topical sulfur application.  All of the patient's questions and concerns were addressed.
Birth Control Pills Pregnancy And Lactation Text: This medication should be avoided if pregnant and for the first 30 days post-partum.
Tetracycline Counseling: Patient counseled regarding possible photosensitivity and increased risk for sunburn.  Patient instructed to avoid sunlight, if possible.  When exposed to sunlight, patients should wear protective clothing, sunglasses, and sunscreen.  The patient was instructed to call the office immediately if the following severe adverse effects occur:  hearing changes, easy bruising/bleeding, severe headache, or vision changes.  The patient verbalized understanding of the proper use and possible adverse effects of tetracycline.  All of the patient's questions and concerns were addressed. Patient understands to avoid pregnancy while on therapy due to potential birth defects.
Patient Specific Counseling (Will Not Stick From Patient To Patient): Recommend using OTC Differin.
Azithromycin Counseling:  I discussed with the patient the risks of azithromycin including but not limited to GI upset, allergic reaction, drug rash, diarrhea, and yeast infections.
Isotretinoin Pregnancy And Lactation Text: This medication is Pregnancy Category X and is considered extremely dangerous during pregnancy. It is unknown if it is excreted in breast milk.
Dapsone Pregnancy And Lactation Text: This medication is Pregnancy Category C and is not considered safe during pregnancy or breast feeding.
Erythromycin Counseling:  I discussed with the patient the risks of erythromycin including but not limited to GI upset, allergic reaction, drug rash, diarrhea, increase in liver enzymes, and yeast infections.
Birth Control Pills Counseling: Birth Control Pill Counseling: I discussed with the patient the potential side effects of OCPs including but not limited to increased risk of stroke, heart attack, thrombophlebitis, deep venous thrombosis, hepatic adenomas, breast changes, GI upset, headaches, and depression.  The patient verbalized understanding of the proper use and possible adverse effects of OCPs. All of the patient's questions and concerns were addressed.
Isotretinoin Counseling: Patient should get monthly blood tests, not donate blood, not drive at night if vision affected, not share medication, and not undergo elective surgery for 6 months after tx completed. Side effects reviewed, pt to contact office should one occur.
Doxycycline Pregnancy And Lactation Text: This medication is Pregnancy Category D and not consider safe during pregnancy. It is also excreted in breast milk but is considered safe for shorter treatment courses.
Minocycline Counseling: Patient advised regarding possible photosensitivity and discoloration of the teeth, skin, lips, tongue and gums.  Patient instructed to avoid sunlight, if possible.  When exposed to sunlight, patients should wear protective clothing, sunglasses, and sunscreen.  The patient was instructed to call the office immediately if the following severe adverse effects occur:  hearing changes, easy bruising/bleeding, severe headache, or vision changes.  The patient verbalized understanding of the proper use and possible adverse effects of minocycline.  All of the patient's questions and concerns were addressed.
Topical Retinoid Pregnancy And Lactation Text: This medication is Pregnancy Category C. It is unknown if this medication is excreted in breast milk.
Sarecycline Pregnancy And Lactation Text: This medication is Pregnancy Category D and not consider safe during pregnancy. It is also excreted in breast milk.
Use Enhanced Medication Counseling?: No
Bactrim Pregnancy And Lactation Text: This medication is Pregnancy Category D and is known to cause fetal risk.  It is also excreted in breast milk.
Erythromycin Pregnancy And Lactation Text: This medication is Pregnancy Category B and is considered safe during pregnancy. It is also excreted in breast milk.
Topical Clindamycin Counseling: Patient counseled that this medication may cause skin irritation or allergic reactions.  In the event of skin irritation, the patient was advised to reduce the amount of the drug applied or use it less frequently.   The patient verbalized understanding of the proper use and possible adverse effects of clindamycin.  All of the patient's questions and concerns were addressed.
Dapsone Counseling: I discussed with the patient the risks of dapsone including but not limited to hemolytic anemia, agranulocytosis, rashes, methemoglobinemia, kidney failure, peripheral neuropathy, headaches, GI upset, and liver toxicity.  Patients who start dapsone require monitoring including baseline LFTs and weekly CBCs for the first month, then every month thereafter.  The patient verbalized understanding of the proper use and possible adverse effects of dapsone.  All of the patient's questions and concerns were addressed.
Topical Sulfur Applications Pregnancy And Lactation Text: This medication is Pregnancy Category C and has an unknown safety profile during pregnancy. It is unknown if this topical medication is excreted in breast milk.
High Dose Vitamin A Pregnancy And Lactation Text: High dose vitamin A therapy is contraindicated during pregnancy and breast feeding.
Benzoyl Peroxide Pregnancy And Lactation Text: This medication is Pregnancy Category C. It is unknown if benzoyl peroxide is excreted in breast milk.
Azithromycin Pregnancy And Lactation Text: This medication is considered safe during pregnancy and is also secreted in breast milk.
Spironolactone Pregnancy And Lactation Text: This medication can cause feminization of the male fetus and should be avoided during pregnancy. The active metabolite is also found in breast milk.
Sarecycline Counseling: Patient advised regarding possible photosensitivity and discoloration of the teeth, skin, lips, tongue and gums.  Patient instructed to avoid sunlight, if possible.  When exposed to sunlight, patients should wear protective clothing, sunglasses, and sunscreen.  The patient was instructed to call the office immediately if the following severe adverse effects occur:  hearing changes, easy bruising/bleeding, severe headache, or vision changes.  The patient verbalized understanding of the proper use and possible adverse effects of sarecycline.  All of the patient's questions and concerns were addressed.
Tazorac Counseling:  Patient advised that medication is irritating and drying.  Patient may need to apply sparingly and wash off after an hour before eventually leaving it on overnight.  The patient verbalized understanding of the proper use and possible adverse effects of tazorac.  All of the patient's questions and concerns were addressed.
Bactrim Counseling:  I discussed with the patient the risks of sulfa antibiotics including but not limited to GI upset, allergic reaction, drug rash, diarrhea, dizziness, photosensitivity, and yeast infections.  Rarely, more serious reactions can occur including but not limited to aplastic anemia, agranulocytosis, methemoglobinemia, blood dyscrasias, liver or kidney failure, lung infiltrates or desquamative/blistering drug rashes.
Spironolactone Counseling: Patient advised regarding risks of diarrhea, abdominal pain, hyperkalemia, birth defects (for female patients), liver toxicity and renal toxicity. The patient may need blood work to monitor liver and kidney function and potassium levels while on therapy. The patient verbalized understanding of the proper use and possible adverse effects of spironolactone.  All of the patient's questions and concerns were addressed.

## 2020-12-05 ENCOUNTER — VIRTUAL VISIT (OUTPATIENT)
Dept: URGENT CARE | Facility: CLINIC | Age: 40
End: 2020-12-05
Payer: COMMERCIAL

## 2020-12-05 DIAGNOSIS — J20.9 ACUTE BRONCHITIS, UNSPECIFIED ORGANISM: Primary | ICD-10-CM

## 2020-12-05 DIAGNOSIS — Z72.0 TOBACCO ABUSE DISORDER: ICD-10-CM

## 2020-12-05 PROCEDURE — 99213 OFFICE O/P EST LOW 20 MIN: CPT | Mod: TEL | Performed by: PHYSICIAN ASSISTANT

## 2020-12-05 RX ORDER — AZITHROMYCIN 250 MG/1
TABLET, FILM COATED ORAL
Qty: 6 TABLET | Refills: 0 | Status: SHIPPED | OUTPATIENT
Start: 2020-12-05 | End: 2020-12-09

## 2020-12-05 NOTE — PROGRESS NOTES
This Urgent Care visit is taking place through an audio encounter for convenience and efficient care of the patient. The Patient's acute non-critical condition can be safely assessed via telemedicine.      SUBJECTIVE:  Christine Hu is a 40 year old female who presents to the clinic today with a chief complaint of not feeling well for 6 day(s).  Tested for COVID 4 days ago.  Fever free for 5 days.  Cough is worsening - nonproductive.  Sinus pressure.     Past Medical History:   Diagnosis Date     ASCUS favor benign 1/2015    Neg HPV     Degeneration of cervical intervertebral disc      Knee effusion, left      Lumbago      Ménière's disease      Overdose 6/09    TRAZADONE       Current Outpatient Medications   Medication Sig Dispense Refill     levonorgestrel-ethinyl estradiol (AVIANE/ALESSE/LESSINA) 0.1-20 MG-MCG tablet Take 1 tablet by mouth daily 84 tablet 1       Social History     Tobacco Use     Smoking status: Current Every Day Smoker     Packs/day: 0.25     Smokeless tobacco: Never Used   Substance Use Topics     Alcohol use: Not Currently       ROS  10 point ROS negative except as listed above      OBJECTIVE:  NA due to telephone visit    ASSESSMENT:   (J20.9) Acute bronchitis, unspecified organism  (primary encounter diagnosis)  (Z72.0) Tobacco abuse disorder  Comment: URI developinginto Bronchitis  Plan: azithromycin (ZITHROMAX) 250 MG tablet        Continue rest and fluids, begin mucinex.  If symptoms of pneumonia occur begin zpack. Follow up with PCP if not improved in 2-3 days        Covid-19  Pt was evaluated during a global COVID-19 pandemic, which necessitated consideration that the patient might be at risk for infection with the SARS-CoV-2 virus that causes COVID-19.   Applicable protocols for evaluation were followed during the patient's care.   COVID-19 was considered as part of the patient's evaluation. The plan for testing is:  a test was not indicated during this visit, negative teset  earlier this week - discussed unable to rule out COVID completelyt    Follow up immediately with severe headache, chest pain, or shortness of breath    Rest, isolate for 10 days, hydrate, test, follow up if worsening or new symptoms  HH members to isolate until test results, if positive isolate for 2 weeks and follow up for testing if symptoms occur  Red flags and emergent follow up discussed, and understood by patient  Follow up with PCP if symptoms worsen or fail to improve    Phone call duration:  15 minutes  Hosea Snowden PA-C

## 2020-12-09 ENCOUNTER — TELEPHONE (OUTPATIENT)
Dept: FAMILY MEDICINE | Facility: CLINIC | Age: 40
End: 2020-12-09

## 2020-12-09 ENCOUNTER — OFFICE VISIT (OUTPATIENT)
Dept: FAMILY MEDICINE | Facility: CLINIC | Age: 40
End: 2020-12-09
Payer: COMMERCIAL

## 2020-12-09 ENCOUNTER — ANCILLARY PROCEDURE (OUTPATIENT)
Dept: GENERAL RADIOLOGY | Facility: CLINIC | Age: 40
End: 2020-12-09
Attending: NURSE PRACTITIONER
Payer: COMMERCIAL

## 2020-12-09 VITALS
SYSTOLIC BLOOD PRESSURE: 142 MMHG | BODY MASS INDEX: 22.94 KG/M2 | HEART RATE: 99 BPM | WEIGHT: 140 LBS | OXYGEN SATURATION: 99 % | DIASTOLIC BLOOD PRESSURE: 88 MMHG

## 2020-12-09 DIAGNOSIS — R05.9 COUGH: Primary | ICD-10-CM

## 2020-12-09 PROCEDURE — 99213 OFFICE O/P EST LOW 20 MIN: CPT | Performed by: NURSE PRACTITIONER

## 2020-12-09 PROCEDURE — 71046 X-RAY EXAM CHEST 2 VIEWS: CPT | Performed by: RADIOLOGY

## 2020-12-09 RX ORDER — CODEINE PHOSPHATE AND GUAIFENESIN 10; 100 MG/5ML; MG/5ML
1-2 SOLUTION ORAL EVERY 4 HOURS PRN
Qty: 120 ML | Refills: 0 | Status: SHIPPED | OUTPATIENT
Start: 2020-12-09 | End: 2021-10-05

## 2020-12-09 RX ORDER — FLUOROURACIL 50 MG/G
CREAM TOPICAL
COMMUNITY
Start: 2020-11-18 | End: 2021-10-05

## 2020-12-09 RX ORDER — SPIRONOLACTONE 100 MG/1
1 TABLET, FILM COATED ORAL DAILY
COMMUNITY
Start: 2020-11-18 | End: 2022-06-06

## 2020-12-09 RX ORDER — ALBUTEROL SULFATE 90 UG/1
1-2 AEROSOL, METERED RESPIRATORY (INHALATION) EVERY 4 HOURS PRN
Qty: 1 INHALER | Refills: 0 | Status: SHIPPED | OUTPATIENT
Start: 2020-12-09 | End: 2021-10-05

## 2020-12-09 NOTE — TELEPHONE ENCOUNTER
Patient did virtual visit 12/5, treated for bronchitis, possible pneumonia. Patient treated with z-bernadette  Patient still c/o cough, fatigue  Patient did test negative for Covid    Instructed patient needs to be seen in clinic  Appointment today with KARSON Gill BSN, RN, CPN

## 2020-12-09 NOTE — TELEPHONE ENCOUNTER
Reason for Call:  Other prescription    Detailed comments: pt thought she had pneumonia and got negative  test result so she was treated with a zpack but it did not help. Her main symptoms are hacking cough and exhaustion. She is wondering if she could be perscribed something else to help her- last year when she had pneumonia the z pack also did not work and she thought that perhaps that same prescription would help again this year.     Phone Number Patient can be reached at: Cell number on file:    Telephone Information:   Mobile [phone]       Best Time: any    Can we leave a detailed message on this number? YES    Call taken on 12/9/2020 at 11:01 AM by Devika Rivas

## 2020-12-09 NOTE — LETTER
December 10, 2020      Christine Hu  [address]  [place] [address]        Dear Ms.Vonderohe Hu,    We are writing to inform you of your test results.    Normal chest xray.     Eboni Dean CNP     Resulted Orders   XR Chest 2 Views    Narrative    CHEST TWO VIEWS  12/9/2020 1:59 PM     HISTORY: Cough for 10 days.    COMPARISON: Chest x-ray on 10/16/2018      Impression    IMPRESSION: PA and lateral views of the chest were obtained.  Cardiomediastinal silhouette is within normal limits. No suspicious  focal pulmonary opacities. No significant pleural effusion or  pneumothorax.    JOSE MANUEL KHAN MD

## 2020-12-09 NOTE — PROGRESS NOTES
Subjective     Christine Hu is a 40 year old female who presents to clinic today for the following health issues:    HPI           URI symptoms for the last 10 days.   Had low grade fever of 99.6 first night, otherwise no fevers.   Cough feels loose, but doesn't get sputum up in mouth.    Denies SOB or wheezing.   Denies chest pain other than with cough.    Had negative COVID-19 test 12/3.   Symptoms persisting.  Had virtual urgent care visit 12/5/20- put on Zpack.   Only mild improvement since then, maybe a little more energy.   No hx of asthma, copd or smoking.        Review of Systems   Constitutional, HEENT, cardiovascular, pulmonary, gi and gu systems are negative, except as otherwise noted.      Objective    BP (!) 142/88   Pulse 99   Wt 63.5 kg (140 lb)   SpO2 99%   BMI 22.94 kg/m    Body mass index is 22.94 kg/m .  Physical Exam   GENERAL: healthy, alert and no distress  EYES: Eyes grossly normal to inspection, PERRL and conjunctivae and sclerae normal  HENT: ear canals and TM's normal, nose and mouth without ulcers or lesions  NECK: no adenopathy, no asymmetry, masses, or scars and thyroid normal to palpation  RESP: Loose thick cough, but lungs sound clear.  lungs clear to auscultation - no rales, rhonchi or wheezes  CV: regular rate and rhythm, normal S1 S2, no S3 or S4, no murmur, click or rub, no peripheral edema and peripheral pulses strong  MS: no gross musculoskeletal defects noted, no edema  SKIN: no suspicious lesions or rashes  NEURO: Normal strength and tone, mentation intact and speech normal  PSYCH: mentation appears normal, affect normal/bright    CXR:   I personally reviewed, appears clear without acute process.  Awaiting final rad report.           Assessment & Plan     Cough  CXR normal.  Appears comfortable, stable VS.  Likely viral process.  Antibiotics not indicated.  Albuterol and Robitussin AC prn.  Return if worsening or failure to improve.   - XR Chest 2 Views  -  albuterol (PROAIR HFA/PROVENTIL HFA/VENTOLIN HFA) 108 (90 Base) MCG/ACT inhaler; Inhale 1-2 puffs into the lungs every 4 hours as needed for shortness of breath / dyspnea or wheezing  - guaiFENesin-codeine (ROBITUSSIN AC) 100-10 MG/5ML solution; Take 5-10 mLs by mouth every 4 hours as needed for cough     Tobacco Cessation:   reports that she has been smoking. She has been smoking about 0.25 packs per day. She has never used smokeless tobacco.  Patient stated that she does not smoke.         See Patient Instructions  Patient Instructions   Chest xray is clear.  Likely this is a viral process (other than COVID-19).  99% of bronchitis is viral, probably why the Zpack didn't do much.     I sent in an inhaler you can use as needed for coughing jags.  I also sent in a prescription for Robitussin with codeine to help with sleep at night- can cause sedation so do not mix with other sedating medications or with alcohol.      Tylenol/ibuprofen for discomfort.      Return with any worsening or failure to improve.          Return in about 2 weeks (around 12/23/2020) for If failure to improve.    Eboni Dean, [facility]

## 2020-12-09 NOTE — PATIENT INSTRUCTIONS
Chest xray is clear.  Likely this is a viral process (other than COVID-19).  99% of bronchitis is viral, probably why the Zpack didn't do much.     I sent in an inhaler you can use as needed for coughing jags.  I also sent in a prescription for Robitussin with codeine to help with sleep at night- can cause sedation so do not mix with other sedating medications or with alcohol.      Tylenol/ibuprofen for discomfort.      Return with any worsening or failure to improve.

## 2021-09-27 ENCOUNTER — TRANSFERRED RECORDS (OUTPATIENT)
Dept: HEALTH INFORMATION MANAGEMENT | Facility: CLINIC | Age: 41
End: 2021-09-27

## 2021-10-01 NOTE — PROGRESS NOTES
Assessment & Plan     Irregular menses  We reviewed her history, work up and current desire for pregnancy. Discussed possible etiologies of the irregular cycles. UPT negative. Plan progesterone withdrawal and then cycle day 3 labs. Reviewed rationale for this, side effects of medication, when to expect bleeding and when to call if it does not occur. Then can plan day 21 labs. Given her irregular menses, age and desire for pregnancy, would then recommend she follow up with reproductive endocrinology. Recommend she check now with insurance as to where she may be covered so we can keep moving along in helping her become pregnant.   Also, given her previous almost 6 cm uterine fibroid, follow up ultrasound ordered. Patient is given an opportunity to ask questions and have them answered.  - US Pelvic Complete with Transvaginal; Future  - HCG qualitative urine; Future  - progesterone (PROMETRIUM) 200 MG capsule; Take 1 capsule (200 mg) by mouth daily  - HCG qualitative urine  - Follicle stimulating hormone; Future  - Lutropin; Future  - Prolactin; Future  - Estradiol; Future  - TSH with free T4 reflex; Future    Intramural leiomyoma of uterus  See above  - US Pelvic Complete with Transvaginal; Future    Elevated BP without diagnosis of hypertension  Blood pressure elevated today and review of chart shows some elevated readings, patient states she is under a lot of stress and anxiety. Recommend follow up with primary care to closely monitor and it would be ideal to have this under good control in the event she becomes pregnant.     MIO Ryan [facility] KIA King is a 41 year old who presents for the following health issues     HPI     Follow up Irregular menses    Patient was seen about 2 years ago with concerns of irregular menses. Ultrasound showed a nearly 6 cm uterine fibroid. At that time, patient was contemplating pregnancy. Labs were checked and she did  "several months of oral contraceptive pill to see if it helped her cycles and then discontinued. No follow up since. After discontinuing, they started out regular, but have become more irregular in the last year plus. Last several cycles: 2/7, 5/26, 6/17 and 7/26. Bleeding lasts 5 days, no significant cramping, no clotting. They are still attempting pregnancy, but are having a hard time timing intercourse as her cycle length varies. Last year, tried ovulation tests for a month or two, but discontinued because she was having to test daily as the cycles were so unpredictable. No difficulty conceiving her first 2 pregnancies.  Denies galactorrhea, headaches, vision changes.     Review of Systems   Constitutional, HEENT, cardiovascular, pulmonary, gi and gu systems are negative, except as otherwise noted.      Objective    BP (!) 156/99 (BP Location: Right arm, Patient Position: Sitting, Cuff Size: Adult Regular)   Pulse 109   Temp 97.6  F (36.4  C) (Tympanic)   Ht 1.676 m (5' 6\")   Wt 64.7 kg (142 lb 9.6 oz)   LMP 07/26/2021 (Exact Date)   SpO2 99%   BMI 23.02 kg/m    Body mass index is 23.02 kg/m .  Physical Exam   GENERAL: healthy, alert and no distress   (female): Patient declined today  MS: no gross musculoskeletal defects noted, no edema  SKIN: no suspicious lesions or rashes  PSYCH: mentation appears normal, affect normal/bright    Results for orders placed or performed in visit on 10/05/21 (from the past 24 hour(s))   HCG qualitative urine   Result Value Ref Range    hCG Urine Qualitative Negative Negative       "

## 2021-10-05 ENCOUNTER — OFFICE VISIT (OUTPATIENT)
Dept: OBGYN | Facility: CLINIC | Age: 41
End: 2021-10-05
Payer: COMMERCIAL

## 2021-10-05 VITALS
TEMPERATURE: 97.6 F | OXYGEN SATURATION: 99 % | HEART RATE: 109 BPM | BODY MASS INDEX: 22.92 KG/M2 | HEIGHT: 66 IN | WEIGHT: 142.6 LBS | DIASTOLIC BLOOD PRESSURE: 99 MMHG | SYSTOLIC BLOOD PRESSURE: 156 MMHG

## 2021-10-05 DIAGNOSIS — R03.0 ELEVATED BP WITHOUT DIAGNOSIS OF HYPERTENSION: ICD-10-CM

## 2021-10-05 DIAGNOSIS — N92.6 IRREGULAR MENSES: Primary | ICD-10-CM

## 2021-10-05 DIAGNOSIS — D25.1 INTRAMURAL LEIOMYOMA OF UTERUS: ICD-10-CM

## 2021-10-05 LAB — HCG UR QL: NEGATIVE

## 2021-10-05 PROCEDURE — 81025 URINE PREGNANCY TEST: CPT | Performed by: NURSE PRACTITIONER

## 2021-10-05 PROCEDURE — 99213 OFFICE O/P EST LOW 20 MIN: CPT | Performed by: NURSE PRACTITIONER

## 2021-10-05 RX ORDER — PROGESTERONE 200 MG/1
200 CAPSULE ORAL DAILY
Qty: 7 CAPSULE | Refills: 0 | Status: SHIPPED | OUTPATIENT
Start: 2021-10-05 | End: 2021-11-17

## 2021-10-05 ASSESSMENT — MIFFLIN-ST. JEOR: SCORE: 1323.58

## 2021-10-05 ASSESSMENT — PAIN SCALES - GENERAL: PAINLEVEL: NO PAIN (0)

## 2021-10-13 ENCOUNTER — APPOINTMENT (OUTPATIENT)
Dept: URBAN - METROPOLITAN AREA CLINIC 252 | Age: 41
Setting detail: DERMATOLOGY
End: 2021-10-18

## 2021-10-13 VITALS — HEIGHT: 66 IN | RESPIRATION RATE: 16 BRPM | WEIGHT: 140 LBS

## 2021-10-13 DIAGNOSIS — L57.0 ACTINIC KERATOSIS: ICD-10-CM

## 2021-10-13 PROCEDURE — OTHER LIQUID NITROGEN: OTHER

## 2021-10-13 PROCEDURE — OTHER COUNSELING: OTHER

## 2021-10-13 PROCEDURE — OTHER ADDITIONAL NOTES: OTHER

## 2021-10-13 PROCEDURE — 17000 DESTRUCT PREMALG LESION: CPT

## 2021-10-13 ASSESSMENT — LOCATION DETAILED DESCRIPTION DERM: LOCATION DETAILED: RIGHT SUPERIOR FOREHEAD

## 2021-10-13 ASSESSMENT — LOCATION ZONE DERM: LOCATION ZONE: FACE

## 2021-10-13 ASSESSMENT — LOCATION SIMPLE DESCRIPTION DERM: LOCATION SIMPLE: RIGHT FOREHEAD

## 2021-10-13 NOTE — PROCEDURE: ADDITIONAL NOTES
Detail Level: Simple
Additional Notes: If not resolved after two weeks advised patient to return to clinic and will plan to do a biopsy.\\nAdvised patient to discontinue Efudex cream
Render Risk Assessment In Note?: no

## 2021-10-13 NOTE — HPI: SKIN LESION (ACTINIC KERATOSES)
How Severe Are They?: mild
Is This A New Presentation, Or A Follow-Up?: Follow Up Actinic Keratoses
Additional History: Restarted Efudex 2 weeks ago on her own

## 2021-10-25 ENCOUNTER — ANCILLARY PROCEDURE (OUTPATIENT)
Dept: ULTRASOUND IMAGING | Facility: CLINIC | Age: 41
End: 2021-10-25
Attending: NURSE PRACTITIONER
Payer: COMMERCIAL

## 2021-10-25 DIAGNOSIS — N92.6 IRREGULAR MENSES: ICD-10-CM

## 2021-10-25 DIAGNOSIS — D25.1 INTRAMURAL LEIOMYOMA OF UTERUS: ICD-10-CM

## 2021-10-25 PROCEDURE — 76830 TRANSVAGINAL US NON-OB: CPT | Performed by: RADIOLOGY

## 2021-10-25 PROCEDURE — 76856 US EXAM PELVIC COMPLETE: CPT | Performed by: RADIOLOGY

## 2021-11-16 NOTE — PROGRESS NOTES
Christine is a 41 year old who is being evaluated via a billable telephone visit.      What phone number would you like to be contacted at? [phone]  How would you like to obtain your AVS? Rhea    Assessment & Plan     Irregular menses  We reviewed her ultrasound and then her absence of a withdrawal bleed after the progesterone challenge. We discussed next step to prime uterine lining first and recommend 1 month trial of combined oral contraceptive pill to attempt withdrawal bleeding. Discussed taking pills regularly, possible side effects. If successful, plan labs on cycle day 3. If no bleeding within a week of finishing the pack, to let me know. May then plan to obtain labs for evaluation to rule out pituitary, thyroid disorders, perimenpause and refer as appropriate. Encouraged her to check coverage in the meantime with RE.   - norgestimate-ethinyl estradiol (SPRINTEC 28) 0.25-35 MG-MCG tablet; Take 1 tablet by mouth daily    [facility] [place]    Subjective   Christine is a 41 year old who requested a telephone visit for the following health issues     HPI     Follow up Irregular menses     Patient was seen last month for concerns of irregular cycles and desire for pregnancy. History of irregular cycles a few years ago-had work up and then on oral contraceptive pill for a few months. This year, has had cycls in Feb, May, June and July. Nothing since. Plan was a progesterone challenge and then labs on cycle day 3 and 21.   Also has a history of a 6 cm uterine fibroid, so a pelvic ultrasound was also done that shows the fibroid is still present, approximately 6 cm.   She did the progesterone challenge starting 10/5 and has not had any vaginal bleeding/spotting since completing it.   We had discussed that due to her age, irregular cycles, once work up was completed, I would recommend referral to Reproductive Endocrinology. Still has not looked into where she would be  covered.    Review of Systems   Constitutional, HEENT, cardiovascular, pulmonary, gi and gu systems are negative, except as otherwise noted.      Objective           Vitals:  No vitals were obtained today due to virtual visit.    Physical Exam   PSYCH: Alert and oriented times 3; coherent speech, normal   rate and volume, able to articulate logical thoughts, able   to abstract reason, no tangential thoughts, no hallucinations   or delusions    RESP: No cough, no audible wheezing, able to talk in full sentences  Remainder of exam unable to be completed due to telephone visits    Phone call duration: 13 minutes

## 2021-11-17 ENCOUNTER — VIRTUAL VISIT (OUTPATIENT)
Dept: OBGYN | Facility: CLINIC | Age: 41
End: 2021-11-17
Payer: COMMERCIAL

## 2021-11-17 DIAGNOSIS — N92.6 IRREGULAR MENSES: Primary | ICD-10-CM

## 2021-11-17 PROCEDURE — 99213 OFFICE O/P EST LOW 20 MIN: CPT | Mod: 95 | Performed by: NURSE PRACTITIONER

## 2021-11-17 RX ORDER — NORGESTIMATE AND ETHINYL ESTRADIOL 0.25-0.035
1 KIT ORAL DAILY
Qty: 28 TABLET | Refills: 0 | Status: SHIPPED | OUTPATIENT
Start: 2021-11-17 | End: 2021-12-16

## 2021-11-20 ENCOUNTER — HEALTH MAINTENANCE LETTER (OUTPATIENT)
Age: 41
End: 2021-11-20

## 2021-12-15 DIAGNOSIS — N92.6 IRREGULAR MENSES: ICD-10-CM

## 2021-12-16 RX ORDER — NORGESTIMATE AND ETHINYL ESTRADIOL 0.25-0.035
KIT ORAL
Qty: 84 TABLET | Refills: 0 | Status: SHIPPED | OUTPATIENT
Start: 2021-12-16 | End: 2022-06-06

## 2021-12-16 NOTE — TELEPHONE ENCOUNTER
"Requested Prescriptions   Pending Prescriptions Disp Refills     ESTARYLLA 0.25-35 MG-MCG tablet [Pharmacy Med Name: ESTARYLLA TABLETS 28S] 28 tablet 0     Sig: TAKE 1 TABLET BY MOUTH DAILY       Contraceptives Protocol Failed - 12/16/2021  9:12 AM        Failed - Patient is not a current smoker if age is 35 or older        Passed - Recent (12 mo) or future (30 days) visit within the authorizing provider's specialty     Patient has had an office visit with the authorizing provider or a provider within the authorizing providers department within the previous 12 mos or has a future within next 30 days. See \"Patient Info\" tab in inbasket, or \"Choose Columns\" in Meds & Orders section of the refill encounter.              Passed - Medication is active on med list        Passed - No active pregnancy on record        Passed - No positive pregnancy test in past 12 months           Pt last seen 11/17/2021 for VV for irregular menses    Last prescribed 11/17/2021 for 28 tablets with 0 refills.    Pt took prescription to get withdrawal bleed, RN sent message to f/u if successful, pt states she starting bleeding on 12/15. Pt on placebo week now, asking for refills for a couple months to regulate menses better.    RN routing to provider to advise.    Yolette Saucedo RN on 12/16/2021 at 9:16 AM    "

## 2021-12-16 NOTE — TELEPHONE ENCOUNTER
3 month supply sent to pharmacy. Please make sure she is aware that this will impede her ability to become pregnant as they were desiring pregnancy. We had been using medication to induce bleeding so we could do labs. Given her age, the longer she waits to move forward the more delayed a possible pregnancy would be. Renetta LIEBERMAN CNP

## 2021-12-16 NOTE — TELEPHONE ENCOUNTER
RN relayed providers advisement to pt via [facility].    Yolette Saucedo RN on 12/16/2021 at 9:44 AM

## 2022-01-06 NOTE — LETTER
November 4, 2019    Christine Hu  [address]  [place] [address]          Dear Christine,    It was a pleasure to see you at your recent office visit.  Your test results are listed below.  Normal cholesterol.         If you have any questions or concerns, please call the clinic at [phone].    Sincerely,  Angi Marroquin PA-C    Results for orders placed or performed in visit on 11/01/19   Lipid panel reflex to direct LDL Fasting     Status: None   Result Value Ref Range    Cholesterol 167 <200 mg/dL    Triglycerides 46 <150 mg/dL    HDL Cholesterol 66 >49 mg/dL    LDL Cholesterol Calculated 92 <100 mg/dL    Non HDL Cholesterol 101 <130 mg/dL                        Order placed.

## 2022-03-18 ENCOUNTER — TRANSFERRED RECORDS (OUTPATIENT)
Dept: HEALTH INFORMATION MANAGEMENT | Facility: CLINIC | Age: 42
End: 2022-03-18
Payer: COMMERCIAL

## 2022-03-21 ENCOUNTER — TRANSFERRED RECORDS (OUTPATIENT)
Dept: HEALTH INFORMATION MANAGEMENT | Facility: CLINIC | Age: 42
End: 2022-03-21
Payer: COMMERCIAL

## 2022-03-23 ENCOUNTER — TRANSFERRED RECORDS (OUTPATIENT)
Dept: HEALTH INFORMATION MANAGEMENT | Facility: CLINIC | Age: 42
End: 2022-03-23
Payer: COMMERCIAL

## 2022-04-25 ENCOUNTER — TRANSFERRED RECORDS (OUTPATIENT)
Dept: HEALTH INFORMATION MANAGEMENT | Facility: CLINIC | Age: 42
End: 2022-04-25

## 2022-04-25 ENCOUNTER — DOCUMENTATION ONLY (OUTPATIENT)
Dept: LAB | Facility: CLINIC | Age: 42
End: 2022-04-25

## 2022-04-25 ENCOUNTER — LAB (OUTPATIENT)
Dept: LAB | Facility: CLINIC | Age: 42
End: 2022-04-25
Payer: COMMERCIAL

## 2022-04-25 DIAGNOSIS — R52 PAIN: ICD-10-CM

## 2022-04-25 DIAGNOSIS — N92.6 IRREGULAR MENSES: ICD-10-CM

## 2022-04-25 DIAGNOSIS — R52 PAIN: Primary | ICD-10-CM

## 2022-04-25 LAB
BASOPHILS # BLD AUTO: 0 10E3/UL (ref 0–0.2)
BASOPHILS NFR BLD AUTO: 0 %
EOSINOPHIL # BLD AUTO: 0.1 10E3/UL (ref 0–0.7)
EOSINOPHIL NFR BLD AUTO: 1 %
ERYTHROCYTE [DISTWIDTH] IN BLOOD BY AUTOMATED COUNT: 12.2 % (ref 10–15)
ESTRADIOL SERPL-MCNC: 35 PG/ML
FSH SERPL-ACNC: 49 IU/L
HCT VFR BLD AUTO: 43.2 % (ref 35–47)
HGB BLD-MCNC: 13.8 G/DL (ref 11.7–15.7)
HOLD SPECIMEN: NORMAL
LH SERPL-ACNC: 38.2 IU/L
LYMPHOCYTES # BLD AUTO: 1.5 10E3/UL (ref 0.8–5.3)
LYMPHOCYTES NFR BLD AUTO: 21 %
MCH RBC QN AUTO: 33 PG (ref 26.5–33)
MCHC RBC AUTO-ENTMCNC: 31.9 G/DL (ref 31.5–36.5)
MCV RBC AUTO: 103 FL (ref 78–100)
MONOCYTES # BLD AUTO: 0.6 10E3/UL (ref 0–1.3)
MONOCYTES NFR BLD AUTO: 9 %
NEUTROPHILS # BLD AUTO: 5 10E3/UL (ref 1.6–8.3)
NEUTROPHILS NFR BLD AUTO: 70 %
PLATELET # BLD AUTO: 264 10E3/UL (ref 150–450)
PROLACTIN SERPL-MCNC: 5 UG/L (ref 3–27)
RBC # BLD AUTO: 4.18 10E6/UL (ref 3.8–5.2)
TSH SERPL DL<=0.005 MIU/L-ACNC: 0.99 MU/L (ref 0.4–4)
WBC # BLD AUTO: 7.2 10E3/UL (ref 4–11)

## 2022-04-25 PROCEDURE — 82670 ASSAY OF TOTAL ESTRADIOL: CPT

## 2022-04-25 PROCEDURE — 84146 ASSAY OF PROLACTIN: CPT

## 2022-04-25 PROCEDURE — 83001 ASSAY OF GONADOTROPIN (FSH): CPT

## 2022-04-25 PROCEDURE — 83002 ASSAY OF GONADOTROPIN (LH): CPT

## 2022-04-25 PROCEDURE — 36415 COLL VENOUS BLD VENIPUNCTURE: CPT

## 2022-04-25 PROCEDURE — 84443 ASSAY THYROID STIM HORMONE: CPT

## 2022-04-25 PROCEDURE — 85025 COMPLETE CBC W/AUTO DIFF WBC: CPT

## 2022-04-25 NOTE — PROGRESS NOTES
I ordered it but if it is abnormal she needs an appt to go over unless her ortho treats.     Angi Marroquin PA-C

## 2022-04-25 NOTE — PROGRESS NOTES
Desi King came into lab today (4-25) for another provider's lab orders, but would like to also have a WBC done. Christine states that she believes that she has an infection going on with her foot.     Please order WBC as future if appropriate so that [facility] may process. Lab does have a sample.     Thanks, Yumi RYAN

## 2022-04-26 NOTE — RESULT ENCOUNTER NOTE
"Spoke with patient and he states he will call again to possibly schedule once he is ""doing a pulmonology thing right now\""  " Christine,    Normal white blood cell count.      Angi Marroquin PA-C

## 2022-04-27 DIAGNOSIS — N92.6 IRREGULAR MENSES: ICD-10-CM

## 2022-04-27 RX ORDER — PROGESTERONE 200 MG/1
200 CAPSULE ORAL DAILY
Qty: 7 CAPSULE | Refills: 0 | Status: SHIPPED | OUTPATIENT
Start: 2022-04-27 | End: 2022-06-06

## 2022-04-28 ENCOUNTER — TRANSFERRED RECORDS (OUTPATIENT)
Dept: HEALTH INFORMATION MANAGEMENT | Facility: CLINIC | Age: 42
End: 2022-04-28
Payer: COMMERCIAL

## 2022-05-09 ENCOUNTER — TRANSFERRED RECORDS (OUTPATIENT)
Dept: HEALTH INFORMATION MANAGEMENT | Facility: CLINIC | Age: 42
End: 2022-05-09
Payer: COMMERCIAL

## 2022-05-17 NOTE — PROGRESS NOTES
History of Present Illness - Christine Hu is a very pleasant 41 year old female I have not seen since 4/12/2016.    She has a long history of ear problems.  Initially she presented with hearing loss and pain in R ear.  It has been present and noticeable for approximately many years, but is worsening over last month or so. She has a history of tympanomastoidectomy in 2008 on the right. Has had waxing and waning ear pain since. However, her pain is worsening with sharp episodes. Also episodes of an plugged feeling for 5-10 minutes that than clears. Overall she has usable hearing on the right with the phone. No left ear troubles. She was seen by the [facility], Dr. Roman last summer for this. Surgery was discussed, but she held off. Now things have worsened with regard to distracting pain and pulsatile tinnitus. No otorrhea.     She was starting to have problems years ago and was told that she had scar tissue in the RIGHT ear.  This was about 2009, and was managed by [facility], and she was referred to Rick Nissen who did the surgery. She had surgery, what sounded like a mastoidectomy, but she tells me that things did not seem to improve with the hearing.  But fortunately she has not had issues with pain or drainage. But she does mention that she has a lot of issues with recruitment phenomenon when things are loud.     Things were fine for a long time, but in 2016 as allergies have increased she has had a return of RIGHT ear fullness and pain.  After my least evaluation in 2016 I had suspicion of possible temporomandibular disease, as the ears looked quite good.  I offered imaging with CT scans, but she was lost to follow up since then 6 years ago.    She tells me that in the years since, she has continued to have cycles of fullness and pain that comes and goes.  But she has come back to me due to a recent prolonged bout of fullness and crackling in the RIGHT side.  No changes in hearing, no chronic  drainage, no change in balance.    Past Medical History -   Patient Active Problem List   Diagnosis     Degeneration of cervical intervertebral disc     Contraceptive management     History of abnormal Pap smear     Depressive disorder, not elsewhere classified     Thyrotoxicosis     Low back pain     Generalized anxiety disorder     Mastoiditis     Overdose     CARDIOVASCULAR SCREENING; LDL GOAL LESS THAN 160     Tobacco use     Primary insomnia     History of Clostridium difficile     Benign essential hypertension     Heart murmur     Acute alcoholic intoxication (H)     Acute gastritis     Alcohol dependence (H)     Alcohol withdrawal (H)     Anxiety state     Bladder retention of urine     Hepatitis     Hypomagnesemia     Palpitations     Sprain of ankle       Current Medications -   Current Outpatient Medications:      ESTARYLLA 0.25-35 MG-MCG tablet, TAKE 1 TABLET BY MOUTH DAILY, Disp: 84 tablet, Rfl: 0     progesterone (PROMETRIUM) 200 MG capsule, Take 1 capsule (200 mg) by mouth daily, Disp: 7 capsule, Rfl: 0     spironolactone (ALDACTONE) 100 MG tablet, Take 1 tablet by mouth daily, Disp: , Rfl:     Allergies -   Allergies   Allergen Reactions     Codeine GI Disturbance     Nausea with use of Tylenol #3     Clindamycin Other (See Comments)     C diff, hospitalized      Other [No Clinical Screening - See Comments]      Unknown Diuretic-patient complain about blisters on her legs.     Penicillins Rash       Social History -   Social History     Socioeconomic History     Marital status:    Tobacco Use     Smoking status: Current Every Day Smoker     Packs/day: 0.25     Smokeless tobacco: Never Used   Substance and Sexual Activity     Alcohol use: Not Currently     Drug use: No     Sexual activity: Yes     Partners: Male   Other Topics Concern      Service No     Blood Transfusions No     Caffeine Concern No     Occupational Exposure No     Hobby Hazards No     Sleep Concern No     Stress  Concern Yes     Comment: kids,work, divorce     Weight Concern Yes     Comment: gained 30 pounds in last 4-5 months     Special Diet No     Back Care Yes     Comment: bulging disk in neck, hyper extened lower back     Exercise No     Bike Helmet No     Comment: don't ride bike     Seat Belt Yes     Comment: 100%     Self-Exams No     Parent/sibling w/ CABG, MI or angioplasty before 65F 55M? No   Social History Narrative    Hello,         I saw your note regarding this patient. I had previously filled one Vicodin request (we're not on Epic so this is't always clear) and had told her very clearly that it would be a one-time rx only, that she'd need to follow through with non-medicine modalities too.  I subsequently denied a second refill request. She has not returned for any follow ups (she no-showed today for me and our pain psychologist). I would strongly recommend against opioids and for comprehensive pain rehab services.         Call me if you have any questions,    Lida Collin    [facility] [phone]    Dir [phone]        Note: no narcotics for pain. September 12, 2007 Pepito Navarro MD    Internal medicine/Pediatrics         Sees [facility]       Family History -   Family History   Problem Relation Age of Onset     Allergies Mother      Psychotic Disorder Father         depression     Heart Failure Father         heart attack in NOV. 2015?     Allergies Sister      Allergies Sister      Asthma Other      Psychotic Disorder Sister         anxiety     Psychotic Disorder Sister         depression     Psychotic Disorder Maternal Uncle         anxiety     C.A.D. No family hx of      Diabetes No family hx of      Hypertension No family hx of      Cerebrovascular Disease No family hx of      Breast Cancer No family hx of      Cancer - colorectal No family hx of      Prostate Cancer No family hx of        Review of Systems - As per HPI and PMHx, otherwise 10+ system review of the head and neck, and general constitution is  negative.    Physical Exam  /88 (BP Location: Right arm, Patient Position: Sitting, Cuff Size: Adult Regular)   Pulse 83   Wt 64.4 kg (142 lb)   SpO2 98%   BMI 22.92 kg/m      General - The patient is well nourished and well developed, and appears to have good nutritional status.  Alert and oriented to person and place, answers questions and cooperates with examination appropriately.   Head and Face - Normocephalic and atraumatic, with no gross asymmetry noted of the contour of the facial features.  The facial nerve is intact, with strong symmetric movements.  Voice and Breathing - The patient was breathing comfortably without the use of accessory muscles. There was no wheezing, stridor, or stertor.  The patients voice was clear and strong, and had appropriate pitch and quality.  Ears - The LEFT tympanic membrane and canal are normal and healthy. The RIGHT canal is widened from previous surgery, no CWD.  The RIGHT tympanic membrane is about 50% monomeric, but no effusion or retraction noted.  But also of note there is a blue foreign body in the LEFT middle ear.  Eyes - Extraocular movements intact, and the pupils were reactive to light.  Sclera were not icteric or injected, conjunctiva were pink and moist.  Mouth - Dentition in fair condition, no masses, ulcerations, or erosions noted on examination of mucosa.  Mucosa is pink and moist. The tongue is mobile and midline, and the uvula is midline on elevation.  Palpation of the mucosa of the vestibules, buccal surfaces, hard and soft palate, oral tongue, and floor of mouth was soft.  The dental exam was notable for wear facets that were consistent with bruxism.  Palpation of the digastric muscles in the floor of mouth and also the TMJ's were tender.    Throat - The walls of the oropharynx were smooth, pink, moist, symmetric, and had no lesions or ulcerations.  The tonsillar pillars and soft palate were symmetric.  The uvula was midline on elevation.         Audiologic Studies - An audiogram and tympanogram were performed today as part of the evaluation and personally reviewed. The tympanogram shows a normal Type A curve, with normal canal volume and middle ear pressure.  There is no sign of eustachian tube dysfunction or middle ear effusion.  The audiogram was normal on the LEFT, but the RIGHT side has a threshold shift and mixed hearing loss, mild though, with thresholds around 20-30dB.  Not significantly changed from the last visit      A/P - Christine HEARN Shruti Hu is a 41 year old female  (H90.8) Mixed hearing loss, unilateral  (primary encounter diagnosis)  (Z90.89) History of mastoidectomy  (H92.01) Right ear pain    Although she does have an extensive ear history, I actually think this is something different.    Based on today's history and physical exam, I can find no evidence of middle ear pathology or eustachian tube dysfunction.  At this point my primary diagnosis is of temporomandibular syndrome.  I have discussed the etiology of TMJ, and the reasons why referred pain can mimic symptoms of ear disease, headaches, and even sinusitis.  i have given the patient an instructional sheet of things to be tried at home, as well a referral to a TMJ specialist should it be needed.  Finally, I counseled the patient that should the therapy not help, or should the symptoms change, that they should return to me.    However, given her ear history, and the continued presence of the foreign body in the RIGHT middle ear (also seen by Oton[facility] in 2015), if all the TMJ therapy is of no benefit, I would ask she contact me and I will order a temporal bone CT.  But I would be hesitant to do a middle ear exploration unless there was no other choice.

## 2022-05-20 ENCOUNTER — OFFICE VISIT (OUTPATIENT)
Dept: OTOLARYNGOLOGY | Facility: CLINIC | Age: 42
End: 2022-05-20
Payer: COMMERCIAL

## 2022-05-20 ENCOUNTER — OFFICE VISIT (OUTPATIENT)
Dept: AUDIOLOGY | Facility: CLINIC | Age: 42
End: 2022-05-20
Payer: COMMERCIAL

## 2022-05-20 VITALS
DIASTOLIC BLOOD PRESSURE: 88 MMHG | OXYGEN SATURATION: 98 % | BODY MASS INDEX: 22.92 KG/M2 | SYSTOLIC BLOOD PRESSURE: 129 MMHG | WEIGHT: 142 LBS | HEART RATE: 83 BPM

## 2022-05-20 DIAGNOSIS — H92.03 OTALGIA OF BOTH EARS: Primary | ICD-10-CM

## 2022-05-20 DIAGNOSIS — H90.8 MIXED HEARING LOSS, UNILATERAL: Primary | ICD-10-CM

## 2022-05-20 DIAGNOSIS — Z90.89 HISTORY OF MASTOIDECTOMY: ICD-10-CM

## 2022-05-20 DIAGNOSIS — H92.01 RIGHT EAR PAIN: ICD-10-CM

## 2022-05-20 PROCEDURE — 92550 TYMPANOMETRY & REFLEX THRESH: CPT

## 2022-05-20 PROCEDURE — 99203 OFFICE O/P NEW LOW 30 MIN: CPT | Performed by: OTOLARYNGOLOGY

## 2022-05-20 PROCEDURE — 92557 COMPREHENSIVE HEARING TEST: CPT

## 2022-05-20 RX ORDER — CYCLOBENZAPRINE HCL 10 MG
10 TABLET ORAL 3 TIMES DAILY PRN
Qty: 60 TABLET | Refills: 1 | Status: SHIPPED | OUTPATIENT
Start: 2022-05-20 | End: 2022-07-14

## 2022-05-20 NOTE — PROGRESS NOTES
AUDIOLOGY REPORT:    Patient was referred to [facility] from ENT by Dr. Dalton for a hearing examination. Patient returns today with the report of pain and pressure in her right ear that began 'a couple weeks ago'. She reports longstanding pressure and pain in her right ear resulting in a mastoidectomy, however, it just became worse. Christine reports occasional right ear tinnitus along with sensitivity to certain frequencies. She denies drainage and family history of hearing loss.    Testing:    Otoscopy:   Otoscopic exam indicates ears are clear of cerumen bilaterally     Tympanograms:    RIGHT: normal eardrum mobility     LEFT:   normal eardrum mobility    Reflexes (reported by stimulus ear): 1000 Hz  RIGHT: Ipsilateral is absent at frequencies tested  RIGHT: Contralateral-could not maintain seal  LEFT:   Ipsilateral-could not maintain seal  LEFT:   Contralateral is absent at frequencies tested    Thresholds:   Pure Tone Thresholds assessed using conventional audiometry with good  reliability from 250-8000 Hz bilaterally using insert earphones and circumaural headphones     RIGHT:  borderline-normal with a conductive component at 250 HZ    LEFT:    normal Hearing sensitivity at frequencies tested    Speech Reception Threshold:    RIGHT: 25 dB HL    LEFT:   5 dB HL  Speech Reception Thresholds are in good agreement with pure tone thresholds    Word Recognition Score:     RIGHT: 96% at 65 dB HL using NU-6 recorded word list.    LEFT:   100% at 50 dB HL using NU-6 recorded word list.    When compared to previous audiogram on 4/12/2016,  hearing in the left ear has declined by 5-10dB at all frequencies tested. Right ear has declined by 5-15dB at all frequencies tested. Discussed results with the patient.     Patient was returned to ENT for follow up.     Yuridia Johnson, CCC-A  Licensed Audiologist  [place] #[id number]    05/20/22

## 2022-05-20 NOTE — NURSING NOTE
"Chief Complaint   Patient presents with     Hearing Problem     Ear Problem       Vitals:    05/20/22 1214   BP: 129/88   BP Location: Right arm   Patient Position: Sitting   Cuff Size: Adult Regular   Pulse: 83   SpO2: 98%   Weight: 64.4 kg (142 lb)     Wt Readings from Last 1 Encounters:   05/20/22 64.4 kg (142 lb)     Ht Readings from Last 1 Encounters:   10/05/21 1.676 m (5' 6\")       Ric Julien [facility], 5/20/2022 12:15 PM    "

## 2022-05-20 NOTE — LETTER
5/20/2022         RE: Christine Hu  [address]  [place] [address]        Dear Colleague,    Thank you for referring your patient, Christine Hu, to the [facility]. Please see a copy of my visit note below.    History of Present Illness - Christine Hu is a very pleasant 41 year old female I have not seen since 4/12/2016.    She has a long history of ear problems.  Initially she presented with hearing loss and pain in R ear.  It has been present and noticeable for approximately many years, but is worsening over last month or so. She has a history of tympanomastoidectomy in 2008 on the right. Has had waxing and waning ear pain since. However, her pain is worsening with sharp episodes. Also episodes of an plugged feeling for 5-10 minutes that than clears. Overall she has usable hearing on the right with the phone. No left ear troubles. She was seen by the [facility], Dr. Roman last summer for this. Surgery was discussed, but she held off. Now things have worsened with regard to distracting pain and pulsatile tinnitus. No otorrhea.     She was starting to have problems years ago and was told that she had scar tissue in the RIGHT ear.  This was about 2009, and was managed by [facility], and she was referred to Rick Nissen who did the surgery. She had surgery, what sounded like a mastoidectomy, but she tells me that things did not seem to improve with the hearing.  But fortunately she has not had issues with pain or drainage. But she does mention that she has a lot of issues with recruitment phenomenon when things are loud.     Things were fine for a long time, but in 2016 as allergies have increased she has had a return of RIGHT ear fullness and pain.  After my least evaluation in 2016 I had suspicion of possible temporomandibular disease, as the ears looked quite good.  I offered imaging with CT scans, but she was lost to follow up since then 6 years  ago.    She tells me that in the years since, she has continued to have cycles of fullness and pain that comes and goes.  But she has come back to me due to a recent prolonged bout of fullness and crackling in the RIGHT side.  No changes in hearing, no chronic drainage, no change in balance.    Past Medical History -   Patient Active Problem List   Diagnosis     Degeneration of cervical intervertebral disc     Contraceptive management     History of abnormal Pap smear     Depressive disorder, not elsewhere classified     Thyrotoxicosis     Low back pain     Generalized anxiety disorder     Mastoiditis     Overdose     CARDIOVASCULAR SCREENING; LDL GOAL LESS THAN 160     Tobacco use     Primary insomnia     History of Clostridium difficile     Benign essential hypertension     Heart murmur     Acute alcoholic intoxication (H)     Acute gastritis     Alcohol dependence (H)     Alcohol withdrawal (H)     Anxiety state     Bladder retention of urine     Hepatitis     Hypomagnesemia     Palpitations     Sprain of ankle       Current Medications -   Current Outpatient Medications:      ESTARYLLA 0.25-35 MG-MCG tablet, TAKE 1 TABLET BY MOUTH DAILY, Disp: 84 tablet, Rfl: 0     progesterone (PROMETRIUM) 200 MG capsule, Take 1 capsule (200 mg) by mouth daily, Disp: 7 capsule, Rfl: 0     spironolactone (ALDACTONE) 100 MG tablet, Take 1 tablet by mouth daily, Disp: , Rfl:     Allergies -   Allergies   Allergen Reactions     Codeine GI Disturbance     Nausea with use of Tylenol #3     Clindamycin Other (See Comments)     C diff, hospitalized      Other [No Clinical Screening - See Comments]      Unknown Diuretic-patient complain about blisters on her legs.     Penicillins Rash       Social History -   Social History     Socioeconomic History     Marital status:    Tobacco Use     Smoking status: Current Every Day Smoker     Packs/day: 0.25     Smokeless tobacco: Never Used   Substance and Sexual Activity     Alcohol use:  Not Currently     Drug use: No     Sexual activity: Yes     Partners: Male   Other Topics Concern      Service No     Blood Transfusions No     Caffeine Concern No     Occupational Exposure No     Hobby Hazards No     Sleep Concern No     Stress Concern Yes     Comment: kids,work, divorce     Weight Concern Yes     Comment: gained 30 pounds in last 4-5 months     Special Diet No     Back Care Yes     Comment: bulging disk in neck, hyper extened lower back     Exercise No     Bike Helmet No     Comment: don't ride bike     Seat Belt Yes     Comment: 100%     Self-Exams No     Parent/sibling w/ CABG, MI or angioplasty before 65F 55M? No   Social History Narrative    Hello,         I saw your note regarding this patient. I had previously filled one Vicodin request (we're not on Epic so this is't always clear) and had told her very clearly that it would be a one-time rx only, that she'd need to follow through with non-medicine modalities too.  I subsequently denied a second refill request. She has not returned for any follow ups (she no-showed today for me and our pain psychologist). I would strongly recommend against opioids and for comprehensive pain rehab services.         Call me if you have any questions,    Lida Badilloker    [facility] [phone]    Dir [phone]        Note: no narcotics for pain. September 12, 2007 Pepito Navarro MD    Internal medicine/Pediatrics         Sees [facility]       Family History -   Family History   Problem Relation Age of Onset     Allergies Mother      Psychotic Disorder Father         depression     Heart Failure Father         heart attack in NOV. 2015?     Allergies Sister      Allergies Sister      Asthma Other      Psychotic Disorder Sister         anxiety     Psychotic Disorder Sister         depression     Psychotic Disorder Maternal Uncle         anxiety     C.A.D. No family hx of      Diabetes No family hx of      Hypertension No family hx of      Cerebrovascular Disease  No family hx of      Breast Cancer No family hx of      Cancer - colorectal No family hx of      Prostate Cancer No family hx of        Review of Systems - As per HPI and PMHx, otherwise 10+ system review of the head and neck, and general constitution is negative.    Physical Exam  /88 (BP Location: Right arm, Patient Position: Sitting, Cuff Size: Adult Regular)   Pulse 83   Wt 64.4 kg (142 lb)   SpO2 98%   BMI 22.92 kg/m      General - The patient is well nourished and well developed, and appears to have good nutritional status.  Alert and oriented to person and place, answers questions and cooperates with examination appropriately.   Head and Face - Normocephalic and atraumatic, with no gross asymmetry noted of the contour of the facial features.  The facial nerve is intact, with strong symmetric movements.  Voice and Breathing - The patient was breathing comfortably without the use of accessory muscles. There was no wheezing, stridor, or stertor.  The patients voice was clear and strong, and had appropriate pitch and quality.  Ears - The LEFT tympanic membrane and canal are normal and healthy. The RIGHT canal is widened from previous surgery, no CWD.  The RIGHT tympanic membrane is about 50% monomeric, but no effusion or retraction noted.  But also of note there is a blue foreign body in the LEFT middle ear.  Eyes - Extraocular movements intact, and the pupils were reactive to light.  Sclera were not icteric or injected, conjunctiva were pink and moist.  Mouth - Dentition in fair condition, no masses, ulcerations, or erosions noted on examination of mucosa.  Mucosa is pink and moist. The tongue is mobile and midline, and the uvula is midline on elevation.  Palpation of the mucosa of the vestibules, buccal surfaces, hard and soft palate, oral tongue, and floor of mouth was soft.  The dental exam was notable for wear facets that were consistent with bruxism.  Palpation of the digastric muscles in the  floor of mouth and also the TMJ's were tender.    Throat - The walls of the oropharynx were smooth, pink, moist, symmetric, and had no lesions or ulcerations.  The tonsillar pillars and soft palate were symmetric.  The uvula was midline on elevation.        Audiologic Studies - An audiogram and tympanogram were performed today as part of the evaluation and personally reviewed. The tympanogram shows a normal Type A curve, with normal canal volume and middle ear pressure.  There is no sign of eustachian tube dysfunction or middle ear effusion.  The audiogram was normal on the LEFT, but the RIGHT side has a threshold shift and mixed hearing loss, mild though, with thresholds around 20-30dB.  Not significantly changed from the last visit      A/P - Christine Hu is a 41 year old female  (H90.8) Mixed hearing loss, unilateral  (primary encounter diagnosis)  (Z90.89) History of mastoidectomy  (H92.01) Right ear pain    Although she does have an extensive ear history, I actually think this is something different.    Based on today's history and physical exam, I can find no evidence of middle ear pathology or eustachian tube dysfunction.  At this point my primary diagnosis is of temporomandibular syndrome.  I have discussed the etiology of TMJ, and the reasons why referred pain can mimic symptoms of ear disease, headaches, and even sinusitis.  i have given the patient an instructional sheet of things to be tried at home, as well a referral to a TMJ specialist should it be needed.  Finally, I counseled the patient that should the therapy not help, or should the symptoms change, that they should return to me.    However, given her ear history, and the continued presence of the foreign body in the RIGHT middle ear (also seen by [facility] in 2015), if all the TMJ therapy is of no benefit, I would ask she contact me and I will order a temporal bone CT.  But I would be hesitant to do a middle ear exploration unless there was  no other choice.      Again, thank you for allowing me to participate in the care of your patient.        Sincerely,        Yves Dalton MD

## 2022-05-24 ENCOUNTER — OFFICE VISIT (OUTPATIENT)
Dept: FAMILY MEDICINE | Facility: CLINIC | Age: 42
End: 2022-05-24
Payer: COMMERCIAL

## 2022-05-24 VITALS
BODY MASS INDEX: 23.92 KG/M2 | DIASTOLIC BLOOD PRESSURE: 83 MMHG | WEIGHT: 148.2 LBS | TEMPERATURE: 98.8 F | HEART RATE: 92 BPM | OXYGEN SATURATION: 99 % | SYSTOLIC BLOOD PRESSURE: 141 MMHG

## 2022-05-24 DIAGNOSIS — S82.832D CLOSED FRACTURE OF DISTAL END OF LEFT FIBULA WITH ROUTINE HEALING, UNSPECIFIED FRACTURE MORPHOLOGY, SUBSEQUENT ENCOUNTER: ICD-10-CM

## 2022-05-24 DIAGNOSIS — S83.512D RUPTURE OF ANTERIOR CRUCIATE LIGAMENT OF LEFT KNEE, SUBSEQUENT ENCOUNTER: Primary | ICD-10-CM

## 2022-05-24 DIAGNOSIS — M26.609 TMJ (TEMPOROMANDIBULAR JOINT SYNDROME): ICD-10-CM

## 2022-05-24 DIAGNOSIS — R20.2 PARESTHESIAS: ICD-10-CM

## 2022-05-24 PROCEDURE — 99213 OFFICE O/P EST LOW 20 MIN: CPT | Performed by: NURSE PRACTITIONER

## 2022-05-24 RX ORDER — HYDROCODONE BITARTRATE AND ACETAMINOPHEN 5; 325 MG/1; MG/1
TABLET ORAL
COMMUNITY
Start: 2022-05-09 | End: 2022-07-21

## 2022-05-24 RX ORDER — NAPROXEN 500 MG/1
TABLET ORAL
COMMUNITY
Start: 2022-05-15 | End: 2022-07-28

## 2022-05-24 RX ORDER — TRAMADOL HYDROCHLORIDE 50 MG/1
TABLET ORAL
COMMUNITY
Start: 2022-05-15 | End: 2022-06-06

## 2022-05-24 ASSESSMENT — PATIENT HEALTH QUESTIONNAIRE - PHQ9
SUM OF ALL RESPONSES TO PHQ QUESTIONS 1-9: 5
10. IF YOU CHECKED OFF ANY PROBLEMS, HOW DIFFICULT HAVE THESE PROBLEMS MADE IT FOR YOU TO DO YOUR WORK, TAKE CARE OF THINGS AT HOME, OR GET ALONG WITH OTHER PEOPLE: NOT DIFFICULT AT ALL
SUM OF ALL RESPONSES TO PHQ QUESTIONS 1-9: 5

## 2022-05-24 NOTE — PATIENT INSTRUCTIONS
Referral to [facility], will fax today.    Referral for EMG test to diagnose the numbness and tingling in your fingers.
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
## 2022-05-26 ENCOUNTER — TRANSFERRED RECORDS (OUTPATIENT)
Dept: HEALTH INFORMATION MANAGEMENT | Facility: CLINIC | Age: 42
End: 2022-05-26
Payer: COMMERCIAL

## 2022-06-03 ENCOUNTER — THERAPY VISIT (OUTPATIENT)
Dept: PHYSICAL THERAPY | Facility: CLINIC | Age: 42
End: 2022-06-03
Payer: COMMERCIAL

## 2022-06-03 DIAGNOSIS — M54.2 NECK PAIN: Primary | ICD-10-CM

## 2022-06-03 DIAGNOSIS — H92.01 RIGHT EAR PAIN: ICD-10-CM

## 2022-06-03 PROCEDURE — 97110 THERAPEUTIC EXERCISES: CPT | Mod: GP | Performed by: PHYSICAL THERAPIST

## 2022-06-03 PROCEDURE — 97140 MANUAL THERAPY 1/> REGIONS: CPT | Mod: GP | Performed by: PHYSICAL THERAPIST

## 2022-06-03 PROCEDURE — 97162 PT EVAL MOD COMPLEX 30 MIN: CPT | Mod: GP | Performed by: PHYSICAL THERAPIST

## 2022-06-03 PROCEDURE — 97530 THERAPEUTIC ACTIVITIES: CPT | Mod: GP | Performed by: PHYSICAL THERAPIST

## 2022-06-03 NOTE — PROGRESS NOTES
Physical Therapy Initial Evaluation  Subjective:  The history is provided by the patient and medical records.   Therapist Generated HPI Evaluation  Problem details: Had ear surgery at age 26 (about 15 years ago).  Ear pain comes and goes.   Has chronic pain in neck and back.  Currently is doing PT for her ankle and knee injuries at [facility].  Face does not hurt and does not feel restricted with jaw function.  She has pain in upper trap area and goes up neck to base of skull.  She is also getting tingling and coldness into arms, right worse than left..         Type of problem:  TMJ bilateral.    This is a chronic condition.      Patient reports pain:  Other (right ear).  Pain is described as aching and sharp and is intermittent.  Pain is worse during the day.  Since onset symptoms are unchanged.  Associated symptoms:  Ear ache.  and relieved by heat.        Parafunctional Habits:    Bruxism:  Evidence of this per MD note  Mandibular Habits:  Not aware of    Clenching:  Not aware of      Aerobic Exercise:  Normally does regular exercises but not now since injury in March  Sleep Quality:  Sleeps 7 hours but not always great  Patient Health History  Christine NADIYA Ocampokat Mayte being seen for neck pain and TMJ.     Date of Onset: several years ago.      Pain is reported as 8/10 on pain scale.             Other surgery history details: mastoidectomy Right side.        Current occupation is .                                       Objective:  System                                      TMJ Evaluation  ROM:     AROM Cervical:    Flexion: tight paraspinals Overpressure: Pain:  Extension: moderate loss, pain Overpressure: Pain:  Left Side Bend: tight right UT Overpressure: Pain:  Right Side Bend: tight left upper trap Overpressure: Pain:  Left Rotation: mild loss, pain on both sides Overpressure: Pain:  Right Rotation: mild loss, pain on right Overpressure: Pain:  AROM TMJ:  Openin mm     Left Laterotrusion:  8  mm    Right Laterotrusion:  8mm pain right          Associated Findings: Headaches:  None      Palpation:    Left side tenderness present at:  Sternocleidomastoid; Upper Trap; Levator; Superficial Masseter; Deep Masseter; Medial Pterygoid and Lateral Pterygoid  Right side tenderness present at:  Sternocleidomastoid; Upper Trap; Levator; Superficial Masseter; Deep Masseter; Medial Pterygoid and Lateral Pterygoid    Movement Characteristics:  normal  Click:  None  Crepitus:  None  Deviations:  None  Subluxation:  None  Early Translation:  None    TMJ Findings:              Capsule Palpation:  Left side tenderness present at :  Lateral Capsule; Superior Capsule and Posterior Capsule  Right side tenderness present at:  Lateral Capsule; Superior Capsule and Posterior Capsule                General     ROS    Assessment/Plan:    Patient is a 41 year old female with cervical and both sides TMJ complaints.    Patient has the following significant findings with corresponding treatment plan.                Diagnosis 1:  Neck pain and TMJ pain  Pain -  manual therapy and education  Decreased ROM/flexibility - manual therapy and therapeutic exercise  Decreased function - therapeutic activities    Therapy Evaluation Codes:   1) History comprised of:   Personal factors that impact the plan of care:      Time since onset of symptoms.    Comorbidity factors that impact the plan of care are:      Numbness/tingling.     Medications impacting care: Muscle relaxant.  2) Examination of Body Systems comprised of:   Body structures and functions that impact the plan of care:      Cervical spine and TMJ.   Activity limitations that impact the plan of care are:      Lifting, Reading/Computer work and Sleeping.  3) Clinical presentation characteristics are:   Evolving/Changing.  4) Decision-Making    Moderate complexity using standardized patient assessment instrument and/or measureable assessment of functional outcome.  Cumulative Therapy  Evaluation is: Moderate complexity.    Previous and current functional limitations:  (See Goal Flow Sheet for this information)    Short term and Long term goals: (See Goal Flow Sheet for this information)     Communication ability:  Patient appears to be able to clearly communicate and understand verbal and written communication and follow directions correctly.  Treatment Explanation - The following has been discussed with the patient:   RX ordered/plan of care  Anticipated outcomes  Possible risks and side effects  This patient would benefit from PT intervention to resume normal activities.   Rehab potential is excellent.    Frequency:  1 X week, once daily  Duration:  for 12 weeks  Discharge Plan:  Achieve all LTG.  Independent in home treatment program.  Reach maximal therapeutic benefit.    Please refer to the daily flowsheet for treatment today, total treatment time and time spent performing 1:1 timed codes.

## 2022-06-06 ENCOUNTER — THERAPY VISIT (OUTPATIENT)
Dept: PHYSICAL THERAPY | Facility: CLINIC | Age: 42
End: 2022-06-06
Payer: COMMERCIAL

## 2022-06-06 ENCOUNTER — OFFICE VISIT (OUTPATIENT)
Dept: FAMILY MEDICINE | Facility: CLINIC | Age: 42
End: 2022-06-06
Payer: COMMERCIAL

## 2022-06-06 VITALS
WEIGHT: 150.8 LBS | RESPIRATION RATE: 12 BRPM | DIASTOLIC BLOOD PRESSURE: 84 MMHG | BODY MASS INDEX: 24.34 KG/M2 | SYSTOLIC BLOOD PRESSURE: 138 MMHG | HEART RATE: 108 BPM | TEMPERATURE: 99 F

## 2022-06-06 DIAGNOSIS — A69.20 LYME DISEASE: Primary | ICD-10-CM

## 2022-06-06 DIAGNOSIS — M54.2 NECK PAIN: Primary | ICD-10-CM

## 2022-06-06 PROCEDURE — 97110 THERAPEUTIC EXERCISES: CPT | Mod: GP | Performed by: PHYSICAL THERAPIST

## 2022-06-06 PROCEDURE — 99213 OFFICE O/P EST LOW 20 MIN: CPT | Performed by: NURSE PRACTITIONER

## 2022-06-06 PROCEDURE — 97140 MANUAL THERAPY 1/> REGIONS: CPT | Mod: GP | Performed by: PHYSICAL THERAPIST

## 2022-06-06 RX ORDER — DOXYCYCLINE 100 MG/1
100 CAPSULE ORAL 2 TIMES DAILY
Qty: 28 CAPSULE | Refills: 0 | Status: SHIPPED | OUTPATIENT
Start: 2022-06-06 | End: 2022-06-20

## 2022-06-06 ASSESSMENT — ENCOUNTER SYMPTOMS
NUMBNESS: 1
MYALGIAS: 1
ARTHRALGIAS: 1

## 2022-06-06 ASSESSMENT — PAIN SCALES - GENERAL: PAINLEVEL: EXTREME PAIN (8)

## 2022-06-06 NOTE — PROGRESS NOTES
Assessment & Plan     Lyme disease  We will treat empirically today based on symptoms and exposure.  Educated on use of doxycycline.  Does have history of C. difficile.  Encourage probiotic.  Monitor closely for symptoms of C. difficile.  Notify if aching does not completely resolve after completion of full course of antibiotic.  - doxycycline hyclate (VIBRAMYCIN) 100 MG capsule; Take 1 capsule (100 mg) by mouth 2 times daily for 14 days    Prescription drug management  13 minutes spent on the date of the encounter doing chart review, history and exam, documentation and further activities per the note       Return in about 2 weeks (around 6/20/2022), or if symptoms worsen or fail to improve.    MIO Ferraro CNP  [facility] NAHED King is a 41 year old who presents for the following health issues     Chief Complaint   Patient presents with     Tick Bite       2 weeks ago had circular area on top of right foot with smaller circular area next to it that looked like a bug bite. Lately has been in pain all over body. Has been extremely weak and fatigued. Has been experiencing tingling and numbness in hands and finger tips. Hands fall asleep at night.     States has never taken a diuretic. Does not know why that would be on her allergy list.     HPI       Wondering if has Lyme. Was bit by tick to top of right foot.  Is aching head to toe. Is not sleeping. Is miserable and constantly in pain. Hands and fingers will get numb and tingling. Had rash to right foot between 4th and 5th toes.  Rash has since resolved.  Reports was circular with clearing in the middle.  No temp that is aware of, but has felt warm.  Has been more tired and fatigued.  Does get chills occasionally-where wants a blanket. Body aches wake up in the middle of the night. Takes norco for left foot and knee pain.  That does not really help to decrease the aching.  Does have prescription for cyclobenzaprine as  well-that does help to decrease the aching some.    Review of Systems   Musculoskeletal: Positive for arthralgias and myalgias.   Neurological: Positive for numbness (bilat hands, come and go).          Objective    /84 (BP Location: Left arm, Patient Position: Sitting, Cuff Size: Adult Regular)   Pulse 108   Temp 99  F (37.2  C) (Tympanic)   Resp 12   Wt 68.4 kg (150 lb 12.8 oz)   BMI 24.34 kg/m    Body mass index is 24.34 kg/m .  Physical Exam  Constitutional:       Appearance: She is well-developed.   Cardiovascular:      Rate and Rhythm: Normal rate and regular rhythm.   Pulmonary:      Effort: Pulmonary effort is normal.      Breath sounds: Normal breath sounds.   Skin:     General: Skin is warm.   Neurological:      Mental Status: She is alert.

## 2022-06-20 ENCOUNTER — THERAPY VISIT (OUTPATIENT)
Dept: PHYSICAL THERAPY | Facility: CLINIC | Age: 42
End: 2022-06-20
Payer: COMMERCIAL

## 2022-06-20 DIAGNOSIS — M54.2 NECK PAIN: Primary | ICD-10-CM

## 2022-06-20 PROCEDURE — 97140 MANUAL THERAPY 1/> REGIONS: CPT | Mod: GP | Performed by: PHYSICAL THERAPIST

## 2022-06-20 PROCEDURE — 97110 THERAPEUTIC EXERCISES: CPT | Mod: GP | Performed by: PHYSICAL THERAPIST

## 2022-06-27 ENCOUNTER — THERAPY VISIT (OUTPATIENT)
Dept: PHYSICAL THERAPY | Facility: CLINIC | Age: 42
End: 2022-06-27
Payer: COMMERCIAL

## 2022-06-27 DIAGNOSIS — M54.2 NECK PAIN: Primary | ICD-10-CM

## 2022-06-27 PROCEDURE — 97140 MANUAL THERAPY 1/> REGIONS: CPT | Mod: GP | Performed by: PHYSICAL THERAPIST

## 2022-06-27 PROCEDURE — 97110 THERAPEUTIC EXERCISES: CPT | Mod: GP | Performed by: PHYSICAL THERAPIST

## 2022-07-06 ENCOUNTER — THERAPY VISIT (OUTPATIENT)
Dept: PHYSICAL THERAPY | Facility: CLINIC | Age: 42
End: 2022-07-06
Payer: COMMERCIAL

## 2022-07-06 DIAGNOSIS — M54.2 NECK PAIN: Primary | ICD-10-CM

## 2022-07-06 PROCEDURE — 97110 THERAPEUTIC EXERCISES: CPT | Mod: GP | Performed by: PHYSICAL THERAPIST

## 2022-07-06 PROCEDURE — 97140 MANUAL THERAPY 1/> REGIONS: CPT | Mod: GP | Performed by: PHYSICAL THERAPIST

## 2022-07-08 ENCOUNTER — TRANSFERRED RECORDS (OUTPATIENT)
Dept: HEALTH INFORMATION MANAGEMENT | Facility: CLINIC | Age: 42
End: 2022-07-08

## 2022-07-10 ENCOUNTER — OFFICE VISIT (OUTPATIENT)
Dept: URGENT CARE | Facility: URGENT CARE | Age: 42
End: 2022-07-10
Payer: COMMERCIAL

## 2022-07-10 VITALS
TEMPERATURE: 98.4 F | WEIGHT: 149 LBS | SYSTOLIC BLOOD PRESSURE: 132 MMHG | BODY MASS INDEX: 24.05 KG/M2 | DIASTOLIC BLOOD PRESSURE: 88 MMHG | OXYGEN SATURATION: 100 % | HEART RATE: 99 BPM | RESPIRATION RATE: 18 BRPM

## 2022-07-10 DIAGNOSIS — G89.29 CHRONIC RADICULAR CERVICAL PAIN: ICD-10-CM

## 2022-07-10 DIAGNOSIS — M54.12 CHRONIC RADICULAR CERVICAL PAIN: ICD-10-CM

## 2022-07-10 DIAGNOSIS — M25.50 MULTIPLE JOINT PAIN: Primary | ICD-10-CM

## 2022-07-10 LAB
BASOPHILS # BLD AUTO: 0 10E3/UL (ref 0–0.2)
BASOPHILS NFR BLD AUTO: 0 %
EOSINOPHIL # BLD AUTO: 0.4 10E3/UL (ref 0–0.7)
EOSINOPHIL NFR BLD AUTO: 6 %
ERYTHROCYTE [DISTWIDTH] IN BLOOD BY AUTOMATED COUNT: 13 % (ref 10–15)
HCT VFR BLD AUTO: 40.6 % (ref 35–47)
HGB BLD-MCNC: 13.8 G/DL (ref 11.7–15.7)
LYMPHOCYTES # BLD AUTO: 1.5 10E3/UL (ref 0.8–5.3)
LYMPHOCYTES NFR BLD AUTO: 20 %
MCH RBC QN AUTO: 34.8 PG (ref 26.5–33)
MCHC RBC AUTO-ENTMCNC: 34 G/DL (ref 31.5–36.5)
MCV RBC AUTO: 103 FL (ref 78–100)
MONOCYTES # BLD AUTO: 0.7 10E3/UL (ref 0–1.3)
MONOCYTES NFR BLD AUTO: 9 %
NEUTROPHILS # BLD AUTO: 4.8 10E3/UL (ref 1.6–8.3)
NEUTROPHILS NFR BLD AUTO: 65 %
PLATELET # BLD AUTO: 297 10E3/UL (ref 150–450)
RBC # BLD AUTO: 3.96 10E6/UL (ref 3.8–5.2)
WBC # BLD AUTO: 7.4 10E3/UL (ref 4–11)

## 2022-07-10 PROCEDURE — 36415 COLL VENOUS BLD VENIPUNCTURE: CPT | Performed by: EMERGENCY MEDICINE

## 2022-07-10 PROCEDURE — 86618 LYME DISEASE ANTIBODY: CPT | Performed by: EMERGENCY MEDICINE

## 2022-07-10 PROCEDURE — 87798 DETECT AGENT NOS DNA AMP: CPT | Mod: 90 | Performed by: EMERGENCY MEDICINE

## 2022-07-10 PROCEDURE — 99000 SPECIMEN HANDLING OFFICE-LAB: CPT | Performed by: EMERGENCY MEDICINE

## 2022-07-10 PROCEDURE — 86753 PROTOZOA ANTIBODY NOS: CPT | Mod: 90 | Performed by: EMERGENCY MEDICINE

## 2022-07-10 PROCEDURE — 85025 COMPLETE CBC W/AUTO DIFF WBC: CPT | Performed by: EMERGENCY MEDICINE

## 2022-07-10 PROCEDURE — 80048 BASIC METABOLIC PNL TOTAL CA: CPT | Performed by: EMERGENCY MEDICINE

## 2022-07-10 PROCEDURE — 99215 OFFICE O/P EST HI 40 MIN: CPT | Performed by: EMERGENCY MEDICINE

## 2022-07-10 RX ORDER — AZITHROMYCIN 250 MG/1
TABLET, FILM COATED ORAL
Qty: 8 TABLET | Refills: 0 | Status: SHIPPED | OUTPATIENT
Start: 2022-07-10 | End: 2022-07-10

## 2022-07-10 RX ORDER — OXYCODONE HYDROCHLORIDE 5 MG/1
5 TABLET ORAL EVERY 6 HOURS PRN
Qty: 6 TABLET | Refills: 0 | Status: SHIPPED | OUTPATIENT
Start: 2022-07-10 | End: 2022-07-21

## 2022-07-10 RX ORDER — OXYCODONE HYDROCHLORIDE 5 MG/1
5 TABLET ORAL EVERY 6 HOURS PRN
Qty: 6 TABLET | Refills: 0 | Status: SHIPPED | OUTPATIENT
Start: 2022-07-10 | End: 2022-07-10

## 2022-07-10 RX ORDER — PREDNISONE 5 MG/1
TABLET ORAL
COMMUNITY
Start: 2022-05-26 | End: 2022-07-21

## 2022-07-10 RX ORDER — AZITHROMYCIN 250 MG/1
TABLET, FILM COATED ORAL
Qty: 8 TABLET | Refills: 0 | Status: SHIPPED | OUTPATIENT
Start: 2022-07-10 | End: 2022-07-17
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
## 2022-07-11 ENCOUNTER — MYC MEDICAL ADVICE (OUTPATIENT)
Dept: FAMILY MEDICINE | Facility: CLINIC | Age: 42
End: 2022-07-11

## 2022-07-11 DIAGNOSIS — G89.29 OTHER CHRONIC PAIN: Primary | ICD-10-CM

## 2022-07-11 LAB
ANION GAP SERPL CALCULATED.3IONS-SCNC: 4 MMOL/L (ref 3–14)
B BURGDOR IGG+IGM SER QL: 0.04
BUN SERPL-MCNC: 17 MG/DL (ref 7–30)
CALCIUM SERPL-MCNC: 10 MG/DL (ref 8.5–10.1)
CHLORIDE BLD-SCNC: 109 MMOL/L (ref 94–109)
CO2 SERPL-SCNC: 29 MMOL/L (ref 20–32)
CREAT SERPL-MCNC: 0.57 MG/DL (ref 0.52–1.04)
GFR SERPL CREATININE-BSD FRML MDRD: >90 ML/MIN/1.73M2
GLUCOSE BLD-MCNC: 101 MG/DL (ref 70–99)
POTASSIUM BLD-SCNC: 4.7 MMOL/L (ref 3.4–5.3)
SODIUM SERPL-SCNC: 142 MMOL/L (ref 133–144)

## 2022-07-11 NOTE — TELEPHONE ENCOUNTER
SARAH Arnold are you willing to refill Oxycodone or will patient need to wait until office visit 7/14/22?    Thank you,  Katarina RN  Triage Nurse, [facility]  [phone]  [facility] hours Monday through Friday 10 am to 8 pm and Sat/Sun 9 am - 8 pm  [facility] hours Monday through Friday 10 am to 8 pm and Sat/Sun 9 am - 5 pm   [facility]  24 hour nurse line, available by calling clinic at [phone]

## 2022-07-11 NOTE — TELEPHONE ENCOUNTER
I referred her to pain clinic. This will be her best bet for ongoing pain management especially if they diagnose her with fibromyalgia. Narcotics are not the best treatment for most chronic pain issues. They may or may not be part of their long-term plan but she will need pain clinic to address this complex situation. I would have her keep rheum appointment of course to make sure there is nothing rheumatological going on. unfortunately  I cannot refill narcotics without an appointment. Especially for an issue I have not seen her for. If her pain is so severe it requires narcotics, she would have to go to [facility]

## 2022-07-12 ENCOUNTER — TELEPHONE (OUTPATIENT)
Dept: PALLIATIVE MEDICINE | Facility: CLINIC | Age: 42
End: 2022-07-12

## 2022-07-12 ENCOUNTER — TRANSFERRED RECORDS (OUTPATIENT)
Dept: FAMILY MEDICINE | Facility: CLINIC | Age: 42
End: 2022-07-12

## 2022-07-12 LAB
A PHAGOCYTOPH DNA BLD QL NAA+PROBE: NOT DETECTED
E CHAFFEENSIS DNA BLD QL NAA+PROBE: NOT DETECTED
E EWINGII DNA SPEC QL NAA+PROBE: NOT DETECTED
EHRLICHIA DNA SPEC QL NAA+PROBE: NOT DETECTED

## 2022-07-12 NOTE — PROGRESS NOTES
Assessment & Plan     Neck pain  We will get urine drug screen today  She has appt with pain clinic  Is actively participating in pt and other recommendations by specialists  Consider spine referral/secondary consult in future but pain clinic is the best place to start I feel  Has had for many years, not new since the lymes   - Drug Abuse Screen Panel 13, Urine (Pain Care Package) - lab collect; Future  - amitriptyline (ELAVIL) 10 MG tablet; Take 1 tablet (10 mg) by mouth At Bedtime Increase to 2 tablets at bedtime after one week if needed/tolerated. For nerve pain/sleep  - HYDROcodone-acetaminophen (NORCO) 5-325 MG tablet; Take 1 tablet by mouth every 6 hours as needed for pain    Chronic low back pain, unspecified back pain laterality, unspecified whether sciatica present  Ongoing for years, worse since having bullseye rash. Was treated for this completely. Tests have not been positive. Will see rheum to rule out RA or other inflammatory arthritis. Suspect more fibromyalgia diagnosis but will leave that for specialist to determine. Will try elavil at night for nerve pain/sleep. She reports getting no more than 3 hours at a time of sleep    - Drug Abuse Screen Panel 13, Urine (Pain Care Package) - lab collect; Future  - amitriptyline (ELAVIL) 10 MG tablet; Take 1 tablet (10 mg) by mouth At Bedtime Increase to 2 tablets at bedtime after one week if needed/tolerated. For nerve pain/sleep  - HYDROcodone-acetaminophen (NORCO) 5-325 MG tablet; Take 1 tablet by mouth every 6 hours as needed for pain    Myalgia  Same as above  - Adult Rheumatology  Referral; Future  - amitriptyline (ELAVIL) 10 MG tablet; Take 1 tablet (10 mg) by mouth At Bedtime Increase to 2 tablets at bedtime after one week if needed/tolerated. For nerve pain/sleep    Patient Instructions   Keep appointment with pain clinic  Make appointment with therapist, consider psych/mental health referral in future let me know at any time if you would  like this  Start new pain/sleep medication at night  Take narcotic pain med only when pain is severe before bedtime  Schedule with rheumatology, check with insurance to see if they cover elsewhere it will likely be a long wait  Recheck with fevers, worsening pain        Return in about 1 week (around 2022) for if not improving.    PRAVIN Macias [facility]      Billin min spent on patient today including chart review, history, exam, and explaining treatment plan and follow-up.     Rajni King is a 41 year old accompanied by her Self, presenting for the following health issues:  Pain and Health Maintenance    - Per pt referral for rheumatologist?         History of Present Illness       Back Pain:  She presents for follow up of back pain. Patient's back pain is a chronic problem.  Location of back pain:  Right lower back, left lower back, right middle of back, left middle of back, right upper back, left upper back, right side of neck, left side of neck, right shoulder, left shoulder, right hip, left hip and right side of waist  Description of back pain: burning  Back pain spreads: right shoulder, left shoulder, right side of neck and left side of neck    Since patient first noticed back pain, pain is: rapidly worsening  Does back pain interfere with her job:  Yes      She eats 2-3 servings of fruits and vegetables daily.She consumes 0 sweetened beverage(s) daily.She exercises with enough effort to increase her heart rate 20 to 29 minutes per day.  She exercises with enough effort to increase her heart rate 3 or less days per week.   She is taking medications regularly.     Was seen for back pain in er. Had recent imaging for neck radiculopathy as well. Has pain clinic appt in 2 weeks as patient has struggled with pain on/on for years including ear pain. Has been seeing pt for ear/neck currently.   Had cervical neck mri 2 days ago--through [facility], frustrated with  them. Was on doxycyline after saw a red rash and tick bite for two weeks. Then seen again due to ongoing symptoms and given azithromycin. lyme's test has been negative. Mri results: moderate to severe foraminal stenosis in cervical spine. Will copy into her chart.   Hands go numb and tingling from this per patient.   Used to very active, running, etc.   Has tried myofascial release boyfriend trained in this.   Will will be getting EMG for her hands and arms through [facility]. Has weakness and forearm and wrists.     Had lymes and other tick borne illness tested for recently. Everything negative so far.   H/o chronic pain, I did a rheum basic lab workup in 2018 that was unfruitful. She just had thryoid and hormone labs done by [facility] this year.  Prednisone? Takes this as needed. This is through [facility] orth[facility]. Has not noticed if it helps. Taking 5 mg of this.   She feels tizanidine is helping more than flexeril. Makes her less sleepy also.     Had mri of back in 2007 that showed mild space narrowing. Had chronic neck and back pain in 2007 as well.     H/o anxiety and depression-meds or therapy right now?  Not currently, but feels angry at her situation. Feels helpless. Felt more depressed when she broke her ankle but now that she has been more active that helps.   Psych?   Never seen. Consider in future if agreeable.   Has seen a therapist. Is trying to get in with her old one right now/will be scheduling.   Going to the gym for sauna, etc feels good.   Previous psych meds-paxil, sertaline, effexor, xanax, clonopin. Had side effects with effexor.   Denies suicidal or homicidal thoughts.  Patient instructed to go to the emergency room or call 911 if these occur.          Last fill was 7-10-22 6 oxycodone. Before that 20 hydrocodone on 6-3-33. No fills of benzos. Most of these fills seem to be from [facility] orthopedics for knee/ankle pain issues.     Complicated patient:  2020-in hospital with blood alcohol of over 0.40.  "h/o alcohol and tobacco abuse. 2009 had trazad[place].   [place]ycodone, norco, tramadol, tylenol number 3,  mostly through two providers. Patient has been going [facility] for [facility] issues. I do have concerns about this registry and long-term pain management for patient.         Review of Systems   Constitutional, HEENT, cardiovascular, pulmonary, GI, , musculoskeletal, neuro, skin, endocrine and psych systems are negative, except as otherwise noted.      Objective    /82   Pulse 102   Temp 98.2  F (36.8  C) (Tympanic)   Resp 16   Ht 1.676 m (5' 6\")   Wt 67.6 kg (149 lb)   SpO2 99%   Breastfeeding No   BMI 24.05 kg/m    Body mass index is 24.05 kg/m .  Physical Exam   GENERAL: healthy, alert and no distress  NECK: no adenopathy and no asymmetry, masses, or scars, tender bilaterally and non-specifically  RESP: lungs clear to auscultation - no rales, rhonchi or wheezes  CV: regular rate and rhythm, normal S1 S2, no S3 or S4, no murmur, click or rub, no peripheral edema and peripheral pulses strong  MS: no gross musculoskeletal defects noted, no edema  NEURO: Normal strength and tone, mentation intact and speech normal  PSYCH: {:teary at times, good eye contact                .  ..    "

## 2022-07-12 NOTE — PROGRESS NOTES
Assessment & Plan     Right shoulder pain, unspecified chronicity  Concern for early frozen shoulder per pt, patient is starting to get more limited motion in her right arm and difficult brushing hair, etc.   She has specific provider she would like to see, referred.   - Orthopedic  Referral; Future    Multiple joint pain  Already has f/u for this inflammatory arthritis , OA, and/or fibromyalgia in differential    - predniSONE (DELTASONE) 5 MG tablet; Take 1 tablet (5 mg) by mouth daily  - HYDROcodone-acetaminophen (NORCO) 5-325 MG tablet; Take 1 tablet by mouth every 6 hours as needed for severe pain    Chronic radicular cervical pain    - tiZANidine (ZANAFLEX) 4 MG tablet; Take 1 tablet (4 mg) by mouth 3 times daily  - HYDROcodone-acetaminophen (NORCO) 5-325 MG tablet; Take 1 tablet by mouth every 6 hours as needed for severe pain        Return in about 4 weeks (around 8/18/2022) for if not improving.    PRAVIN Macias [facility]   Christine is a 41 year old accompanied by her Self, presenting for the following health issues:  Pain and Health Maintenance      History of Present Illness       Back Pain:  She presents for follow up of back pain. Patient's back pain is a chronic problem.  Location of back pain:  Right lower back, left lower back, right middle of back, left middle of back, right upper back, left upper back, right side of neck, left side of neck, right shoulder, left shoulder, right hip, left hip and right side of waist  Description of back pain: burning  Back pain spreads: right shoulder, left shoulder, right side of neck and left side of neck    Since patient first noticed back pain, pain is: rapidly worsening  Does back pain interfere with her job:  Yes      She eats 2-3 servings of fruits and vegetables daily.She consumes 0 sweetened beverage(s) daily.She exercises with enough effort to increase her heart rate 20 to 29 minutes per day.  She  exercises with enough effort to increase her heart rate 3 or less days per week.   She is taking medications regularly.       Please see my last notes for all the details on complex patient. She was seen recently.     Had recent lymes disease this year, complicated with a long history of chronic neck, back, jaw/ear pain as well. Has appt with rheum and pain clinic upcoming. Negative urine drug screen at last visit. We have discussed that I do not start patients on chronic narcotics but can in some cases see them back if pain clinic recommends a certain plan for them.  She has complicated mental health/alcohol history and I do not know if she is a candidate for such therapy and would like pain specialist to help determine.     7-18 filled 16 norco. Has been taking 2 tablets in am  And 1 in afternoon and 1 in pm.         Has been going to pt and seeing specialists as directed.   At our last visit we had started elavil to see if this helped with pain and sleep issues. Since then she has not felt any better with pain but has been sleeping better.       She has been taking 20 mg elavil and tolerating this ok. Helps her get sleep but not helping with pain at this time. Gets about 3 hours.     Using tizanadine as well and that is helpful. Flexeril helped her.         Upcoming appointments: as below  Thank you for responding yesterday.  I know I will be coming in on Thursday morning for my appointment, but just wanted to give you a quick update of my appointments.  My Rheumatology appointment is Wednesday, August 3rd at the [place] location and my EMG nerve tests are Friday, August 5th at [facility] location.  I took the first available appointments.  Desperately hoping for some answers.  Thank you for all your help guiding me thru this difficult and scary time,  Christine Hu           Review of Systems   Constitutional, HEENT, cardiovascular, pulmonary, GI, , musculoskeletal, neuro, skin,  endocrine and psych systems are negative, except as otherwise noted.      Objective    /82   Pulse 90   Temp 97.8  F (36.6  C) (Tympanic)   Resp 14   Wt 68 kg (150 lb)   SpO2 100%   Breastfeeding No   BMI 24.21 kg/m    Body mass index is 24.21 kg/m .  Physical Exam   GENERAL: healthy, alert and no distress  RESP: lungs clear to auscultation - no rales, rhonchi or wheezes  CV: regular rate and rhythm, normal S1 S2, no S3 or S4, no murmur, click or rub, no peripheral edema and peripheral pulses strong  MS: no gross musculoskeletal defects noted, no edema  NEURO: Normal strength and tone, mentation intact and speech normal  PSYCH: mentation appears normal, affect normal/bright                .  ..

## 2022-07-12 NOTE — TELEPHONE ENCOUNTER

## 2022-07-14 ENCOUNTER — TRANSFERRED RECORDS (OUTPATIENT)
Dept: HEALTH INFORMATION MANAGEMENT | Facility: CLINIC | Age: 42
End: 2022-07-14

## 2022-07-14 ENCOUNTER — OFFICE VISIT (OUTPATIENT)
Dept: FAMILY MEDICINE | Facility: CLINIC | Age: 42
End: 2022-07-14
Payer: COMMERCIAL

## 2022-07-14 VITALS
SYSTOLIC BLOOD PRESSURE: 136 MMHG | HEIGHT: 66 IN | TEMPERATURE: 98.2 F | OXYGEN SATURATION: 99 % | DIASTOLIC BLOOD PRESSURE: 82 MMHG | WEIGHT: 149 LBS | HEART RATE: 102 BPM | RESPIRATION RATE: 16 BRPM | BODY MASS INDEX: 23.95 KG/M2

## 2022-07-14 DIAGNOSIS — M54.50 CHRONIC LOW BACK PAIN, UNSPECIFIED BACK PAIN LATERALITY, UNSPECIFIED WHETHER SCIATICA PRESENT: ICD-10-CM

## 2022-07-14 DIAGNOSIS — M54.2 NECK PAIN: Primary | ICD-10-CM

## 2022-07-14 DIAGNOSIS — M79.10 MYALGIA: ICD-10-CM

## 2022-07-14 DIAGNOSIS — G89.29 CHRONIC LOW BACK PAIN, UNSPECIFIED BACK PAIN LATERALITY, UNSPECIFIED WHETHER SCIATICA PRESENT: ICD-10-CM

## 2022-07-14 LAB
AMPHETAMINES UR QL: NOT DETECTED
B MICROTI IGG TITR SER: NORMAL {TITER}
BARBITURATES UR QL SCN: NOT DETECTED
BENZODIAZ UR QL SCN: NOT DETECTED
BUPRENORPHINE UR QL: NOT DETECTED
CANNABINOIDS UR QL: NOT DETECTED
COCAINE UR QL SCN: NOT DETECTED
D-METHAMPHET UR QL: NOT DETECTED
METHADONE UR QL SCN: NOT DETECTED
OPIATES UR QL SCN: NOT DETECTED
OXYCODONE UR QL SCN: NOT DETECTED
PCP UR QL SCN: NOT DETECTED
PROPOXYPH UR QL: NOT DETECTED
TRICYCLICS UR QL SCN: NOT DETECTED

## 2022-07-14 PROCEDURE — 80306 DRUG TEST PRSMV INSTRMNT: CPT | Performed by: PHYSICIAN ASSISTANT

## 2022-07-14 PROCEDURE — 99215 OFFICE O/P EST HI 40 MIN: CPT | Performed by: PHYSICIAN ASSISTANT

## 2022-07-14 RX ORDER — HYDROCODONE BITARTRATE AND ACETAMINOPHEN 5; 325 MG/1; MG/1
1 TABLET ORAL EVERY 6 HOURS PRN
Qty: 18 TABLET | Refills: 0 | Status: SHIPPED | OUTPATIENT
Start: 2022-07-14 | End: 2022-07-18

## 2022-07-14 RX ORDER — AMITRIPTYLINE HYDROCHLORIDE 10 MG/1
10 TABLET ORAL AT BEDTIME
Qty: 60 TABLET | Refills: 0 | Status: SHIPPED | OUTPATIENT
Start: 2022-07-14 | End: 2022-07-28

## 2022-07-14 NOTE — PATIENT INSTRUCTIONS
Keep appointment with pain clinic  Make appointment with therapist, consider psych/mental health referral in future let me know at any time if you would like this  Start new pain/sleep medication at night  Take narcotic pain med only when pain is severe before bedtime  Schedule with rheumatology, check with insurance to see if they cover elsewhere it will likely be a long wait  Recheck with fevers, worsening pain

## 2022-07-15 NOTE — RESULT ENCOUNTER NOTE
Jacquelyn King,       Your recent test results are attached, if you have any questions or concerns please feel free to contact me via e-mail or call [phone].  Urine drug screen was negative for anything concerning.           Sincerely,  Angi Marroquin PA-C

## 2022-07-18 ENCOUNTER — THERAPY VISIT (OUTPATIENT)
Dept: PHYSICAL THERAPY | Facility: CLINIC | Age: 42
End: 2022-07-18
Payer: COMMERCIAL

## 2022-07-18 DIAGNOSIS — M54.2 NECK PAIN: Primary | ICD-10-CM

## 2022-07-18 PROCEDURE — 97110 THERAPEUTIC EXERCISES: CPT | Mod: GP | Performed by: PHYSICAL THERAPIST

## 2022-07-18 PROCEDURE — 97140 MANUAL THERAPY 1/> REGIONS: CPT | Mod: GP | Performed by: PHYSICAL THERAPIST

## 2022-07-21 ENCOUNTER — OFFICE VISIT (OUTPATIENT)
Dept: FAMILY MEDICINE | Facility: CLINIC | Age: 42
End: 2022-07-21
Payer: COMMERCIAL

## 2022-07-21 VITALS
RESPIRATION RATE: 14 BRPM | WEIGHT: 150 LBS | SYSTOLIC BLOOD PRESSURE: 134 MMHG | OXYGEN SATURATION: 100 % | TEMPERATURE: 97.8 F | HEART RATE: 90 BPM | BODY MASS INDEX: 24.21 KG/M2 | DIASTOLIC BLOOD PRESSURE: 82 MMHG

## 2022-07-21 DIAGNOSIS — G89.29 CHRONIC RADICULAR CERVICAL PAIN: ICD-10-CM

## 2022-07-21 DIAGNOSIS — M54.12 CHRONIC RADICULAR CERVICAL PAIN: ICD-10-CM

## 2022-07-21 DIAGNOSIS — M25.511 RIGHT SHOULDER PAIN, UNSPECIFIED CHRONICITY: Primary | ICD-10-CM

## 2022-07-21 DIAGNOSIS — M25.50 MULTIPLE JOINT PAIN: ICD-10-CM

## 2022-07-21 PROCEDURE — 99214 OFFICE O/P EST MOD 30 MIN: CPT | Performed by: PHYSICIAN ASSISTANT

## 2022-07-21 RX ORDER — HYDROCODONE BITARTRATE AND ACETAMINOPHEN 5; 325 MG/1; MG/1
1 TABLET ORAL EVERY 6 HOURS PRN
Qty: 28 TABLET | Refills: 0 | Status: SHIPPED | OUTPATIENT
Start: 2022-07-21 | End: 2022-07-28

## 2022-07-21 RX ORDER — PREDNISONE 5 MG/1
5 TABLET ORAL DAILY
Qty: 30 TABLET | Refills: 0 | Status: SHIPPED | OUTPATIENT
Start: 2022-07-21 | End: 2022-08-18

## 2022-07-22 NOTE — TELEPHONE ENCOUNTER
DIAGNOSIS: Right shoulder pain/ Also mentioned some left shoulder pain   APPOINTMENT DATE: 08/11/2022   NOTES STATUS DETAILS   OFFICE NOTE from referring provider Internal 07/21/2022 Dr Marroquin   OFFICE NOTE from other specialist Internal 07/18/2022 PT [facility]    DISCHARGE SUMMARY from hospital N/A    DISCHARGE REPORT from the ER N/A    OPERATIVE REPORT N/A    EMG report N/A    MEDICATION LIST N/A    MRI N/A    DEXA (osteoporosis/bone health) N/A    CT SCAN N/A    XRAYS (IMAGES & REPORTS) N/A
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
## 2022-07-28 ENCOUNTER — MYC REFILL (OUTPATIENT)
Dept: FAMILY MEDICINE | Facility: CLINIC | Age: 42
End: 2022-07-28

## 2022-07-28 ENCOUNTER — MYC MEDICAL ADVICE (OUTPATIENT)
Dept: FAMILY MEDICINE | Facility: CLINIC | Age: 42
End: 2022-07-28

## 2022-07-28 ENCOUNTER — OFFICE VISIT (OUTPATIENT)
Dept: PALLIATIVE MEDICINE | Facility: CLINIC | Age: 42
End: 2022-07-28
Attending: PHYSICIAN ASSISTANT
Payer: COMMERCIAL

## 2022-07-28 VITALS
WEIGHT: 150.1 LBS | BODY MASS INDEX: 24.23 KG/M2 | SYSTOLIC BLOOD PRESSURE: 145 MMHG | HEART RATE: 82 BPM | OXYGEN SATURATION: 99 % | DIASTOLIC BLOOD PRESSURE: 79 MMHG

## 2022-07-28 DIAGNOSIS — M25.50 MULTIPLE JOINT PAIN: ICD-10-CM

## 2022-07-28 DIAGNOSIS — G89.29 CHRONIC RADICULAR CERVICAL PAIN: ICD-10-CM

## 2022-07-28 DIAGNOSIS — M54.2 NECK PAIN: ICD-10-CM

## 2022-07-28 DIAGNOSIS — M48.02 FORAMINAL STENOSIS OF CERVICAL REGION: Primary | ICD-10-CM

## 2022-07-28 DIAGNOSIS — M54.12 CHRONIC RADICULAR CERVICAL PAIN: ICD-10-CM

## 2022-07-28 DIAGNOSIS — M54.50 CHRONIC LOW BACK PAIN, UNSPECIFIED BACK PAIN LATERALITY, UNSPECIFIED WHETHER SCIATICA PRESENT: ICD-10-CM

## 2022-07-28 DIAGNOSIS — M79.10 MYALGIA: ICD-10-CM

## 2022-07-28 DIAGNOSIS — G89.29 CHRONIC LOW BACK PAIN, UNSPECIFIED BACK PAIN LATERALITY, UNSPECIFIED WHETHER SCIATICA PRESENT: ICD-10-CM

## 2022-07-28 PROCEDURE — 99205 OFFICE O/P NEW HI 60 MIN: CPT | Performed by: NURSE PRACTITIONER

## 2022-07-28 RX ORDER — AMITRIPTYLINE HYDROCHLORIDE 10 MG/1
10 TABLET ORAL AT BEDTIME
Qty: 60 TABLET | Refills: 0 | Status: SHIPPED | OUTPATIENT
Start: 2022-07-28 | End: 2022-08-18

## 2022-07-28 RX ORDER — HYDROCODONE BITARTRATE AND ACETAMINOPHEN 5; 325 MG/1; MG/1
TABLET ORAL
Qty: 28 TABLET | Refills: 0 | Status: SHIPPED | OUTPATIENT
Start: 2022-07-28 | End: 2022-08-18

## 2022-07-28 RX ORDER — GABAPENTIN 300 MG/1
600 CAPSULE ORAL 3 TIMES DAILY
Qty: 180 CAPSULE | Refills: 1 | Status: SHIPPED | OUTPATIENT
Start: 2022-07-28 | End: 2022-08-18 | Stop reason: SINTOL

## 2022-07-28 RX ORDER — HYDROCODONE BITARTRATE AND ACETAMINOPHEN 5; 325 MG/1; MG/1
1 TABLET ORAL EVERY 6 HOURS PRN
Qty: 28 TABLET | Refills: 0 | Status: CANCELLED | OUTPATIENT
Start: 2022-07-28

## 2022-07-28 ASSESSMENT — PAIN SCALES - GENERAL: PAINLEVEL: SEVERE PAIN (7)

## 2022-07-28 NOTE — PATIENT INSTRUCTIONS
1.  Pain Physical Therapy:    YES   Continue physical therapy for knee pain and for neck pain.    2.  Pain Psychologist to address relaxation, behavioral change, coping style, and other factors important to improvement.    YES  Consider pain psychology in the future.    3.  Diagnostic Studies:  I will look for your results of your cervical MRI.    4.  Medication Management:   Start gabapentin as directed below. Call with issues.  Gabapentin 1 tab= 300mg    AM   PM   Bedtime  0   0   300mg (1 tab).  After 3-5 days, increase as tolerated   300mg (1 tab)  0   300mg (1 tab).  After 3-5 days, increase as tolerated   300mg (1 tab)  300mg (1 tab)  300mg (1 tab).  After 3-5 days, increase as tolerated   300mg (1 tab)  300mg (1 tab)  600mg (2 tabs). After 3-5 days, increase as tolerated   600mg (2 tabs)  300mg (1 tab)  600mg (2 tabs). After 3-5 days, increase as tolerated   600mg (2 tabs)  600mg (2 tabs)  600mg (2 tabs).     Call with any problems.  Caution for sedation.    Do not drive until you know how the medication affects you.   You can go slower if you need to or increasing only one dose at a time.  Do not stop abruptly once at higher doses.  This medication must be tapered off.      2. Continue tizanidine 4mg at bedtime. Continue amitriptyline 20mg at bedtime, do not change this dose while we are starting gabapentin.    3. I would recommend reducing [employer] to a truly as needed basis as able. Update your primary care provider.    5.  Potential procedures: consider a cervical epidural steroid injection. I will review your cervical MRI but would likely recommend.     6.  Referrals: go to appointment with Rheumatology as planned.    7.  Follow up with MIO Amanda CNP in 4 weeks.       ----------------------------------------------------------------  Clinic Number:  [phone]   Call with any questions about your care and for scheduling assistance.   Calls are returned Monday through Friday between 8 AM and 4:30  PM. We usually get back to you within 2 business days depending on the issue/request.    If we are prescribing your medications:  For opioid medication refills, call the clinic or send a Animated [employer] message 7 days in advance.  Please include:  Name of requested medication  Name of the pharmacy.  For non-opioid medications, call your pharmacy directly to request a refill. Please allow 3-4 days to be processed.   Per [place] State Law:  All controlled substance prescriptions must be filled within 30 days of being written.    For those controlled substances allowing refills, pickup must occur within 30 days of last fill.      We believe regular attendance is key to your success in our program!    Any time you are unable to keep your appointment we ask that you call us at least 24 hours in advance to cancel.This will allow us to offer the appointment time to another patient.   Multiple missed appointments may lead to dismissal from the clinic.

## 2022-07-28 NOTE — TELEPHONE ENCOUNTER
Looks like plan was to taper off med.     Using a few days a week as needed versus multiple times a day.     I will send over a taper off.     I am glad visit went well.    Angi Marroquin PA-C
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
## 2022-08-03 ENCOUNTER — OFFICE VISIT (OUTPATIENT)
Dept: RHEUMATOLOGY | Facility: CLINIC | Age: 42
End: 2022-08-03
Attending: PHYSICIAN ASSISTANT
Payer: COMMERCIAL

## 2022-08-03 VITALS
WEIGHT: 151.7 LBS | BODY MASS INDEX: 24.49 KG/M2 | OXYGEN SATURATION: 100 % | TEMPERATURE: 98.3 F | DIASTOLIC BLOOD PRESSURE: 77 MMHG | SYSTOLIC BLOOD PRESSURE: 127 MMHG | HEART RATE: 94 BPM

## 2022-08-03 DIAGNOSIS — M79.10 MYALGIA: ICD-10-CM

## 2022-08-03 PROCEDURE — 99214 OFFICE O/P EST MOD 30 MIN: CPT | Performed by: NURSE PRACTITIONER

## 2022-08-03 ASSESSMENT — PAIN SCALES - GENERAL: PAINLEVEL: EXTREME PAIN (9)

## 2022-08-03 NOTE — PATIENT INSTRUCTIONS
1) Keep appointment with Dr. Duran 8/11/22 and EMG appt    2) If you have joint swelling occur please return to see me at that time.     3) Please get [facility] records of xray and MRI repots.

## 2022-08-03 NOTE — NURSING NOTE
R.O.I completed by patient, faxed to [facility].   Copy sent to abstract for scanning.     WONG Landeros   Email: [email or web address]  [facility] - Rheumatology  Phone: [phone]  Fax: [phone]

## 2022-08-03 NOTE — Clinical Note
FYI only, pt sees you next week, pt has family with poor surgical outcomes and it may effect her communication as it has made her very anxious.

## 2022-08-03 NOTE — NURSING NOTE
"Christine Hu's goals for this visit include:   Chief Complaint   Patient presents with     Consult     Referred by: Angi Marroquin PA-C     Joint Pain       PCP: Angi Marroquin    Referring Provider:  Angi Marroquin PA-C  [id number] [place] [address]      Initial /77 (BP Location: Left arm, Patient Position: Sitting, Cuff Size: Adult Regular)   Pulse 94   Temp 98.3  F (36.8  C) (Oral)   Wt 68.8 kg (151 lb 11.2 oz)   LMP  (LMP Unknown)   SpO2 100%   Breastfeeding No   BMI 24.49 kg/m   Estimated body mass index is 24.49 kg/m  as calculated from the following:    Height as of 7/14/22: 1.676 m (5' 6\").    Weight as of this encounter: 68.8 kg (151 lb 11.2 oz).    Medication Reconciliation: complete    Do you need any medication refills at today's visit? none    WONG Deras  Rheumatology/Infectious disease  [facility]   [phone]    "

## 2022-08-04 ENCOUNTER — MYC MEDICAL ADVICE (OUTPATIENT)
Dept: FAMILY MEDICINE | Facility: CLINIC | Age: 42
End: 2022-08-04

## 2022-08-04 DIAGNOSIS — M25.50 MULTIPLE JOINT PAIN: ICD-10-CM

## 2022-08-04 DIAGNOSIS — M54.12 CHRONIC RADICULAR CERVICAL PAIN: ICD-10-CM

## 2022-08-04 DIAGNOSIS — G89.29 CHRONIC RADICULAR CERVICAL PAIN: ICD-10-CM

## 2022-08-04 RX ORDER — HYDROCODONE BITARTRATE AND ACETAMINOPHEN 5; 325 MG/1; MG/1
TABLET ORAL
Qty: 28 TABLET | Refills: 0 | Status: CANCELLED | OUTPATIENT
Start: 2022-08-04

## 2022-08-04 NOTE — TELEPHONE ENCOUNTER
See [employer] message. To provider to advise.     Had last NORCO filled on 7/28/22.    Eboni Scott RN

## 2022-08-09 NOTE — TELEPHONE ENCOUNTER
below I have attached the pain management note.  the plan was to wean her off narcotics as they are not indicated or helpful for chronic pain in most cases, especially if fibromyalgia.     I cannot refill the narcotics as we discussed before I could only do that short term,  I gave her a taper off of them and cannot give more.   I know it is very frustrating, They often do not find an answer for this type of pain/tingling all over, it sounds like probably fibromyalgia is the most likely diagnosis which narcotics are not helpful for. She could consider seeing a medical THC prescriber if she is interested in that she doesn't need a referral just find a provider on [place] website for medical THC and get certified for chronic pain.  For her anxiety/mood I think psychiatry would be very good to see, I would be happy to place this referral for her.  Chronic pain can truly cause mental health to get worse and vice versa. I do think she needs to continue with the pain clinic as well.   Lastly, [facility] if her insurance covers it has people that do a thorough diagnosis/fibromyalgia clinic if that is what they diagnose her with . I am happy to refer her there if it is covered for her.     Sincerely,  Angi Kapadia.  Medication Management:   1. We discussed medication management at length today. I recommend being more specific with medication to origin of pain (rather than Norco). She was recently started on amitriptyline with unclear benefit but may not be at a therapeutic dose yet. We discussed the benefits of gabapentin or increasing amitriptyline. Because of potential benefit for multiple pain origins we decided to start with gabapentin (she was on as a teenager but unclear dose or time frame she was on this medication) . She will start gabapentin as directed below. Call with issues.  Gabapentin 1 tab= 300mg     AM                                       PM                                       Bedtime  0                                            0                                           300mg (1 tab).  After 3-5 days, increase as tolerated   300mg (1 tab)                      0                                           300mg (1 tab).  After 3-5 days, increase as tolerated   300mg (1 tab)                      300mg (1 tab)                      300mg (1 tab).  After 3-5 days, increase as tolerated   300mg (1 tab)                      300mg (1 tab)                      600mg (2 tabs). After 3-5 days, increase as tolerated   600mg (2 tabs)                    300mg (1 tab)                      600mg (2 tabs). After 3-5 days, increase as tolerated   600mg (2 tabs)                    600mg (2 tabs)                    600mg (2 tabs).      Call with any problems.  Caution for sedation.    Do not drive until you know how the medication affects you.   You can go slower if you need to or increasing only one dose at a time.  Do not stop abruptly once at higher doses.  This medication must be tapered off.                            2. I advised she continue tizanidine 4mg at bedtime. Continue amitriptyline 20mg at bedtime for now. I recommend we continue at this dose while we are starting gabapentin but increase in the future as tolerated.                          3. We discussed the use of opioids for chronic pain and why I do not recommend for long term management of pain. Specifically we talked about the development of tolerance over time, opioid induced hyperalgesia, sedation and cognitive impairment, sleep disordered breathing, and risk of unintentional overdose and death. Some of these risks are increased given hx of alcohol abuse. We also discussed that if she does indeed have fibromyalgia, she would be at an even higher risk for opioid induced analgesia. I recommended that beginning today she reduce her use to a truly as needed basis, a few days a week as needed, and plan to completely discontinue this medication over the next month or so.  She verbalized understanding of this.

## 2022-08-10 NOTE — PROGRESS NOTES
"Christine is a 41 year old who is being evaluated via a billable video visit.      How would you like to obtain your AVS? MyChart  If the video visit is dropped, the invitation should be resent by: Text to cell phone: [phone]  Will anyone else be joining your video visit? No        Assessment & Plan     Neck pain  We will see how injection helps, continue to follow with spine  Schedule f/u with pain clinic please as due for follow up  Will try increasing elavil  Consider lyrica if needed in future    - amitriptyline (ELAVIL) 10 MG tablet; Take 3 tablets daily note increase in dose.    Primary insomnia  Improved some with elavail        Angi Marroquin PA-C  [facility] [place]    Subjective   Christine is a 41 year old accompanied by her Self, presenting for the following health issues:  RECHECK      HPI     F/U appts with specialist. Would like to go over updates of her health.     Has been seeing several specialists. Their notes are copied below:    All rheum labs were normal/negative. Gabapentin made her too tired so had to stop.  Not sure if it helped with pain she did not take it very long. Was only on for 2 weeks.     Has injection for neck scheduled tomorrow. This was through sports med.  Still having hip and arm pain.   This makes her question lymes or autoimmune disorders. We discussed so far labs and rheum are negative for rheum cause. She was treated for lymes and her tests always came back negative.     Discussed going to [place] if her insurance covers it for better coordination of care. She would like more thorough testing and to get the \"answers\".           Pain clinic note:    [facility] Pain Management      Date of visit: 7/26/2022     Assessment:  Christine Hu is a 41 year old female with a past medical history significant for Menieres who presents with complaints of neck pain.      1. Neck pain- etiology unclear without imaging to review but per primary care " provider's note she does have moderate to severe foraminal stenosis which is consistent with physical exam.   2. Mental Health - the patient's mental health concerns, specifically situational depression, affect her experience of pain and contribute to her clinically significant distress.     1. Foraminal stenosis of cervical region    2. Multiple joint pain          Plan:  The following recommendations were given to the patient. Diagnosis, treatment options, risks, benefits, and alternatives were discussed, and all questions were answered. The patient expressed understanding of the plan for management.      I am recommending a multidisciplinary treatment plan to help this patient better manage her pain.  This includes:                  1.  Pain Physical Therapy:    YES  I encouraged Christine continue physical therapy for knee pain and for neck pain.               2.  Pain Psychologist to address relaxation, behavioral change, coping style, and other factors important to improvement.    YES  Consider pain psychology in the future.               3.  Diagnostic Studies: cervical MRI results have been sent to scanning it sounds like but not available for my review today. I will look for cervical MRI to verify results.               4.  Medication Management:   1. We discussed medication management at length today. I recommend being more specific with medication to origin of pain (rather than Norco). She was recently started on amitriptyline with unclear benefit but may not be at a therapeutic dose yet. We discussed the benefits of gabapentin or increasing amitriptyline. Because of potential benefit for multiple pain origins we decided to start with gabapentin (she was on as a teenager but unclear dose or time frame she was on this medication) . She will start gabapentin as directed below. Call with issues.  Gabapentin 1 tab= 300mg     AM                                       PM                                        Bedtime  0                                           0                                           300mg (1 tab).  After 3-5 days, increase as tolerated   300mg (1 tab)                      0                                           300mg (1 tab).  After 3-5 days, increase as tolerated   300mg (1 tab)                      300mg (1 tab)                      300mg (1 tab).  After 3-5 days, increase as tolerated   300mg (1 tab)                      300mg (1 tab)                      600mg (2 tabs). After 3-5 days, increase as tolerated   600mg (2 tabs)                    300mg (1 tab)                      600mg (2 tabs). After 3-5 days, increase as tolerated   600mg (2 tabs)                    600mg (2 tabs)                    600mg (2 tabs).      Call with any problems.  Caution for sedation.    Do not drive until you know how the medication affects you.   You can go slower if you need to or increasing only one dose at a time.  Do not stop abruptly once at higher doses.  This medication must be tapered off.                            2. I advised she continue tizanidine 4mg at bedtime. Continue amitriptyline 20mg at bedtime for now. I recommend we continue at this dose while we are starting gabapentin but increase in the future as tolerated.                          3. We discussed the use of opioids for chronic pain and why I do not recommend for long term management of pain. Specifically we talked about the development of tolerance over time, opioid induced hyperalgesia, sedation and cognitive impairment, sleep disordered breathing, and risk of unintentional overdose and death. Some of these risks are increased given hx of alcohol abuse. We also discussed that if she does indeed have fibromyalgia, she would be at an even higher risk for opioid induced analgesia. I recommended that beginning today she reduce her use to a truly as needed basis, a few days a week as needed, and plan to completely discontinue this  medication over the next month or so. She verbalized understanding of this.               5.  Potential procedures: I advised she consider a cervical epidural steroid injection. I will review cervical MRI but would likely recommend if indeed corresponding foraminal stenosis. Have EMGs as ordered.                6.  Referrals: go to appointment with Rheumatology as planned.               7.  Follow up with MIO Amanda CNP in 4 weeks.       MIO Amanda CNP  [facility] Pain Management        Sports med note:  ASSESSMENT & PLAN  Ms. Shruti Hu is a 41 year old female who presents to clinic today with multiple musculoskeletal conditions including bilateral shoulder pain, neck pain with potential radicular issues, and generalized muscle aching.     I had a lengthy discussion with Christine centering around these issues.  We discussed potential reasons for her pain, the timing of her tick bite does not mean that it is very difficult to discount Lyme disease as a source of her pain.  However, she has had negative testing up to this point.     Given her systemic symptoms I am ordering a rheumatologic panel, she can have this done on her way out today.  I am also ordering x-ray imaging of her shoulders, in addition to bilateral shoulder MRIs.     Lastly, she does have cervical spine stenosis.  I agree that it would be important to employ a trial of a cervical epidural on a diagnostic and therapeutic basis.  She is going to contact me 1 to 2 weeks after her injection to discuss the efficacy.     It was a pleasure seeing Christine today.     Maverick Duran DO, [facility]  Primary Care Sports Medicine       Rheum note:        1) Significant next pain with radiculopathy: Has bilateral numbness and tingling extending to fingers and pain of 8.5. This has significantly decreased pt's quality of life and sleep. She had established with [facility] and had an MRI there that I do not have the reports of. She reports her F/U visit  was very brief to her MRI and she would like a second consultation, of which she has one scheduled here next week. She has significant concerns about surgery related to 2 close family members with poor outcomes.  I have highly encouraged pt to keep appt next week with ortho Dr. Duran and have MRI and xray reports sent to us.      2) Pain and poor sleep: Pt on appropriate regimen to attempt to improve both.  There is a question of FM however at this point I am not able to label this as her poor sleep and upper body pain may all be coming from her neck.  She can continue Follow-up with pain clinic and PCP.      3) Pt to RTC PRN joint swelling, increase in peripheral joint pain and stiffness     Lisset Miner, CNP  Rheumatology, [facility]        Review of Systems   Constitutional, HEENT, cardiovascular, pulmonary, GI, , musculoskeletal, neuro, skin, endocrine and psych systems are negative, except as otherwise noted.      Objective           Vitals:  No vitals were obtained today due to virtual visit.    Physical Exam   GENERAL: Healthy, alert and no distress  EYES: Eyes grossly normal to inspection.  No discharge or erythema, or obvious scleral/conjunctival abnormalities.  RESP: No audible wheeze, cough, or visible cyanosis.  No visible retractions or increased work of breathing.    SKIN: Visible skin clear. No significant rash, abnormal pigmentation or lesions.  NEURO: Cranial nerves grossly intact.  Mentation and speech appropriate for age.  PSYCH: Mentation appears normal, affect normal/bright, judgement and insight intact, normal speech and appearance well-groomed.            Video-Visit Details    Video Start Time: 1:55 pm  Type of service:  Video Visit    Video End Time:2:16 PM    Originating Location (pt. Location): Home    Distant Location (provider location):  [facility]     Platform used for Video Visit: [employer].  Billin min spent on patient today including chart review,  history, exam, and explaining treatment plan and follow-up.

## 2022-08-11 ENCOUNTER — PRE VISIT (OUTPATIENT)
Dept: ORTHOPEDICS | Facility: CLINIC | Age: 42
End: 2022-08-11

## 2022-08-11 ENCOUNTER — OFFICE VISIT (OUTPATIENT)
Dept: ORTHOPEDICS | Facility: CLINIC | Age: 42
End: 2022-08-11
Payer: COMMERCIAL

## 2022-08-11 DIAGNOSIS — G89.29 CHRONIC LEFT SHOULDER PAIN: Primary | ICD-10-CM

## 2022-08-11 DIAGNOSIS — M13.0 POLYARTHROPATHY: ICD-10-CM

## 2022-08-11 DIAGNOSIS — M25.512 CHRONIC LEFT SHOULDER PAIN: Primary | ICD-10-CM

## 2022-08-11 DIAGNOSIS — M25.511 RIGHT SHOULDER PAIN, UNSPECIFIED CHRONICITY: ICD-10-CM

## 2022-08-11 DIAGNOSIS — M54.12 CERVICAL RADICULOPATHY: ICD-10-CM

## 2022-08-11 LAB
CRP SERPL-MCNC: <2.9 MG/L (ref 0–8)
URATE SERPL-MCNC: 3.8 MG/DL (ref 2.6–6)

## 2022-08-11 PROCEDURE — 86039 ANTINUCLEAR ANTIBODIES (ANA): CPT | Performed by: FAMILY MEDICINE

## 2022-08-11 PROCEDURE — 84550 ASSAY OF BLOOD/URIC ACID: CPT | Performed by: FAMILY MEDICINE

## 2022-08-11 PROCEDURE — 86431 RHEUMATOID FACTOR QUANT: CPT | Performed by: FAMILY MEDICINE

## 2022-08-11 PROCEDURE — 86038 ANTINUCLEAR ANTIBODIES: CPT | Performed by: FAMILY MEDICINE

## 2022-08-11 PROCEDURE — 99205 OFFICE O/P NEW HI 60 MIN: CPT | Performed by: FAMILY MEDICINE

## 2022-08-11 PROCEDURE — 86140 C-REACTIVE PROTEIN: CPT | Performed by: FAMILY MEDICINE

## 2022-08-11 PROCEDURE — 36415 COLL VENOUS BLD VENIPUNCTURE: CPT | Performed by: FAMILY MEDICINE

## 2022-08-11 RX ORDER — DICLOFENAC SODIUM 75 MG/1
75 TABLET, DELAYED RELEASE ORAL DAILY PRN
Qty: 30 TABLET | Refills: 1 | Status: SHIPPED | OUTPATIENT
Start: 2022-08-11 | End: 2022-10-18

## 2022-08-11 RX ORDER — METHOCARBAMOL 500 MG/1
500 TABLET, FILM COATED ORAL 3 TIMES DAILY PRN
Qty: 90 TABLET | Refills: 0 | Status: SHIPPED | OUTPATIENT
Start: 2022-08-11 | End: 2022-10-18

## 2022-08-11 NOTE — NURSING NOTE
Chief Complaint   Patient presents with     Neck - Pain, New Patient     Right Shoulder - Pain, New Patient     Left Shoulder - Pain, New Patient       There were no vitals filed for this visit.    There is no height or weight on file to calculate BMI.      RUBENS Bentley NREMT

## 2022-08-11 NOTE — PROGRESS NOTES
HISTORY OF PRESENT ILLNESS  Ms. Shruti Hu is a pleasant 41 year old female who presents to clinic today with multiple issues that have started over the past few months and have gotten worse.  Christine reports relatively abrupt onset of multiple musculoskeletal conditions in May of this year.  This correlates with about 7 to 10 days after she was bit by a tick.  She was seen and treated for potential Lyme disease with doxycycline, unfortunately her symptoms have been progressive.  She has neck pain and stiffness, pain and stiffness in both of her shoulders, right is worse, and pain in various other sites of her body.  She times feels numbness and tingling, at other times achiness and soreness.  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  She has been seen by multiple providers including outside orthopedic institutions, a pain clinic, and our rheumatology clinic.  She does have a cervical spine MRI that revealed cervical spine stenosis (she does have a prior cervical disc problem that has been treated nonoperatively).        Additional history: as documented    MEDICAL HISTORY  Patient Active Problem List   Diagnosis     Degeneration of cervical intervertebral disc     Contraceptive management     History of abnormal Pap smear     Depressive disorder, not elsewhere classified     Thyrotoxicosis     Low back pain     Generalized anxiety disorder     Mastoiditis     Overdose     CARDIOVASCULAR SCREENING; LDL GOAL LESS THAN 160     Neck pain     Tobacco use     Primary insomnia     History of Clostridium difficile     Benign essential hypertension     Heart murmur     Acute alcoholic intoxication (H)     Acute gastritis     Alcohol dependence (H)     Alcohol withdrawal (H)     Anxiety state     Bladder retention of urine     Hepatitis     Hypomagnesemia     Palpitations     Sprain of ankle     Myalgia       Current Outpatient Medications    Medication Sig Dispense Refill     amitriptyline (ELAVIL) 10 MG tablet Take 1 tablet (10 mg) by mouth At Bedtime Increase to 2 tablets at bedtime after one week if needed/tolerated. For nerve pain/sleep 60 tablet 0     gabapentin (NEURONTIN) 300 MG capsule Take 2 capsules (600 mg) by mouth 3 times daily 180 capsule 1     HYDROcodone-acetaminophen (NORCO) 5-325 MG tablet Take 1-2 tablets daily for a week. Then 1 tab daily for a week. Then 1 tablet every other day for a week. Then 1 tablet only 2 days a week then stop. 28 tablet 0     predniSONE (DELTASONE) 5 MG tablet Take 1 tablet (5 mg) by mouth daily (Patient not taking: Reported on 8/3/2022) 30 tablet 0     tiZANidine (ZANAFLEX) 4 MG tablet Take 1 tablet (4 mg) by mouth 3 times daily 60 tablet 3       Allergies   Allergen Reactions     Codeine GI Disturbance     Nausea with use of Tylenol #3     Clindamycin Other (See Comments)     C diff, hospitalized      Other [No Clinical Screening - See Comments]      Unknown Diuretic-patient complain about blisters on her legs.     Penicillins Rash       Family History   Problem Relation Age of Onset     Allergies Mother      Psychotic Disorder Father         depression     Heart Failure Father         heart attack in NOV. 2015?     Allergies Sister      Allergies Sister      Asthma Other      Psychotic Disorder Sister         anxiety     Psychotic Disorder Sister         depression     Psychotic Disorder Maternal Uncle         anxiety     C.A.D. No family hx of      Diabetes No family hx of      Hypertension No family hx of      Cerebrovascular Disease No family hx of      Breast Cancer No family hx of      Cancer - colorectal No family hx of      Prostate Cancer No family hx of        Additional medical/Social/Surgical histories reviewed in [facility] and updated as appropriate.        PHYSICAL EXAM  General  - normal appearance, in no obvious distress  Musculoskeletal - right and left shoulder  - inspection: normal bone and  joint alignment, no obvious deformity, no scapular winging, no AC step-off  - palpation: mildly tender RC insertion bilateral, normal clavicle, non-tender AC  - ROM: Painful endrange flexion and internal rotation, right worse than left  - strength: 5/5  strength, 5/5 in all shoulder planes  - special tests:  (-) Speed's  (+) Neer  (+) Hawkin's  (+) Logan's    Musculoskeletal - cervical spine  - inspection: normal bone and joint alignment, no obvious kyphosis  - palpation: tender bilateral cervical and shoulder musculature  - ROM: pain with bilateral rotation and sidebending  - strength: upper extremities 5/5 in all planes  Neuro  - C5-7 DTRs 2+ bilaterally, no sensory or motor deficit, grossly normal coordination, normal muscle tone  Skin  - no ecchymosis, erythema, warmth, or induration, no obvious rash  Psych  - interactive, appropriate, normal mood and affect             ASSESSMENT & PLAN  Ms. Shruti Hu is a 41 year old female who presents to clinic today with multiple musculoskeletal conditions including bilateral shoulder pain, neck pain with potential radicular issues, and generalized muscle aching.    I had a lengthy discussion with Christine centering around these issues.  We discussed potential reasons for her pain, the timing of her tick bite does not mean that it is very difficult to discount Lyme disease as a source of her pain.  However, she has had negative testing up to this point.    Given her systemic symptoms I am ordering a rheumatologic panel, she can have this done on her way out today.  I am also ordering x-ray imaging of her shoulders, in addition to bilateral shoulder MRIs.    Lastly, she does have cervical spine stenosis.  I agree that it would be important to employ a trial of a cervical epidural on a diagnostic and therapeutic basis.  She is going to contact me 1 to 2 weeks after her injection to discuss the efficacy.    It was a pleasure seeing Christine today.    Maverick Duran DO,  [facility]  Primary Care Sports Medicine      This note was constructed using Dragon dictation software, please excuse any minor errors in spelling, grammar, or syntax.

## 2022-08-11 NOTE — LETTER
8/11/2022         RE: Christine Hu  [address]  [place] [address]        Dear Colleague,    Thank you for referring your patient, Christine Hu, to the [facility] [place]. Please see a copy of my visit note below.      HISTORY OF PRESENT ILLNESS  Ms. Shruti Hu is a pleasant 41 year old female who presents to clinic today with multiple issues that have started over the past few months and have gotten worse.  Christine reports relatively abrupt onset of multiple musculoskeletal conditions in May of this year.  This correlates with about 7 to 10 days after she was bit by a tick.  She was seen and treated for potential Lyme disease with doxycycline, unfortunately her symptoms have been progressive.  She has neck pain and stiffness, pain and stiffness in both of her shoulders, right is worse, and pain in various other sites of her body.  She times feels numbness and tingling, at other times achiness and soreness.  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  She has been seen by multiple providers including outside orthopedic institutions, a pain clinic, and our rheumatology clinic.  She does have a cervical spine MRI that revealed cervical spine stenosis (she does have a prior cervical disc problem that has been treated nonoperatively).        Additional history: as documented    MEDICAL HISTORY  Patient Active Problem List   Diagnosis     Degeneration of cervical intervertebral disc     Contraceptive management     History of abnormal Pap smear     Depressive disorder, not elsewhere classified     Thyrotoxicosis     Low back pain     Generalized anxiety disorder     Mastoiditis     Overdose     CARDIOVASCULAR SCREENING; LDL GOAL LESS THAN 160     Neck pain     Tobacco use     Primary insomnia     History of Clostridium difficile     Benign essential hypertension     Heart murmur      Acute alcoholic intoxication (H)     Acute gastritis     Alcohol dependence (H)     Alcohol withdrawal (H)     Anxiety state     Bladder retention of urine     Hepatitis     Hypomagnesemia     Palpitations     Sprain of ankle     Myalgia       Current Outpatient Medications   Medication Sig Dispense Refill     amitriptyline (ELAVIL) 10 MG tablet Take 1 tablet (10 mg) by mouth At Bedtime Increase to 2 tablets at bedtime after one week if needed/tolerated. For nerve pain/sleep 60 tablet 0     gabapentin (NEURONTIN) 300 MG capsule Take 2 capsules (600 mg) by mouth 3 times daily 180 capsule 1     HYDROcodone-acetaminophen (NORCO) 5-325 MG tablet Take 1-2 tablets daily for a week. Then 1 tab daily for a week. Then 1 tablet every other day for a week. Then 1 tablet only 2 days a week then stop. 28 tablet 0     predniSONE (DELTASONE) 5 MG tablet Take 1 tablet (5 mg) by mouth daily (Patient not taking: Reported on 8/3/2022) 30 tablet 0     tiZANidine (ZANAFLEX) 4 MG tablet Take 1 tablet (4 mg) by mouth 3 times daily 60 tablet 3       Allergies   Allergen Reactions     Codeine GI Disturbance     Nausea with use of Tylenol #3     Clindamycin Other (See Comments)     C diff, hospitalized      Other [No Clinical Screening - See Comments]      Unknown Diuretic-patient complain about blisters on her legs.     Penicillins Rash       Family History   Problem Relation Age of Onset     Allergies Mother      Psychotic Disorder Father         depression     Heart Failure Father         heart attack in NOV. 2015?     Allergies Sister      Allergies Sister      Asthma Other      Psychotic Disorder Sister         anxiety     Psychotic Disorder Sister         depression     Psychotic Disorder Maternal Uncle         anxiety     C.A.D. No family hx of      Diabetes No family hx of      Hypertension No family hx of      Cerebrovascular Disease No family hx of      Breast Cancer No family hx of      Cancer - colorectal No family hx of       Prostate Cancer No family hx of        Additional medical/Social/Surgical histories reviewed in [facility] and updated as appropriate.        PHYSICAL EXAM  General  - normal appearance, in no obvious distress  Musculoskeletal - right and left shoulder  - inspection: normal bone and joint alignment, no obvious deformity, no scapular winging, no AC step-off  - palpation: mildly tender RC insertion bilateral, normal clavicle, non-tender AC  - ROM: Painful endrange flexion and internal rotation, right worse than left  - strength: 5/5  strength, 5/5 in all shoulder planes  - special tests:  (-) Speed's  (+) Neer  (+) Hawkin's  (+) Logan's    Musculoskeletal - cervical spine  - inspection: normal bone and joint alignment, no obvious kyphosis  - palpation: tender bilateral cervical and shoulder musculature  - ROM: pain with bilateral rotation and sidebending  - strength: upper extremities 5/5 in all planes  Neuro  - C5-7 DTRs 2+ bilaterally, no sensory or motor deficit, grossly normal coordination, normal muscle tone  Skin  - no ecchymosis, erythema, warmth, or induration, no obvious rash  Psych  - interactive, appropriate, normal mood and affect             ASSESSMENT & PLAN  Ms. Shruti Hu is a 41 year old female who presents to clinic today with multiple musculoskeletal conditions including bilateral shoulder pain, neck pain with potential radicular issues, and generalized muscle aching.    I had a lengthy discussion with Christine centering around these issues.  We discussed potential reasons for her pain, the timing of her tick bite does not mean that it is very difficult to discount Lyme disease as a source of her pain.  However, she has had negative testing up to this point.    Given her systemic symptoms I am ordering a rheumatologic panel, she can have this done on her way out today.  I am also ordering x-ray imaging of her shoulders, in addition to bilateral shoulder MRIs.    Lastly, she does have cervical spine  stenosis.  I agree that it would be important to employ a trial of a cervical epidural on a diagnostic and therapeutic basis.  She is going to contact me 1 to 2 weeks after her injection to discuss the efficacy.    It was a pleasure seeing hCristine today.    Maverick Duran DO, [facility]  Primary [facility] Medicine      This note was constructed using Dragon dictation software, please excuse any minor errors in spelling, grammar, or syntax.        Again, thank you for allowing me to participate in the care of your patient.        Sincerely,        Maverick Duran DO

## 2022-08-12 LAB
ANA PAT SER IF-IMP: ABNORMAL
ANA SER QL IF: ABNORMAL
ANA TITR SER IF: ABNORMAL {TITER}
RHEUMATOID FACT SER NEPH-ACNC: 9 IU/ML

## 2022-08-18 ENCOUNTER — VIRTUAL VISIT (OUTPATIENT)
Dept: FAMILY MEDICINE | Facility: CLINIC | Age: 42
End: 2022-08-18
Payer: COMMERCIAL

## 2022-08-18 DIAGNOSIS — F51.01 PRIMARY INSOMNIA: ICD-10-CM

## 2022-08-18 DIAGNOSIS — M54.2 NECK PAIN: Primary | ICD-10-CM

## 2022-08-18 PROCEDURE — 99214 OFFICE O/P EST MOD 30 MIN: CPT | Mod: 95 | Performed by: PHYSICIAN ASSISTANT

## 2022-08-18 RX ORDER — AMITRIPTYLINE HYDROCHLORIDE 10 MG/1
TABLET ORAL
Qty: 90 TABLET | Refills: 1 | Status: SHIPPED | OUTPATIENT
Start: 2022-08-18 | End: 2022-10-18

## 2022-08-26 ENCOUNTER — LAB (OUTPATIENT)
Dept: LAB | Facility: CLINIC | Age: 42
End: 2022-08-26
Payer: COMMERCIAL

## 2022-08-26 DIAGNOSIS — N92.6 IRREGULAR MENSES: ICD-10-CM

## 2022-08-26 LAB
ESTRADIOL SERPL-MCNC: 33 PG/ML
FSH SERPL IRP2-ACNC: 16.9 MIU/ML

## 2022-08-26 PROCEDURE — 82670 ASSAY OF TOTAL ESTRADIOL: CPT

## 2022-08-26 PROCEDURE — 83001 ASSAY OF GONADOTROPIN (FSH): CPT

## 2022-08-26 PROCEDURE — 36415 COLL VENOUS BLD VENIPUNCTURE: CPT

## 2022-09-07 ENCOUNTER — ANCILLARY PROCEDURE (OUTPATIENT)
Dept: MRI IMAGING | Facility: CLINIC | Age: 42
End: 2022-09-07
Attending: FAMILY MEDICINE
Payer: COMMERCIAL

## 2022-09-07 DIAGNOSIS — M25.511 RIGHT SHOULDER PAIN, UNSPECIFIED CHRONICITY: ICD-10-CM

## 2022-09-07 DIAGNOSIS — M13.0 POLYARTHROPATHY: ICD-10-CM

## 2022-09-07 PROCEDURE — 73221 MRI JOINT UPR EXTREM W/O DYE: CPT | Mod: RT | Performed by: RADIOLOGY

## 2022-09-13 ENCOUNTER — OFFICE VISIT (OUTPATIENT)
Dept: ORTHOPEDICS | Facility: CLINIC | Age: 42
End: 2022-09-13
Payer: COMMERCIAL

## 2022-09-13 DIAGNOSIS — G89.29 CHRONIC LEFT SHOULDER PAIN: ICD-10-CM

## 2022-09-13 DIAGNOSIS — M54.2 NECK PAIN: Primary | ICD-10-CM

## 2022-09-13 DIAGNOSIS — M25.512 CHRONIC LEFT SHOULDER PAIN: ICD-10-CM

## 2022-09-13 DIAGNOSIS — M25.511 RIGHT SHOULDER PAIN, UNSPECIFIED CHRONICITY: ICD-10-CM

## 2022-09-13 DIAGNOSIS — G89.29 CHRONIC PAIN OF LEFT ANKLE: Primary | ICD-10-CM

## 2022-09-13 DIAGNOSIS — M25.561 CHRONIC PAIN OF RIGHT KNEE: ICD-10-CM

## 2022-09-13 DIAGNOSIS — M54.12 CERVICAL RADICULOPATHY: ICD-10-CM

## 2022-09-13 DIAGNOSIS — G89.29 CHRONIC PAIN OF RIGHT KNEE: ICD-10-CM

## 2022-09-13 DIAGNOSIS — G89.29 CHRONIC PAIN OF LEFT KNEE: ICD-10-CM

## 2022-09-13 DIAGNOSIS — M25.562 CHRONIC PAIN OF LEFT KNEE: ICD-10-CM

## 2022-09-13 DIAGNOSIS — M25.572 CHRONIC PAIN OF LEFT ANKLE: Primary | ICD-10-CM

## 2022-09-13 PROCEDURE — 99214 OFFICE O/P EST MOD 30 MIN: CPT | Performed by: FAMILY MEDICINE

## 2022-09-13 ASSESSMENT — PAIN SCALES - GENERAL: PAINLEVEL: SEVERE PAIN (6)

## 2022-09-13 NOTE — LETTER
9/13/2022         RE: Christine Hu  [address]  [place] [address]        Dear Colleague,    Thank you for referring your patient, Christine Hu, to the [facility] [place]. Please see a copy of my visit note below.    HISTORY OF PRESENT ILLNESS  Ms. Shruti Hu is a pleasant 41 year old female following up with multiple conditions including bilateral shoulder pain.  Christine is here to review her shoulder MRIs.  Unfortunately her shoulders are both still bothering her, right worse than left.  Bigger issues to Christine are her cervical spine issues and her knees, left worse than right.  She continues to experience severe pain in her neck that travels down her arms, causing numbness, tingling, and burning sensation in her hands.  This is quite severe at times.  This causes her to drop objects as well.  She has been through physical therapy, unfortunately her symptoms continue.  Her knees also are continuing to get worse.  She feels pain in her left knee that is quite severe, her right knee has started to bother her over the past many months, no single inciting event.  She points to the anterior medial aspect of her right knee.  Her hips also continue to bother her, her pain has increased from primarily posterior hip pain to pain at the anterior aspect of her bilateral hips.     PHYSICAL EXAM  General  - normal appearance, in no obvious distress  Musculoskeletal - right and left knee  - stance: normal gait without limp  - inspection: trace effusion bilaterally  - palpation: L > R medial joint line tenderness  - ROM: 135 degrees flexion, lacks 5 degrees extension   - strength: 5/5 in flexion, 5/5 in extension  - special tests:  (-) Lachman  (-) Mariama  (-) varus at 0 and 30 degrees flexion  (-) valgus at 0 and 30 degrees flexion    Neuro  - no sensory or motor deficit, grossly normal coordination, normal muscle tone  Skin  - no ecchymosis, erythema, warmth, or  induration, no obvious rash              ASSESSMENT & PLAN  Ms. Shruti Hu is a 41 year old female following up with multiple conditions including bilateral shoulder pain, bilateral knee pain, and neck pain with radicular features, chronic left ankle pain, as well as bilateral hip pain.    I reviewed her MR imaging in the room with her.  Her right shoulder MRI reveals a low-grade articular sided tear of the supraspinatus with approximately 10 mm of medial retraction, in addition to a SLAP tear.  Her left shoulder MRI reveals a likely SLAP tear as well.    We discussed both of these issues in some depth.  We discussed nonoperative treatment with physical therapy, injection based treatments, and the role of a surgical consult.    We also revisited her cervical spine symptoms.  I again reviewed her MRI the room with her, this does reveal severe stenosis at  C4-5 and lesser degree stenosis at the surrounding levels.  Given her pain, dysfunction, and lack of improvement with conservative measures I am referring her to our spine surgeons to have a discussion centered around if surgery may help her.    We also had a long discussion about her knees.  I ordered and independently reviewed x-rays of her knees, these show no obvious acute or chronic issues.  I also reviewed her left knee MRI in the room with her, which does reveal a partial tear of her left ACL and surrounding chondromalacia.  Given her dysfunction and lack of improvement with physical therapy I am referring her to our surgical partners as well.  I am also ordering an MRI of her right knee.    Lastly, we also discussed her left ankle.  I reviewed her MR in the room with her and that was done in May of this year, this does reveal tenosynovitis of the peroneal tendons and a ATFL tear.  I gave her an ankle brace that she can hopefully wear for stability, as tolerated.  In the future we could consider reimaging her ankle if she continues to not improve.    It  was a pleasure seeing Christine.        Maverick Duran DO, [facility]      This note was constructed using Dragon dictation software, please excuse any minor errors in spelling, grammar, or syntax.        Again, thank you for allowing me to participate in the care of your patient.        Sincerely,        Maverick Duran DO

## 2022-09-13 NOTE — TELEPHONE ENCOUNTER
SPINE PATIENTS - NEW PROTOCOL PREVISIT    RECORDS RECEIVED FROM: Internal/[facility]   REASON FOR VISIT: neck pain    Date of Appt: 09/16/2022   NOTES (FOR ALL VISITS) STATUS DETAILS   OFFICE NOTE from referring provider Internal 09/13/2022 Dr Duran [facility]    OFFICE NOTE from other specialist Internal 08/18/2022 Dr Marroquin [facility]  08/11/2022 Dr Duran [facility]   07/18/2022 PT [facility]   07/14/2022 [facility]  07/08/2022 [facility]   DISCHARGE SUMMARY from hospital N/A    DISCHARGE REPORT from ER N/A    EMG REPORT N/A    MEDICATION LIST N/A    IMAGING  (FOR ALL VISITS)     MRI (HEAD, NECK, SPINE) Internal 09/07/2022 RT LFT shoulder  07/13/2022 cervical spine  07/18/2007 cervical spine   XRAY (SPINE) *NEUROSURGERY* Internal 08/11/2022 bilateral shoulder   CT (HEAD, NECK, SPINE) N/A

## 2022-09-13 NOTE — NURSING NOTE
Chief Complaint   Patient presents with     Right Shoulder - Follow Up, Pain     Left Shoulder - Follow Up, Pain       There were no vitals filed for this visit.    There is no height or weight on file to calculate BMI.      RUBENS Bentley NREMT

## 2022-09-13 NOTE — TELEPHONE ENCOUNTER
DIAGNOSIS: left knee    APPOINTMENT DATE: 09/15/2022   NOTES STATUS DETAILS   OFFICE NOTE from referring provider Internal 09/13/2022 Dr Duran [facility]    OFFICE NOTE from other specialist Received 05/26/2022 [facility]  03/18/2022 [facility]     DISCHARGE SUMMARY from hospital N/A    DISCHARGE REPORT from the ER N/A    OPERATIVE REPORT N/A    MEDICATION LIST N/A    EMG (for Spine) N/A    IMPLANT RECORD/STICKER N/A    LABS     CBC/DIFF N/A    CULTURES N/A    INJECTIONS DONE IN RADIOLOGY N/A    MRI Internal 04/28/2022 LFT knee  01/12/2015 LFT knee   CT SCAN N/A    XRAYS (IMAGES & REPORTS) Internal 09/13/2022 bilateral knee  03/18/2022 LFT knee    TUMOR     PATHOLOGY  Slides & report N/A

## 2022-09-14 ENCOUNTER — TELEPHONE (OUTPATIENT)
Dept: ORTHOPEDICS | Facility: CLINIC | Age: 42
End: 2022-09-14

## 2022-09-14 NOTE — TELEPHONE ENCOUNTER
9/14 Provided phone number [phone] to schedule left and right knee mri's.     Luiza chapin Procedure   Orthopedics, Podiatry, Sports Medicine, ENT/Eye Specialties  [facility]   [phone]

## 2022-09-15 ENCOUNTER — OFFICE VISIT (OUTPATIENT)
Dept: ORTHOPEDICS | Facility: CLINIC | Age: 42
End: 2022-09-15
Payer: COMMERCIAL

## 2022-09-15 ENCOUNTER — PRE VISIT (OUTPATIENT)
Dept: ORTHOPEDICS | Facility: CLINIC | Age: 42
End: 2022-09-15

## 2022-09-15 ENCOUNTER — TELEPHONE (OUTPATIENT)
Dept: ORTHOPEDICS | Facility: CLINIC | Age: 42
End: 2022-09-15

## 2022-09-15 VITALS — WEIGHT: 159 LBS | HEIGHT: 66 IN | BODY MASS INDEX: 25.55 KG/M2

## 2022-09-15 DIAGNOSIS — S83.512A RUPTURE OF ANTERIOR CRUCIATE LIGAMENT OF LEFT KNEE, INITIAL ENCOUNTER: Primary | ICD-10-CM

## 2022-09-15 PROCEDURE — 99204 OFFICE O/P NEW MOD 45 MIN: CPT | Performed by: ORTHOPAEDIC SURGERY
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
## 2022-09-15 NOTE — PROGRESS NOTES
CHIEF CONCERN: Left knee pain    HISTORY:   Very pleasant 41 year old female who presents to my clinic for left knee pain. 5 years ago she sustained a left knee injury while skiing.  She was able to continue skiing but she eventually had an MRI of her left knee which revealed a partial tear of her ACL.  She did not have a surgery for this.  Her left knee has continued to be painful and she complains of instability.  She was seen by a physician at [facility] in 2022 who obtained another MRI of her left knee.  She was not recommended to have surgery on her knee at this time.  She presents to my clinic for ongoing pain and instability.  She states that she is not confident in the knee.  It gives out on her.  She does not feel that she can do the things that she wants to do.    Of note, she has degenerative changes of her C-spine, rotator cuff and labral tears of her right shoulder, and a labral tear of her left shoulder, all of which are causing her pain.  This is being worked up by other physicians.  She understands that knee surgery will not improve these other maladies    PAST MEDICAL HISTORY: (Reviewed with the patient and in the EPIC medical record)  Past Medical History:   Diagnosis Date     ASCUS favor benign 2015    Neg HPV     Degeneration of cervical intervertebral disc      Knee effusion, left      Lumbago      Ménière's disease      Overdose     TRAZADONE       PAST SURGICAL HISTORY: (Reviewed with the patient and in the EPIC medical record)  Past Surgical History:   Procedure Laterality Date     C/SECTION, LOW TRANSVERSE  2005    , Low Transverse     COLONOSCOPY      5 yrs     MASTOIDECTOMY       TONSILLECTOMY           MEDICATIONS: (Reviewed with the patient and in the EPIC medical record)    Notable medications include: No blood thinners or opioids    ALLERGIES: (Reviewed with the patient and in the EPIC medical record)     Allergies   Allergen Reactions     Codeine GI Disturbance      Nausea with use of Tylenol #3     Clindamycin Other (See Comments)     C diff, hospitalized      Other [No Clinical Screening - See Comments]      Unknown Diuretic-patient complain about blisters on her legs.     Penicillins Rash           SOCIAL HISTORY: (Reviewed with the patient and in the medical record)  --Tobacco: 0.5 packs/day  --Occupation: Owns a school  --Avocation/Sport: Running, dance    FAMILY HISTORY: (Reviewed with the patient and in the medical record)  -- No family history of bleeding, clotting, or difficulty with anesthesia    REVIEW OF SYSTEMS: (Reviewed with the patient and on the health intake form)  -- A comprehensive 10 point review of systems was conducted and is negative except as noted in the HPI    EXAM:     General: Awake, Alert and Oriented, No acute Distress. Articulate and Interactive    Body mass index is 25.66 kg/m .    Left Lower extremity :    Skin is Warm and Well perfused, no suggestion of infection    Range of motion 0-120 degrees    Stable to varus and valgus stressing.     Stable posterior drawer.  Pivot shift difficult to assess the patient was guarding     Lachman 2B    EHL/FHL/TA/GS 5/5    Sensation intact L3-S1    2+ Dorsalis Pedis Pulse    IMAGING:    Radiographs of the left knee from 9/13/22 were independently reviewed by me and findings were discussed with the patient today. The imaging demonstrates no fracture dislocation well-preserved joint space.    MRI of the left knee from 4/28/22 were independently reviewed by me and findings were discussed with the patient today. The imaging demonstrates bone bruises typical of an ACL injury.  High-grade injury to the anterior cruciate ligament.  No definite meniscus tears.    ASSESSMENT:  1. Left knee ACL tear    PLAN:  1. Long discussion with the patient.  Reviewed the diagnosis potential treatment options.  I do think that she does have a high-grade injury to her her anterior cruciate ligament.  She has bone bruises as well  as considerable change within the ACL I think she is a candidate for ACL reconstructive surgery.  I told the patient that this would improve the stability of her knee though it may not improve the pain of her knee.    2. Our surgical plan will be examination under anesthesia left knee, left knee arthroscopy, left knee ACL reconstruction with hamstring allograft.  I discussed with her that this procedure is to improve her stability, however, I can't guarantee that it will improve her pain.  3. Given her history of smoking we will plan to use aspirin 162 mg for 1 month postoperatively  I discussed with the patient the risks, benefits, complications and techniques of surgery as well as the natural history of ACL tears and the alternative treatment options.    The risks include, but are not limited to the risk of death and risk of a myocardial infarction, risk of bleeding and a risk of infection, risk of nerve damage and a risk of muscle damage, stiffness, instability, continued pain or worsening pain and retear, need for future surgery, failure to improve.    The patient was provided an opportunity to ask questions and these were answered.

## 2022-09-15 NOTE — TELEPHONE ENCOUNTER
Procedure: Left knee acl reconstruction  Facility: [facility]  Length: 90 minutes  Anesthesia: Choice  Post-op appointments needed: 2 weeks provider only, 6 weeks with provider only.  Surgery packet/instructions given to patient?  Yes     Pre-Operative Teaching Flowsheet     Person(s) involved in teaching: Patient     Motivation Level:  Receptive (willing/able to accept information) and asks appropriate questions where applicable: Yes  Any cultural factors/Sabianist beliefs that may influence understanding or compliance? No     Patient demonstrates understanding of the following:  Pre-operative planning, including the necessary appointments and preparation needed prior to surgery: Yes  Which situations necessitate calling provider and whom to contact: Yes  Pain management techniques pre and post op: Yes  How, and when, to access community resources: Yes    Who will stay/ with patient after surgery: Mom  Who will drive patient to surgery: Mom  Covid test at home  PT at [facility] in [place]  PCP in .         Additional Teaching Concerns Addressed:   Post-operative living arrangements and necessary adaptations to living environment.     Instructional Materials Used/Given: Yes, pre-op packet given including forms for Your surgery day, medications to stop taking before surgery, preparing for surgery, Covid-19 testing, showering before surgery, Stop light tool introduced, Opioid pain medication guideline, pre-op physical form, and map  Patient expressed understanding of all forms given, questions were answered and will review in more detail at home.     Time spent with patient: 20 minutes.

## 2022-09-15 NOTE — LETTER
9/15/2022         RE: Christine Hu  [address]  [place] [address]        Dear Colleague,    Thank you for referring your patient, Christine Hu, to the [facility]. Please see a copy of my visit note below.    CHIEF CONCERN: Left knee pain    HISTORY:   Very pleasant 41 year old female who presents to my clinic for left knee pain. 5 years ago she sustained a left knee injury while skiing.  She was able to continue skiing but she eventually had an MRI of her left knee which revealed a partial tear of her ACL.  She did not have a surgery for this.  Her left knee has continued to be painful and she complains of instability.  She was seen by a physician at [facility] in 2022 who obtained another MRI of her left knee.  She was not recommended to have surgery on her knee at this time.  She presents to my clinic for ongoing pain and instability.  She states that she is not confident in the knee.  It gives out on her.  She does not feel that she can do the things that she wants to do.    Of note, she has degenerative changes of her C-spine, rotator cuff and labral tears of her right shoulder, and a labral tear of her left shoulder, all of which are causing her pain.  This is being worked up by other physicians.  She understands that knee surgery will not improve these other maladies    PAST MEDICAL HISTORY: (Reviewed with the patient and in the [facility] medical record)  Past Medical History:   Diagnosis Date     ASCUS favor benign 2015    Neg HPV     Degeneration of cervical intervertebral disc      Knee effusion, left      Lumbago      Ménière's disease      Overdose     TRAZADONE       PAST SURGICAL HISTORY: (Reviewed with the patient and in the [facility] medical record)  Past Surgical History:   Procedure Laterality Date     C/SECTION, LOW TRANSVERSE  2005    , Low Transverse     COLONOSCOPY      5 yrs     MASTOIDECTOMY       TONSILLECTOMY           MEDICATIONS:  (Reviewed with the patient and in the EPIC medical record)    Notable medications include: No blood thinners or opioids    ALLERGIES: (Reviewed with the patient and in the EPIC medical record)     Allergies   Allergen Reactions     Codeine GI Disturbance     Nausea with use of Tylenol #3     Clindamycin Other (See Comments)     C diff, hospitalized      Other [No Clinical Screening - See Comments]      Unknown Diuretic-patient complain about blisters on her legs.     Penicillins Rash           SOCIAL HISTORY: (Reviewed with the patient and in the medical record)  --Tobacco: 0.5 packs/day  --Occupation: Owns a school  --Avocation/Sport: Running, dance    FAMILY HISTORY: (Reviewed with the patient and in the medical record)  -- No family history of bleeding, clotting, or difficulty with anesthesia    REVIEW OF SYSTEMS: (Reviewed with the patient and on the health intake form)  -- A comprehensive 10 point review of systems was conducted and is negative except as noted in the HPI    EXAM:     General: Awake, Alert and Oriented, No acute Distress. Articulate and Interactive    Body mass index is 25.66 kg/m .    Left Lower extremity :    Skin is Warm and Well perfused, no suggestion of infection    Range of motion 0-120 degrees    Stable to varus and valgus stressing.     Stable posterior drawer.  Pivot shift difficult to assess the patient was guarding     Lachman 2B    EHL/FHL/TA/GS 5/5    Sensation intact L3-S1    2+ Dorsalis Pedis Pulse    IMAGING:    Radiographs of the left knee from 9/13/22 were independently reviewed by me and findings were discussed with the patient today. The imaging demonstrates no fracture dislocation well-preserved joint space.    MRI of the left knee from 4/28/22 were independently reviewed by me and findings were discussed with the patient today. The imaging demonstrates bone bruises typical of an ACL injury.  High-grade injury to the anterior cruciate ligament.  No definite meniscus  tears.    ASSESSMENT:  1. Left knee ACL tear    PLAN:  1. Long discussion with the patient.  Reviewed the diagnosis potential treatment options.  I do think that she does have a high-grade injury to her her anterior cruciate ligament.  She has bone bruises as well as considerable change within the ACL I think she is a candidate for ACL reconstructive surgery.  I told the patient that this would improve the stability of her knee though it may not improve the pain of her knee.    2. Our surgical plan will be examination under anesthesia left knee, left knee arthroscopy, left knee ACL reconstruction with hamstring allograft.  I discussed with her that this procedure is to improve her stability, however, I can't guarantee that it will improve her pain.  3. Given her history of smoking we will plan to use aspirin 162 mg for 1 month postoperatively  I discussed with the patient the risks, benefits, complications and techniques of surgery as well as the natural history of ACL tears and the alternative treatment options.    The risks include, but are not limited to the risk of death and risk of a myocardial infarction, risk of bleeding and a risk of infection, risk of nerve damage and a risk of muscle damage, stiffness, instability, continued pain or worsening pain and retear, need for future surgery, failure to improve.    The patient was provided an opportunity to ask questions and these were answered.            Again, thank you for allowing me to participate in the care of your patient.        Sincerely,        Mc Peterson MD

## 2022-09-16 ENCOUNTER — OFFICE VISIT (OUTPATIENT)
Dept: NEUROSURGERY | Facility: CLINIC | Age: 42
End: 2022-09-16
Payer: COMMERCIAL

## 2022-09-16 ENCOUNTER — PRE VISIT (OUTPATIENT)
Dept: NEUROSURGERY | Facility: CLINIC | Age: 42
End: 2022-09-16

## 2022-09-16 ENCOUNTER — ANCILLARY PROCEDURE (OUTPATIENT)
Dept: GENERAL RADIOLOGY | Facility: CLINIC | Age: 42
End: 2022-09-16
Attending: ORTHOPAEDIC SURGERY
Payer: COMMERCIAL

## 2022-09-16 VITALS
DIASTOLIC BLOOD PRESSURE: 81 MMHG | SYSTOLIC BLOOD PRESSURE: 128 MMHG | HEIGHT: 66 IN | BODY MASS INDEX: 25.55 KG/M2 | WEIGHT: 159 LBS | HEART RATE: 96 BPM

## 2022-09-16 DIAGNOSIS — Z01.818 PRE-OP TESTING: ICD-10-CM

## 2022-09-16 DIAGNOSIS — M54.2 NECK PAIN: Primary | ICD-10-CM

## 2022-09-16 DIAGNOSIS — M54.12 CERVICAL RADICULOPATHY AT C5: ICD-10-CM

## 2022-09-16 DIAGNOSIS — M54.2 NECK PAIN: ICD-10-CM

## 2022-09-16 PROCEDURE — 99214 OFFICE O/P EST MOD 30 MIN: CPT | Performed by: ORTHOPAEDIC SURGERY

## 2022-09-16 PROCEDURE — 72050 X-RAY EXAM NECK SPINE 4/5VWS: CPT | Mod: GC | Performed by: STUDENT IN AN ORGANIZED HEALTH CARE EDUCATION/TRAINING PROGRAM

## 2022-09-16 ASSESSMENT — PAIN SCALES - GENERAL: PAINLEVEL: EXTREME PAIN (8)
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
## 2022-09-16 NOTE — NURSING NOTE
"Christine Hu's goals for this visit include:   Chief Complaint   Patient presents with     New Patient     neck pain referred by dr. tracy perea   XR ordered     She requests these members of her care team be copied on today's visit information: yes    PCP: Angi Marroquin    Referring Provider:  No referring provider defined for this encounter.    /81 (BP Location: Right arm, Patient Position: Sitting, Cuff Size: Adult Regular)   Pulse 96   Ht 1.676 m (5' 6\")   Wt 72.1 kg (159 lb)   LMP  (LMP Unknown)   BMI 25.66 kg/m      Do you need any medication refills at today's visit? No  JANELL Gonzalez, YEFRI (AAMA)      "

## 2022-09-16 NOTE — PATIENT INSTRUCTIONS
Patient Instructions    Surgery scheduled at [facility] ([facility]) for Anterior cervical discectomy and fusion with Dr. Murray    Pre-Operative  Surgical risks: blood clots in the leg or lung, problems urinating, nerve damage, drainage from the incision, infection,stiffness  Pre-operative physical in our Pre-operative Assessment Center (PAC) at our [facility] location. See handout in folder. To be completed within 30 days of surgical date. Surgery scheduler will assist in scheduling this appointment. **May need to be done with a local primary care provider for patients who live several hours away from [place].**  Possibly 2-4 night hospitalization stay (this will also depend on the progress of your recovery)   Shower procedure  Please shower with antimicrobial soap the night before surgery and morning of surgery. Refer to showering instruction handout in folder.  Eating/Drinking  Stop all solid foods 8 hours before surgery.  You may drink ONLY CLEAR LIQUIDS until 2 hours before surgery  Clear liquids include water, clear juice, black coffee with no milk or creamer, clear tea without milk, Gatorade, clear soda.   **REMINDER: DO NOT drink any clear liquids with pulp, milk, or creamer**   Your surgery may have to be rescheduled if these eating/drinking directions are not followed correctly   Medications  Generally hold Aspirin, NSAIDs (Advil/Ibuprofen, Indocin, Naproxen,Nuprin,Relafen/Nabumetone, Diclofenac,Meloxicam, Aleve, Celebrex) x 7 days prior to surgical date. Please see handout in folder for more details.   You can take Tylenol (Acetaminophen) for pain, 1000 mg  Do not exceed 3,000 mg per day   Any other medications prescribed, please discuss at your PAC appointment or with your prescribing provider at your pre-operative physical   As part of preparation for your upcoming procedure you are required to have a test for the novel Coronavirus,  COVID-19.  Patients planning on staying overnight in the hospital should get a PCR test from a lab four days before their procedure through [facility] or another lab.   Please schedule a PCR test with [facility] by calling [phone] or by visiting [email or web address].  You may NOT receive the COVID-19 vaccine 72 hours before or after surgery.    Pain Management  Dealing with pain  As your body heals, you might feel a stabbing, burning, or aching pain. You may also have some numbness.  Everyone feels pain differently, we may ask you to rate your pain using a pain scale. This will let us know how much pain you feel.   Keep in mind that medicine won't take away all of your pain. It helps to try other ways to relax and ease pain.   Things to help with pain  After surgery, we will give you medicine for your pain. These medications work well, but they can make you drowsy, itchy, or sick to your stomach. If we give you narcotics for pain, try to take the pills with food.   For mild to moderate pain, you can take medication such as Tylenol. These can be used with narcotics, but make sure that your narcotic does not contain Tylenol.   Do NOT drive while taking narcotic pain medication  Do NOT drink alcohol while using any pain medication  You can utilize ice as needed (no longer than 20 minutes at one time)  **Please let us know at least 3 days in advance before you run out of your pain medications so these can be refilled in a timely manner. You may send a [employer] message or call [phone] to leave a message with our care team.**    Incision Care  No submerging incision in water such as pools, hot tubs, baths for at least 8 weeks or until incision is healed  You may get your incision wet in the shower. Allow water to run over incision, and gently pat dry.   Remove dressing as instructed upon discharge  Watch for signs of infection  Redness, swelling, warmth, drainage, and fever of  101 degrees or higher  Notify clinic [phone]    Activity Restrictions  For the first 6-8 weeks, no lifting > 10 pounds, no bending, twisting, or overhead reaching.  Take stairs in moderation   Ok to walk as tolerated, take short frequent walks. You may gradually increase the distance as tolerated.   Avoid bed rest and prolonged sitting for longer than 30 minutes (change positions frequently while awake)  No contact sports until after follow up visit  No high impact activities such as; running/jogging, snowmobile or 4 reardon riding or any other recreational vehicles  Please call the clinic if you develop any of the following symptoms:  Swelling and/or warmth in one or both legs  Pain or tenderness in your leg, ankle, foot, or arm   Red or discolored skin     Post-Op Follow Up Appointments  6 week post op with xray prior with Dr. Murray  Remaining appointments will be determined by the provider   Please call to schedule follow up and xray appointments at [phone]    Resources  If you are currently employed, you will need to be off work for 4-6 weeks for post op recovery and healing.  Please fax any FMLA/short term disability paperwork to [phone]  You may call our clinic when you'd like to return to work and we can provide a work letter  To allow staff adequate time to complete paperwork, please send as soon as possible     [facility] Orthopedic Department   Phone: [phone]

## 2022-09-16 NOTE — NURSING NOTE
Reviewed pre- and post-operative instructions as outlined in the After Visit Summary/Patient Instructions with patient.   Surgery folder was given to patient    Patient Education Topic: Procedure with Dr. Murray     Learner(s): Patient    Knowledge Level: Advanced     Readiness to Learn: Ready    Method:  Verbal explanation and Written material     Outcome: Able to verbalize instructions    Barriers to Learning: No barrier    Covid Testing: patient educated they will need to have a test for the novel Coronavirus, COVID-19.The PCR test needs to be completed within 4 days (96) hours of surgery. . Order Placed.Yes    Scheduling Number: [phone]    NDI/EVA: Confirmation of completion within last 6 months    Patient had the opportunity for questions to be answered. Advised Patient to call clinic with any questions/concerns. Gave patient antibacterial soap for pre-surgery skin preparation.       Antonella Fletcher RN Care Coordinator   Neurology/Neurosurgery/PM&R/ Pain Management
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
## 2022-09-20 ENCOUNTER — TELEPHONE (OUTPATIENT)
Dept: ORTHOPEDICS | Facility: CLINIC | Age: 42
End: 2022-09-20

## 2022-09-20 PROBLEM — S83.512A RUPTURE OF ANTERIOR CRUCIATE LIGAMENT OF LEFT KNEE, INITIAL ENCOUNTER: Status: ACTIVE | Noted: 2022-09-20

## 2022-09-20 NOTE — TELEPHONE ENCOUNTER
Phoned patient to get her scheduled for surgery with Dr. Murray     Patient was unavailable,   Provided call back number in voicemail:   [phone].

## 2022-09-21 DIAGNOSIS — M54.2 NECK PAIN: ICD-10-CM

## 2022-09-21 RX ORDER — AMITRIPTYLINE HYDROCHLORIDE 10 MG/1
TABLET ORAL
Qty: 270 TABLET | Refills: 0 | OUTPATIENT
Start: 2022-09-21

## 2022-09-27 ENCOUNTER — MYC MEDICAL ADVICE (OUTPATIENT)
Dept: ORTHOPEDICS | Facility: CLINIC | Age: 42
End: 2022-09-27

## 2022-09-27 ENCOUNTER — APPOINTMENT (OUTPATIENT)
Dept: URBAN - METROPOLITAN AREA CLINIC 252 | Age: 42
Setting detail: DERMATOLOGY
End: 2022-09-27

## 2022-09-27 VITALS — WEIGHT: 140 LBS | HEIGHT: 66 IN

## 2022-09-27 DIAGNOSIS — L70.0 ACNE VULGARIS: ICD-10-CM

## 2022-09-27 DIAGNOSIS — G89.29 CHRONIC PAIN OF LEFT KNEE: Primary | ICD-10-CM

## 2022-09-27 DIAGNOSIS — M25.562 CHRONIC PAIN OF LEFT KNEE: Primary | ICD-10-CM

## 2022-09-27 DIAGNOSIS — M25.511 RIGHT SHOULDER PAIN, UNSPECIFIED CHRONICITY: ICD-10-CM

## 2022-09-27 DIAGNOSIS — M25.561 CHRONIC PAIN OF RIGHT KNEE: ICD-10-CM

## 2022-09-27 DIAGNOSIS — G89.29 CHRONIC PAIN OF RIGHT KNEE: ICD-10-CM

## 2022-09-27 DIAGNOSIS — L57.0 ACTINIC KERATOSIS: ICD-10-CM

## 2022-09-27 DIAGNOSIS — M54.12 CERVICAL RADICULOPATHY: ICD-10-CM

## 2022-09-27 PROCEDURE — OTHER MIPS QUALITY: OTHER

## 2022-09-27 PROCEDURE — 17000 DESTRUCT PREMALG LESION: CPT

## 2022-09-27 PROCEDURE — OTHER PRESCRIPTION: OTHER

## 2022-09-27 PROCEDURE — OTHER COUNSELING: OTHER

## 2022-09-27 PROCEDURE — OTHER LIQUID NITROGEN: OTHER

## 2022-09-27 PROCEDURE — 99213 OFFICE O/P EST LOW 20 MIN: CPT | Mod: 25

## 2022-09-27 RX ORDER — FLUOROURACIL 5 MG/G
5% CREAM TOPICAL BID
Qty: 40 | Refills: 0 | Status: ERX | COMMUNITY
Start: 2022-09-27

## 2022-09-27 RX ORDER — SPIRONOLACTONE 100 MG/1
100MG TABLET, FILM COATED ORAL
Qty: 90 | Refills: 4 | Status: ERX

## 2022-09-27 ASSESSMENT — LOCATION DETAILED DESCRIPTION DERM
LOCATION DETAILED: LEFT CENTRAL MALAR CHEEK
LOCATION DETAILED: RIGHT SUPERIOR FOREHEAD

## 2022-09-27 ASSESSMENT — LOCATION ZONE DERM: LOCATION ZONE: FACE

## 2022-09-27 ASSESSMENT — LOCATION SIMPLE DESCRIPTION DERM
LOCATION SIMPLE: RIGHT FOREHEAD
LOCATION SIMPLE: LEFT CHEEK

## 2022-09-27 NOTE — HPI: SKIN LESION
Is This A New Presentation, Or A Follow-Up?: Skin Lesion
Additional History: Resolves with cryosurgery and then continues to recur. to recur 2 months ago.

## 2022-09-27 NOTE — PROCEDURE: LIQUID NITROGEN
Render Post-Care Instructions In Note?: yes
Consent: - Discussed that these are a result of cumulative sun exposure.\\n- Verbal and written consent was obtained, and risks were reviewed prior to procedure today. \\n- Risks discussed include but are not limited to pain, crusting, scabbing, blistering, scarring, temporary or permanent darker or lighter pigmentary change, recurrence, incomplete resolution, and infection.\\n- will recommended biopsy should this fail to resolve or every grow back.
Duration Of Freeze Thaw-Cycle (Seconds): 0
Post-Care Instructions: - Patient was instructed to avoid picking at any of the treated lesions.
Detail Level: Detailed

## 2022-09-27 NOTE — HPI: PIMPLES (ACNE)
How Severe Is Your Acne?: moderate
Is This A New Presentation, Or A Follow-Up?: Acne
Additional Comments (Use Complete Sentences): Was doing well with spironolactone 100mg qd but ran out 2 months ago and started to flare. Denies side effects. Patients denies personal history of high blood pressure, low blood pressure, renal disease, congestive heart failure, or frequent use of NSAIDs.

## 2022-09-27 NOTE — PROCEDURE: MIPS QUALITY
Quality 110: Preventive Care And Screening: Influenza Immunization: Influenza Immunization Ordered or Recommended, but not Administered due to system reason
Quality 226: Preventive Care And Screening: Tobacco Use: Screening And Cessation Intervention: Patient screened for tobacco use, is a smoker AND received Cessation Counseling within the Previous 12 Months
Detail Level: Detailed
Quality 431: Preventive Care And Screening: Unhealthy Alcohol Use - Screening: Patient not identified as an unhealthy alcohol user when screened for unhealthy alcohol use using a systematic screening method

## 2022-09-27 NOTE — PROCEDURE: COUNSELING
Tetracycline Pregnancy And Lactation Text: This medication is Pregnancy Category D and not consider safe during pregnancy. It is also excreted in breast milk.
Benzoyl Peroxide Pregnancy And Lactation Text: This medication is Pregnancy Category C. It is unknown if benzoyl peroxide is excreted in breast milk.
Topical Sulfur Applications Pregnancy And Lactation Text: This medication is Pregnancy Category C and has an unknown safety profile during pregnancy. It is unknown if this topical medication is excreted in breast milk.
Include Pregnancy/Lactation Warning?: No
Topical Retinoid Pregnancy And Lactation Text: This medication is Pregnancy Category C. It is unknown if this medication is excreted in breast milk.
Bactrim Counseling:  I discussed with the patient the risks of sulfa antibiotics including but not limited to GI upset, allergic reaction, drug rash, diarrhea, dizziness, photosensitivity, and yeast infections.  Rarely, more serious reactions can occur including but not limited to aplastic anemia, agranulocytosis, methemoglobinemia, blood dyscrasias, liver or kidney failure, lung infiltrates or desquamative/blistering drug rashes.
Patient Specific Counseling (Will Not Stick From Patient To Patient): - Recommended continuing current regimen.
Birth Control Pills Pregnancy And Lactation Text: This medication should be avoided if pregnant and for the first 30 days post-partum.
High Dose Vitamin A Pregnancy And Lactation Text: High dose vitamin A therapy is contraindicated during pregnancy and breast feeding.
Topical Retinoid counseling:  Patient advised to apply a pea-sized amount only at bedtime and wait 30 minutes after washing their face before applying.  If too drying, patient may add a non-comedogenic moisturizer. The patient verbalized understanding of the proper use and possible adverse effects of retinoids.  All of the patient's questions and concerns were addressed.
Spironolactone Pregnancy And Lactation Text: This medication can cause feminization of the male fetus and should be avoided during pregnancy. The active metabolite is also found in breast milk.
Bactrim Pregnancy And Lactation Text: This medication is Pregnancy Category D and is known to cause fetal risk.  It is also excreted in breast milk.
Detail Level: Detailed
Tazorac Pregnancy And Lactation Text: This medication is not safe during pregnancy. It is unknown if this medication is excreted in breast milk.
Topical Sulfur Applications Counseling: Topical Sulfur Counseling: Patient counseled that this medication may cause skin irritation or allergic reactions.  In the event of skin irritation, the patient was advised to reduce the amount of the drug applied or use it less frequently.   The patient verbalized understanding of the proper use and possible adverse effects of topical sulfur application.  All of the patient's questions and concerns were addressed.
Erythromycin Counseling:  I discussed with the patient the risks of erythromycin including but not limited to GI upset, allergic reaction, drug rash, diarrhea, increase in liver enzymes, and yeast infections.
Isotretinoin Pregnancy And Lactation Text: This medication is Pregnancy Category X and is considered extremely dangerous during pregnancy. It is unknown if it is excreted in breast milk.
Isotretinoin Counseling: Patient should get monthly blood tests, not donate blood, not drive at night if vision affected, not share medication, and not undergo elective surgery for 6 months after tx completed. Side effects reviewed, pt to contact office should one occur.
Topical Clindamycin Counseling: Patient counseled that this medication may cause skin irritation or allergic reactions.  In the event of skin irritation, the patient was advised to reduce the amount of the drug applied or use it less frequently.   The patient verbalized understanding of the proper use and possible adverse effects of clindamycin.  All of the patient's questions and concerns were addressed.
Benzoyl Peroxide Counseling: Patient counseled that medicine may cause skin irritation and bleach clothing.  In the event of skin irritation, the patient was advised to reduce the amount of the drug applied or use it less frequently.   The patient verbalized understanding of the proper use and possible adverse effects of benzoyl peroxide.  All of the patient's questions and concerns were addressed.
Doxycycline Pregnancy And Lactation Text: This medication is Pregnancy Category D and not consider safe during pregnancy. It is also excreted in breast milk but is considered safe for shorter treatment courses.
Minocycline Counseling: Patient advised regarding possible photosensitivity and discoloration of the teeth, skin, lips, tongue and gums.  Patient instructed to avoid sunlight, if possible.  When exposed to sunlight, patients should wear protective clothing, sunglasses, and sunscreen.  The patient was instructed to call the office immediately if the following severe adverse effects occur:  hearing changes, easy bruising/bleeding, severe headache, or vision changes.  The patient verbalized understanding of the proper use and possible adverse effects of minocycline.  All of the patient's questions and concerns were addressed.
High Dose Vitamin A Counseling: Side effects reviewed, pt to contact office should one occur.
Patient Specific Counseling (Will Not Stick From Patient To Patient): - Patient going on vacation and requesting liquid nitrogen today.\\n- Discussed adding in use of 5fu; will wait until returns from vacation to start use of 5fu.
Spironolactone Counseling: Patient advised regarding risks of diarrhea, abdominal pain, hyperkalemia, birth defects (for female patients), liver toxicity and renal toxicity. The patient may need blood work to monitor liver and kidney function and potassium levels while on therapy. The patient verbalized understanding of the proper use and possible adverse effects of spironolactone.  All of the patient's questions and concerns were addressed.
Topical Clindamycin Pregnancy And Lactation Text: This medication is Pregnancy Category B and is considered safe during pregnancy. It is unknown if it is excreted in breast milk.
Birth Control Pills Counseling: Birth Control Pill Counseling: I discussed with the patient the potential side effects of OCPs including but not limited to increased risk of stroke, heart attack, thrombophlebitis, deep venous thrombosis, hepatic adenomas, breast changes, GI upset, headaches, and depression.  The patient verbalized understanding of the proper use and possible adverse effects of OCPs. All of the patient's questions and concerns were addressed.
Azithromycin Counseling:  I discussed with the patient the risks of azithromycin including but not limited to GI upset, allergic reaction, drug rash, diarrhea, and yeast infections.
Azithromycin Pregnancy And Lactation Text: This medication is considered safe during pregnancy and is also secreted in breast milk.
Tetracycline Counseling: Patient counseled regarding possible photosensitivity and increased risk for sunburn.  Patient instructed to avoid sunlight, if possible.  When exposed to sunlight, patients should wear protective clothing, sunglasses, and sunscreen.  The patient was instructed to call the office immediately if the following severe adverse effects occur:  hearing changes, easy bruising/bleeding, severe headache, or vision changes.  The patient verbalized understanding of the proper use and possible adverse effects of tetracycline.  All of the patient's questions and concerns were addressed. Patient understands to avoid pregnancy while on therapy due to potential birth defects.
Dapsone Counseling: I discussed with the patient the risks of dapsone including but not limited to hemolytic anemia, agranulocytosis, rashes, methemoglobinemia, kidney failure, peripheral neuropathy, headaches, GI upset, and liver toxicity.  Patients who start dapsone require monitoring including baseline LFTs and weekly CBCs for the first month, then every month thereafter.  The patient verbalized understanding of the proper use and possible adverse effects of dapsone.  All of the patient's questions and concerns were addressed.
Tazorac Counseling:  Patient advised that medication is irritating and drying.  Patient may need to apply sparingly and wash off after an hour before eventually leaving it on overnight.  The patient verbalized understanding of the proper use and possible adverse effects of tazorac.  All of the patient's questions and concerns were addressed.
Erythromycin Pregnancy And Lactation Text: This medication is Pregnancy Category B and is considered safe during pregnancy. It is also excreted in breast milk.
Doxycycline Counseling:  Patient counseled regarding possible photosensitivity and increased risk for sunburn.  Patient instructed to avoid sunlight, if possible.  When exposed to sunlight, patients should wear protective clothing, sunglasses, and sunscreen.  The patient was instructed to call the office immediately if the following severe adverse effects occur:  hearing changes, easy bruising/bleeding, severe headache, or vision changes.  The patient verbalized understanding of the proper use and possible adverse effects of doxycycline.  All of the patient's questions and concerns were addressed.
Dapsone Pregnancy And Lactation Text: This medication is Pregnancy Category C and is not considered safe during pregnancy or breast feeding.
Detail Level: Zone

## 2022-09-29 RX ORDER — TRAMADOL HYDROCHLORIDE 50 MG/1
50 TABLET ORAL EVERY 6 HOURS PRN
Qty: 10 TABLET | Refills: 0 | Status: SHIPPED | OUTPATIENT
Start: 2022-09-29 | End: 2022-10-02

## 2022-09-29 NOTE — TELEPHONE ENCOUNTER
Patient is scheduled for surgery with Dr. Murray    Spoke with: Christine    Date of Surgery: 11/11/2022    Location: [facility]    Informed patient they will need an adult  Yes    Pre op with Provider PAC    Pre-procedure COVID-19 Test: 11/7/22    Additional imaging/appointments: POP made    Surgery packet: Received     Additional comments: CT: on 10/3/22

## 2022-09-30 NOTE — TELEPHONE ENCOUNTER
FUTURE VISIT INFORMATION      SURGERY INFORMATION:    Date: 11/11/22    Location:  or    Surgeon:  Mc Peterson MD    Anesthesia Type:  choice    Procedure: left knee examination under anesthesia, knee arthroscopy, anterior cruciate ligament reconstruction hamstring autograft, meniscus surgery    RECORDS REQUESTED FROM:        Primary Care Provider: Angi Marroquin PA-C- [facility]    Pertinent Medical History: hypertension, heart murmur, palpitations    Most recent EKG+ Tracing: 3/21/16- Syl[facility]

## 2022-10-03 ENCOUNTER — ANCILLARY PROCEDURE (OUTPATIENT)
Dept: MRI IMAGING | Facility: CLINIC | Age: 42
End: 2022-10-03
Attending: FAMILY MEDICINE
Payer: COMMERCIAL

## 2022-10-03 DIAGNOSIS — G89.29 CHRONIC PAIN OF LEFT KNEE: ICD-10-CM

## 2022-10-03 DIAGNOSIS — M25.562 CHRONIC PAIN OF LEFT KNEE: ICD-10-CM

## 2022-10-03 PROCEDURE — 73721 MRI JNT OF LWR EXTRE W/O DYE: CPT | Mod: TC | Performed by: RADIOLOGY

## 2022-10-07 ENCOUNTER — VIRTUAL VISIT (OUTPATIENT)
Dept: NEUROSURGERY | Facility: CLINIC | Age: 42
End: 2022-10-07
Payer: COMMERCIAL

## 2022-10-07 DIAGNOSIS — Z53.9 ERRONEOUS ENCOUNTER--DISREGARD: Primary | ICD-10-CM

## 2022-10-07 NOTE — PROGRESS NOTES
Christine is a 42 year old who is being evaluated via a billable telephone visit.      What phone number would you like to be contacted at? ***  How would you like to obtain your AVS? {AVS Preference:[id number]}  Phone call duration: *** minutes

## 2022-10-07 NOTE — LETTER
10/7/2022         RE: Christine Hu  [address]  [place] [address]        Dear Colleague,    Thank you for referring your patient, Christine Hu, to the [facility] [place]. Please see a copy of my visit note below.      This encounter was opened in error. Please disregard.      Again, thank you for allowing me to participate in the care of your patient.        Sincerely,        Wilbur Murray MD

## 2022-10-07 NOTE — NURSING NOTE
I called patient and attempted the rooming process. Pt did not answer, I left a message for patient to call back.  If they do not I will attempt to call back in 10-15 min.    Kristie Sullivan LPN

## 2022-10-11 NOTE — PROGRESS NOTES
[facility]  [address] [facility] [address]  Phone: [phone]  Primary Provider: Angi Carter  Pre-op Performing Provider: ANGI CARTER      PREOPERATIVE EVALUATION:  Today's date: 10/18/2022    Christine Hu is a 42 year old female who presents for a preoperative evaluation.    Surgical Information:  Surgery/Procedure: Neck surgery--cervical 4-6 anterior cervical decompression and fusion with medtronic plates and screws and knee arthroscopy for rupture of ACL  Surgery Location: in [facility]  Surgeon: Dr. Murray and Dr. Peterson  Surgery Date: 11/11/2022 for neck, unsure for knee scope  Time of Surgery: 2:30pm  Where patient plans to recover: At home with family  Fax number for surgical facility: not needed in epic    Type of Anesthesia Anticipated: General    Assessment & Plan     The proposed surgical procedure is considered INTERMEDIATE risk.    Preop general physical exam    - CBC with platelets and differential; Future  - HCG qualitative, Blood ([id number]); Future  - CBC with platelets and differential  - HCG qualitative, Blood ([id number])    Neck pain      Rupture of anterior cruciate ligament of left knee, initial encounter             Risks and Recommendations:  The patient has the following additional risks and recommendations for perioperative complications:   - No identified additional risk factors other than previously addressed    Medication Instructions:  Patient is on no chronic medications    RECOMMENDATION:  Assuming labs WNL, APPROVAL GIVEN to proceed with proposed procedure, without further diagnostic evaluation.      Subjective      HPI related to upcoming procedure: ongoing intractable neck pain will be having the procedure listed above  Also has ACL tear in left knee and will be having arthroscopy for that.   Gabapentin made her too sleepy. Not currently taking any meds.       Preop Questions 10/18/2022   1. Have you ever had a heart  attack or stroke? No   2. Have you ever had surgery on your heart or blood vessels, such as a stent placement, a coronary artery bypass, or surgery on an artery in your head, neck, heart, or legs? No   3. Do you have chest pain with activity? No   4. Do you have a history of  heart failure? No   5. Do you currently have a cold, bronchitis or symptoms of other infection? No   6. Do you have a cough, shortness of breath, or wheezing? No   7. Do you or anyone in your family have previous history of blood clots? No   8. Do you or does anyone in your family have a serious bleeding problem such as prolonged bleeding following surgeries or cuts? No   9. Have you ever had problems with anemia or been told to take iron pills? No   10. Have you had any abnormal blood loss such as black, tarry or bloody stools, or abnormal vaginal bleeding? No   11. Have you ever had a blood transfusion? No   12. Are you willing to have a blood transfusion if it is medically needed before, during, or after your surgery? Yes   13. Have you or any of your relatives ever had problems with anesthesia? No   14. Do you have sleep apnea, excessive snoring or daytime drowsiness? No   15. Do you have any artifical heart valves or other implanted medical devices like a pacemaker, defibrillator, or continuous glucose monitor? No   16. Do you have artificial joints? No   17. Are you allergic to latex? No   18. Is there any chance that you may be pregnant? UNKNOWN -      Health Care Directive:  Patient does not have a Health Care Directive or Living Will: Discussed advance care planning with patient; however, patient declined at this time.    Preoperative Review of :   reviewed - controlled substances prescribed by other outside provider(s).      Chronic pain-neck, shoulders, knee, ankle    Anxiety-has improved. Exercise has helped as well.     Review of Systems  Constitutional, neuro, ENT, endocrine, pulmonary, cardiac, gastrointestinal,  genitourinary, musculoskeletal, integument and psychiatric systems are negative, except as otherwise noted.    Patient Active Problem List    Diagnosis Date Noted     Rupture of anterior cruciate ligament of left knee, initial encounter 09/20/2022     Priority: Medium     Added automatically from request for surgery [id number]       Myalgia 07/14/2022     Priority: Medium     Heart murmur 01/13/2017     Priority: Medium     History of Clostridium difficile 04/01/2016     Priority: Medium     Clindamycin caused       Benign essential hypertension 04/01/2016     Priority: Medium     Primary insomnia 02/09/2016     Priority: Medium     Tobacco use 05/21/2015     Priority: Medium     Neck pain 07/28/2014     Priority: Medium     Acute alcoholic intoxication (H) 05/28/2011     Priority: Medium     Seen in emergency room 2020 after 14 months sobriety       Bladder retention of urine 05/28/2011     Priority: Medium     Hypomagnesemia 03/24/2011     Priority: Medium     Hepatitis 03/23/2011     Priority: Medium     Acute gastritis 03/22/2011     Priority: Medium     CARDIOVASCULAR SCREENING; LDL GOAL LESS THAN 160 10/31/2010     Priority: Medium     Sprain of ankle 12/03/2009     Priority: Medium     Overdose      Priority: Medium     TRAZADONE       Anxiety state 04/01/2009     Priority: Medium     Palpitations 04/01/2009     Priority: Medium     Mastoiditis 03/09/2009     Priority: Medium     Generalized anxiety disorder 10/24/2008     Priority: Medium     Diagnosis updated by automated process. Provider to review and confirm.       Low back pain 08/06/2008     Priority: Medium     Thyrotoxicosis 10/24/2007     Priority: Medium     Normal thyroid ab and thyroid uptake scan  Problem list name updated by automated process. Provider to review       History of abnormal Pap smear 10/01/2007     Priority: Medium     Depressive disorder, not elsewhere classified 10/01/2007     Priority: Medium     Contraceptive management  2007     Priority: Medium     Problem list name updated by automated process. Provider to review       Degeneration of cervical intervertebral disc      Priority: Medium     Being treated by Pain clinic        Past Medical History:   Diagnosis Date     ASCUS favor benign 2015    Neg HPV     Degeneration of cervical intervertebral disc      Knee effusion, left      Lumbago      Ménière's disease      Overdose     TRAZADONE     Past Surgical History:   Procedure Laterality Date     C/SECTION, LOW TRANSVERSE  2005    , Low Transverse     COLONOSCOPY      5 yrs     MASTOIDECTOMY       TONSILLECTOMY       Current Outpatient Medications   Medication Sig Dispense Refill     amitriptyline (ELAVIL) 10 MG tablet Take 3 tablets daily note increase in dose. (Patient not taking: Reported on 2022) 90 tablet 1     diclofenac (VOLTAREN) 75 MG EC tablet Take 1 tablet (75 mg) by mouth daily as needed for moderate pain (Patient not taking: No sig reported) 30 tablet 1     methocarbamol (ROBAXIN) 500 MG tablet Take 1 tablet (500 mg) by mouth 3 times daily as needed for muscle spasms (Patient not taking: Reported on 2022) 90 tablet 0     tiZANidine (ZANAFLEX) 4 MG tablet Take 4 mg by mouth 3 times daily (Patient not taking: No sig reported) 60 tablet 3       Allergies   Allergen Reactions     Codeine GI Disturbance     Nausea with use of Tylenol #3     Clindamycin Other (See Comments)     C diff, hospitalized      Penicillins Rash        Social History     Tobacco Use     Smoking status: Every Day     Packs/day: 0.25     Types: Cigarettes     Smokeless tobacco: Never   Substance Use Topics     Alcohol use: Not Currently       History   Drug Use No         Objective     /81   Pulse 94   Temp 98.2  F (36.8  C) (Tympanic)   Resp 16   Wt 71.7 kg (158 lb)   LMP  (LMP Unknown)   SpO2 97%   Breastfeeding No   BMI 25.50 kg/m      Physical Exam    GENERAL APPEARANCE: healthy, alert and no distress      EYES: EOMI, PERRL     HENT: ear canals and TM's normal and nose and mouth without ulcers or lesions     NECK: no adenopathy, no asymmetry, masses, or scars and thyroid normal to palpation     RESP: lungs clear to auscultation - no rales, rhonchi or wheezes     CV: regular rates and rhythm, normal S1 S2, no S3 or S4 and no murmur, click or rub     MS: extremities normal- no gross deformities noted, no evidence of inflammation in joints, FROM in all extremities.     SKIN: no suspicious lesions or rashes     NEURO: Normal strength and tone, sensory exam grossly normal, mentation intact and speech normal     PSYCH: mentation appears normal. and affect normal/bright     LYMPHATICS: No cervical adenopathy    Recent Labs   Lab Test 07/10/22  1003 22  1247   HGB 13.8 13.8    264     --    POTASSIUM 4.7  --    CR 0.57  --           Diagnostics:  Labs pending at this time.  Results will be reviewed when available.   Has future covid test.    No EKG required, no history of coronary heart disease, significant arrhythmia, peripheral arterial disease or other structural heart disease.    Revised Cardiac Risk Index (RCRI):  The patient has the following serious cardiovascular risks for perioperative complications:   - No serious cardiac risks = 0 points     RCRI Interpretation: 0 points: Class I (very low risk - 0.4% complication rate)           Signed Electronically by: Angi Marroquin PA-C  Copy of this evaluation report is provided to requesting physician.    Billin min spent on patient today including chart review, history, exam, and explaining treatment plan and follow-up.

## 2022-10-11 NOTE — PATIENT INSTRUCTIONS
Preparing for Your Surgery  Getting started  A nurse will call you to review your health history and instructions. They will give you an arrival time based on your scheduled surgery time. Please be ready to share:    Your doctor's clinic name and phone number    Your medical, surgical and anesthesia history    A list of allergies and sensitivities    A list of medicines, including herbal treatments and over-the-counter drugs    Whether the patient has a legal guardian (ask how to send us the papers in advance)  Please tell us if you're pregnant--or if there's any chance you might be pregnant. Some surgeries may injure a fetus (unborn baby), so they require a pregnancy test. Surgeries that are safe for a fetus don't always need a test, and you can choose whether to have one.   If you have a child who's having surgery, please ask for a copy of Preparing for Your Child's Surgery.    Preparing for surgery    Within 10 to 30 days of surgery: Have a pre-op exam (sometimes called an H&P, or History and Physical). This can be done at a clinic or pre-operative center.  ? If you're having a , you may not need this exam. Talk to your care team.    At your pre-op exam, talk to your care team about all medicines you take. If you need to stop any medicines before surgery, ask when to start taking them again.  ? We do this for your safety. Many medicines can make you bleed too much during surgery. Some change how well surgery (anesthesia) drugs work.    Call your insurance company to let them know you're having surgery. (If you don't have insurance, call [phone].)    Call your clinic if there's any change in your health. This includes signs of a cold or flu (sore throat, runny nose, cough, rash, fever). It also includes a scrape or scratch near the surgery site.    If you have questions on the day of surgery, call your hospital or surgery center.  COVID testing  You may need to be tested for COVID-19 before having  surgery. If so, we will give you instructions (or click here).  Eating and drinking guidelines  For your safety: Unless your surgeon tells you otherwise, follow the guidelines below.    Eat and drink as usual until 8 hours before surgery. After that, no food or milk.    Drink clear liquids until 2 hours before surgery. These are liquids you can see through, like water, Gatorade and Propel Water. You may also have black coffee and tea (no cream or milk).    Nothing by mouth within 2 hours of surgery. This includes gum, candy and breath mints.    If you drink alcohol: Stop drinking it the night before surgery.    If your care team tells you to take medicine on the morning of surgery, it's okay to take it with a sip of water.  Preventing infection    Shower or bathe the night before and morning of your surgery. Follow the instructions your clinic gave you. (If no instructions, use regular soap.)    Don't shave or clip hair near your surgery site. We'll remove the hair if needed.    Don't smoke or vape the morning of surgery. You may chew nicotine gum up to 2 hours before surgery. A nicotine patch is okay.  ? Note: Some surgeries require you to completely quit smoking and nicotine. Check with your surgeon.    Your care team will make every effort to keep you safe from infection. We will:  ? Clean our hands often with soap and water (or an alcohol-based hand rub).  ? Clean the skin at your surgery site with a special soap that kills germs.  ? Give you a special gown to keep you warm. (Cold raises the risk of infection.)  ? Wear special hair covers, masks, gowns and gloves during surgery.  ? Give antibiotic medicine, if prescribed. Not all surgeries need antibiotics.  What to bring on the day of surgery    Photo ID and insurance card    Copy of your health care directive, if you have one    Glasses and hearing aides (bring cases)  ? You can't wear contacts during surgery    Inhaler and eye drops, if you use them (tell us  about these when you arrive)    CPAP machine or breathing device, if you use them    A few personal items, if spending the night    If you have . . .  ? A pacemaker, ICD (cardiac defibrillator) or other implant: Bring the ID card.  ? An implanted stimulator: Bring the remote control.  ? A legal guardian: Bring a copy of the certified (court-stamped) guardianship papers.  Please remove any jewelry, including body piercings. Leave jewelry and other valuables at home.  If you're going home the day of surgery    You must have a responsible adult drive you home. They should stay with you overnight as well.    If you don't have someone to stay with you, and you aren't safe to go home alone, we may keep you overnight. Insurance often won't pay for this.  After surgery  If it's hard to control your pain or you need more pain medicine, please call your surgeon's office.  Questions?   If you have any questions for your care team, list them here: _________________________________________________________________________________________________________________________________________________________________________ ____________________________________ ____________________________________ ____________________________________  For informational purposes only. Not to replace the advice of your health care provider. Copyright   2003, 2019 [facility]. All rights reserved. Clinically reviewed by Lisa Banda MD. [employer] [id number] - REV 07/22.

## 2022-10-18 ENCOUNTER — OFFICE VISIT (OUTPATIENT)
Dept: FAMILY MEDICINE | Facility: CLINIC | Age: 42
End: 2022-10-18
Payer: COMMERCIAL

## 2022-10-18 VITALS
BODY MASS INDEX: 25.5 KG/M2 | SYSTOLIC BLOOD PRESSURE: 133 MMHG | WEIGHT: 158 LBS | DIASTOLIC BLOOD PRESSURE: 81 MMHG | OXYGEN SATURATION: 97 % | HEART RATE: 94 BPM | TEMPERATURE: 98.2 F | RESPIRATION RATE: 16 BRPM

## 2022-10-18 DIAGNOSIS — S83.512A RUPTURE OF ANTERIOR CRUCIATE LIGAMENT OF LEFT KNEE, INITIAL ENCOUNTER: ICD-10-CM

## 2022-10-18 DIAGNOSIS — M54.2 NECK PAIN: ICD-10-CM

## 2022-10-18 DIAGNOSIS — Z01.818 PREOP GENERAL PHYSICAL EXAM: Primary | ICD-10-CM

## 2022-10-18 LAB
BASOPHILS # BLD AUTO: 0 10E3/UL (ref 0–0.2)
BASOPHILS NFR BLD AUTO: 0 %
EOSINOPHIL # BLD AUTO: 0.2 10E3/UL (ref 0–0.7)
EOSINOPHIL NFR BLD AUTO: 3 %
ERYTHROCYTE [DISTWIDTH] IN BLOOD BY AUTOMATED COUNT: 12.9 % (ref 10–15)
HCG SERPL QL: NEGATIVE
HCT VFR BLD AUTO: 37.5 % (ref 35–47)
HGB BLD-MCNC: 12.7 G/DL (ref 11.7–15.7)
LYMPHOCYTES # BLD AUTO: 1.4 10E3/UL (ref 0.8–5.3)
LYMPHOCYTES NFR BLD AUTO: 21 %
MCH RBC QN AUTO: 34.4 PG (ref 26.5–33)
MCHC RBC AUTO-ENTMCNC: 33.9 G/DL (ref 31.5–36.5)
MCV RBC AUTO: 102 FL (ref 78–100)
MONOCYTES # BLD AUTO: 1.1 10E3/UL (ref 0–1.3)
MONOCYTES NFR BLD AUTO: 17 %
NEUTROPHILS # BLD AUTO: 3.9 10E3/UL (ref 1.6–8.3)
NEUTROPHILS NFR BLD AUTO: 59 %
PLATELET # BLD AUTO: 193 10E3/UL (ref 150–450)
RBC # BLD AUTO: 3.69 10E6/UL (ref 3.8–5.2)
WBC # BLD AUTO: 6.6 10E3/UL (ref 4–11)

## 2022-10-18 PROCEDURE — 36415 COLL VENOUS BLD VENIPUNCTURE: CPT | Performed by: PHYSICIAN ASSISTANT

## 2022-10-18 PROCEDURE — 99215 OFFICE O/P EST HI 40 MIN: CPT | Performed by: PHYSICIAN ASSISTANT

## 2022-10-18 PROCEDURE — 85025 COMPLETE CBC W/AUTO DIFF WBC: CPT | Performed by: PHYSICIAN ASSISTANT

## 2022-10-18 PROCEDURE — 84703 CHORIONIC GONADOTROPIN ASSAY: CPT | Performed by: PHYSICIAN ASSISTANT

## 2022-10-18 NOTE — RESULT ENCOUNTER NOTE
Jacquelyn King,       Your recent test results are attached, if you have any questions or concerns please feel free to contact me via e-mail or call [phone].  Hemoglobin normal. White blood cell count normal. Waiting on pregnancy test.        It was a pleasure to see you at your recent office visit.      Sincerely,  Angi Marroquin PA-C

## 2022-10-18 NOTE — LETTER
[facility]  [address] [facility] [address]  Phone: [phone]    October 18, 2022        Christine Hu  [address] [place] [address]          To whom it may concern:    RE: Christine Hu    Patient was seen and treated today at our clinic and has upcoming surgery. I recommend she not wait in line for very long due to putting more stress on her physical condition.     Please contact me for questions or concerns.      Sincerely,        Angi Marroquin PA-C

## 2022-10-24 ENCOUNTER — PRE VISIT (OUTPATIENT)
Dept: SURGERY | Facility: CLINIC | Age: 42
End: 2022-10-24

## 2022-10-24 ENCOUNTER — OFFICE VISIT (OUTPATIENT)
Dept: ORTHOPEDICS | Facility: CLINIC | Age: 42
End: 2022-10-24
Payer: COMMERCIAL

## 2022-10-24 DIAGNOSIS — M54.2 CERVICAL PAIN: Primary | ICD-10-CM

## 2022-10-24 PROCEDURE — 99214 OFFICE O/P EST MOD 30 MIN: CPT | Performed by: ORTHOPAEDIC SURGERY

## 2022-10-24 NOTE — TELEPHONE ENCOUNTER
Date Scheduled: 12-26-22  Facility: [facility]  Surgeon: Dr. Peterson   Post-op appointment scheduled:   Next 5 appointments (look out 90 days)    Nov 07, 2022 11:00 AM  Pre-procedure Covid with BE COVID LAB  [facility] ([facility] ) [address] [facility] [address]  [phone]           scheduled?: No  Surgery packet/instructions confirmed received?  Yes  Special Considerations:     Patient has post op appointment with Dr. Murray on 12-23-22.  Sadia Severino, Surgery Scheduling Coordinator
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
## 2022-10-24 NOTE — NURSING NOTE
Reason For Visit:   Chief Complaint   Patient presents with     RECHECK     Return patient.  Pre-op visit.  Completed pre-op physical with her PCP, Angi Marroquin MD on 10/18/22.  Surgery scheduled for 11/11/22.  Please discuss her additional PAC appointment following this visit today.         Primary MD: Angi Marroquin  Ref. MD: est    ?  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 Yes.  Work status?  Full time.   Date of surgery: 11/11/22  Type of surgery: C4-6 ACDF  Smoker: Yes.  Is quitting now  Request smoking cessation information: No    LMP  (LMP Unknown)     Pain Assessment  Patient Currently in Pain: Yes  0-10 Pain Scale: 6    Oswestry (EVA) Questionnaire    OSWESTRY DISABILITY INDEX 9/16/2022   Count 9   Sum 24   Oswestry Score (%) 53.33   Some recent data might be hidden            Neck Disability Index (NDI) Questionnaire    No flowsheet data found.           Visual Analog Pain Scale  Back Pain Scale 0-10: 5.5  Right leg pain: 0  Left leg pain: 0  Neck Pain Scale 0-10: 6  Right arm pain: 0  Left arm pain: 0    Promis 10 Assessment    No flowsheet data found.             Rigo Curiel, ATC

## 2022-11-06 ENCOUNTER — ANESTHESIA EVENT (OUTPATIENT)
Dept: SURGERY | Facility: CLINIC | Age: 42
DRG: 472 | End: 2022-11-06
Payer: COMMERCIAL

## 2022-11-09 ENCOUNTER — LAB (OUTPATIENT)
Dept: LAB | Facility: CLINIC | Age: 42
End: 2022-11-09
Payer: COMMERCIAL

## 2022-11-09 DIAGNOSIS — M54.2 NECK PAIN: ICD-10-CM

## 2022-11-09 DIAGNOSIS — Z01.818 PRE-OP TESTING: ICD-10-CM

## 2022-11-09 PROCEDURE — U0005 INFEC AGEN DETEC AMPLI PROBE: HCPCS

## 2022-11-09 PROCEDURE — U0003 INFECTIOUS AGENT DETECTION BY NUCLEIC ACID (DNA OR RNA); SEVERE ACUTE RESPIRATORY SYNDROME CORONAVIRUS 2 (SARS-COV-2) (CORONAVIRUS DISEASE [COVID-19]), AMPLIFIED PROBE TECHNIQUE, MAKING USE OF HIGH THROUGHPUT TECHNOLOGIES AS DESCRIBED BY CMS-2020-01-R: HCPCS

## 2022-11-10 LAB — SARS-COV-2 RNA RESP QL NAA+PROBE: POSITIVE

## 2022-11-10 RX ORDER — IBUPROFEN 200 MG
600-800 TABLET ORAL EVERY 4 HOURS PRN
Status: ON HOLD | COMMUNITY
End: 2022-11-28

## 2022-11-11 ENCOUNTER — TELEPHONE (OUTPATIENT)
Dept: NEUROSURGERY | Facility: CLINIC | Age: 42
End: 2022-11-11

## 2022-11-11 ENCOUNTER — ANESTHESIA (OUTPATIENT)
Dept: SURGERY | Facility: CLINIC | Age: 42
DRG: 472 | End: 2022-11-11
Payer: COMMERCIAL

## 2022-11-11 ENCOUNTER — TELEPHONE (OUTPATIENT)
Dept: ORTHOPEDICS | Facility: CLINIC | Age: 42
End: 2022-11-11

## 2022-11-11 NOTE — TELEPHONE ENCOUNTER
Pt recently returned from her vacation and likely was likely exposed to COVID during the trip. She was aware that her COVID test came back positive but was not notified if and when her surgery would be rescheduled. She spent last evening calling the clinic and spoke to an on-call provider who discussed with anesthesiology. Pt was informed to reschedule the surgery and was waiting for a return phone call.    At this time, pt is asymptomatic. She would like surgery rescheduled ASAP in 10 days per [facility] office. She is to call clinic if she should become symptomatic in which surgery will be postponed. Routed to surgery scheduler to advise.       ----- Message -----   From: Alexandrea Tompkins   Sent: 11/10/2022   3:37 PM CST   To: Teo Chacko RN, *     Sorry wrong patient on the last IB was attached.     Hello All,     This patient tested positive for covid. If asymptomatic they can be rescheduled in 10 days.     Thanks     Alexandrea   [place] office          Jenna Navarro RNCC  Neurology/Neurosurgery/PM&R

## 2022-11-11 NOTE — TELEPHONE ENCOUNTER
Rescheduled  Patient is scheduled for surgery with Dr. Murray    Spoke with: Christine    Date of Surgery: 12/15/22    Location: [facility]    Informed patient they will need an adult  Yes    Pre op with Provider PAC    Pre-procedure COVID-19 Test: Patient COVID+ on 11/9/22    Additional imaging/appointments: POP Made     Additional comments: N/A

## 2022-11-14 NOTE — TELEPHONE ENCOUNTER
FUTURE VISIT INFORMATION        SURGERY INFORMATION:    Date: 12/15/22    [facility]    Surgeon:  Wilbur Murray MD    Anesthesia Type:  general    Procedure: Cervical 4 to 6 anterior cervical decompression and fusion with medtronic plate and screws, interbody device, use of fluoroscopy, microscope Left Sided Iliac Crest Autograft     RECORDS REQUESTED FROM:          Primary Care Provider: Angi Marroquin PA-C- [facility]     Pertinent Medical History: hypertension, heart murmur, palpitations     Most recent EKG+ Tracing: 3/21/16- Sylvie

## 2022-11-17 ENCOUNTER — VIRTUAL VISIT (OUTPATIENT)
Dept: SURGERY | Facility: CLINIC | Age: 42
End: 2022-11-17
Payer: COMMERCIAL

## 2022-11-17 ENCOUNTER — PRE VISIT (OUTPATIENT)
Dept: SURGERY | Facility: CLINIC | Age: 42
End: 2022-11-17

## 2022-11-17 DIAGNOSIS — Z01.818 PREOP EXAMINATION: Primary | ICD-10-CM

## 2022-11-17 DIAGNOSIS — M54.12 CERVICAL RADICULOPATHY: ICD-10-CM

## 2022-11-17 PROCEDURE — 99204 OFFICE O/P NEW MOD 45 MIN: CPT | Mod: 95 | Performed by: CLINICAL NURSE SPECIALIST

## 2022-11-17 ASSESSMENT — ENCOUNTER SYMPTOMS
DYSRHYTHMIAS: 0
SEIZURES: 0

## 2022-11-17 ASSESSMENT — PAIN SCALES - GENERAL: PAINLEVEL: EXTREME PAIN (8)

## 2022-11-17 ASSESSMENT — LIFESTYLE VARIABLES: TOBACCO_USE: 1

## 2022-11-17 NOTE — PATIENT INSTRUCTIONS
Preparing for Your Surgery      Name:  Christine Hu   MRN:  [id number]   :  1980   Today's Date:  2022       Arriving for surgery:  Surgery date:  22  Arrival time:  5:45AM     Surgeries and procedures: Adult patients can have 2 visitors all through the surgery process.     Visiting hours: 8 a.m. to 8:30 p.m.     Hospital: Adult patients and children under age 18 can have 4 visitor at a time     No visitors under the age of 5 are allowed for hospital patients.  Double occupancy rooms: Patients can have only two visitors at a time.     Patients with disabilities: Can have a support person with them (family member, service provider     Or someone well informed about their needs) plus the allowed number of visitors     Patients confirmed or suspected to have symptoms of COVID 19 or flu:     No visitors allowed for adult patients.   Children (under age 18) can have 1 named visitor.     People who are sick or showing symptoms of COVID 19 or flu:    Are not allowed to visit patients--we can only make exceptions in special situations.       Please follow these guidelines for your visit:   Arrive wearing a mask over your mouth and nose; we will give you a medical mask to wear    If you arrive wearing a cloth mask.   Keep it on during your entire visit, even when in patient's room.   If you don't wear a mask we'll ask you to leave.     Clean your hands with alcohol hand . Do this when you arrive at and leave the building and patient room,    And again after you touch your mask or anything in the room.     You can t visit if you have a fever, cough, shortness of breath, muscle aches, headaches, sore throat    Or diarrhea      Stay 6 feet away from others during your visit and between visits     Go directly to and from the room you are visiting.     Stay in the patient s room during your visit. Limit going to other places in the hospital as much as possible     Leave bags and jackets at  home or in the car.     For everyone s health, please don t come and go during your visit. That includes for smoking   during your visit.     Please come to:     [facility] [address]  [address] [place]  [address]    - parking is available in front of [facility] from 7:00AM to 3:30PM. If you prefer, park your car in the Green Lot.    -Proceed to the 3rd floor, check in at the Adult Surgery Waiting Lounge. [phone]    If an escort is needed stop at the Information Desk in the lobby. Inform the information person that you are here for surgery. An escort to the Adult Surgery Waiting Lounge will be provided.    What can I eat or drink?  -  You may eat and drink normally up to 8 hours prior to arrival time. (Until 11/25/22, 9:45PM)  -  You may have clear liquids until 2 hours prior to arrival time. (Until 11/26/22, 3:45AM)    Examples of clear liquids:  Water  Clear broth  Juices (apple, white grape, white cranberry  and cider) without pulp  Noncarbonated, powder based beverages  (lemonade and Ham-Aid)  Sodas (Sprite, 7-Up, ginger ale and seltzer)  Coffee or tea (without milk or cream)  Gatorade    -  No Alcohol for at least 24 hours before surgery.   -Do not smoke after midnight the day of surgery.     Which medicines can I take?    Hold Aspirin for 7 days before surgery.   Hold Multivitamins for 7 days before surgery.  Hold Supplements for 7 days before surgery.  Hold all NSAIDS for 7 days before spine surgery. (Ibuprofen, Naproxen, Celebrex, Indocin, Diclofenac.)      -  PLEASE TAKE these medications the day of surgery:    Tizanidine(Zanaflex) as needed    How do I prepare myself?  - Please take 2 showers before surgery using Scrubcare or Hibiclens soap.    Use this soap only from the neck to your toes.     Leave the soap on your skin for one minute--then rinse thoroughly.      You may use your own shampoo and conditioner. No  other hair products.   - Please remove all jewelry and body piercings.  - No lotions, deodorants or fragrance.  - No makeup or fingernail polish.   - Bring your ID and insurance card.    -If you have a Deep Brain Stimulator, Spinal Cord Stimulator, or any Neuro Stimulator device---you must bring the remote control to the hospital.        Questions or Concerns:    - For any questions regarding the day of surgery or your hospital stay, please contact the Pre Admission Nursing Office at [phone].       - If you have health changes between today and your surgery, please call your surgeon.       - For questions after surgery, please call your surgeons office.

## 2022-11-17 NOTE — PROGRESS NOTES
Christine is a 42 year old who is being evaluated via a billable video visit.      How would you like to obtain your AVS? MyChart  If the video visit is dropped, the invitation should be resent by: Text to cell phone: [phone]    HPI       Review of Systems         Objective    Vitals - Patient Reported  Pain Score: Extreme Pain (8)        Physical Exam
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
## 2022-11-26 ENCOUNTER — APPOINTMENT (OUTPATIENT)
Dept: GENERAL RADIOLOGY | Facility: CLINIC | Age: 42
DRG: 472 | End: 2022-11-26
Attending: ORTHOPAEDIC SURGERY
Payer: COMMERCIAL

## 2022-11-26 ENCOUNTER — HOSPITAL ENCOUNTER (INPATIENT)
Facility: CLINIC | Age: 42
LOS: 6 days | Discharge: HOME | DRG: 472 | End: 2022-12-02
Attending: ORTHOPAEDIC SURGERY | Admitting: ORTHOPAEDIC SURGERY
Payer: COMMERCIAL

## 2022-11-26 DIAGNOSIS — M50.30 DEGENERATION OF CERVICAL INTERVERTEBRAL DISC: Primary | ICD-10-CM

## 2022-11-26 DIAGNOSIS — Z98.890 S/P SPINAL SURGERY: ICD-10-CM

## 2022-11-26 LAB — GLUCOSE BLDC GLUCOMTR-MCNC: 113 MG/DL (ref 70–99)

## 2022-11-26 PROCEDURE — 272N000004 HC RX 272: Performed by: ORTHOPAEDIC SURGERY

## 2022-11-26 PROCEDURE — 22552 ARTHRD ANT NTRBD CERVICAL EA: CPT | Mod: GC | Performed by: ORTHOPAEDIC SURGERY

## 2022-11-26 PROCEDURE — 0RT30ZZ RESECTION OF CERVICAL VERTEBRAL DISC, OPEN APPROACH: ICD-10-PCS | Performed by: ORTHOPAEDIC SURGERY

## 2022-11-26 PROCEDURE — 20937 SP BONE AGRFT MORSEL ADD-ON: CPT | Mod: GC | Performed by: ORTHOPAEDIC SURGERY

## 2022-11-26 PROCEDURE — 22845 INSERT SPINE FIXATION DEVICE: CPT | Mod: 59 | Performed by: ORTHOPAEDIC SURGERY

## 2022-11-26 PROCEDURE — 250N000013 HC RX MED GY IP 250 OP 250 PS 637: Performed by: STUDENT IN AN ORGANIZED HEALTH CARE EDUCATION/TRAINING PROGRAM

## 2022-11-26 PROCEDURE — 250N000013 HC RX MED GY IP 250 OP 250 PS 637: Performed by: INTERNAL MEDICINE

## 2022-11-26 PROCEDURE — C1713 ANCHOR/SCREW BN/BN,TIS/BN: HCPCS | Performed by: ORTHOPAEDIC SURGERY

## 2022-11-26 PROCEDURE — 250N000009 HC RX 250: Performed by: NURSE ANESTHETIST, CERTIFIED REGISTERED

## 2022-11-26 PROCEDURE — 258N000003 HC RX IP 258 OP 636: Performed by: STUDENT IN AN ORGANIZED HEALTH CARE EDUCATION/TRAINING PROGRAM

## 2022-11-26 PROCEDURE — C1762 CONN TISS, HUMAN(INC FASCIA): HCPCS | Performed by: ORTHOPAEDIC SURGERY

## 2022-11-26 PROCEDURE — 250N000024 HC ISOFLURANE, PER MIN: Performed by: ORTHOPAEDIC SURGERY

## 2022-11-26 PROCEDURE — 99222 1ST HOSP IP/OBS MODERATE 55: CPT | Performed by: INTERNAL MEDICINE

## 2022-11-26 PROCEDURE — L1499 SPINAL ORTHOSIS NOS: HCPCS

## 2022-11-26 PROCEDURE — 360N000085 HC SURGERY LEVEL 5 W/ FLUORO, PER MIN: Performed by: ORTHOPAEDIC SURGERY

## 2022-11-26 PROCEDURE — 22853 INSJ BIOMECHANICAL DEVICE: CPT | Mod: GC | Performed by: ORTHOPAEDIC SURGERY

## 2022-11-26 PROCEDURE — 0QB30ZZ EXCISION OF LEFT PELVIC BONE, OPEN APPROACH: ICD-10-PCS | Performed by: ORTHOPAEDIC SURGERY

## 2022-11-26 PROCEDURE — 999N000141 HC STATISTIC PRE-PROCEDURE NURSING ASSESSMENT: Performed by: ORTHOPAEDIC SURGERY

## 2022-11-26 PROCEDURE — 22551 ARTHRD ANT NTRBDY CERVICAL: CPT | Mod: GC | Performed by: ORTHOPAEDIC SURGERY

## 2022-11-26 PROCEDURE — 120N000002 HC R&B MED SURG/OB UMMC

## 2022-11-26 PROCEDURE — 272N000001 HC OR GENERAL SUPPLY STERILE: Performed by: ORTHOPAEDIC SURGERY

## 2022-11-26 PROCEDURE — 258N000003 HC RX IP 258 OP 636: Performed by: NURSE ANESTHETIST, CERTIFIED REGISTERED

## 2022-11-26 PROCEDURE — 710N000009 HC RECOVERY PHASE 1, LEVEL 1, PER MIN: Performed by: ORTHOPAEDIC SURGERY

## 2022-11-26 PROCEDURE — 370N000017 HC ANESTHESIA TECHNICAL FEE, PER MIN: Performed by: ORTHOPAEDIC SURGERY

## 2022-11-26 PROCEDURE — 0QU30KZ SUPPLEMENT LEFT PELVIC BONE WITH NONAUTOLOGOUS TISSUE SUBSTITUTE, OPEN APPROACH: ICD-10-PCS | Performed by: ORTHOPAEDIC SURGERY

## 2022-11-26 PROCEDURE — L0172 CERV COL SR FOAM 2PC PRE OTS: HCPCS

## 2022-11-26 PROCEDURE — 01N10ZZ RELEASE CERVICAL NERVE, OPEN APPROACH: ICD-10-PCS | Performed by: ORTHOPAEDIC SURGERY

## 2022-11-26 PROCEDURE — 250N000011 HC RX IP 250 OP 636: Performed by: ANESTHESIOLOGY

## 2022-11-26 PROCEDURE — 250N000011 HC RX IP 250 OP 636: Performed by: NURSE ANESTHETIST, CERTIFIED REGISTERED

## 2022-11-26 PROCEDURE — 250N000011 HC RX IP 250 OP 636: Performed by: STUDENT IN AN ORGANIZED HEALTH CARE EDUCATION/TRAINING PROGRAM

## 2022-11-26 PROCEDURE — 250N000011 HC RX IP 250 OP 636: Performed by: PHYSICIAN ASSISTANT

## 2022-11-26 PROCEDURE — 999N000180 XR SURGERY CARM FLUORO LESS THAN 5 MIN: Mod: TC

## 2022-11-26 PROCEDURE — 250N000013 HC RX MED GY IP 250 OP 250 PS 637: Performed by: ANESTHESIOLOGY

## 2022-11-26 PROCEDURE — 0RG20A0 FUSION OF 2 OR MORE CERVICAL VERTEBRAL JOINTS WITH INTERBODY FUSION DEVICE, ANTERIOR APPROACH, ANTERIOR COLUMN, OPEN APPROACH: ICD-10-PCS | Performed by: ORTHOPAEDIC SURGERY

## 2022-11-26 DEVICE — IMP SCR MEDT ZEVO 3.5X17MM ST VA 7723517: Type: IMPLANTABLE DEVICE | Site: SPINE CERVICAL | Status: FUNCTIONAL

## 2022-11-26 DEVICE — IMPLANT 6240686 ANATOMIC 18X16X6MM
Type: IMPLANTABLE DEVICE | Site: SPINE CERVICAL | Status: FUNCTIONAL
Brand: VERTE-STACK® SPINAL SYSTEM

## 2022-11-26 DEVICE — BONE CHIP CANCELLOUS 15CC 100015: Type: IMPLANTABLE DEVICE | Site: HIP | Status: FUNCTIONAL

## 2022-11-26 DEVICE — IMP PLATE CERV MEDT ZEVO 35MM 2 LVL 3002035: Type: IMPLANTABLE DEVICE | Site: SPINE CERVICAL | Status: FUNCTIONAL

## 2022-11-26 DEVICE — IMPLANT 4240786 ANATOMIC C 18X16X 7MM
Type: IMPLANTABLE DEVICE | Site: SPINE CERVICAL | Status: FUNCTIONAL
Brand: ANATOMIC PEEK CERVICAL FUSION SYSTEM

## 2022-11-26 DEVICE — IMP SCR MEDT ZEVO 3.5X19MM ST VA 7723519: Type: IMPLANTABLE DEVICE | Site: SPINE CERVICAL | Status: FUNCTIONAL

## 2022-11-26 RX ORDER — FENTANYL CITRATE 50 UG/ML
50 INJECTION, SOLUTION INTRAMUSCULAR; INTRAVENOUS EVERY 5 MIN PRN
Status: DISCONTINUED | OUTPATIENT
Start: 2022-11-26 | End: 2022-11-26 | Stop reason: HOSPADM

## 2022-11-26 RX ORDER — KETOROLAC TROMETHAMINE 15 MG/ML
15 INJECTION, SOLUTION INTRAMUSCULAR; INTRAVENOUS
Status: DISCONTINUED | OUTPATIENT
Start: 2022-11-26 | End: 2022-11-26 | Stop reason: HOSPADM

## 2022-11-26 RX ORDER — ONDANSETRON 2 MG/ML
4 INJECTION INTRAMUSCULAR; INTRAVENOUS EVERY 6 HOURS PRN
Status: DISCONTINUED | OUTPATIENT
Start: 2022-11-26 | End: 2022-12-02 | Stop reason: HOSPADM

## 2022-11-26 RX ORDER — DIPHENHYDRAMINE HYDROCHLORIDE 50 MG/ML
25 INJECTION INTRAMUSCULAR; INTRAVENOUS EVERY 6 HOURS PRN
Status: DISCONTINUED | OUTPATIENT
Start: 2022-11-26 | End: 2022-11-26 | Stop reason: HOSPADM

## 2022-11-26 RX ORDER — LIDOCAINE HYDROCHLORIDE 20 MG/ML
INJECTION, SOLUTION INFILTRATION; PERINEURAL PRN
Status: DISCONTINUED | OUTPATIENT
Start: 2022-11-26 | End: 2022-11-26

## 2022-11-26 RX ORDER — ONDANSETRON 2 MG/ML
4 INJECTION INTRAMUSCULAR; INTRAVENOUS EVERY 30 MIN PRN
Status: DISCONTINUED | OUTPATIENT
Start: 2022-11-26 | End: 2022-11-26 | Stop reason: HOSPADM

## 2022-11-26 RX ORDER — CEFAZOLIN SODIUM/WATER 2 G/20 ML
2 SYRINGE (ML) INTRAVENOUS
Status: COMPLETED | OUTPATIENT
Start: 2022-11-26 | End: 2022-11-26

## 2022-11-26 RX ORDER — PROPOFOL 10 MG/ML
INJECTION, EMULSION INTRAVENOUS CONTINUOUS PRN
Status: DISCONTINUED | OUTPATIENT
Start: 2022-11-26 | End: 2022-11-26

## 2022-11-26 RX ORDER — HYDROMORPHONE HCL IN WATER/PF 6 MG/30 ML
0.4 PATIENT CONTROLLED ANALGESIA SYRINGE INTRAVENOUS EVERY 5 MIN PRN
Status: DISCONTINUED | OUTPATIENT
Start: 2022-11-26 | End: 2022-11-26 | Stop reason: HOSPADM

## 2022-11-26 RX ORDER — DEXAMETHASONE SODIUM PHOSPHATE 4 MG/ML
4 INJECTION, SOLUTION INTRA-ARTICULAR; INTRALESIONAL; INTRAMUSCULAR; INTRAVENOUS; SOFT TISSUE
Status: DISCONTINUED | OUTPATIENT
Start: 2022-11-26 | End: 2022-11-26 | Stop reason: HOSPADM

## 2022-11-26 RX ORDER — POLYETHYLENE GLYCOL 3350 17 G/17G
17 POWDER, FOR SOLUTION ORAL DAILY
Status: DISCONTINUED | OUTPATIENT
Start: 2022-11-27 | End: 2022-12-02 | Stop reason: HOSPADM

## 2022-11-26 RX ORDER — SODIUM CHLORIDE 9 MG/ML
INJECTION, SOLUTION INTRAVENOUS CONTINUOUS
Status: DISCONTINUED | OUTPATIENT
Start: 2022-11-26 | End: 2022-12-01

## 2022-11-26 RX ORDER — HYDROMORPHONE HCL IN WATER/PF 6 MG/30 ML
0.2 PATIENT CONTROLLED ANALGESIA SYRINGE INTRAVENOUS EVERY 5 MIN PRN
Status: DISCONTINUED | OUTPATIENT
Start: 2022-11-26 | End: 2022-11-26 | Stop reason: HOSPADM

## 2022-11-26 RX ORDER — HYDROXYZINE HYDROCHLORIDE 25 MG/1
25 TABLET, FILM COATED ORAL EVERY 6 HOURS PRN
Status: DISCONTINUED | OUTPATIENT
Start: 2022-11-26 | End: 2022-11-26 | Stop reason: HOSPADM

## 2022-11-26 RX ORDER — PHENYLEPHRINE HCL IN 0.9% NACL 50MG/250ML
.5-1.25 PLASTIC BAG, INJECTION (ML) INTRAVENOUS CONTINUOUS
Status: DISCONTINUED | OUTPATIENT
Start: 2022-11-26 | End: 2022-11-28

## 2022-11-26 RX ORDER — NALOXONE HYDROCHLORIDE 0.4 MG/ML
0.4 INJECTION, SOLUTION INTRAMUSCULAR; INTRAVENOUS; SUBCUTANEOUS
Status: DISCONTINUED | OUTPATIENT
Start: 2022-11-26 | End: 2022-11-26 | Stop reason: HOSPADM

## 2022-11-26 RX ORDER — CEFAZOLIN SODIUM 1 G/3ML
1 INJECTION, POWDER, FOR SOLUTION INTRAMUSCULAR; INTRAVENOUS EVERY 8 HOURS
Status: DISCONTINUED | OUTPATIENT
Start: 2022-11-26 | End: 2022-11-27

## 2022-11-26 RX ORDER — HYDROMORPHONE HCL IN WATER/PF 6 MG/30 ML
0.2 PATIENT CONTROLLED ANALGESIA SYRINGE INTRAVENOUS
Status: DISCONTINUED | OUTPATIENT
Start: 2022-11-26 | End: 2022-11-28

## 2022-11-26 RX ORDER — CEFAZOLIN SODIUM/WATER 2 G/20 ML
2 SYRINGE (ML) INTRAVENOUS SEE ADMIN INSTRUCTIONS
Status: DISCONTINUED | OUTPATIENT
Start: 2022-11-26 | End: 2022-11-26 | Stop reason: HOSPADM

## 2022-11-26 RX ORDER — NALOXONE HYDROCHLORIDE 0.4 MG/ML
0.2 INJECTION, SOLUTION INTRAMUSCULAR; INTRAVENOUS; SUBCUTANEOUS
Status: DISCONTINUED | OUTPATIENT
Start: 2022-11-26 | End: 2022-12-02 | Stop reason: HOSPADM

## 2022-11-26 RX ORDER — SODIUM CHLORIDE 9 MG/ML
INJECTION, SOLUTION INTRAVENOUS
Status: DISCONTINUED
Start: 2022-11-26 | End: 2022-11-27 | Stop reason: HOSPADM

## 2022-11-26 RX ORDER — OXYCODONE HYDROCHLORIDE 5 MG/1
5 TABLET ORAL EVERY 4 HOURS PRN
Status: DISCONTINUED | OUTPATIENT
Start: 2022-11-26 | End: 2022-11-26 | Stop reason: HOSPADM

## 2022-11-26 RX ORDER — PROCHLORPERAZINE MALEATE 10 MG
10 TABLET ORAL EVERY 6 HOURS PRN
Status: DISCONTINUED | OUTPATIENT
Start: 2022-11-26 | End: 2022-12-02 | Stop reason: HOSPADM

## 2022-11-26 RX ORDER — ACETAMINOPHEN 325 MG/1
650 TABLET ORAL EVERY 4 HOURS PRN
Status: DISCONTINUED | OUTPATIENT
Start: 2022-11-29 | End: 2022-11-30

## 2022-11-26 RX ORDER — KETAMINE HYDROCHLORIDE 10 MG/ML
INJECTION INTRAMUSCULAR; INTRAVENOUS PRN
Status: DISCONTINUED | OUTPATIENT
Start: 2022-11-26 | End: 2022-11-26

## 2022-11-26 RX ORDER — ONDANSETRON 4 MG/1
4 TABLET, ORALLY DISINTEGRATING ORAL EVERY 6 HOURS PRN
Status: DISCONTINUED | OUTPATIENT
Start: 2022-11-26 | End: 2022-12-02 | Stop reason: HOSPADM

## 2022-11-26 RX ORDER — NALOXONE HYDROCHLORIDE 0.4 MG/ML
0.2 INJECTION, SOLUTION INTRAMUSCULAR; INTRAVENOUS; SUBCUTANEOUS
Status: DISCONTINUED | OUTPATIENT
Start: 2022-11-26 | End: 2022-11-26 | Stop reason: HOSPADM

## 2022-11-26 RX ORDER — DIMENHYDRINATE 50 MG/ML
25 INJECTION, SOLUTION INTRAMUSCULAR; INTRAVENOUS
Status: DISCONTINUED | OUTPATIENT
Start: 2022-11-26 | End: 2022-11-26 | Stop reason: HOSPADM

## 2022-11-26 RX ORDER — SODIUM CHLORIDE, SODIUM LACTATE, POTASSIUM CHLORIDE, CALCIUM CHLORIDE 600; 310; 30; 20 MG/100ML; MG/100ML; MG/100ML; MG/100ML
INJECTION, SOLUTION INTRAVENOUS CONTINUOUS
Status: DISCONTINUED | OUTPATIENT
Start: 2022-11-26 | End: 2022-11-26 | Stop reason: HOSPADM

## 2022-11-26 RX ORDER — ONDANSETRON 4 MG/1
4 TABLET, ORALLY DISINTEGRATING ORAL EVERY 30 MIN PRN
Status: DISCONTINUED | OUTPATIENT
Start: 2022-11-26 | End: 2022-11-26 | Stop reason: HOSPADM

## 2022-11-26 RX ORDER — SODIUM CHLORIDE, SODIUM LACTATE, POTASSIUM CHLORIDE, CALCIUM CHLORIDE 600; 310; 30; 20 MG/100ML; MG/100ML; MG/100ML; MG/100ML
INJECTION, SOLUTION INTRAVENOUS CONTINUOUS PRN
Status: DISCONTINUED | OUTPATIENT
Start: 2022-11-26 | End: 2022-11-26

## 2022-11-26 RX ORDER — HYDRALAZINE HYDROCHLORIDE 10 MG/1
10 TABLET, FILM COATED ORAL EVERY 6 HOURS PRN
Status: DISCONTINUED | OUTPATIENT
Start: 2022-11-26 | End: 2022-12-02 | Stop reason: HOSPADM

## 2022-11-26 RX ORDER — SODIUM CHLORIDE, SODIUM GLUCONATE, SODIUM ACETATE, POTASSIUM CHLORIDE AND MAGNESIUM CHLORIDE 526; 502; 368; 37; 30 MG/100ML; MG/100ML; MG/100ML; MG/100ML; MG/100ML
INJECTION, SOLUTION INTRAVENOUS CONTINUOUS PRN
Status: DISCONTINUED | OUTPATIENT
Start: 2022-11-26 | End: 2022-11-26

## 2022-11-26 RX ORDER — PROPOFOL 10 MG/ML
INJECTION, EMULSION INTRAVENOUS PRN
Status: DISCONTINUED | OUTPATIENT
Start: 2022-11-26 | End: 2022-11-26

## 2022-11-26 RX ORDER — OXYCODONE HYDROCHLORIDE 10 MG/1
10 TABLET ORAL EVERY 4 HOURS PRN
Status: DISCONTINUED | OUTPATIENT
Start: 2022-11-26 | End: 2022-11-26 | Stop reason: HOSPADM

## 2022-11-26 RX ORDER — LABETALOL HYDROCHLORIDE 5 MG/ML
10 INJECTION, SOLUTION INTRAVENOUS
Status: COMPLETED | OUTPATIENT
Start: 2022-11-26 | End: 2022-11-26

## 2022-11-26 RX ORDER — DEXAMETHASONE SODIUM PHOSPHATE 4 MG/ML
INJECTION, SOLUTION INTRA-ARTICULAR; INTRALESIONAL; INTRAMUSCULAR; INTRAVENOUS; SOFT TISSUE PRN
Status: DISCONTINUED | OUTPATIENT
Start: 2022-11-26 | End: 2022-11-26

## 2022-11-26 RX ORDER — ACETAMINOPHEN 325 MG/1
975 TABLET ORAL EVERY 8 HOURS
Status: COMPLETED | OUTPATIENT
Start: 2022-11-26 | End: 2022-11-29

## 2022-11-26 RX ORDER — ONDANSETRON 2 MG/ML
INJECTION INTRAMUSCULAR; INTRAVENOUS PRN
Status: DISCONTINUED | OUTPATIENT
Start: 2022-11-26 | End: 2022-11-26

## 2022-11-26 RX ORDER — METHOCARBAMOL 750 MG/1
750 TABLET, FILM COATED ORAL EVERY 6 HOURS PRN
Status: DISCONTINUED | OUTPATIENT
Start: 2022-11-26 | End: 2022-12-01

## 2022-11-26 RX ORDER — DIPHENHYDRAMINE HCL 25 MG
25 CAPSULE ORAL EVERY 6 HOURS PRN
Status: DISCONTINUED | OUTPATIENT
Start: 2022-11-26 | End: 2022-11-26 | Stop reason: HOSPADM

## 2022-11-26 RX ORDER — FENTANYL CITRATE 50 UG/ML
25 INJECTION, SOLUTION INTRAMUSCULAR; INTRAVENOUS EVERY 5 MIN PRN
Status: DISCONTINUED | OUTPATIENT
Start: 2022-11-26 | End: 2022-11-26 | Stop reason: HOSPADM

## 2022-11-26 RX ORDER — LIDOCAINE 40 MG/G
CREAM TOPICAL
Status: DISCONTINUED | OUTPATIENT
Start: 2022-11-26 | End: 2022-12-02 | Stop reason: HOSPADM

## 2022-11-26 RX ORDER — FENTANYL CITRATE 50 UG/ML
INJECTION, SOLUTION INTRAMUSCULAR; INTRAVENOUS PRN
Status: DISCONTINUED | OUTPATIENT
Start: 2022-11-26 | End: 2022-11-26

## 2022-11-26 RX ORDER — FAMOTIDINE 20 MG/1
20 TABLET, FILM COATED ORAL 2 TIMES DAILY
Status: DISCONTINUED | OUTPATIENT
Start: 2022-11-26 | End: 2022-12-02 | Stop reason: HOSPADM

## 2022-11-26 RX ORDER — OXYCODONE HYDROCHLORIDE 10 MG/1
10 TABLET ORAL EVERY 4 HOURS PRN
Status: DISCONTINUED | OUTPATIENT
Start: 2022-11-26 | End: 2022-11-28

## 2022-11-26 RX ORDER — OXYCODONE HYDROCHLORIDE 5 MG/1
5 TABLET ORAL EVERY 4 HOURS PRN
Status: DISCONTINUED | OUTPATIENT
Start: 2022-11-26 | End: 2022-11-28

## 2022-11-26 RX ORDER — NALOXONE HYDROCHLORIDE 0.4 MG/ML
0.4 INJECTION, SOLUTION INTRAMUSCULAR; INTRAVENOUS; SUBCUTANEOUS
Status: DISCONTINUED | OUTPATIENT
Start: 2022-11-26 | End: 2022-12-02 | Stop reason: HOSPADM

## 2022-11-26 RX ORDER — HYDROMORPHONE HCL IN WATER/PF 6 MG/30 ML
0.4 PATIENT CONTROLLED ANALGESIA SYRINGE INTRAVENOUS
Status: DISCONTINUED | OUTPATIENT
Start: 2022-11-26 | End: 2022-11-28

## 2022-11-26 RX ORDER — LABETALOL HYDROCHLORIDE 5 MG/ML
10 INJECTION, SOLUTION INTRAVENOUS
Status: DISCONTINUED | OUTPATIENT
Start: 2022-11-26 | End: 2022-11-26 | Stop reason: HOSPADM

## 2022-11-26 RX ORDER — BISACODYL 10 MG
10 SUPPOSITORY, RECTAL RECTAL DAILY PRN
Status: DISCONTINUED | OUTPATIENT
Start: 2022-11-26 | End: 2022-12-02 | Stop reason: HOSPADM

## 2022-11-26 RX ORDER — AMOXICILLIN 250 MG
1 CAPSULE ORAL 2 TIMES DAILY
Status: DISCONTINUED | OUTPATIENT
Start: 2022-11-26 | End: 2022-12-02 | Stop reason: HOSPADM

## 2022-11-26 RX ADMIN — FENTANYL CITRATE 25 MCG: 50 INJECTION INTRAMUSCULAR; INTRAVENOUS at 10:58

## 2022-11-26 RX ADMIN — HYDROMORPHONE HYDROCHLORIDE 0.5 MG: 1 INJECTION, SOLUTION INTRAMUSCULAR; INTRAVENOUS; SUBCUTANEOUS at 10:10

## 2022-11-26 RX ADMIN — OXYCODONE HYDROCHLORIDE 10 MG: 10 TABLET ORAL at 15:52

## 2022-11-26 RX ADMIN — HYDROMORPHONE HYDROCHLORIDE 0.2 MG: 0.2 INJECTION, SOLUTION INTRAMUSCULAR; INTRAVENOUS; SUBCUTANEOUS at 11:24

## 2022-11-26 RX ADMIN — OXYCODONE HYDROCHLORIDE 10 MG: 10 TABLET ORAL at 21:07

## 2022-11-26 RX ADMIN — Medication 20 MG: at 09:39

## 2022-11-26 RX ADMIN — FENTANYL CITRATE 25 MCG: 50 INJECTION, SOLUTION INTRAMUSCULAR; INTRAVENOUS at 07:51

## 2022-11-26 RX ADMIN — HYDROMORPHONE HYDROCHLORIDE 0.4 MG: 0.2 INJECTION, SOLUTION INTRAMUSCULAR; INTRAVENOUS; SUBCUTANEOUS at 20:09

## 2022-11-26 RX ADMIN — FENTANYL CITRATE 75 MCG: 50 INJECTION, SOLUTION INTRAMUSCULAR; INTRAVENOUS at 08:28

## 2022-11-26 RX ADMIN — FENTANYL CITRATE 50 MCG: 50 INJECTION, SOLUTION INTRAMUSCULAR; INTRAVENOUS at 10:30

## 2022-11-26 RX ADMIN — Medication 20 MG: at 08:28

## 2022-11-26 RX ADMIN — HYDROMORPHONE HYDROCHLORIDE 0.4 MG: 0.2 INJECTION, SOLUTION INTRAMUSCULAR; INTRAVENOUS; SUBCUTANEOUS at 17:47

## 2022-11-26 RX ADMIN — FENTANYL CITRATE 25 MCG: 50 INJECTION INTRAMUSCULAR; INTRAVENOUS at 10:38

## 2022-11-26 RX ADMIN — Medication 30 MG: at 07:51

## 2022-11-26 RX ADMIN — FENTANYL CITRATE 25 MCG: 50 INJECTION INTRAMUSCULAR; INTRAVENOUS at 10:44

## 2022-11-26 RX ADMIN — HYDROMORPHONE HYDROCHLORIDE 0.2 MG: 0.2 INJECTION, SOLUTION INTRAMUSCULAR; INTRAVENOUS; SUBCUTANEOUS at 11:39

## 2022-11-26 RX ADMIN — Medication 10 MG: at 10:10

## 2022-11-26 RX ADMIN — Medication 10 MG: at 08:52

## 2022-11-26 RX ADMIN — MIDAZOLAM 2 MG: 1 INJECTION INTRAMUSCULAR; INTRAVENOUS at 07:41

## 2022-11-26 RX ADMIN — PROPOFOL 100 MCG/KG/MIN: 10 INJECTION, EMULSION INTRAVENOUS at 07:55

## 2022-11-26 RX ADMIN — ACETAMINOPHEN 975 MG: 325 TABLET, FILM COATED ORAL at 20:05

## 2022-11-26 RX ADMIN — DEXAMETHASONE SODIUM PHOSPHATE 8 MG: 4 INJECTION, SOLUTION INTRA-ARTICULAR; INTRALESIONAL; INTRAMUSCULAR; INTRAVENOUS; SOFT TISSUE at 08:25

## 2022-11-26 RX ADMIN — HYDROMORPHONE HYDROCHLORIDE 0.4 MG: 0.2 INJECTION, SOLUTION INTRAMUSCULAR; INTRAVENOUS; SUBCUTANEOUS at 13:11

## 2022-11-26 RX ADMIN — SODIUM CHLORIDE, SODIUM GLUCONATE, SODIUM ACETATE, POTASSIUM CHLORIDE AND MAGNESIUM CHLORIDE: 526; 502; 368; 37; 30 INJECTION, SOLUTION INTRAVENOUS at 07:42

## 2022-11-26 RX ADMIN — METHOCARBAMOL 750 MG: 750 TABLET ORAL at 18:47

## 2022-11-26 RX ADMIN — Medication 2 G: at 07:42

## 2022-11-26 RX ADMIN — METHOCARBAMOL 750 MG: 750 TABLET ORAL at 11:24

## 2022-11-26 RX ADMIN — SODIUM CHLORIDE, POTASSIUM CHLORIDE, SODIUM LACTATE AND CALCIUM CHLORIDE: 600; 310; 30; 20 INJECTION, SOLUTION INTRAVENOUS at 07:42

## 2022-11-26 RX ADMIN — LABETALOL HYDROCHLORIDE 10 MG: 5 INJECTION, SOLUTION INTRAVENOUS at 11:30

## 2022-11-26 RX ADMIN — ONDANSETRON 4 MG: 2 INJECTION INTRAMUSCULAR; INTRAVENOUS at 09:51

## 2022-11-26 RX ADMIN — LIDOCAINE HYDROCHLORIDE 100 MG: 20 INJECTION, SOLUTION INFILTRATION; PERINEURAL at 07:51

## 2022-11-26 RX ADMIN — PROPOFOL 200 MG: 10 INJECTION, EMULSION INTRAVENOUS at 07:51

## 2022-11-26 RX ADMIN — CEFAZOLIN 1 G: 1 INJECTION, POWDER, FOR SOLUTION INTRAMUSCULAR; INTRAVENOUS at 15:42

## 2022-11-26 RX ADMIN — ONDANSETRON 4 MG: 2 INJECTION INTRAMUSCULAR; INTRAVENOUS at 11:02

## 2022-11-26 RX ADMIN — FENTANYL CITRATE 50 MCG: 50 INJECTION, SOLUTION INTRAMUSCULAR; INTRAVENOUS at 08:48

## 2022-11-26 RX ADMIN — SUGAMMADEX 200 MG: 100 INJECTION, SOLUTION INTRAVENOUS at 10:11

## 2022-11-26 RX ADMIN — SODIUM CHLORIDE: 9 INJECTION, SOLUTION INTRAVENOUS at 17:49

## 2022-11-26 RX ADMIN — ACETAMINOPHEN 975 MG: 325 TABLET, FILM COATED ORAL at 11:51

## 2022-11-26 RX ADMIN — FAMOTIDINE 20 MG: 20 TABLET ORAL at 20:05

## 2022-11-26 RX ADMIN — OXYCODONE HYDROCHLORIDE 5 MG: 5 TABLET ORAL at 11:47

## 2022-11-26 RX ADMIN — HYDROMORPHONE HYDROCHLORIDE 0.2 MG: 0.2 INJECTION, SOLUTION INTRAMUSCULAR; INTRAVENOUS; SUBCUTANEOUS at 11:10

## 2022-11-26 RX ADMIN — DEXAMETHASONE 10 MG: 2 TABLET ORAL at 21:06

## 2022-11-26 RX ADMIN — Medication 10 MG: at 07:51

## 2022-11-26 RX ADMIN — SENNOSIDES AND DOCUSATE SODIUM 1 TABLET: 50; 8.6 TABLET ORAL at 20:05

## 2022-11-26 RX ADMIN — Medication 20 MG: at 08:24

## 2022-11-26 RX ADMIN — HYDROMORPHONE HYDROCHLORIDE 0.2 MG: 0.2 INJECTION, SOLUTION INTRAMUSCULAR; INTRAVENOUS; SUBCUTANEOUS at 22:38

## 2022-11-26 ASSESSMENT — LIFESTYLE VARIABLES: TOBACCO_USE: 1

## 2022-11-26 ASSESSMENT — ACTIVITIES OF DAILY LIVING (ADL)
ADLS_ACUITY_SCORE: 22

## 2022-11-26 NOTE — PROGRESS NOTES
SPIRITUAL HEALTH SERVICES Progress Note  [facility] ([facility]) 5 Med/Surg  ON CALL    Visited pt Christine per pre surgery request. Her mom and boyfriend were present during our visit. Her surgery was scheduled for before my  shift started, so I visited after surgery.     Provided introduction to spiritual health services and apologized for not being able to pray with her before surgery. Christine said that surgery went well, and she can already tell that she's swallowing better.   Christine requested prayer for healing. Prayed with Christine.  Provided instructions for requesting a  should she desire further spiritual/emotional care while in the hospital.    Plan: Will defer to unit  for follow up.  remains available per patient request.     Kristal Hidalgo, [facility]  Chaplain Resident  Pager [phone]      * [facility] remains available 24/7 for emergent requests/referrals, either by having the switchboard page the on-call  or by entering an ASAP/STAT consult in Epic (this will also page the on-call ). Routine Epic consults receive an initial response within 24 hours.*

## 2022-11-26 NOTE — OP NOTE
DATE OF SURGERY: 11/26/2022    PREOPERATIVE DIAGNOSIS:   Neck pain   Cervical radiculopathy at C5 and C6              POSTOPERATIVE DIAGNOSIS: Same    PROCEDURES:  1. Anterior cervical discectomy with removal of disc material and osteophytes at C4-5 and C5-6 for decompression of the spinal cord.  2. Bilateral Foraminotomies at C4-5 and C5-6for decompression of the nerve roots.  3. Placement of intervertebral prosthetic device at C4-5 and C5-6, Medtronic PEEK Cages.  4. Use of Iliac Crest Autograft for fusion, C4-5 and C5-6, which was harvested through a separate skin and fascial incision.  5. Placement of anterior Medtronic plate and screw instrumentation, C4-6.  The plate was placed separately from the interbody and was placed to aid in the fusion Rate.    Primary Surgeon: Wilbur Murray MD    FIRST ASSISTANT: Fadia Baltazar, PGY4    ANESTHESIA: General Endotracheal    COMPLICATIONS:  None.    SPECIMENS: None.    ESTIMATED BLOOD LOSS: 50 mL    INDICATIONS:                          Christine Hu is a 42 year old female with debilitating symptoms from Neck pain and Cervical radiculopathy.  The patient elected surgical treatment, and understood the indications for this surgery, as well as its risks, benefits, and alternatives. We reviewed the risks and benefits of the surgery in detail. The risks include, but are not limited to, the general risks associated with anesthesia, including death, pulmonary embolism, DVT, stroke, myocardial infarction, pneumonia, and urinary tract infection. Additional risks specific to the surgery include the risk of infection, failure to heal (non-union), dural tear with resultant CSF leak which might necessitate placement of a drain or revision surgery or could result in headaches, nerve injury resulting in weakness or paralysis, risk of adjacent segment disease, the risks of vascular injury resulting in severe or possibly uncontrollable bleeding, need for revision surgery  in the future due to one of the above issues, or risk of incomplete symptom relief.  Christine Hu understands the risks of the surgery and wishes to proceed.  No guarantees were given.    DESCRIPTION OF PROCEDURE:           Christine Hu was taken to the operating room, where the Anesthesiology Service induced satisfactory general anesthesia. Ancef was given IV.  Venous thromboembolic prophylaxis was performed with sequential devices placed on the calves bilaterally.  Ogden catheter was placed under standard sterile techniques. A bump was placed under the shoulders the arms were secured.  All pressure points were well padded.  The anterior neck was prepped and draped in its entirety in the usual sterile fashion. We then held a multidisciplinary time out in which we verified the patient, procedure, antibiotics, and operative plan.  All team members were in agreement.    I started by making a 3 cm longitudinal incision over the left iliac crest and dissected down to the fascia which was elevated medially and laterally.  We breached the dorsal cortex with an osteotome to create a bone window while carefully preserving the inner and outer tables.  I then harvested cancellous autograft in the appropriate amount for the levels needed with a curette.  The area was irrigated and then backfilled with allograft chips and the fascia was closed in layers.      I then performed a standard perea-day approach to the anterior spine from the patient's Left side.  I used surface landmarks to identify the incision location.  The skin was sharply incised and I then switched to electrocautery to go through the platysma.  Sub-platysmal dissection was bluntly performed 3cm above and below the incision to relax the soft tissues.   I then used a combination of scissor dissection and blunt dissection to carefully dissect the plane between the SCM and the soft tissues medially, down to the spine. Crossing vessels were  tied off as we encountered them.  A spinal needle was placed into the most accessible vertebral body, and a fluoroscopic image was obtained to verify the level.  The surgical disc space was marked with a pen.      I then used electrocautery to elevate the longus coli over the levels of the planned fusion from C4-C6. We then turned our attention to the first level of decompression.      I used cautery to define the disc C5-6 space.  A 15 blade was then used to incise the annulus caudal and cephalad. A micro-curette was then used to remove the bulk of the disc material.  I next placed caspar pins above and below and the distractor was used to open up the disc space while a roblero was used to gently provide distraction.  With this increased visualization I then removed the remainder of the posterior disc material down to the level of the PLL.  With the PLL still intact, I next used a bur to remove the posterior osteophytes above the level of the PLL and to thin the uncinates bilaterally.  The bur was then used to remove the anterior lip of the cephalad vertebral body and to produce a square shaped perpendicular surface for graft placement with a good bed for fusion.  I then sized the graft for a planned 7 Medtronic PEEK cage.  While this was prepared on the back table, I then used a microcurette to split the PLL and create space between the PLL and dura.  A 2mm kerrison was then used to resect the PLL out to the level of the uncinates and also to remove any remaining posterior osteophytes.  This dissection was carried out into the foramen until the uptake of the root was clearly visible and until a nerve hook could be freely passed out into the foramen bilaterally, thus completing the foraminotomies.  The cage was filled with iliac crest graft and then impacted under fluoroscopic guidance.    I then moved up to the C4-5 level.  The caspars and trimlines were moved up a level.  A 15 blade was then used to incise the  annulus caudal and cephalad. A micro-curette was then used to remove the bulk of the disc material.  I next placed caspar pins above and below and the distractor was used to open up the disc space while a roblero was used to gently provide distraction.  With this increased visualization I then removed the remainder of the posterior disc material down to the level of the PLL.  With the PLL still intact, I next used a bur to remove the posterior osteophytes above the level of the PLL and to thin the uncinates bilaterally.  The bur was then used to remove the anterior lip of the cephalad vertebral body and to produce a square shaped perpendicular surface for graft placement with a good bed for fusion.  I then sized the graft for a planned 6 Medtronic PEEK cage.  While this was prepared on the back table, I then used a microcurette to split the PLL and create space between the PLL and dura.  A 2mm kerrison was then used to resect the PLL out to the level of the uncinates and also to remove any remaining posterior osteophytes.  This dissection was carried out into the foramen until the uptake of the root was clearly visible and until a nerve hook could be freely passed out into the foramen bilaterally, thus completing the foraminotomies.  The cage was filled with iliac crest graft and then impacted under fluoroscopic guidance.    I measured an appropriate length plate and the plate was aligned and screw holes were drilled.  The screws were placed under manual power.  The placement of the anterior plate with screw fixation was not placed to anchor the interbody device but rather the anterior plate was placed to aid in a successful fusion.   We took our final fluoroscopic radiographs and I was satisfied with the positioning.      I then thoroughly irrigated.  A 1/8 inch hemovac drain was placed and the wound was closed in layers with 3-0 vicryl for the platysma, 3-0 vicryl for the dermis, and 4-0 monocryl and dermabond for  the skin.  The wound was dressed with 4x4 and tegaderm.      Sensory monitoring was stable throughout.    The patient was extubated and taken to the PACU in stable condition.  There were no immediately evident complications.    IWilbur MD, was personally present and scrubbed for the entire procedure.

## 2022-11-26 NOTE — OR NURSING
PACU to Inpatient Nursing Handoff    Patient Christine Hu is a 42 year old female who speaks English.   Procedure Procedure(s):  Cervical 4 to 6 Anterior Cervical Decompression and Fusion with Medtronic Plate and Screws, Interbody Device, Use of Fluoroscopy, Microscope  Left Sided Iliac Crest Autograft   Surgeon(s) Primary: Wilbur Murray MD  Resident - Assisting: Fadia Shah MD     Allergies   Allergen Reactions     Codeine GI Disturbance     Nausea with use of Tylenol #3     Clindamycin Other (See Comments)     C diff, hospitalized      Penicillins Rash       Isolation  No active isolations     Past Medical History   has a past medical history of Alcohol dependence (H) (11/30/2012), Alcohol withdrawal (H) (06/29/2011), ASCUS favor benign (01/2015), Degeneration of cervical intervertebral disc, Knee effusion, left, Lumbago, Ménière's disease, and Overdose (06/2009).    Anesthesia General   Dermatome Level     Preop Meds Not applicable   Nerve block Not applicable   Intraop Meds dexamethasone (Decadron)  fentanyl (Sublimaze): 200 mcg total  hydromorphone (Dilaudid): 0.5 mg total  ketamine (Ketalar): 50 mg given  ondansetron (Zofran): last given at 0951   Local Meds No   Antibiotics cefazolin (Ancef) - last given at 0742     Pain Patient Currently in Pain: yes   PACU meds  fentanyl (Sublimaze): 75 mcg (total dose) last given at 1058   hydromorphone (Dilaudid): 0.6 mg (total dose) last given at 1139   labetalol (Normodyne/Trandate): 10 mg (total dose) last given at 1140   ondansetron (Zofran): 4 mg (total dose) last given at 1102   Robaxin at 1124   PCA / epidural No   Capnography     Telemetry ECG Rhythm: Sinus rhythm   Inpatient Telemetry Monitor Ordered? No        Labs Glucose Lab Results   Component Value Date     11/26/2022     07/10/2022    GLC 87 06/21/2019       Hgb Lab Results   Component Value Date    HGB 12.7 10/18/2022    HGB 13.6 07/23/2019       INR No results  found for: INR   PACU Imaging Not applicable     Wound/Incision Incision/Surgical Site 11/26/22 Left Hip (Active)   Number of days: 0       Incision/Surgical Site 11/26/22 Neck (Active)   Number of days: 0      CMS        Equipment ice pack and soft cervical collar   Other LDA       IV Access Peripheral IV 11/26/22 Left Lower forearm (Active)   Site Assessment WDL 11/26/22 0700   Line Status Saline locked 11/26/22 0700   Phlebitis Scale 0-->no symptoms 11/26/22 0700   Infiltration Scale 0 11/26/22 0700   Number of days: 0       Peripheral IV 11/26/22 Left Hand (Active)   Site Assessment WDL 11/26/22 1024   Line Status Infusing 11/26/22 1024   Phlebitis Scale 0-->no symptoms 11/26/22 1024   Infiltration Scale 0 11/26/22 1024   Number of days: 0      Blood Products Not applicable EBL 50 mL   Intake/Output Date 11/26/22 0700 - 11/27/22 0659   Shift 5580-3977 0753-3381 2930-9084 24 Hour Total   INTAKE   I.V. 1300   1300   Shift Total(mL/kg) 1300(17.74)   1300(17.74)   OUTPUT   Urine 0   0   Blood 50   50   Shift Total(mL/kg) 50(0.68)   50(0.68)   Weight (kg) 73.3 73.3 73.3 73.3      Drains / [place] Closed/Suction Drain 1 Anterior Neck Accordion 10 [place] (Active)   Site Description UTV 11/26/22 1024   Dressing Status Normal: Clean, Dry & Intact 11/26/22 1024   Drainage Appearance Bloody/Bright Red 11/26/22 1024   Status Other (Comment) 11/26/22 1024   Number of days: 0      Time of void PreOp Void Prior to Procedure: 0648 (11/26/22 0646)    PostOp      Diapered? No   Bladder Scan     PO    tolerating sips, water and popsicles     Vitals    B/P: (!) 163/105  T: 98.2  F (36.8  C)    Temp src: Oral  P:  Pulse: 71 (11/26/22 1135)          R: 12  O2:  SpO2: 98 %         Oxygen Delivery: 2 LPM (11/26/22 1130)         Family/support present mother and significant other   Patient belongings     Patient transported on cart and air mat   DC meds/scripts (obs/outpt) Not applicable   Inpatient Pain Meds Released? Yes       Special  needs/considerations None   Tasks needing completion None       Isis Gonzalez, RN  [facility] [phone]

## 2022-11-26 NOTE — INTERVAL H&P NOTE
"I have reviewed the surgical (or preoperative) H&P that is linked to this encounter, and examined the patient. There are no significant changes    Clinical Conditions Present on Arrival:  Clinically Significant Risk Factors Present on Admission                    # Overweight: Estimated body mass index is 26.08 kg/m  as calculated from the following:    Height as of this encounter: 1.676 m (5' 6\").    Weight as of this encounter: 73.3 kg (161 lb 9.6 oz).       "

## 2022-11-26 NOTE — PROGRESS NOTES
S:  I received a page on the On call phone for a cervical collar.  DX:  Post op    O:  Pt was fit with a [place]ert cervical collar. The anterior section was donned. The posterior section was donned and fastened to the front using the velcro straps. Attention was given to the chin support being sure that the correct height was set to support the chin. An extra padding set was also provided. Patient instructed on donning, doffing, use and care    A: An [place] collar was provided and fit as required.     P: Patient will wear the collar at all times following Physician guidelines. Patient has been instructed to contact our facility with any questions and/or concerns    G: The objective/goal of the collar is to help reduce motion/give cervical stability.    Preet SALEEM

## 2022-11-26 NOTE — ANESTHESIA PROCEDURE NOTES
Airway       Patient location during procedure: OR       Procedure Start/Stop Times: 11/26/2022 7:53 AM  Staff -        CRNA: Sharon Duong APRN CRNA       Performed By: CRNA  Consent for Airway        Urgency: elective  Indications and Patient Condition       Indications for airway management: karl-procedural       Induction type:intravenous       Mask difficulty assessment: 1 - vent by mask    Final Airway Details       Final airway type: endotracheal airway       Successful airway: ETT - single  Endotracheal Airway Details        ETT size (mm): 7.0       Cuffed: yes       Successful intubation technique: direct laryngoscopy       DL Blade Type: Saucedo 2       Grade View of Cords: 1       Adjucts: stylet       Secured at (cm): 22    Post intubation assessment        Placement verified by: capnometry, equal breath sounds and chest rise        Number of attempts at approach: 1       Ease of procedure: easy       Dentition: Intact    Medication(s) Administered   Medication Administration Time: 11/26/2022 7:53 AM
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
## 2022-11-26 NOTE — PROGRESS NOTES
"POD 0 from C4-6 ACDF    Patient assessed postoperatively. Paged about right shoulder weakness.     Patient denies pain except with abduction past 50 degrees. Reports feeling that her arm just \"stops\" or had a block to motion.     Exam:   Motor Strength Right Left   Deltoids: C5 3/5 5/5   Biceps: C5 5/5 5/5   Brachialis: C6 5/5 5/5   Wrist extension: C6 5/5 5/5   Triceps: C7  5/5 5/5   Wrist flexion: C7 5/5 5/5    strength: C8 5/5 5/5   Hand intrinsics: T1 5/5 5/5     Sensation from C4-L1 is preserved.    Unable to raise arm above head.     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 Plan for steroid burst.     Will reassess in morning.     Patient discussed with Dr. Murray.     Fadia Shah MD  Orthopedic Surgery PGY4  [phone]        "

## 2022-11-26 NOTE — OR NURSING
Patient awake and alert. Able to clear airway, cough, dry swallow. Tried small sip of water (5mL). No signs of aspiration, no coughing. Able to drink without issue.

## 2022-11-26 NOTE — PLAN OF CARE
Fairly anxious  Arrived up to unit this afternoon  Fitted for [place] J collar  Took off BP cuff during post op vitals, reducated on keeping it on     Pain in the front 8/10, pain in the L hip   Ice packs   roabxin   Up to commode A1  Encouraged IS    CAPNO off per patient request

## 2022-11-26 NOTE — ANESTHESIA PREPROCEDURE EVALUATION
Anesthesia Pre-Procedure Evaluation    Patient: Christine Hu   MRN: [id number] : 1980        Procedure : Procedure(s):  Cervical 4 to 6 Anterior Cervical Decompression and Fusion with Medtronic Plate and Screws, Interbody Device, Use of Fluoroscopy, Microscope  Left Sided Iliac Crest Autograft          Past Medical History:   Diagnosis Date     Alcohol dependence (H) 2012     Alcohol withdrawal (H) 2011     ASCUS favor benign 2015    Neg HPV     Degeneration of cervical intervertebral disc      Knee effusion, left      Lumbago      Ménière's disease      Overdose 2009    TRAZADONE      Past Surgical History:   Procedure Laterality Date     C/SECTION, LOW TRANSVERSE  2005    , Low Transverse     COLONOSCOPY      5 yrs     MASTOIDECTOMY       TONSILLECTOMY        Allergies   Allergen Reactions     Codeine GI Disturbance     Nausea with use of Tylenol #3     Clindamycin Other (See Comments)     C diff, hospitalized      Penicillins Rash      Social History     Tobacco Use     Smoking status: Every Day     Packs/day: 0.25     Types: Cigarettes     Smokeless tobacco: Never     Tobacco comments:     Reduced to 6 per day started 10/25/22   Substance Use Topics     Alcohol use: Not Currently      Wt Readings from Last 1 Encounters:   22 73.3 kg (161 lb 9.6 oz)        Anesthesia Evaluation            ROS/MED HX  ENT/Pulmonary: Comment: Recent  COVID.  Asymptomatic other than mild fatigue    (+) tobacco use,     Neurologic:       Cardiovascular:       METS/Exercise Tolerance:     Hematologic:       Musculoskeletal: Comment: Cervical radiculopathy  (+) arthritis,     GI/Hepatic:       Renal/Genitourinary:       Endo:       Psychiatric/Substance Use:     (+) psychiatric history anxiety alcohol abuse     Infectious Disease:       Malignancy:       Other:            Physical Exam    Airway  airway exam normal      Mallampati: II   TM distance: > 3 FB   Neck ROM: full   Mouth  opening: > 3 cm    Respiratory Devices and Support         Dental  no notable dental history         Cardiovascular          Rhythm and rate: regular and normal     Pulmonary   pulmonary exam normal        breath sounds clear to auscultation           OUTSIDE LABS:  CBC:   Lab Results   Component Value Date    WBC 6.6 10/18/2022    WBC 7.4 07/10/2022    HGB 12.7 10/18/2022    HGB 13.8 07/10/2022    HCT 37.5 10/18/2022    HCT 40.6 07/10/2022     10/18/2022     07/10/2022     BMP:   Lab Results   Component Value Date     07/10/2022     06/21/2019    POTASSIUM 4.7 07/10/2022    POTASSIUM 4.8 06/21/2019    CHLORIDE 109 07/10/2022    CHLORIDE 106 06/21/2019    CO2 29 07/10/2022    CO2 26 06/21/2019    BUN 17 07/10/2022    BUN 10 06/21/2019    CR 0.57 07/10/2022    CR 0.67 06/21/2019     (H) 11/26/2022     (H) 07/10/2022     COAGS: No results found for: PTT, INR, FIBR  POC:   Lab Results   Component Value Date    HCG Negative 10/05/2021    HCGS Negative 10/18/2022     HEPATIC:   Lab Results   Component Value Date    ALBUMIN 4.0 06/21/2019    PROTTOTAL 7.3 06/21/2019    ALT 19 06/21/2019    AST 17 06/21/2019    GGT 27 01/18/2018    ALKPHOS 72 06/21/2019    BILITOTAL 0.5 06/21/2019     OTHER:   Lab Results   Component Value Date    LOS 10.0 07/10/2022    MAG 1.8 06/12/2009    TSH 0.99 04/25/2022    T4 0.85 10/01/2007    T3 105 10/02/2007    CRP <2.9 08/11/2022    SED 10 01/18/2018       Anesthesia Plan    ASA Status:  2   NPO Status:  NPO Appropriate    Anesthesia Type: General.     - Airway: ETT   Induction: Intravenous.   Maintenance: Balanced.   Techniques and Equipment:     - Lines/Monitors: 2nd IV     Consents    Anesthesia Plan(s) and associated risks, benefits, and realistic alternatives discussed. Questions answered and patient/representative(s) expressed understanding.    - Discussed:     - Discussed with:  Patient      - Extended Intubation/Ventilatory Support Discussed: No.       - Patient is DNR/DNI Status: No    Use of blood products discussed: Yes.     - Discussed with: Patient.     - Consented: consented to blood products            Reason for refusal: other.     Postoperative Care    Pain management: IV analgesics, Oral pain medications, Multi-modal analgesia.   PONV prophylaxis: Ondansetron (or other 5HT-3), Background Propofol Infusion     Comments:                Tino Gonzalez MD

## 2022-11-26 NOTE — PHARMACY-ADMISSION MEDICATION HISTORY
Admission Medication History Completed by Pharmacy    See [facility] Admission Navigator for allergy information, preferred outpatient pharmacy, prior to admission medications and immunization status.     Medication History Sources:     Patient interview    Surescripts dispense report    Changes made to PTA medication list (reason):    Added: None    Deleted: None    Changed: None    Additional Information:    None    Prior to Admission medications    Medication Sig Last Dose Taking? Auth Provider Long Term End Date   ibuprofen (ADVIL/MOTRIN) 200 MG tablet Take 600-800 mg by mouth every 4 hours as needed for fever Past Month at 0600 Yes Reported, Patient     tiZANidine (ZANAFLEX) 4 MG tablet Take 1 tablet (4 mg) by mouth 3 times daily as needed for muscle spasms 11/25/2022 at 2100 Yes Wilbur Murray MD         Date completed: 11/26/22    Medication history completed by: Chicho Jackson [facility]

## 2022-11-26 NOTE — OR NURSING
Talked with ADDISON Gonzalez, patient stable, BP still slightly elevated but improved. Pain moderate.     Per ADDISON-OK to discharge to the floor.

## 2022-11-26 NOTE — CONSULTS
[facility]  Consult Note - Hospitalist Service, GOLD TEAM 17  Date of Admission:  2022  Consult Requested by: Wilbur Murray MD   Reason for Consult: management of medical comorbidities     Assessment & Plan   Christine Hu is a 42 year old female admitted on 2022 after cervical spine surgery . She has history of myalgias chronic pain, cervical spine issues knee , shoulder hip wrist ankle issues .   She underwent surgery by Dr Murray  , had :   1. Anterior cervical discectomy with removal of disc material and osteophytes at C4-5 and C5-6 for decompression of the spinal cord.  2. Bilateral Foraminotomies at C4-5 and C5-6for decompression of the nerve roots.  3. Placement of intervertebral prosthetic device at C4-5 and C5-6, Medtronic PEEK Cages.  4. Use of Iliac Crest Autograft for fusion, C4-5 and C5-6, which was harvested through a separate skin and fascial incision.  5. Placement of anterior Medtronic plate and screw instrumentation, C4-6.  The plate was placed separately from the interbody and was placed to aid in the fusion Rate*      Neck pain with radiculopathy   - status post above surgery   - per orthopedic surgery    - has some right arm weakness, she cannot lift ar elbow, has good strong shoulder shrug and can squeeze with hands 5/5 bilateral  , no  sensations, orthopedic surgery  Was notified by RN   and continue to monitor , neuro checks q 2 for now     Hypertension   -  Per patient  Is situational , not on PTA medications   - treat pain, also placed PRN hydralazine     Multiple joint issues   ACL tear  - planned to have surgery knee surgery nest   Tear of right supraspinatus, tenosynovitis of left ankle an ATFL tear  - defer back to orthopedic surgery        History of Meniere's disease   - no issue currently     Anxiety, depression  - history previous overdose   - not on medications currently     Mentioned  "thyrotoxicosis   -  Patient  Does not recall having any thyroid issues TSH was normal in April of this year , not on medications      Recent COVID-19  - tested + 11/9 , considered COVID recovered   - would do good DVT prophylaxis         The patient's care was discussed with  Bedside RN, family by bedside     Mira Danielle MD  [facility]  Securely message with the Vocera Web Console (learn more here)  Text page via [facility] Paging/Directory   Please see signed in provider for up to date coverage information    Hospitalist Service, [facility] TEAM 17    Clinically Significant Risk Factors Present on Admission                      # Overweight: Estimated body mass index is 26.08 kg/m  as calculated from the following:    Height as of this encounter: 1.676 m (5' 6\").    Weight as of this encounter: 73.3 kg (161 lb 9.6 oz).           ______________________________________________________________________    Chief Complaint   Neck pain     History is obtained from the patient and reviewing records in Epic     History of Present Illness   Christine Hu is a 42 year old female who admitted on 11/26/2022 after cervical spine surgery . She has history of myalgias chronic pain, cervical spine issues knee , shoulder hip wrist ankle issues . She underwent surgery by Dr Murray 11/26 .   she is doing ok but has  Weakness in right arm and states cannot lift right arm up , no numbness, ha alison in left hip form bone graft   Denies any shortness of breath  No chest pain  No nausea vomiting     Review of Systems   The 10 point Review of Systems is negative other than noted in the HPI or here.     Past Medical History    I have reviewed this patient's medical history and updated it with pertinent information if needed.   Past Medical History:   Diagnosis Date     Alcohol dependence (H) 11/30/2012     Alcohol withdrawal (H) 06/29/2011     ASCUS favor benign 01/2015    Neg HPV     " Degeneration of cervical intervertebral disc      Knee effusion, left      Lumbago      Ménière's disease      Overdose 2009    TRAZADONE       Past Surgical History   I have reviewed this patient's surgical history and updated it with pertinent information if needed.  Past Surgical History:   Procedure Laterality Date     C/SECTION, LOW TRANSVERSE  2005    , Low Transverse     COLONOSCOPY      5 yrs     MASTOIDECTOMY       TONSILLECTOMY         Social History   I have reviewed this patient's social history and updated it with pertinent information if needed.  Social History     Tobacco Use     Smoking status: Every Day     Packs/day: 0.25     Types: Cigarettes     Smokeless tobacco: Never     Tobacco comments:     Reduced to 6 per day started 10/25/22   Vaping Use     Vaping Use: Never used   Substance Use Topics     Alcohol use: Not Currently     Drug use: No       Family History   I have reviewed this patient's family history and updated it with pertinent information if needed.  Family History   Problem Relation Age of Onset     Allergies Mother      Psychotic Disorder Father         depression     Heart Failure Father         heart attack in 2015?     Allergies Sister      Allergies Sister      Psychotic Disorder Sister         anxiety     Psychotic Disorder Sister         depression     Psychotic Disorder Maternal Uncle         anxiety     Asthma Other      C.A.D. No family hx of      Diabetes No family hx of      Hypertension No family hx of      Cerebrovascular Disease No family hx of      Breast Cancer No family hx of      Cancer - colorectal No family hx of      Prostate Cancer No family hx of      Anesthesia Reaction No family hx of        Medications   Medications Prior to Admission   Medication Sig Dispense Refill Last Dose     ibuprofen (ADVIL/MOTRIN) 200 MG capsule Take 200 mg by mouth every 4 hours as needed for fever   Past Month at 0600     tiZANidine (ZANAFLEX) 4 MG tablet Take 1  tablet (4 mg) by mouth 3 times daily as needed for muscle spasms 40 tablet 0 11/25/2022 at 2100       Allergies   Allergies   Allergen Reactions     Codeine GI Disturbance     Nausea with use of Tylenol #3     Clindamycin Other (See Comments)     C diff, hospitalized      Penicillins Rash       Physical Exam   Vital Signs: Temp: 98.2  F (36.8  C) Temp src: Oral BP: (!) 160/105 Pulse: 64   Resp: 11 SpO2: 99 %   Oxygen Delivery: 2 LPM  Weight: 161 lbs 9.55 oz   General appearence: awake alert  In no apparent distress    HEENT: EOMI, PEARLA, sclera nonicteric, moist, mucus membranes,   NECK : has collar on, drain in   RESPIRATORY: lungs clear to auscultation bilateral,    CARDIOVASCULAR:S1 S2 regular rate and rhythm, no rubs gallops or murmurs appreciated  GASTROINTESTINAL:soft, non-distended , non-tender , + bowel sounds, no masses felt   SKIN: warm and dry, no mottling noted   NEUROLOGIC; awake alert and oriented, hand  5/5 bilateral , able to do good strong and = shoulder shrug, can only lift  Elbow half was and cannot lift right arm above head , good radial pulses , also cannot lift left leg as hip hurts form  Iliac crest bone graft   EXTREMITIES: no clubbing, cyanosis or edema ,  good pedal pulses           Data   I personally reviewed  Results for orders placed or performed during the hospital encounter of 11/26/22 (from the past 24 hour(s))   Glucose by meter   Result Value Ref Range    GLUCOSE BY METER POCT 113 (H) 70 - 99 mg/dL   XR Surgery JONATAN L/T 5 Min Fluoro    Narrative    This exam was marked as non-reportable because it will not be read by a   radiologist or a [facility] non-radiologist provider.

## 2022-11-26 NOTE — ANESTHESIA POSTPROCEDURE EVALUATION
Patient: Christine Hu    Procedure: Procedure(s):  Cervical 4 to 6 Anterior Cervical Decompression and Fusion with Medtronic Plate and Screws, Interbody Device, Use of Fluoroscopy, Microscope  Left Sided Iliac Crest Autograft       Anesthesia Type:  No value filed.    Note:  Disposition: Inpatient   Postop Pain Control: Uneventful            Sign Out: Well controlled pain   PONV: No   Neuro/Psych: Uneventful            Sign Out: Acceptable/Baseline neuro status   Airway/Respiratory: Uneventful            Sign Out: Acceptable/Baseline resp. status   CV/Hemodynamics: Uneventful            Sign Out: Acceptable CV status   Other NRE: NONE   DID A NON-ROUTINE EVENT OCCUR? No           Last vitals:  Vitals Value Taken Time   /97 11/26/22 1200   Temp 36.8  C (98.2  F) 11/26/22 1200   Pulse 56 11/26/22 1200   Resp 11 11/26/22 1200   SpO2 99 % 11/26/22 1224   Vitals shown include unvalidated device data.    Electronically Signed By: Tino Gonzalez MD  November 26, 2022  3:31 PM

## 2022-11-26 NOTE — OR NURSING
Report and care given to Solange MISHRA RN on 5 med surg. Notified her of tylenol and Oxycodone given after handoff note was signed.     Stated no questions at this time.

## 2022-11-27 ENCOUNTER — APPOINTMENT (OUTPATIENT)
Dept: MRI IMAGING | Facility: CLINIC | Age: 42
DRG: 472 | End: 2022-11-27
Attending: ORTHOPAEDIC SURGERY
Payer: COMMERCIAL

## 2022-11-27 ENCOUNTER — APPOINTMENT (OUTPATIENT)
Dept: CT IMAGING | Facility: CLINIC | Age: 42
DRG: 472 | End: 2022-11-27
Attending: STUDENT IN AN ORGANIZED HEALTH CARE EDUCATION/TRAINING PROGRAM
Payer: COMMERCIAL

## 2022-11-27 ENCOUNTER — APPOINTMENT (OUTPATIENT)
Dept: PHYSICAL THERAPY | Facility: CLINIC | Age: 42
DRG: 472 | End: 2022-11-27
Attending: STUDENT IN AN ORGANIZED HEALTH CARE EDUCATION/TRAINING PROGRAM
Payer: COMMERCIAL

## 2022-11-27 LAB
CREAT SERPL-MCNC: 0.44 MG/DL (ref 0.52–1.04)
GFR SERPL CREATININE-BSD FRML MDRD: >90 ML/MIN/1.73M2
HGB BLD-MCNC: 12.3 G/DL (ref 11.7–15.7)

## 2022-11-27 PROCEDURE — 72125 CT NECK SPINE W/O DYE: CPT

## 2022-11-27 PROCEDURE — 72156 MRI NECK SPINE W/O & W/DYE: CPT | Mod: 26 | Performed by: RADIOLOGY

## 2022-11-27 PROCEDURE — 72156 MRI NECK SPINE W/O & W/DYE: CPT

## 2022-11-27 PROCEDURE — 120N000002 HC R&B MED SURG/OB UMMC

## 2022-11-27 PROCEDURE — 250N000013 HC RX MED GY IP 250 OP 250 PS 637: Performed by: ORTHOPAEDIC SURGERY

## 2022-11-27 PROCEDURE — 250N000013 HC RX MED GY IP 250 OP 250 PS 637: Performed by: STUDENT IN AN ORGANIZED HEALTH CARE EDUCATION/TRAINING PROGRAM

## 2022-11-27 PROCEDURE — 85018 HEMOGLOBIN: CPT | Performed by: STUDENT IN AN ORGANIZED HEALTH CARE EDUCATION/TRAINING PROGRAM

## 2022-11-27 PROCEDURE — 36415 COLL VENOUS BLD VENIPUNCTURE: CPT | Performed by: STUDENT IN AN ORGANIZED HEALTH CARE EDUCATION/TRAINING PROGRAM

## 2022-11-27 PROCEDURE — 99233 SBSQ HOSP IP/OBS HIGH 50: CPT | Performed by: INTERNAL MEDICINE

## 2022-11-27 PROCEDURE — 250N000011 HC RX IP 250 OP 636: Performed by: STUDENT IN AN ORGANIZED HEALTH CARE EDUCATION/TRAINING PROGRAM

## 2022-11-27 PROCEDURE — 82565 ASSAY OF CREATININE: CPT | Performed by: ORTHOPAEDIC SURGERY

## 2022-11-27 PROCEDURE — A9585 GADOBUTROL INJECTION: HCPCS | Performed by: ORTHOPAEDIC SURGERY

## 2022-11-27 PROCEDURE — 97530 THERAPEUTIC ACTIVITIES: CPT | Mod: GP | Performed by: PHYSICAL THERAPIST

## 2022-11-27 PROCEDURE — 250N000011 HC RX IP 250 OP 636: Performed by: ORTHOPAEDIC SURGERY

## 2022-11-27 PROCEDURE — 255N000002 HC RX 255 OP 636: Performed by: ORTHOPAEDIC SURGERY

## 2022-11-27 PROCEDURE — 72125 CT NECK SPINE W/O DYE: CPT | Mod: 26 | Performed by: RADIOLOGY

## 2022-11-27 PROCEDURE — 97162 PT EVAL MOD COMPLEX 30 MIN: CPT | Mod: GP | Performed by: PHYSICAL THERAPIST

## 2022-11-27 PROCEDURE — 97116 GAIT TRAINING THERAPY: CPT | Mod: GP | Performed by: PHYSICAL THERAPIST

## 2022-11-27 RX ORDER — GABAPENTIN 300 MG/1
300 CAPSULE ORAL 3 TIMES DAILY
Status: DISCONTINUED | OUTPATIENT
Start: 2022-11-27 | End: 2022-11-30

## 2022-11-27 RX ORDER — GADOBUTROL 604.72 MG/ML
7.33 INJECTION INTRAVENOUS ONCE
Status: COMPLETED | OUTPATIENT
Start: 2022-11-27 | End: 2022-11-27

## 2022-11-27 RX ADMIN — HYDROMORPHONE HYDROCHLORIDE 0.4 MG: 0.2 INJECTION, SOLUTION INTRAMUSCULAR; INTRAVENOUS; SUBCUTANEOUS at 16:16

## 2022-11-27 RX ADMIN — METHOCARBAMOL 750 MG: 750 TABLET ORAL at 01:34

## 2022-11-27 RX ADMIN — OXYCODONE HYDROCHLORIDE 10 MG: 10 TABLET ORAL at 05:59

## 2022-11-27 RX ADMIN — OXYCODONE HYDROCHLORIDE 10 MG: 10 TABLET ORAL at 18:34

## 2022-11-27 RX ADMIN — DEXAMETHASONE 10 MG: 2 TABLET ORAL at 21:57

## 2022-11-27 RX ADMIN — Medication 1 LOZENGE: at 05:59

## 2022-11-27 RX ADMIN — OXYCODONE HYDROCHLORIDE 10 MG: 10 TABLET ORAL at 01:33

## 2022-11-27 RX ADMIN — SENNOSIDES AND DOCUSATE SODIUM 1 TABLET: 50; 8.6 TABLET ORAL at 08:00

## 2022-11-27 RX ADMIN — GABAPENTIN 300 MG: 300 CAPSULE ORAL at 21:57

## 2022-11-27 RX ADMIN — CEFAZOLIN 1 G: 1 INJECTION, POWDER, FOR SOLUTION INTRAMUSCULAR; INTRAVENOUS at 01:33

## 2022-11-27 RX ADMIN — HYDROMORPHONE HYDROCHLORIDE 0.4 MG: 0.2 INJECTION, SOLUTION INTRAMUSCULAR; INTRAVENOUS; SUBCUTANEOUS at 07:43

## 2022-11-27 RX ADMIN — POLYETHYLENE GLYCOL 3350 17 G: 17 POWDER, FOR SOLUTION ORAL at 08:02

## 2022-11-27 RX ADMIN — METHOCARBAMOL 750 MG: 750 TABLET ORAL at 09:22

## 2022-11-27 RX ADMIN — ACETAMINOPHEN 975 MG: 325 TABLET, FILM COATED ORAL at 20:06

## 2022-11-27 RX ADMIN — METHOCARBAMOL 750 MG: 750 TABLET ORAL at 21:57

## 2022-11-27 RX ADMIN — Medication 1 LOZENGE: at 01:33

## 2022-11-27 RX ADMIN — DEXAMETHASONE 10 MG: 2 TABLET ORAL at 05:59

## 2022-11-27 RX ADMIN — HYDROMORPHONE HYDROCHLORIDE 0.2 MG: 0.2 INJECTION, SOLUTION INTRAMUSCULAR; INTRAVENOUS; SUBCUTANEOUS at 03:04

## 2022-11-27 RX ADMIN — Medication 1 LOZENGE: at 11:37

## 2022-11-27 RX ADMIN — HYDROMORPHONE HYDROCHLORIDE 0.4 MG: 0.2 INJECTION, SOLUTION INTRAMUSCULAR; INTRAVENOUS; SUBCUTANEOUS at 11:39

## 2022-11-27 RX ADMIN — HYDROMORPHONE HYDROCHLORIDE 0.4 MG: 0.2 INJECTION, SOLUTION INTRAMUSCULAR; INTRAVENOUS; SUBCUTANEOUS at 23:28

## 2022-11-27 RX ADMIN — SENNOSIDES AND DOCUSATE SODIUM 1 TABLET: 50; 8.6 TABLET ORAL at 20:06

## 2022-11-27 RX ADMIN — ACETAMINOPHEN 975 MG: 325 TABLET, FILM COATED ORAL at 03:08

## 2022-11-27 RX ADMIN — OXYCODONE HYDROCHLORIDE 10 MG: 10 TABLET ORAL at 14:18

## 2022-11-27 RX ADMIN — GADOBUTROL 7.33 ML: 604.72 INJECTION INTRAVENOUS at 17:52

## 2022-11-27 RX ADMIN — HYDROMORPHONE HYDROCHLORIDE 0.4 MG: 0.2 INJECTION, SOLUTION INTRAMUSCULAR; INTRAVENOUS; SUBCUTANEOUS at 20:07

## 2022-11-27 RX ADMIN — DEXAMETHASONE 10 MG: 2 TABLET ORAL at 14:03

## 2022-11-27 RX ADMIN — OXYCODONE HYDROCHLORIDE 10 MG: 10 TABLET ORAL at 22:30

## 2022-11-27 RX ADMIN — CEFAZOLIN 1 G: 1 INJECTION, POWDER, FOR SOLUTION INTRAMUSCULAR; INTRAVENOUS at 07:44

## 2022-11-27 RX ADMIN — FAMOTIDINE 20 MG: 20 TABLET ORAL at 20:06

## 2022-11-27 RX ADMIN — FAMOTIDINE 20 MG: 20 TABLET ORAL at 08:01

## 2022-11-27 RX ADMIN — OXYCODONE HYDROCHLORIDE 10 MG: 10 TABLET ORAL at 10:24

## 2022-11-27 RX ADMIN — CEFAZOLIN 1 G: 1 INJECTION, POWDER, FOR SOLUTION INTRAMUSCULAR; INTRAVENOUS at 16:16

## 2022-11-27 RX ADMIN — ACETAMINOPHEN 975 MG: 325 TABLET, FILM COATED ORAL at 14:03

## 2022-11-27 ASSESSMENT — ACTIVITIES OF DAILY LIVING (ADL)
ADLS_ACUITY_SCORE: 22

## 2022-11-27 NOTE — PROGRESS NOTES
[facility]    Medicine Progress Note - Hospitalist Service, GOLD TEAM 17    Date of Admission:  11/26/2022    Assessment & Plan      Christine HEARN Shruti Hu is a 42 year old female admitted on 11/26/2022 after cervical spine surgery . She has history of myalgias chronic pain, cervical spine issues knee , shoulder hip wrist ankle issues .   She underwent surgery by Dr Murray 11/26 , had :   1. Anterior cervical discectomy with removal of disc material and osteophytes at C4-5 and C5-6 for decompression of the spinal cord.  2. Bilateral Foraminotomies at C4-5 and C5-6for decompression of the nerve roots.  3. Placement of intervertebral prosthetic device at C4-5 and C5-6, Medtronic PEEK Cages.  4. Use of Iliac Crest Autograft for fusion, C4-5 and C5-6, which was harvested through a separate skin and fascial incision.  5. Placement of anterior Medtronic plate and screw instrumentation, C4-6.  The plate was placed separately from the interbody and was placed to aid in the fusion Rate*        Neck pain with radiculopathy   - status post above surgery   - per orthopedic surgery        right arm weakness  - started 11/26  But now arm is much weaker , cannot shrug shoulder and lift right elbow for me, denies any numbness and sensatiosn seem ok   - she followed by orthopedic surgery  there are concerns for C5 palsy/neuroprecia and was stared on steroids 11/26 , CT c spine pending         Hypertension   -  Per patient  Is situational , not on PTA medications   - treat pain, also placed PRN hydralazine      Multiple joint issues   ACL tear  - planned to have surgery knee surgery nest   Tear of right supraspinatus, tenosynovitis of left ankle an ATFL tear  - defer back to orthopedic surgery         History of Meniere's disease   - no issue currently      Anxiety, depression  - history previous overdose   - not on medications currently      Mentioned thyrotoxicosis   -  Patient  Does  "not recall having any thyroid issues TSH was normal in April of this year , not on medications       Recent COVID-19  - tested + 11/9 , considered COVID recovered   - would do good DVT prophylaxis            Diet: Advance Diet as Tolerated: Regular Diet Adult  Snacks/Supplements Adult: Ensure Max Protein (bariatric); With Meals  Snacks/Supplements Adult: Ensure Max Protein (bariatric); Between Meals    DVT Prophylaxis: Defer to primary service  Ogden Catheter: Not present  Central Lines: None  Cardiac Monitoring: None  Code Status: Full Code      Disposition Plan           The patient's care was discussed with orthopedic surgery , RN    Mira Danielle MD  Hospitalist Service, GOLD TEAM 17  [facility]  Securely message with the Vocera Web Console (learn more here)  Text page via [facility] Paging/Directory   Please see signed in provider for up to date coverage information      Clinically Significant Risk Factors                       # Overweight: Estimated body mass index is 26.08 kg/m  as calculated from the following:    Height as of this encounter: 1.676 m (5' 6\").    Weight as of this encounter: 73.3 kg (161 lb 9.6 oz)., PRESENT ON ADMISSION         ______________________________________________________________________    Interval History      Worsening right arm weakness states now cannot lift elbow, cannot feed herself, sensation seems to be ok and denies tingling/numbness  , now has pain also in right shoulder   No chest pain  No shortness of breath      Data reviewed today: I reviewed all medications, new labs and imaging results over the last 24 hours. I    Physical Exam   Vital Signs: Temp: 98.4  F (36.9  C) Temp src: Oral BP: (!) 152/79 Pulse: 61   Resp: 16 SpO2: 97 % O2 Device: None (Room air) Oxygen Delivery: 2 LPM  Weight: 161 lbs 9.55 oz     General appearence: awake alert  In  no apparent distress    HEENT: EOMI, PEARLA, sclera nonicteric,  moist,  mucus " membranes,   NECK : supple  RESPIRATORY: lungs clear to auscultation bilateral,  no wheezing or crackles   CARDIOVASCULAR:S1 S2 regular rate and rhythm, no rubs gallops or murmurs appreciated  GASTROINTESTINAL:soft, non-distended , non-tender , + bowel sounds,   SKIN: warm and dry, no mottling noted   NEUROLOGIC; awake alert and oriented,  She now cannot shrug right shoulder , cannot lift left elbow, can squeeze 4/5 with right hand , sensation seems intact   EXTREMITIES: no clubbing, cyanosis or edema       Data   Recent Labs   Lab 11/27/22  0602 11/26/22  0721   HGB 12.3  --    CR 0.44*  --    GLC  --  113*     No results found for this or any previous visit (from the past 24 hour(s)).

## 2022-11-27 NOTE — SIGNIFICANT EVENT
I met with the patient at bedside this evening.  Yesterday she underwent a two-level anterior cervical decompression and fusion with myself.  Before surgery she had C5 radicular pain, but had normal motor strength.    Starting yesterday afternoon she feels like she developed a progressive C5 palsy.  She thinks it present fairly soon after the surgery but seems worse to her today than it was yesterday in terms of being more painful with less motion and activity.    On her exam she has full sensation but she feels like there is a deep bruise around the shoulder and she is able to activate the deltoid.  When I lift the arm off the bed she can hold it abducted about 30 to 40 degrees for a very brief time, but this causes pain for her and then the arm falls to the bedside.  With my hand on the deltoid I do feel that the muscle is activating.  I checked it 3 or 4 times, and this was the best result.  1 or 2 of these tests she did not seem to be activating and it was too painful for her so the exam does seem to fluctuate a little bit, and this is consistent with what the residents reported earlier when they have 1 exam documenting her is 3 out of 5, and another exam documenting her is 0 out of 5 so I do think the exam fluctuates and it did so for me during my time at bedside today.    She is able to provide good resistance with the biceps and distally and she has intact sensation throughout.    I have obtained a postoperative CT scan and I pasted a representative image below.  She had severe bony foraminal stenosis at C4-5 before the surgery.  I believe that the stenosis is improved.  There seems to be some residual osteophyte in the foramen, but it is less than it was before the surgery.    At the time of her surgery I did note that the C5 nerve seemed purplish in color and it was somewhat jumpy and twitchy.  So it was clearly an irritated nerve.  In addition I spent some extra time in this area trying to be thorough with  a Kerrison and curette to make sure that the nerve was free.    All these factors lead me to believe that this is nerve irritation, and that there is less stenosis now than it was before surgery.  I explained to her that if the nerve is irritated we do not want to do any more surgery on it and the best thing would be to let it recover.  If there is clear ongoing severe nerve compression, perhaps it would be best to do a revision surgery, but in the absence of this I think the best thing would be to let the nerve recover on its own.    We have been giving her Decadron since yesterday evening.  Going to continue this I have also added some gabapentin.  Lastly, we are going to get an MRI.  The CT was obtained first because it was easiest to obtain logistically but I think to be thorough in her case it would be best to get the additional imaging study.    I spent about an hour at bedside this afternoon discussing things with her, reviewing the imaging and explaining the options which would basically consist of a revision foraminotomies versus observation and supportive care with medications.  She expressed thanks for me coming to bedside and I believe she understands the options as well as my initial recommendation for supportive care given the rationale described above.  I will re-evaluate again after the MRI.    Pasted below are post-op images on the left and pre-op images on the right.  The remains some portion of the uncinate in the right C4-5 foramen, but it appears less stenotic to me than it did before surgery.            Wilbur Murray MD
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
## 2022-11-27 NOTE — PROGRESS NOTES
"Orthopedic Surgery Progress Note:     Subjective:   No acute events overnight. Pain well-controlled. Tolerating a diet.  Voiding spontaneously, not yet moving bowels, +flatus. No new concerns or complaints.    Does endorse some paresthesias of the right upper extremity overnight which is subsequently resolved.  Also endorses some right shoulder pain.    She does continue to have weakness of the right shoulder.  This has worsened since yesterday.    Objective:   BP (!) 152/79   Pulse 61   Temp 98.4  F (36.9  C) (Oral)   Resp 16   Ht 1.676 m (5' 6\")   Wt 73.3 kg (161 lb 9.6 oz)   LMP  (LMP Unknown)   SpO2 97%   BMI 26.08 kg/m    No intake/output data recorded.  General: NAD. Resting comfortably in bed.  Respiratory: Breathing comfortably on RA.  Drain Output: 5/30/NR.  Musculoskeletal:     Motor Strength Right Left   Deltoids: C5 2/5 5/5   Biceps: C5 4/5 5/5   Brachialis: C6 4/5 5/5   Wrist extension: C6 5/5 5/5   Triceps: C7  5/5 5/5   Wrist flexion: C7 5/5 5/5    strength: C8 5/5 5/5   Hand intrinsics: T1 5/5 5/5     Sensation from C4-L1 is preserved.    Strength and Sensation from L1-S2 is preserved.    Laboratory Data:  Lab Results   Component Value Date    WBC 6.6 10/18/2022    HGB 12.3 11/27/2022     10/18/2022       Images:  No new images were obtained.    Assessment & Plan:   Christine Hu is a 42 year old female now s/p C4-6 ACDF on 11/26 with Dr. Murray.     Postoperative course complicated by right C5 nerve palsy.    Plan:  -CT C-spine ordered for further evaluation  -Continue Decadron  -Monitor exam    Ortho Primary  - Activity: Up with assist until independent. No excessive bending or twisting. No lifting >10 lbs x 6 weeks. No Davin lift for transfers.   - Weightbearing Status: WBAT.  - Pain Management: Transition from IV to PO as tolerated. No NSAIDs.   - Antibiotics: Ancef 1 g Q8H while drains remain.  - Diet: Begin with clear fluids and progress diet as tolerated.   - DVT " Prophylaxis: SCDs only. No chemical DVT prophylaxis needed.  - Imaging: XR Upright C-spine PTDC; ordered.  CT C-spine ordered  - Labs: Labs PRN.  - Bracing/Splinting: None.  - Dressings: Keep dressing c/d/i until POD 5-7.  - Drains: Document output per shift; will be discontinued at orthopedic surgery discretion.  - Ogden catheter: Remove POD1.   - Physical Therapy/Occupational Therapy: Evaluation and treatment.  - Consults: Hospitalist, appreciate recs .  - Follow-up: Clinic with Dr. Murray in 6 weeks with repeat radiographs.    - Disposition: Pending progress with therapies, pain control on orals, and medical stability; anticipate discharge to home on POD1.    Orthopedic surgery staff for this patient is Dr. Murray.    Fadia Shah MD  Orthopedic Surgery PGY4    FOLLOWUP:    Future Appointments   Date Time Provider Department Center   11/27/2022  8:15 AM Lawanda Maldonado, PT [facility] [place]   11/28/2022  7:00 PM UR OT WAITLIST [facility] [place]   1/5/2023 10:20 AM Mc Peterson MD [facility] [place]   1/10/2023 11:00 AM Wilbur Murray MD [facility]   2/9/2023 10:20 AM Mc Peterson MD [facility] [place]

## 2022-11-27 NOTE — PROGRESS NOTES
End of Shift Summary. See flowsheets for vital signs and detailed assessment.     Changes this shift:  Remains A&O X4, vitals stable, RA. Reports moderate pain from surgical sites, pain meds given per MAR with improvements noted. Denies SOB. RUE remains weaker and stiff than the left.       Plan:  Continue current plan          Goal Outcome Evaluation:     Plan of Care Reviewed With: Patient     Overall Patient Progress: Progressing     Outcome Evaluation:

## 2022-11-28 ENCOUNTER — TELEPHONE (OUTPATIENT)
Dept: ORTHOPEDICS | Facility: CLINIC | Age: 42
End: 2022-11-28

## 2022-11-28 ENCOUNTER — APPOINTMENT (OUTPATIENT)
Dept: GENERAL RADIOLOGY | Facility: CLINIC | Age: 42
DRG: 472 | End: 2022-11-28
Attending: STUDENT IN AN ORGANIZED HEALTH CARE EDUCATION/TRAINING PROGRAM
Payer: COMMERCIAL

## 2022-11-28 ENCOUNTER — APPOINTMENT (OUTPATIENT)
Dept: PHYSICAL THERAPY | Facility: CLINIC | Age: 42
DRG: 472 | End: 2022-11-28
Attending: ORTHOPAEDIC SURGERY
Payer: COMMERCIAL

## 2022-11-28 ENCOUNTER — APPOINTMENT (OUTPATIENT)
Dept: OCCUPATIONAL THERAPY | Facility: CLINIC | Age: 42
DRG: 472 | End: 2022-11-28
Attending: STUDENT IN AN ORGANIZED HEALTH CARE EDUCATION/TRAINING PROGRAM
Payer: COMMERCIAL

## 2022-11-28 PROCEDURE — 250N000011 HC RX IP 250 OP 636: Performed by: STUDENT IN AN ORGANIZED HEALTH CARE EDUCATION/TRAINING PROGRAM

## 2022-11-28 PROCEDURE — 97116 GAIT TRAINING THERAPY: CPT | Mod: GP | Performed by: REHABILITATION PRACTITIONER

## 2022-11-28 PROCEDURE — 72040 X-RAY EXAM NECK SPINE 2-3 VW: CPT

## 2022-11-28 PROCEDURE — 72040 X-RAY EXAM NECK SPINE 2-3 VW: CPT | Mod: 26 | Performed by: RADIOLOGY

## 2022-11-28 PROCEDURE — 97530 THERAPEUTIC ACTIVITIES: CPT | Mod: GP | Performed by: REHABILITATION PRACTITIONER

## 2022-11-28 PROCEDURE — 99233 SBSQ HOSP IP/OBS HIGH 50: CPT | Performed by: INTERNAL MEDICINE

## 2022-11-28 PROCEDURE — 250N000011 HC RX IP 250 OP 636: Performed by: ORTHOPAEDIC SURGERY

## 2022-11-28 PROCEDURE — 120N000002 HC R&B MED SURG/OB UMMC

## 2022-11-28 PROCEDURE — 250N000013 HC RX MED GY IP 250 OP 250 PS 637: Performed by: CLINICAL NURSE SPECIALIST

## 2022-11-28 PROCEDURE — 250N000011 HC RX IP 250 OP 636: Performed by: CLINICAL NURSE SPECIALIST

## 2022-11-28 PROCEDURE — 97530 THERAPEUTIC ACTIVITIES: CPT | Mod: GO

## 2022-11-28 PROCEDURE — 250N000013 HC RX MED GY IP 250 OP 250 PS 637: Performed by: ORTHOPAEDIC SURGERY

## 2022-11-28 PROCEDURE — 97166 OT EVAL MOD COMPLEX 45 MIN: CPT | Mod: GO

## 2022-11-28 PROCEDURE — 97535 SELF CARE MNGMENT TRAINING: CPT | Mod: GO

## 2022-11-28 PROCEDURE — 250N000013 HC RX MED GY IP 250 OP 250 PS 637: Performed by: STUDENT IN AN ORGANIZED HEALTH CARE EDUCATION/TRAINING PROGRAM

## 2022-11-28 RX ORDER — ACETAMINOPHEN 325 MG/1
650 TABLET ORAL EVERY 4 HOURS PRN
Qty: 60 TABLET | Refills: 0 | Status: SHIPPED | OUTPATIENT
Start: 2022-11-29 | End: 2023-01-27

## 2022-11-28 RX ORDER — METHYLPREDNISOLONE 4 MG
TABLET, DOSE PACK ORAL
Qty: 21 TABLET | Refills: 0 | Status: SHIPPED | OUTPATIENT
Start: 2022-11-28 | End: 2022-12-02

## 2022-11-28 RX ORDER — HYDROMORPHONE HCL IN WATER/PF 6 MG/30 ML
0.2 PATIENT CONTROLLED ANALGESIA SYRINGE INTRAVENOUS ONCE
Status: COMPLETED | OUTPATIENT
Start: 2022-11-28 | End: 2022-11-28

## 2022-11-28 RX ORDER — METHOCARBAMOL 750 MG/1
750 TABLET, FILM COATED ORAL EVERY 6 HOURS PRN
Qty: 40 TABLET | Refills: 0 | Status: SHIPPED | OUTPATIENT
Start: 2022-11-28 | End: 2022-12-02

## 2022-11-28 RX ORDER — OXYCODONE HYDROCHLORIDE 5 MG/1
5 TABLET ORAL EVERY 4 HOURS PRN
Qty: 26 TABLET | Refills: 0 | Status: SHIPPED | OUTPATIENT
Start: 2022-11-28 | End: 2022-11-29

## 2022-11-28 RX ORDER — POLYETHYLENE GLYCOL 3350 17 G/17G
17 POWDER, FOR SOLUTION ORAL DAILY
Qty: 10 PACKET | Refills: 0 | Status: SHIPPED | OUTPATIENT
Start: 2022-11-28 | End: 2023-04-10

## 2022-11-28 RX ORDER — HYDROXYZINE HYDROCHLORIDE 25 MG/1
25 TABLET, FILM COATED ORAL EVERY 6 HOURS PRN
Status: DISCONTINUED | OUTPATIENT
Start: 2022-11-28 | End: 2022-12-02 | Stop reason: HOSPADM

## 2022-11-28 RX ORDER — HYDROXYZINE HYDROCHLORIDE 50 MG/1
50 TABLET, FILM COATED ORAL EVERY 6 HOURS PRN
Status: DISCONTINUED | OUTPATIENT
Start: 2022-11-28 | End: 2022-12-02 | Stop reason: HOSPADM

## 2022-11-28 RX ORDER — HYDROXYZINE HYDROCHLORIDE 10 MG/1
10 TABLET, FILM COATED ORAL EVERY 8 HOURS PRN
Qty: 30 TABLET | Refills: 0 | Status: SHIPPED | OUTPATIENT
Start: 2022-11-28 | End: 2022-11-29

## 2022-11-28 RX ORDER — HYDROMORPHONE HYDROCHLORIDE 2 MG/1
2-4 TABLET ORAL EVERY 4 HOURS PRN
Status: DISCONTINUED | OUTPATIENT
Start: 2022-11-28 | End: 2022-11-30

## 2022-11-28 RX ORDER — GABAPENTIN 300 MG/1
300 CAPSULE ORAL 3 TIMES DAILY
Qty: 90 CAPSULE | Refills: 0 | Status: SHIPPED | OUTPATIENT
Start: 2022-11-28 | End: 2022-12-02

## 2022-11-28 RX ORDER — HYDROXYZINE HYDROCHLORIDE 25 MG/1
25 TABLET, FILM COATED ORAL EVERY 6 HOURS PRN
Qty: 30 TABLET | Refills: 0 | Status: SHIPPED | OUTPATIENT
Start: 2022-11-28 | End: 2022-12-06

## 2022-11-28 RX ORDER — AMOXICILLIN 250 MG
1 CAPSULE ORAL 2 TIMES DAILY
Qty: 30 TABLET | Refills: 0 | Status: SHIPPED | OUTPATIENT
Start: 2022-11-28 | End: 2022-12-02

## 2022-11-28 RX ADMIN — DEXAMETHASONE 10 MG: 2 TABLET ORAL at 06:44

## 2022-11-28 RX ADMIN — HYDROMORPHONE HYDROCHLORIDE 2 MG: 2 TABLET ORAL at 14:40

## 2022-11-28 RX ADMIN — METHOCARBAMOL 750 MG: 750 TABLET ORAL at 20:21

## 2022-11-28 RX ADMIN — ACETAMINOPHEN 975 MG: 325 TABLET, FILM COATED ORAL at 11:57

## 2022-11-28 RX ADMIN — GABAPENTIN 300 MG: 300 CAPSULE ORAL at 08:47

## 2022-11-28 RX ADMIN — OXYCODONE HYDROCHLORIDE 10 MG: 10 TABLET ORAL at 06:48

## 2022-11-28 RX ADMIN — POLYETHYLENE GLYCOL 3350 17 G: 17 POWDER, FOR SOLUTION ORAL at 08:48

## 2022-11-28 RX ADMIN — ACETAMINOPHEN 975 MG: 325 TABLET, FILM COATED ORAL at 18:51

## 2022-11-28 RX ADMIN — SENNOSIDES AND DOCUSATE SODIUM 1 TABLET: 50; 8.6 TABLET ORAL at 20:21

## 2022-11-28 RX ADMIN — HYDROMORPHONE HYDROCHLORIDE 0.2 MG: 0.2 INJECTION, SOLUTION INTRAMUSCULAR; INTRAVENOUS; SUBCUTANEOUS at 09:29

## 2022-11-28 RX ADMIN — FAMOTIDINE 20 MG: 20 TABLET ORAL at 20:21

## 2022-11-28 RX ADMIN — HYDROMORPHONE HYDROCHLORIDE 2 MG: 2 TABLET ORAL at 14:50

## 2022-11-28 RX ADMIN — OXYCODONE HYDROCHLORIDE 10 MG: 10 TABLET ORAL at 02:30

## 2022-11-28 RX ADMIN — OXYCODONE HYDROCHLORIDE 10 MG: 10 TABLET ORAL at 11:58

## 2022-11-28 RX ADMIN — DEXAMETHASONE 10 MG: 2 TABLET ORAL at 14:39

## 2022-11-28 RX ADMIN — ACETAMINOPHEN 975 MG: 325 TABLET, FILM COATED ORAL at 03:42

## 2022-11-28 RX ADMIN — SENNOSIDES AND DOCUSATE SODIUM 1 TABLET: 50; 8.6 TABLET ORAL at 08:46

## 2022-11-28 RX ADMIN — HYDROMORPHONE HYDROCHLORIDE 4 MG: 2 TABLET ORAL at 22:46

## 2022-11-28 RX ADMIN — HYDROMORPHONE HYDROCHLORIDE 0.4 MG: 0.2 INJECTION, SOLUTION INTRAMUSCULAR; INTRAVENOUS; SUBCUTANEOUS at 04:11

## 2022-11-28 RX ADMIN — HYDROMORPHONE HYDROCHLORIDE 4 MG: 2 TABLET ORAL at 18:51

## 2022-11-28 RX ADMIN — GABAPENTIN 300 MG: 300 CAPSULE ORAL at 20:21

## 2022-11-28 RX ADMIN — FAMOTIDINE 20 MG: 20 TABLET ORAL at 08:47

## 2022-11-28 RX ADMIN — GABAPENTIN 300 MG: 300 CAPSULE ORAL at 14:39

## 2022-11-28 ASSESSMENT — ACTIVITIES OF DAILY LIVING (ADL)
ADLS_ACUITY_SCORE: 22

## 2022-11-28 NOTE — PROGRESS NOTES
[facility]    Medicine Progress Note - Hospitalist Service, GOLD TEAM 19    Date of Admission:  11/26/2022    Assessment & Plan   Christine HEARN Shruti Hu is a 42 year old female admitted on 11/26/2022 after cervical spine surgery. She has history of myalgias chronic pain, cervical spine issues knee, shoulder hip wrist ankle issues.     She underwent surgery with Dr Murray 11/26:   1. Anterior cervical discectomy with removal of disc material and osteophytes at C4-5 and C5-6 for decompression of the spinal cord.  2. Bilateral Foraminotomies at C4-5 and C5-6for decompression of the nerve roots.  3. Placement of intervertebral prosthetic device at C4-5 and C5-6, Medtronic PEEK Cages.  4. Use of Iliac Crest Autograft for fusion, C4-5 and C5-6, which was harvested through a separate skin and fascial incision.  5. Placement of anterior Medtronic plate and screw instrumentation, C4-6.  The plate was placed separately from the interbody and was placed to aid in the fusion      #Neck pain with radiculopathy   -- Status post above surgery   -- Per orthopedic surgery       #Right arm weakness  -- Started 11/26  But now arm is much weaker , cannot shrug shoulder and lift right elbow for me, denies any numbness and sensatiosn seem ok   -- She followed by orthopedic surgery  there are concerns for C5 palsy/neuroprecia and was stared on steroids 11/26 , CT c spine pending       #Hypertension   -- Per patient is situational, not on PTA medications   -- Treat pain, also placed PRN hydralazine      #Multiple joint issues   #ACL tear  -- Planned to have surgery knee surgery nest   Tear of right supraspinatus, tenosynovitis of left ankle an ATFL tear  -- Defer back to orthopedic surgery         #Hx Meniere's disease   -- No issue currently      #Anxiety, depression  -- History previous overdose   -- Not on medications currently      #Mentioned thyrotoxicosis   -- Patient does not recall  "having any thyroid issues TSH was normal in April of this year, not on medications       #Recent COVID-19  -- Tested + 11/9 , considered COVID recovered   -- Would do good DVT prophylaxis          Diet: Snacks/Supplements Adult: Ensure Max Protein (bariatric); With Meals  Snacks/Supplements Adult: Ensure Max Protein (bariatric); Between Meals  Diet  Regular Diet Adult  Advance Diet as Tolerated: Regular Diet Adult    DVT Prophylaxis: Pneumatic Compression Devices  Ogden Catheter: Not present  Central Lines: None  Cardiac Monitoring: None  Code Status: Full Code      Disposition Plan      Expected Discharge Date: 11/28/2022,  9:00 AM              The patient's care was discussed with the Bedside Nurse, Care Coordinator/, Patient and Orthopedic Surgery Consultant.    Sanjeev Castillo DO  Hospitalist Service, [address]  Securely message with the Vocera Web Console (learn more here)  Text page via [facility] Paging/Directory   Please see signed in provider for up to date coverage information      Clinically Significant Risk Factors                       # Overweight: Estimated body mass index is 26.08 kg/m  as calculated from the following:    Height as of this encounter: 1.676 m (5' 6\").    Weight as of this encounter: 73.3 kg (161 lb 9.6 oz)., PRESENT ON ADMISSION         ______________________________________________________________________    Interval History   Patient has a plethora of medical concerns.  Patient reports improving RUE palsy.  Patient reports no bowel movements since surgery.  Patient is requesting to continue IV Dilaudid.    Data reviewed today: I reviewed all medications, new labs and imaging results over the last 24 hours. I personally reviewed no images or EKG's today.    Physical Exam   Vital Signs: Temp: 97.9  F (36.6  C) Temp src: Oral BP: 136/73 Pulse: 70   Resp: 18 SpO2: 97 % O2 Device: None (Room air)    Weight: 161 lbs 9.55 " oz    GENERAL: Alert and oriented x 3; no acute distress; well-nourished.  HEENT: Normocephalic; atraumatic; PERRLA; MMM; cervical collar.  CV: RRR; normal S1, S2; no rubs, murmurs, or gallops.  RESP: Lung fields clear to aucultation B/L; no wheezing or crepitations.  GI: Abdomen is soft, nontender, nondistended; no organomegaly; normal bowel sounds.  : Deferred genital examination.   MSK: No clubbing, cyanosis, or edema.  DERM: Skin is intact; no rash, lesions, or skin breakdown.  NEURO: No focal deficits appreciated; strength & sensorium are grossly intact.  PSYCH: No active hallucinations; affect, insight appear within normal limits.    Data   Recent Labs   Lab 11/27/22  0602 11/26/22  0721   HGB 12.3  --    CR 0.44*  --    GLC  --  113*     Recent Results (from the past 24 hour(s))   CT Cervical Spine w/o Contrast    Narrative    CT CERVICAL SPINE W/O CONTRAST 11/27/2022 3:03 PM    Provided History: Neck pain; hx Spine surgery; No suspected hardware  failure; Weakness; No known/automatically detected potential  contraindications to imaging    Comparison: CT 10/3/2022    Technique: Using multidetector thin collimation helical acquisition  technique, axial, coronal and sagittal CT images through the cervical  spine were obtained without intravenous contrast.     Findings:  Postsurgical changes from anterior instrumented spinal fusion C4-C6.  Air densities in the prevertebral space, extending along the fascial  planes. Slight prevertebral soft tissue edema with resultant  thickening.    Minimal anterolisthesis at C2-3.     No acute fracture or subluxation.  There is multilevel disc height  narrowing. The findings on a level by level basis are as follows:    C2-3:  No spinal canal or neural foraminal stenosis.    C3-4:  Uncovertebral spurring. Mild to moderate right and mild left  neural foraminal stenosis. No spinal canal stenosis.    C4-5:  Fusion level. No spinal canal or neural foraminal stenosis.    C5-6:   Fusion level. No spinal canal or neural foraminal stenosis.    C6-7:  Disc bulge. No spinal canal or neural foraminal stenosis.    C7-T1:  No spinal canal or neural foraminal stenosis.     No abnormality of the paraspinous soft tissues. There is a drainage  catheter within the left side of the neck. Partially visualized  canal-wall up mastoidectomy on the right.      Impression    Impression:   1. No acute fracture or traumatic subluxation.  2. Postsurgical changes from instrumented anterior spinal fusion  C4-C6. Fusion hardware is in proper position. Air densities along the  fascial planes.    RAZA BUSTOS MD         SYSTEM ID:  [id number]   MR Cervical Spine w/o & w Contrast    Narrative    EXAM: MR CERVICAL SPINE W/O & W CONTRAST  11/27/2022 6:17 PM     HISTORY:  eval for right C5 stenosis       COMPARISON:  Same day CT C-spine    TECHNIQUE: Sagittal T1-weighted and T2-weighted and axial T2-weighted  images of the cervical spine were obtained without intravenous  contrast. Post intravenous contrast using gadolinium axial and  sagittal T1-weighted images were obtained with fat saturation.    CONTRAST: 7.3ml gadovist.    FINDINGS:    Cervical:   Postsurgical changes of anterior instrumented fusion from C4 to C6  with interbody spacers. Stable appearance of prevertebral soft tissue  swelling.     Normal marrow signal. No cord signal abnormality.    The findings on a level by level basis are as follows:    C2-3: No significant spinal canal or neural foraminal stenosis.    C3-4: Uncovertebral spurring. Mild to moderate right and mild left  neural foraminal stenosis. No spinal canal stenosis.    C4-5: Fusion level. There is mild bilateral neural foraminal stenosis,  improved compared to presurgical MRI from 7/12/2022. No spinal canal  stenosis.    C5-6: Fusion level. No significant spinal canal or neural foraminal  stenosis.    C6-7: Tiny central protrusion. No significant spinal canal or neural  foraminal  stenosis.    C7-T1: No significant spinal canal or neural foraminal stenosis.    The visualized skull base, posterior fossa, and paraspinal soft  tissues are within normal limits.      Impression    IMPRESSION:  1. Early postsurgical changes from instrumented anterior spinal fusion  C4-C6 with moderate prevertebral edema.  2. There continues be to be mild bilateral neural foraminal stenosis  at C4-5. However, significantly improved compared to presurgical MRI  from 7/12/2022.    I have personally reviewed the examination and initial interpretation  and I agree with the findings.    RAZA BUSTOS MD         SYSTEM ID:  [id number]     Medications     sodium chloride Stopped (11/27/22 0553)       acetaminophen  975 mg Oral Q8H     dexamethasone  10 mg Oral Q8H KINDRA     famotidine  20 mg Oral BID    Or     famotidine  20 mg Intravenous BID     gabapentin  300 mg Oral TID     polyethylene glycol  17 g Oral Daily     senna-docusate  1 tablet Oral BID     sodium chloride (PF)  3 mL Intracatheter Q8H

## 2022-11-28 NOTE — TELEPHONE ENCOUNTER
----- Message from Wilbur Murray MD sent at 11/28/2022  8:08 AM CST -----  Please arrange in person follow up for Christine Next Tuesday with me in clinic.  Thank you.    Teodoro

## 2022-11-28 NOTE — TELEPHONE ENCOUNTER
Per Dr. Murray's request, RN called and spoke with patient and arranged her appt with Dr. Murray next Tuesday.  DOS 11/26. ACDF.    Teo Chacko RN

## 2022-11-28 NOTE — PLAN OF CARE
Pt aox4. A1 walker. Cont of B&B. 2 L PIVs SL. 10 Syriac hemovac with scant output. (15mL)    CMS: Pt reports intermittent tingling in R arm and as of 4:45 right foot. Some weakness in right arm (no changes from 11/27) and no change in motor function btwn R and L foot. (Slight weakness in dorsi/plantar flexion). All extremities warm. Pedal pulses +2.    Pain: pain partially managed with PRN IV dilaudid q2h and PO oxy q4h last given 0645

## 2022-11-28 NOTE — PLAN OF CARE
"2442-4513:     BP (!) 160/84 (BP Location: Right arm)   Pulse 77   Temp 98.8  F (37.1  C) (Oral)   Resp 16   Ht 1.676 m (5' 6\")   Wt 73.3 kg (161 lb 9.6 oz)   LMP  (LMP Unknown)   SpO2 97%   BMI 26.08 kg/m      A&Ox4  Assist of 1 w/ walker  LBM: 11/25  2 L-PIV - SL  Hemovac  Anterior neck dressing  L hip dressing    Pt has had some R arm weakness since surgery, MD aware, CT and MRI completed today. Pt is NPO @ 0000, in case she needs to go back into surgery. No acute concerns this evening. Pain managed w/ PRN IV dilaudid 1x, PRN oxy x2, PRN robaxin x1, and ice packs. Minimal output from hemovac tonight. Uses call light appropriately, able to clearly communicate needs.     "

## 2022-11-28 NOTE — DISCHARGE SUMMARY
ORTHOPAEDIC SURGERY DISCHARGE SUMMARY     Date of Admission: 11/26/2022  Date of Discharge: No discharge date for patient encounter.  Disposition: Home  Staff Physician: Wilbur Murray*  Primary Care Provider: Angi Marroquin      ADMISSION DIAGNOSIS:  Neck pain [M54.2]  Cervical radiculopathy at C5 [M54.12]    DISCHARGE DIAGNOSIS:  Neck pain [M54.2]  Cervical radiculopathy at C5 [M54.12]    PROCEDURES: Procedure(s):  Cervical 4 to 6 Anterior Cervical Decompression and Fusion with Medtronic Plate and Screws, Interbody Device, Use of Fluoroscopy, Microscope  Left Sided Iliac Crest Autograft on 11/26/2022  Right cervical 4-5 posterior foraminotomy on 11/29/2022    BRIEF HISTORY:  Christine Hu is a 42 year old female with debilitating symptoms from Neck pain and Cervical radiculopathy.  The patient elected surgical treatment, and understood the indications for this surgery, as well as its risks, benefits, and alternatives. We reviewed the risks and benefits of the surgery in detail. The risks include, but are not limited to, the general risks associated with anesthesia, including death, pulmonary embolism, DVT, stroke, myocardial infarction, pneumonia, and urinary tract infection. Additional risks specific to the surgery include the risk of infection, failure to heal (non-union), dural tear with resultant CSF leak which might necessitate placement of a drain or revision surgery or could result in headaches, nerve injury resulting in weakness or paralysis, risk of adjacent segment disease, the risks of vascular injury resulting in severe or possibly uncontrollable bleeding, need for revision surgery in the future due to one of the above issues, or risk of incomplete symptom relief.  Christine Hu understands the risks of the surgery and wishes to proceed.  No guarantees were given.    HOSPITAL COURSE:    The patient was admitted following the above listed procedures for pain control  and rehabilitation.  Postoperatively, the patient was noted to have a C5 nerve palsy.  Initial treatment was steroids and observation.  The patient continued to have worsening symptoms and pain in the C5 nerve distribution and the decision was made to proceed with a right C4-5 foraminotomy to ensure no residual compression of the nerve.  Following this, she has experienced little recovery from a pain or motor standpoint of the C5 nerve. Long discussions were had with the patient regarding that partial nerve palsies do recover in the vast majority of patients and it is very difficult to prognosticate with certainty recovery.  The pain team was consulted for recommendations regarding postoperative pain control.  Medicine was consulted post operatively to aid in management of medical co-morbidities. The patient received routine nursing cares and at the time of discharge was medically stable. Vital signs were stable throughout admission. The patient is tolerating a regular diet and is voiding spontaneously. All PT/OT goals have been met for safe mobility. Pain is now controlled on oral medications which will be available on discharge. Stool softeners have been used while taking pain medications to help prevent constipation. Drains removed prior to discharge. Christine Hu is deemed medically safe to discharge on POD6.     Antibiotics:  Ancef given periop until drain removed  DVT prophylaxis:  SCDs initiated after surgery  PT Progress:  Has met PT/OT goals for safe mobility. PT recommending home   Pain Meds:  Weaned off all IV pain meds by discharge.  Inpatient Events: Right C5 nerve palsy    PHYSICAL EXAM:    Please refer to today's progress note for physical exam.     FOLLOWUP:    Future Appointments   Date Time Provider Department Center   11/28/2022  9:15 AM Bassam Blackburn PTA [facility]   11/28/2022  7:00 PM UR OT WAITLIST [facility]   1/5/2023 10:20 AM [facility] [place]   1/10/2023 11:00 AM Wilbur Murray MD [facility]   2/9/2023 10:20 AM Mc Peterson MD [facility] [place]       Orthopaedic Surgery appointments are at the [facility] [place] ([address] [place] [address]). Call [phone] to schedule a follow-up appointment at this location with your provider.     PLANNED DISCHARGE ORDERS:     DVT Prophylaxis: None     - Activity: Up with assist until independent. No excessive bending or twisting. No lifting >10 lbs x 6 weeks. No Davin lift for transfers.   - Weightbearing Status: WBAT.     Wound Care: see Below      Discharge Medication List as of 12/2/2022  1:25 PM      START taking these medications    Details   acetaminophen (TYLENOL) 325 MG tablet Take 2 tablets (650 mg) by mouth every 4 hours as needed for other, Disp-60 tablet, R-0, E-Prescribe      diazepam (VALIUM) 5 MG tablet Take 1 tablet (5 mg) by mouth every 8 hours as needed for anxiety, Disp-6 tablet, R-0, E-Prescribe      hydrOXYzine (ATARAX) 25 MG tablet Take 1 tablet (25 mg) by mouth every 6 hours as needed for other (adjuvant pain), Disp-30 tablet, R-0, E-Prescribe      polyethylene glycol (MIRALAX) 17 g packet Take 17 g by mouth daily, Disp-10 packet, R-0, E-Prescribe         CONTINUE these medications which have CHANGED    Details   !! gabapentin (NEURONTIN) 300 MG capsule Take 1 capsule (300 mg) by mouth 2 times daily, Disp-60 capsule, R-0, E-Prescribe      !! gabapentin (NEURONTIN) 300 MG capsule Take 2 capsules (600 mg) by mouth At Bedtime, Disp-60 capsule, R-0, E-Prescribe      HYDROmorphone (DILAUDID) 4 MG tablet Take 0.5-1 tablets (2-4 mg) by mouth every 4 hours, Disp-30 tablet, R-0, NATHALY, E-Prescribe      methocarbamol 1000 MG TABS Take 1,000 mg by mouth every 6 hours as needed for muscle spasms, Disp-40 tablet, R-0, E-Prescribe      senna-docusate (SENOKOT-S/PERICOLACE) 8.6-50 MG tablet Take 1 tablet by mouth 2 times daily, Disp-30  tablet, R-0, E-Prescribe       !! - Potential duplicate medications found. Please discuss with provider.      STOP taking these medications       ibuprofen (ADVIL/MOTRIN) 200 MG tablet Comments:   Reason for Stopping:         oxyCODONE (ROXICODONE) 5 MG tablet Comments:   Reason for Stopping:         tiZANidine (ZANAFLEX) 4 MG tablet Comments:   Reason for Stopping:                 Discharge Procedure Orders   Physical Therapy Referral   Standing Status: Future   Referral Priority: Routine: Next available opening Referral Type: Rehab Therapy Physical Therapy   Number of Visits Requested: 1     Reason for your hospital stay   Order Comments: Christine Hu is a 42 year old female now s/p C4-6 ACDF on 11/26 with Dr. Murray     Activity   Order Comments: Your activity upon discharge: activity as tolerated    - Activity: Up with assist until independent. No excessive bending or twisting. No lifting >10 lbs x 6 weeks. No Davin lift for transfers.   - Weightbearing Status: WBAT.     Order Specific Question Answer Comments   Is discharge order? Yes      When to contact your care team   Order Comments: Call Dr Murray if you have any of the following: temperature greater than 101.3  or less than 96.5,  increased shortness of breath, increased drainage, increased swelling, or increased pain.     Wound care and dressings   Order Comments: Instructions to care for your wound at home: ice to area for comfort, keep wound clean and dry, may get incision wet in shower but do not soak or scrub, reinforce dressing as needed, and remove dressing in 7 days.     Adult [facility] Follow-up and recommended labs and tests   Order Comments: FOLLOW UP WITH DR MURRAY as scheduled.  Clinic phone number is     Appointments on [place] and/or [facility] (with [facility] or [facility] provider or service). Call [phone] if you haven't heard regarding these appointments within 7 days of discharge.     Walker Order   Order  Comments: I, the undersigned, certify that the above prescribed supplies are medically necessary for this patient and is both reasonable and necessary in reference to accepted standards of medical and necessary in reference to accepted standards of medical practice in the treatment of this patient's condition and is not prescribed as a convenience.      Order Specific Question Answer Comments   DME Provider: [facility]    Start Date: 12/1/2022    Walker Type: Standard (2 Wheel)    Diagnosis: R26.89 - Impaired Gait and Mobility      Diet   Order Comments: Follow this diet upon discharge: Orders Placed This Encounter      Snacks/Supplements Adult: Ensure Max Protein (bariatric); With Meals      Snacks/Supplements Adult: Ensure Max Protein (bariatric); Between Meals      Snacks/Supplements Adult: Ensure Max Protein (bariatric); Between Meals      NPO per Anesthesia Guidelines for Procedure/Surgery Except for: Meds, Ice Chips, Other; Specify: may have sips of water     Order Specific Question Answer Comments   Is discharge order? Yes      Regular Diet Adult     Order Specific Question Answer Comments   Is discharge order? Yes        Eric Oquendo MD  Orthopaedic Surgery PGY-4

## 2022-11-28 NOTE — PROGRESS NOTES
"   11/28/22 1500   Appointment Info   Signing Clinician's Name / Credentials (OT) Shauna Nuñez, OTR/L   Living Environment   People in Home significant other;child(ladan), dependent   Current Living Arrangements house   Home Accessibility stairs to enter home;stairs within home   Number of Stairs, Main Entrance 4   Number of Stairs, Within Home, Primary greater than 10 stairs   Transportation Anticipated family or friend will provide   Living Environment Comments Pt lives in a multi-level home with elementary age child and x2 teenagers and significant other. She reports that she is able to meet all needs on main level of home except for showering. Main level bathroom with grab bars. Needs to go upstairs for bathing for walk-in shower with built in bench.   Self-Care   Usual Activity Tolerance good   Current Activity Tolerance moderate   Regular Exercise Yes   Activity/Exercise Type   (enjoys yoga, running/jogging)   Equipment Currently Used at Home grab bar, toilet;shower chair   Fall history within last six months no   Activity/Exercise/Self-Care Comment pt was previously independent with all I/ADLs. Pt reports she will be home alone from 715am-330pm daily with no assist from family/friends.   Instrumental Activities of Daily Living (IADL)   IADL Comments shares responsibilities with significant other and teenage children.   General Information   Onset of Illness/Injury or Date of Surgery 11/26/22   Referring Physician Fadia Shah MD   Patient/Family Therapy Goal Statement (OT) return home safely, progress independence.   Additional Occupational Profile Info/Pertinent History of Current Problem per chart: \"Christine Hu is a 42 year old female admitted on 11/26/2022 after cervical spine surgery. She has history of myalgias chronic pain, cervical spine issues knee, shoulder hip wrist ankle issues\"   Existing Precautions/Restrictions fall;spinal   Limitations/Impairments sensory  (pain)   General " Observations and Info Activity order: up with assist. Aspen collar on at all times   Cognitive Status Examination   Orientation Status orientation to person, place and time   Visual Perception   Visual Impairment/Limitations corrective lenses for distance   Sensory   Sensory Quick Adds sensation intact   Sensory Comments pt reports some bilateral tingling and numbness in hands at baseline.   Pain Assessment   Patient Currently in Pain Yes, see Vital Sign flowsheet   Range of Motion Comprehensive   General Range of Motion upper extremity range of motion deficits identified   General Upper Extremity Assessment (Range of Motion)   Comment: Upper Extremity ROM RUE: unable to shurg, significant deficits in shoulder flexion, elbow flexion, wrist flexion, shoulder abduction   Upper Extremity: Range of Motion LUE ROM WFL   Strength Comprehensive (MMT)   General Manual Muscle Testing (MMT) Assessment upper extremity strength deficits identified   Upper Extremity (Manual Muscle Testing)   Upper Extremity: Manual Muscle Testing (MMT) left UE strength is WFL   Comment, MMT: Upper Extremity RUE significant strength deficits. grossly 2/5.   Muscle Tone Assessment   Muscle Tone Quick Adds No deficits were identified   Coordination   Fine Motor Coordination intermittent fine motor deficits with RUE, signficant with grasp. however, pt able to grasp large objects to stabilize.   Bed Mobility   Comment (Bed Mobility) increased time, further education required, CGA   Transfers   Transfer Comments Pantera with FWW   Clinical Impression   Criteria for Skilled Therapeutic Interventions Met (OT) Yes, treatment indicated   OT Diagnosis self-care impairment.   OT Problem List-Impairments impacting ADL problems related to;activity tolerance impaired;balance;range of motion (ROM);strength;sensation;pain;post-surgical precautions   Assessment of Occupational Performance 3-5 Performance Deficits   Identified Performance Deficits dressing, bathing,  toileting, g/h, home management.   Planned Therapy Interventions (OT) ADL retraining;IADL retraining;bed mobility training;ROM;strengthening;transfer training;home program guidelines;progressive activity/exercise;risk factor education   Clinical Decision Making Complexity (OT) moderate complexity   Anticipated Equipment Needs Upon Discharge (OT) dressing equipment   Risk & Benefits of therapy have been explained evaluation/treatment results reviewed;care plan/treatment goals reviewed;risks/benefits reviewed;current/potential barriers reviewed;participants voiced agreement with care plan;participants included;patient   OT Total Evaluation Time   OT Eval, Moderate Complexity Minutes (04705) 5   OT Goals   Therapy Frequency (OT) Daily   OT Predicted Duration/Target Date for Goal Attainment 12/14/22   OT Goals Hygiene/Grooming;Upper Body Dressing;Lower Body Dressing;Toilet Transfer/Toileting   OT: Hygiene/Grooming modified independent;within precautions;while standing   OT: Upper Body Dressing Modified independent;using adaptive equipment;within precautions   OT: Lower Body Dressing Modified independent;using adaptive equipment;within precautions   OT: Toilet Transfer/Toileting Modified independent;toilet transfer;cleaning and garment management;using adaptive equipment;within precautions   OT Discharge Planning   OT Plan OT: BUE strengthening, fine motor, therablock/foam tubing, dressing   OT Discharge Recommendation (DC Rec) home with outpatient occupational therapy;home with assist   OT Rationale for DC Rec pt would benefit from assist with discharge home to maintain post-op precautions with daily activities and higher level IADLs. Recommend OP OT to progress RUE strengthening and performance with activity. pt has all equipment at home including grab bars and shower AE.   OT Brief overview of current status Pantera with FWW, modA toilet transfer

## 2022-11-29 ENCOUNTER — APPOINTMENT (OUTPATIENT)
Dept: GENERAL RADIOLOGY | Facility: CLINIC | Age: 42
DRG: 472 | End: 2022-11-29
Attending: ORTHOPAEDIC SURGERY
Payer: COMMERCIAL

## 2022-11-29 ENCOUNTER — ANESTHESIA EVENT (OUTPATIENT)
Dept: SURGERY | Facility: CLINIC | Age: 42
DRG: 472 | End: 2022-11-29
Payer: COMMERCIAL

## 2022-11-29 ENCOUNTER — APPOINTMENT (OUTPATIENT)
Dept: CT IMAGING | Facility: CLINIC | Age: 42
DRG: 472 | End: 2022-11-29
Attending: ORTHOPAEDIC SURGERY
Payer: COMMERCIAL

## 2022-11-29 ENCOUNTER — APPOINTMENT (OUTPATIENT)
Dept: PHYSICAL THERAPY | Facility: CLINIC | Age: 42
DRG: 472 | End: 2022-11-29
Attending: ORTHOPAEDIC SURGERY
Payer: COMMERCIAL

## 2022-11-29 ENCOUNTER — ANESTHESIA (OUTPATIENT)
Dept: SURGERY | Facility: CLINIC | Age: 42
DRG: 472 | End: 2022-11-29
Payer: COMMERCIAL

## 2022-11-29 LAB
ALBUMIN SERPL-MCNC: 3.2 G/DL (ref 3.4–5)
ALP SERPL-CCNC: 58 U/L (ref 40–150)
ALT SERPL W P-5'-P-CCNC: 23 U/L (ref 0–50)
ANION GAP SERPL CALCULATED.3IONS-SCNC: 3 MMOL/L (ref 3–14)
AST SERPL W P-5'-P-CCNC: 12 U/L (ref 0–45)
BASOPHILS # BLD AUTO: 0 10E3/UL (ref 0–0.2)
BASOPHILS NFR BLD AUTO: 0 %
BILIRUB SERPL-MCNC: 0.3 MG/DL (ref 0.2–1.3)
BUN SERPL-MCNC: 20 MG/DL (ref 7–30)
CALCIUM SERPL-MCNC: 9.1 MG/DL (ref 8.5–10.1)
CHLORIDE BLD-SCNC: 104 MMOL/L (ref 94–109)
CO2 SERPL-SCNC: 32 MMOL/L (ref 20–32)
CREAT SERPL-MCNC: 0.52 MG/DL (ref 0.52–1.04)
EOSINOPHIL # BLD AUTO: 0 10E3/UL (ref 0–0.7)
EOSINOPHIL NFR BLD AUTO: 0 %
ERYTHROCYTE [DISTWIDTH] IN BLOOD BY AUTOMATED COUNT: 12.9 % (ref 10–15)
GFR SERPL CREATININE-BSD FRML MDRD: >90 ML/MIN/1.73M2
GLUCOSE BLD-MCNC: 69 MG/DL (ref 70–99)
HCT VFR BLD AUTO: 35.7 % (ref 35–47)
HGB BLD-MCNC: 11.9 G/DL (ref 11.7–15.7)
IMM GRANULOCYTES # BLD: 0.1 10E3/UL
IMM GRANULOCYTES NFR BLD: 1 %
LYMPHOCYTES # BLD AUTO: 2 10E3/UL (ref 0.8–5.3)
LYMPHOCYTES NFR BLD AUTO: 21 %
MCH RBC QN AUTO: 33.7 PG (ref 26.5–33)
MCHC RBC AUTO-ENTMCNC: 33.3 G/DL (ref 31.5–36.5)
MCV RBC AUTO: 101 FL (ref 78–100)
MONOCYTES # BLD AUTO: 1.2 10E3/UL (ref 0–1.3)
MONOCYTES NFR BLD AUTO: 12 %
NEUTROPHILS # BLD AUTO: 6.6 10E3/UL (ref 1.6–8.3)
NEUTROPHILS NFR BLD AUTO: 66 %
NRBC # BLD AUTO: 0 10E3/UL
NRBC BLD AUTO-RTO: 0 /100
PLATELET # BLD AUTO: 189 10E3/UL (ref 150–450)
POTASSIUM BLD-SCNC: 4.2 MMOL/L (ref 3.4–5.3)
PROT SERPL-MCNC: 6.6 G/DL (ref 6.8–8.8)
RBC # BLD AUTO: 3.53 10E6/UL (ref 3.8–5.2)
SODIUM SERPL-SCNC: 139 MMOL/L (ref 133–144)
WBC # BLD AUTO: 9.9 10E3/UL (ref 4–11)

## 2022-11-29 PROCEDURE — 999N000040 HC STATISTIC CONSULT NO CHARGE VASC ACCESS

## 2022-11-29 PROCEDURE — 85025 COMPLETE CBC W/AUTO DIFF WBC: CPT | Performed by: INTERNAL MEDICINE

## 2022-11-29 PROCEDURE — 370N000017 HC ANESTHESIA TECHNICAL FEE, PER MIN: Performed by: ORTHOPAEDIC SURGERY

## 2022-11-29 PROCEDURE — 250N000013 HC RX MED GY IP 250 OP 250 PS 637: Performed by: ORTHOPAEDIC SURGERY

## 2022-11-29 PROCEDURE — 250N000011 HC RX IP 250 OP 636: Performed by: PHYSICIAN ASSISTANT

## 2022-11-29 PROCEDURE — 272N000004 HC RX 272: Performed by: ORTHOPAEDIC SURGERY

## 2022-11-29 PROCEDURE — 99232 SBSQ HOSP IP/OBS MODERATE 35: CPT | Performed by: INTERNAL MEDICINE

## 2022-11-29 PROCEDURE — 999N000127 HC STATISTIC PERIPHERAL IV START W US GUIDANCE

## 2022-11-29 PROCEDURE — 97530 THERAPEUTIC ACTIVITIES: CPT | Mod: GP | Performed by: PHYSICAL THERAPIST

## 2022-11-29 PROCEDURE — 250N000011 HC RX IP 250 OP 636: Performed by: CLINICAL NURSE SPECIALIST

## 2022-11-29 PROCEDURE — 250N000011 HC RX IP 250 OP 636: Performed by: ANESTHESIOLOGY

## 2022-11-29 PROCEDURE — 250N000011 HC RX IP 250 OP 636: Performed by: NURSE ANESTHETIST, CERTIFIED REGISTERED

## 2022-11-29 PROCEDURE — 360N000083 HC SURGERY LEVEL 3 W/ FLUORO, PER MIN: Performed by: ORTHOPAEDIC SURGERY

## 2022-11-29 PROCEDURE — 250N000013 HC RX MED GY IP 250 OP 250 PS 637: Performed by: STUDENT IN AN ORGANIZED HEALTH CARE EDUCATION/TRAINING PROGRAM

## 2022-11-29 PROCEDURE — 258N000003 HC RX IP 258 OP 636

## 2022-11-29 PROCEDURE — 250N000025 HC SEVOFLURANE, PER MIN: Performed by: ORTHOPAEDIC SURGERY

## 2022-11-29 PROCEDURE — 72125 CT NECK SPINE W/O DYE: CPT

## 2022-11-29 PROCEDURE — 250N000013 HC RX MED GY IP 250 OP 250 PS 637: Performed by: CLINICAL NURSE SPECIALIST

## 2022-11-29 PROCEDURE — 01N10ZZ RELEASE CERVICAL NERVE, OPEN APPROACH: ICD-10-PCS | Performed by: ORTHOPAEDIC SURGERY

## 2022-11-29 PROCEDURE — 80053 COMPREHEN METABOLIC PANEL: CPT | Performed by: INTERNAL MEDICINE

## 2022-11-29 PROCEDURE — 710N000010 HC RECOVERY PHASE 1, LEVEL 2, PER MIN: Performed by: ORTHOPAEDIC SURGERY

## 2022-11-29 PROCEDURE — 250N000009 HC RX 250: Performed by: NURSE ANESTHETIST, CERTIFIED REGISTERED

## 2022-11-29 PROCEDURE — 999N000141 HC STATISTIC PRE-PROCEDURE NURSING ASSESSMENT: Performed by: ORTHOPAEDIC SURGERY

## 2022-11-29 PROCEDURE — 258N000003 HC RX IP 258 OP 636: Performed by: NURSE ANESTHETIST, CERTIFIED REGISTERED

## 2022-11-29 PROCEDURE — 120N000002 HC R&B MED SURG/OB UMMC

## 2022-11-29 PROCEDURE — 97116 GAIT TRAINING THERAPY: CPT | Mod: GP | Performed by: PHYSICAL THERAPIST

## 2022-11-29 PROCEDURE — 250N000013 HC RX MED GY IP 250 OP 250 PS 637

## 2022-11-29 PROCEDURE — 99207 PR CDG-CUT & PASTE-POTENTIAL IMPACT ON LEVEL: CPT | Performed by: INTERNAL MEDICINE

## 2022-11-29 PROCEDURE — 272N000001 HC OR GENERAL SUPPLY STERILE: Performed by: ORTHOPAEDIC SURGERY

## 2022-11-29 PROCEDURE — 250N000009 HC RX 250: Performed by: ORTHOPAEDIC SURGERY

## 2022-11-29 PROCEDURE — 999N000180 XR SURGERY CARM FLUORO LESS THAN 5 MIN

## 2022-11-29 PROCEDURE — 72125 CT NECK SPINE W/O DYE: CPT | Mod: 26 | Performed by: RADIOLOGY

## 2022-11-29 PROCEDURE — 250N000009 HC RX 250

## 2022-11-29 PROCEDURE — 36415 COLL VENOUS BLD VENIPUNCTURE: CPT | Performed by: INTERNAL MEDICINE

## 2022-11-29 PROCEDURE — 250N000011 HC RX IP 250 OP 636

## 2022-11-29 PROCEDURE — 250N000011 HC RX IP 250 OP 636: Performed by: STUDENT IN AN ORGANIZED HEALTH CARE EDUCATION/TRAINING PROGRAM

## 2022-11-29 RX ORDER — OXYCODONE HYDROCHLORIDE 10 MG/1
10 TABLET ORAL EVERY 4 HOURS PRN
Status: DISCONTINUED | OUTPATIENT
Start: 2022-11-29 | End: 2022-11-30

## 2022-11-29 RX ORDER — LIDOCAINE HYDROCHLORIDE 20 MG/ML
INJECTION, SOLUTION INFILTRATION; PERINEURAL PRN
Status: DISCONTINUED | OUTPATIENT
Start: 2022-11-29 | End: 2022-11-29

## 2022-11-29 RX ORDER — HYDROMORPHONE HYDROCHLORIDE 1 MG/ML
0.4 INJECTION, SOLUTION INTRAMUSCULAR; INTRAVENOUS; SUBCUTANEOUS
Status: DISCONTINUED | OUTPATIENT
Start: 2022-11-29 | End: 2022-12-02 | Stop reason: HOSPADM

## 2022-11-29 RX ORDER — ONDANSETRON 2 MG/ML
4 INJECTION INTRAMUSCULAR; INTRAVENOUS EVERY 30 MIN PRN
Status: DISCONTINUED | OUTPATIENT
Start: 2022-11-29 | End: 2022-11-29

## 2022-11-29 RX ORDER — HYDROMORPHONE HYDROCHLORIDE 2 MG/1
2-4 TABLET ORAL EVERY 4 HOURS PRN
Qty: 30 TABLET | Refills: 0 | Status: SHIPPED | OUTPATIENT
Start: 2022-11-29 | End: 2022-12-02

## 2022-11-29 RX ORDER — LIDOCAINE HYDROCHLORIDE 40 MG/ML
SOLUTION TOPICAL PRN
Status: DISCONTINUED | OUTPATIENT
Start: 2022-11-29 | End: 2022-11-29

## 2022-11-29 RX ORDER — PROPOFOL 10 MG/ML
INJECTION, EMULSION INTRAVENOUS PRN
Status: DISCONTINUED | OUTPATIENT
Start: 2022-11-29 | End: 2022-11-29

## 2022-11-29 RX ORDER — ONDANSETRON 2 MG/ML
INJECTION INTRAMUSCULAR; INTRAVENOUS PRN
Status: DISCONTINUED | OUTPATIENT
Start: 2022-11-29 | End: 2022-11-29

## 2022-11-29 RX ORDER — FENTANYL CITRATE 50 UG/ML
25 INJECTION, SOLUTION INTRAMUSCULAR; INTRAVENOUS EVERY 5 MIN PRN
Status: DISCONTINUED | OUTPATIENT
Start: 2022-11-29 | End: 2022-11-30

## 2022-11-29 RX ORDER — FENTANYL CITRATE 50 UG/ML
50 INJECTION, SOLUTION INTRAMUSCULAR; INTRAVENOUS EVERY 5 MIN PRN
Status: DISCONTINUED | OUTPATIENT
Start: 2022-11-29 | End: 2022-11-30

## 2022-11-29 RX ORDER — HYDROMORPHONE HYDROCHLORIDE 1 MG/ML
0.4 INJECTION, SOLUTION INTRAMUSCULAR; INTRAVENOUS; SUBCUTANEOUS EVERY 5 MIN PRN
Status: DISCONTINUED | OUTPATIENT
Start: 2022-11-29 | End: 2022-11-29

## 2022-11-29 RX ORDER — KETAMINE HYDROCHLORIDE 10 MG/ML
INJECTION INTRAMUSCULAR; INTRAVENOUS PRN
Status: DISCONTINUED | OUTPATIENT
Start: 2022-11-29 | End: 2022-11-29

## 2022-11-29 RX ORDER — METHOCARBAMOL 750 MG/1
750 TABLET, FILM COATED ORAL EVERY 6 HOURS PRN
Status: DISCONTINUED | OUTPATIENT
Start: 2022-11-29 | End: 2022-11-29

## 2022-11-29 RX ORDER — SODIUM CHLORIDE, SODIUM LACTATE, POTASSIUM CHLORIDE, CALCIUM CHLORIDE 600; 310; 30; 20 MG/100ML; MG/100ML; MG/100ML; MG/100ML
INJECTION, SOLUTION INTRAVENOUS CONTINUOUS PRN
Status: DISCONTINUED | OUTPATIENT
Start: 2022-11-29 | End: 2022-11-29

## 2022-11-29 RX ORDER — SODIUM CHLORIDE, SODIUM LACTATE, POTASSIUM CHLORIDE, CALCIUM CHLORIDE 600; 310; 30; 20 MG/100ML; MG/100ML; MG/100ML; MG/100ML
INJECTION, SOLUTION INTRAVENOUS CONTINUOUS
Status: DISCONTINUED | OUTPATIENT
Start: 2022-11-29 | End: 2022-12-01

## 2022-11-29 RX ORDER — CEFAZOLIN SODIUM/WATER 2 G/20 ML
2 SYRINGE (ML) INTRAVENOUS
Status: COMPLETED | OUTPATIENT
Start: 2022-11-29 | End: 2022-11-29

## 2022-11-29 RX ORDER — CEFAZOLIN SODIUM/WATER 2 G/20 ML
2 SYRINGE (ML) INTRAVENOUS SEE ADMIN INSTRUCTIONS
Status: DISCONTINUED | OUTPATIENT
Start: 2022-11-29 | End: 2022-11-29 | Stop reason: HOSPADM

## 2022-11-29 RX ORDER — DEXAMETHASONE SODIUM PHOSPHATE 4 MG/ML
INJECTION, SOLUTION INTRA-ARTICULAR; INTRALESIONAL; INTRAMUSCULAR; INTRAVENOUS; SOFT TISSUE PRN
Status: DISCONTINUED | OUTPATIENT
Start: 2022-11-29 | End: 2022-11-29

## 2022-11-29 RX ORDER — FENTANYL CITRATE 50 UG/ML
INJECTION, SOLUTION INTRAMUSCULAR; INTRAVENOUS PRN
Status: DISCONTINUED | OUTPATIENT
Start: 2022-11-29 | End: 2022-11-29

## 2022-11-29 RX ORDER — PROPOFOL 10 MG/ML
INJECTION, EMULSION INTRAVENOUS CONTINUOUS PRN
Status: DISCONTINUED | OUTPATIENT
Start: 2022-11-29 | End: 2022-11-29

## 2022-11-29 RX ORDER — DIAZEPAM 10 MG/2ML
2.5 INJECTION, SOLUTION INTRAMUSCULAR; INTRAVENOUS ONCE
Status: COMPLETED | OUTPATIENT
Start: 2022-11-29 | End: 2022-11-29

## 2022-11-29 RX ORDER — GLYCOPYRROLATE 0.2 MG/ML
INJECTION, SOLUTION INTRAMUSCULAR; INTRAVENOUS PRN
Status: DISCONTINUED | OUTPATIENT
Start: 2022-11-29 | End: 2022-11-29

## 2022-11-29 RX ORDER — ONDANSETRON 4 MG/1
4 TABLET, ORALLY DISINTEGRATING ORAL EVERY 30 MIN PRN
Status: DISCONTINUED | OUTPATIENT
Start: 2022-11-29 | End: 2022-11-29

## 2022-11-29 RX ORDER — MAGNESIUM SULFATE HEPTAHYDRATE 40 MG/ML
INJECTION, SOLUTION INTRAVENOUS PRN
Status: DISCONTINUED | OUTPATIENT
Start: 2022-11-29 | End: 2022-11-29

## 2022-11-29 RX ORDER — HYDROMORPHONE HYDROCHLORIDE 1 MG/ML
0.2 INJECTION, SOLUTION INTRAMUSCULAR; INTRAVENOUS; SUBCUTANEOUS EVERY 5 MIN PRN
Status: DISCONTINUED | OUTPATIENT
Start: 2022-11-29 | End: 2022-11-29

## 2022-11-29 RX ORDER — DEXAMETHASONE SODIUM PHOSPHATE 10 MG/ML
4 INJECTION, SOLUTION INTRAMUSCULAR; INTRAVENOUS ONCE
Status: COMPLETED | OUTPATIENT
Start: 2022-11-29 | End: 2022-11-30

## 2022-11-29 RX ORDER — HYDROMORPHONE HYDROCHLORIDE 1 MG/ML
0.2 INJECTION, SOLUTION INTRAMUSCULAR; INTRAVENOUS; SUBCUTANEOUS
Status: DISCONTINUED | OUTPATIENT
Start: 2022-11-29 | End: 2022-12-02 | Stop reason: HOSPADM

## 2022-11-29 RX ORDER — BUPIVACAINE HYDROCHLORIDE AND EPINEPHRINE 2.5; 5 MG/ML; UG/ML
INJECTION, SOLUTION INFILTRATION; PERINEURAL PRN
Status: DISCONTINUED | OUTPATIENT
Start: 2022-11-29 | End: 2022-11-29 | Stop reason: HOSPADM

## 2022-11-29 RX ORDER — OXYCODONE HYDROCHLORIDE 5 MG/1
5 TABLET ORAL EVERY 4 HOURS PRN
Status: DISCONTINUED | OUTPATIENT
Start: 2022-11-29 | End: 2022-11-30

## 2022-11-29 RX ORDER — HYDROMORPHONE HCL IN WATER/PF 6 MG/30 ML
0.2 PATIENT CONTROLLED ANALGESIA SYRINGE INTRAVENOUS EVERY 4 HOURS PRN
Status: DISCONTINUED | OUTPATIENT
Start: 2022-11-29 | End: 2022-11-29

## 2022-11-29 RX ORDER — DIAZEPAM 2 MG
2 TABLET ORAL EVERY 8 HOURS PRN
Status: DISCONTINUED | OUTPATIENT
Start: 2022-11-29 | End: 2022-11-30

## 2022-11-29 RX ADMIN — METHOCARBAMOL 750 MG: 750 TABLET ORAL at 18:31

## 2022-11-29 RX ADMIN — FENTANYL CITRATE 50 MCG: 50 INJECTION, SOLUTION INTRAMUSCULAR; INTRAVENOUS at 15:25

## 2022-11-29 RX ADMIN — Medication 2 G: at 15:42

## 2022-11-29 RX ADMIN — HYDROMORPHONE HYDROCHLORIDE 0.4 MG: 1 INJECTION, SOLUTION INTRAMUSCULAR; INTRAVENOUS; SUBCUTANEOUS at 18:24

## 2022-11-29 RX ADMIN — GABAPENTIN 300 MG: 300 CAPSULE ORAL at 21:11

## 2022-11-29 RX ADMIN — FAMOTIDINE 20 MG: 20 TABLET ORAL at 21:12

## 2022-11-29 RX ADMIN — Medication 10 MG: at 16:14

## 2022-11-29 RX ADMIN — HYDROMORPHONE HYDROCHLORIDE 4 MG: 2 TABLET ORAL at 07:52

## 2022-11-29 RX ADMIN — Medication 30 MG: at 15:29

## 2022-11-29 RX ADMIN — HYDROMORPHONE HYDROCHLORIDE 4 MG: 2 TABLET ORAL at 02:58

## 2022-11-29 RX ADMIN — HYDROMORPHONE HYDROCHLORIDE 0.2 MG: 0.2 INJECTION, SOLUTION INTRAMUSCULAR; INTRAVENOUS; SUBCUTANEOUS at 13:51

## 2022-11-29 RX ADMIN — PHENYLEPHRINE HYDROCHLORIDE 100 MCG: 10 INJECTION INTRAVENOUS at 16:46

## 2022-11-29 RX ADMIN — LIDOCAINE HYDROCHLORIDE 3 ML: 40 SOLUTION TOPICAL at 15:32

## 2022-11-29 RX ADMIN — MIDAZOLAM 2 MG: 1 INJECTION INTRAMUSCULAR; INTRAVENOUS at 15:16

## 2022-11-29 RX ADMIN — PHENYLEPHRINE HYDROCHLORIDE 150 MCG: 10 INJECTION INTRAVENOUS at 17:08

## 2022-11-29 RX ADMIN — DEXAMETHASONE SODIUM PHOSPHATE 8 MG: 4 INJECTION, SOLUTION INTRA-ARTICULAR; INTRALESIONAL; INTRAMUSCULAR; INTRAVENOUS; SOFT TISSUE at 16:10

## 2022-11-29 RX ADMIN — POLYETHYLENE GLYCOL 3350 17 G: 17 POWDER, FOR SOLUTION ORAL at 07:52

## 2022-11-29 RX ADMIN — ACETAMINOPHEN 650 MG: 325 TABLET, FILM COATED ORAL at 18:30

## 2022-11-29 RX ADMIN — ACETAMINOPHEN 975 MG: 325 TABLET, FILM COATED ORAL at 02:58

## 2022-11-29 RX ADMIN — METHOCARBAMOL 750 MG: 750 TABLET ORAL at 04:20

## 2022-11-29 RX ADMIN — SENNOSIDES AND DOCUSATE SODIUM 1 TABLET: 50; 8.6 TABLET ORAL at 07:53

## 2022-11-29 RX ADMIN — DIAZEPAM 2.5 MG: 5 INJECTION, SOLUTION INTRAMUSCULAR; INTRAVENOUS at 19:30

## 2022-11-29 RX ADMIN — HYDROMORPHONE HYDROCHLORIDE 0.2 MG: 1 INJECTION, SOLUTION INTRAMUSCULAR; INTRAVENOUS; SUBCUTANEOUS at 17:59

## 2022-11-29 RX ADMIN — FENTANYL CITRATE 50 MCG: 50 INJECTION, SOLUTION INTRAMUSCULAR; INTRAVENOUS at 15:29

## 2022-11-29 RX ADMIN — DEXMEDETOMIDINE HYDROCHLORIDE 0.2 MCG/KG/HR: 100 INJECTION, SOLUTION INTRAVENOUS at 15:43

## 2022-11-29 RX ADMIN — PHENYLEPHRINE HYDROCHLORIDE 100 MCG: 10 INJECTION INTRAVENOUS at 17:27

## 2022-11-29 RX ADMIN — HYDROMORPHONE HYDROCHLORIDE 0.4 MG: 1 INJECTION, SOLUTION INTRAMUSCULAR; INTRAVENOUS; SUBCUTANEOUS at 18:10

## 2022-11-29 RX ADMIN — HYDROMORPHONE HYDROCHLORIDE 0.4 MG: 1 INJECTION, SOLUTION INTRAMUSCULAR; INTRAVENOUS; SUBCUTANEOUS at 21:12

## 2022-11-29 RX ADMIN — Medication 20 MG: at 16:29

## 2022-11-29 RX ADMIN — METHOCARBAMOL 750 MG: 750 TABLET ORAL at 09:49

## 2022-11-29 RX ADMIN — FAMOTIDINE 20 MG: 20 TABLET ORAL at 07:53

## 2022-11-29 RX ADMIN — FENTANYL CITRATE 100 MCG: 50 INJECTION, SOLUTION INTRAMUSCULAR; INTRAVENOUS at 16:14

## 2022-11-29 RX ADMIN — SENNOSIDES AND DOCUSATE SODIUM 1 TABLET: 50; 8.6 TABLET ORAL at 21:10

## 2022-11-29 RX ADMIN — HYDROXYZINE HYDROCHLORIDE 25 MG: 25 TABLET, FILM COATED ORAL at 13:23

## 2022-11-29 RX ADMIN — HYDROMORPHONE HYDROCHLORIDE 4 MG: 2 TABLET ORAL at 11:27

## 2022-11-29 RX ADMIN — Medication 40 MG: at 16:00

## 2022-11-29 RX ADMIN — PROPOFOL 50 MCG/KG/MIN: 10 INJECTION, EMULSION INTRAVENOUS at 15:43

## 2022-11-29 RX ADMIN — LIDOCAINE HYDROCHLORIDE 40 MG: 20 INJECTION, SOLUTION INFILTRATION; PERINEURAL at 15:25

## 2022-11-29 RX ADMIN — SUCCINYLCHOLINE CHLORIDE 20 MG: 20 INJECTION, SOLUTION INTRAMUSCULAR; INTRAVENOUS; PARENTERAL at 15:29

## 2022-11-29 RX ADMIN — SODIUM CHLORIDE, POTASSIUM CHLORIDE, SODIUM LACTATE AND CALCIUM CHLORIDE: 600; 310; 30; 20 INJECTION, SOLUTION INTRAVENOUS at 15:16

## 2022-11-29 RX ADMIN — HYDROMORPHONE HYDROCHLORIDE 4 MG: 2 TABLET ORAL at 22:13

## 2022-11-29 RX ADMIN — ONDANSETRON 4 MG: 2 INJECTION INTRAMUSCULAR; INTRAVENOUS at 16:53

## 2022-11-29 RX ADMIN — PHENYLEPHRINE HYDROCHLORIDE 100 MCG: 10 INJECTION INTRAVENOUS at 16:59

## 2022-11-29 RX ADMIN — SUGAMMADEX 200 MG: 100 INJECTION, SOLUTION INTRAVENOUS at 17:50

## 2022-11-29 RX ADMIN — HYDROMORPHONE HYDROCHLORIDE 0.3 MG: 1 INJECTION, SOLUTION INTRAMUSCULAR; INTRAVENOUS; SUBCUTANEOUS at 16:56

## 2022-11-29 RX ADMIN — PHENYLEPHRINE HYDROCHLORIDE 100 MCG: 10 INJECTION INTRAVENOUS at 17:38

## 2022-11-29 RX ADMIN — SODIUM CHLORIDE, POTASSIUM CHLORIDE, SODIUM LACTATE AND CALCIUM CHLORIDE: 600; 310; 30; 20 INJECTION, SOLUTION INTRAVENOUS at 17:27

## 2022-11-29 RX ADMIN — GABAPENTIN 300 MG: 300 CAPSULE ORAL at 13:27

## 2022-11-29 RX ADMIN — GLYCOPYRROLATE 0.2 MG: 0.2 INJECTION, SOLUTION INTRAMUSCULAR; INTRAVENOUS at 15:26

## 2022-11-29 RX ADMIN — GABAPENTIN 300 MG: 300 CAPSULE ORAL at 07:52

## 2022-11-29 RX ADMIN — GLYCOPYRROLATE 0.2 MG: 0.2 INJECTION, SOLUTION INTRAMUSCULAR; INTRAVENOUS at 15:29

## 2022-11-29 RX ADMIN — MAGNESIUM SULFATE HEPTAHYDRATE 2 G: 40 INJECTION, SOLUTION INTRAVENOUS at 15:43

## 2022-11-29 RX ADMIN — PROPOFOL 200 MG: 10 INJECTION, EMULSION INTRAVENOUS at 15:29

## 2022-11-29 RX ADMIN — HYDROMORPHONE HYDROCHLORIDE 0.4 MG: 1 INJECTION, SOLUTION INTRAMUSCULAR; INTRAVENOUS; SUBCUTANEOUS at 18:57

## 2022-11-29 ASSESSMENT — ACTIVITIES OF DAILY LIVING (ADL)
ADLS_ACUITY_SCORE: 22
ADLS_ACUITY_SCORE: 24
ADLS_ACUITY_SCORE: 24
ADLS_ACUITY_SCORE: 22
ADLS_ACUITY_SCORE: 24
ADLS_ACUITY_SCORE: 22

## 2022-11-29 ASSESSMENT — ENCOUNTER SYMPTOMS
DYSRHYTHMIAS: 0
SEIZURES: 0

## 2022-11-29 ASSESSMENT — LIFESTYLE VARIABLES: TOBACCO_USE: 1

## 2022-11-29 NOTE — PLAN OF CARE
Goal Outcome Evaluation:    [id number]    Pt is A/Ox4, denies N/V, N/T and SOB. Able to make needs known. regular diet. Assist of 1 with turn, able to shift weight independently. Pt did post-op Xray tolerate well. Patient complain of pain, PRN pain meds were given. Pt complain of right arm weakness and UE pain provider were notified. No significant change during shift.

## 2022-11-29 NOTE — ANESTHESIA PROCEDURE NOTES
Airway       Patient location during procedure: OR       Procedure Start/Stop Times: 11/29/2022 3:32 PM  Staff -        CRNA: Yossi Edouard APRN CRNA       Performed By: CRNA  Consent for Airway        Urgency: elective  Indications and Patient Condition       Indications for airway management: karl-procedural       Induction type:intravenous       Mask difficulty assessment: 1 - vent by mask    Final Airway Details       Final airway type: endotracheal airway       Successful airway: ETT - single and Oral  Endotracheal Airway Details        ETT size (mm): 6.5       Cuffed: yes       Successful intubation technique: video laryngoscopy       VL Blade Size: MAC 3       Grade View of Cords: 2       Adjucts: stylet       Position: Right       Measured from: lips       Secured at (cm): 22       Bite block used: None    Post intubation assessment        Placement verified by: capnometry, equal breath sounds and chest rise        Number of attempts at approach: 1       Number of other approaches attempted: 0       Secured with: pink tape       Ease of procedure: easy       Dentition: Intact and Unchanged    Medication(s) Administered   Medication Administration Time: 11/29/2022 3:32 PM

## 2022-11-29 NOTE — PROGRESS NOTES
[facility]    Medicine Progress Note - Hospitalist Service, GOLD TEAM 19    Date of Admission:  11/26/2022    Assessment & Plan   Christine HEARN Shruti Hu is a 42 year old female admitted on 11/26/2022 after cervical spine surgery. She has history of myalgias chronic pain, cervical spine issues knee, shoulder hip wrist ankle issues.     She underwent surgery with Dr Murray 11/26:   1. Anterior cervical discectomy with removal of disc material and osteophytes at C4-5 and C5-6 for decompression of the spinal cord.  2. Bilateral Foraminotomies at C4-5 and C5-6for decompression of the nerve roots.  3. Placement of intervertebral prosthetic device at C4-5 and C5-6, Medtronic PEEK Cages.  4. Use of Iliac Crest Autograft for fusion, C4-5 and C5-6, which was harvested through a separate skin and fascial incision.  5. Placement of anterior Medtronic plate and screw instrumentation, C4-6.  The plate was placed separately from the interbody and was placed to aid in the fusion      #Neck pain with radiculopathy   -- Status post above surgery   -- Rest of care per orthopedic surgery       #Right arm weakness  #?C5 palsy  -- Started 11/26 and has remained weaker, cannot shrug shoulder and lift right elbow for me, denies any numbness and normal sensation  -- She followed by orthopedic surgery  there are concerns for C5 palsy/neuroprecia and was stared on steroids 11/26 - CT c-spine & MRI C-spine also obtained; both showing post-surgical changes    Defer mgt to primary team       #Hypertension   -- Per patient is situational, not on PTA medications   -- Treat pain, also placed PRN hydralazine      #Multiple joint issues   #ACL tear  -- Planned to have surgery knee surgery nest   Tear of right supraspinatus, tenosynovitis of left ankle an ATFL tear  -- Defer back to orthopedic surgery         #Hx Meniere's disease   -- No issue currently      #Anxiety, depression  -- History previous  "overdose   -- Not on medications currently      #Mentioned thyrotoxicosis   -- Patient does not recall having any thyroid issues TSH was normal in April of this year, not on medications       #Recent COVID-19  -- Tested + 11/9 , considered COVID recovered   -- Would do good DVT prophylaxis          Diet: Snacks/Supplements Adult: Ensure Max Protein (bariatric); With Meals  Snacks/Supplements Adult: Ensure Max Protein (bariatric); Between Meals  Diet  Regular Diet Adult  NPO per Anesthesia Guidelines for Procedure/Surgery Except for: Meds    DVT Prophylaxis: Pneumatic Compression Devices  Ogden Catheter: Not present  Central Lines: None  Cardiac Monitoring: None  Code Status: Full Code      Disposition Plan  Defer to primary team at this time.      Expected Discharge Date: 11/30/2022,  9:00 AM              The patient's care was discussed with the Bedside Nurse, Care Coordinator/, Patient and Orthopedic Surgery Consultant.    KATHY JANE MD  Hospitalist Service, GOLD TEAM [address] [place] [address]  Securely message with the Vocera Web Console (learn more here)  Text page via [facility] Paging/Directory   Please see signed in provider for up to date coverage information      Clinically Significant Risk Factors              # Hypoalbuminemia: Lowest albumin = 3.2 g/dL (Ref range: 3.5-5.2) at 11/29/2022  6:50 AM, will monitor as appropriate          # Overweight: Estimated body mass index is 26.08 kg/m  as calculated from the following:    Height as of this encounter: 1.676 m (5' 6\").    Weight as of this encounter: 73.3 kg (161 lb 9.6 oz)., PRESENT ON ADMISSION         ______________________________________________________________________    Interval History   Patient continues to report weakness in left arm with ongoing pain  Askin for pain control and decadron  Per patient, she' scheduled for surgery later today    Data reviewed today: I reviewed all medications, new " labs and imaging results over the last 24 hours. I personally reviewed no images or EKG's today.    Physical Exam   Vital Signs: Temp: 97.8  F (36.6  C) Temp src: Oral BP: 130/80 Pulse: 58   Resp: 16 SpO2: 97 % O2 Device: None (Room air)    Weight: 161 lbs 9.55 oz    GENERAL: Alert and oriented x 3; no acute distress; well-nourished.  HEENT: Normocephalic; atraumatic; PERRLA; MMM; cervical collar.  CV: RRR; normal S1, S2; no rubs, murmurs, or gallops.  RESP: Lung fields clear to aucultation B/L; no wheezing or crepitations.  GI: Abdomen is soft, nontender, nondistended; no organomegaly; normal bowel sounds.  : Deferred genital examination.   MSK: No clubbing, cyanosis, or edema.  DERM: Skin is intact; no rash, lesions, or skin breakdown.  NEURO: No focal deficits appreciated; decreased strength in left upper extremity. Patient unable to raise left arm past 45 degree angle.    PSYCH: No active hallucinations; affect, insight appear within normal limits.    Data   Recent Labs   Lab 11/29/22  0650 11/27/22  0602 11/26/22  0721   WBC 9.9  --   --    HGB 11.9 12.3  --    *  --   --      --   --      --   --    POTASSIUM 4.2  --   --    CHLORIDE 104  --   --    CO2 32  --   --    BUN 20  --   --    CR 0.52 0.44*  --    ANIONGAP 3  --   --    LOS 9.1  --   --    GLC 69*  --  113*   ALBUMIN 3.2*  --   --    PROTTOTAL 6.6*  --   --    BILITOTAL 0.3  --   --    ALKPHOS 58  --   --    ALT 23  --   --    AST 12  --   --      No results found for this or any previous visit (from the past 24 hour(s)).  Medications     sodium chloride Stopped (11/27/22 0553)       dexamethasone  4 mg Intravenous Once     famotidine  20 mg Oral BID    Or     famotidine  20 mg Intravenous BID     gabapentin  300 mg Oral TID     polyethylene glycol  17 g Oral Daily     senna-docusate  1 tablet Oral BID     sodium chloride (PF)  3 mL Intracatheter Q8H
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
## 2022-11-29 NOTE — OP NOTE
DATE OF SURGERY: 11/29/2022    PREOPERATIVE DIAGNOSIS: Right C5 radiculopathy and palsy          POSTOPERATIVE DIAGNOSIS: Right C5 Radiculopathy and palsy     PROCEDURES:  1. Right posterior cervical 4-5 foraminotomy    Primary Surgeon: Wilbur Murray MD    FIRST ASSISTANT: Nathaniel Clifford PGY1    ANESTHESIA: General Endotracheal    COMPLICATIONS:  None.    SPECIMENS: None.    ESTIMATED BLOOD LOSS: 75cc    INDICATIONS:                          Christine Hu is a 42 year old female with debilitating symptoms from right C5 palsy and radiculopathy.  The patient elected surgical treatment, and understood the indications for this surgery, as well as its risks, benefits, and alternatives. We reviewed the risks and benefits of the surgery in detail. The risks include, but are not limited to, the general risks associated with anesthesia, including death, pulmonary embolism, DVT, stroke, myocardial infarction, pneumonia, and urinary tract infection. Additional risks specific to the surgery include the risk of infection, failure to heal (non-union), dural tear with resultant CSF leak which might necessitate placement of a drain or revision surgery or could result in headaches, nerve injury resulting in weakness or paralysis, risk of adjacent segment disease, the risks of vascular injury resulting in severe or possibly uncontrollable bleeding, need for revision surgery in the future due to one of the above issues, or risk of incomplete symptom relief.  Christine Hu understands the risks of the surgery and wishes to proceed.     Of note, 2 days ago she underwent an ACDF with myself.  In the days following she developed a progressive and worsening right C5 palsy with severe radicular complaints.  Initially we tried to treat this conservatively and I did get imaging studies.  Due to the ongoing severity of her symptoms and the worsening nature and the fact that she cannot be discharged due to her severe  pain, we discussed doing a posterior foraminotomies to try and do everything we could to get pressure off the nerve.  We discussed the risks noted above and she wished to proceed.    DESCRIPTION OF PROCEDURE:           Christine Hu was taken to the operating  room, where the Anesthesiology Service induced satisfactory general anesthesia.  Ancef was given IV.  Venous thromboembolic prophylaxis  was performed with sequential devices placed on the calves bilaterally.  Ogden catheter was placed under standard sterile techniques.  The patient was placed prone after application of Hinojosa tongs.  The tongs were placed routinely.  The neck was placed into a neutral alignment. C-arm was brought in to verify alignment, and then the Hinojosa attached to the bed frame.  All pressure points were well padded. The posterior neck was then prepped and  draped in its entirety in the usual sterile fashion. We then held a multidisciplinary time out in which we verified the patient, procedure, antibiotics, and operative plan.  All team members were in agreement.    I brought the x-ray in and we took localization images with 18-gauge spinal needles.  I then subperiosteally dissected out the right lateral mass and facet joint at C4-5.  We took additional localization x-rays.    Once I had localized level I used a bur to remove the entirety of the right C4 inferior articular process.  I then thinned down the superior articular process of C5 until it was a thin flake of bone.  I then used a curette to remove this in its entirety.  This unroofed the nerve root beneath.  There is some light venous bleeding which I controlled with a nathaniel and Surgiflo.  I then went about palpating with the curettes and I was able to feel both the C5 and the C4 pedicle and I felt that the entire foramen had been completely decompressed medial to lateral and then also dorsal to ventral given I had taken out the entire facet.  I could see the C5 nerve  root exiting it seemed to have expanded into the available space and I felt this area was completely free.    We then thoroughly irrigated.  I injected 50 cc of quarter percent Marcaine for local anesthetic.  I injected 20 mg of Kenalog as a local steroid injection to try and help with her radicular pain.  An 8 inch Hemovac drain was placed and then the wound was closed in multiple layers of interrupted Vicryl with Dermabond for the skin.    I, Wilbur Murray MD, was present and scrubbed for the critical portions of the case, including patient positioning, exposure, decompression, and evaluation of fluoroscopic images.

## 2022-11-29 NOTE — PLAN OF CARE
"/80 (BP Location: Left arm)   Pulse 60   Temp 97.8  F (36.6  C) (Oral)   Resp 16   Ht 1.676 m (5' 6\")   Wt 73.3 kg (161 lb 9.6 oz)   LMP  (LMP Unknown)   SpO2 97%   BMI 26.08 kg/m    Pt denies SOB or chest pain. RA, LS clear. Alert and oriented x4, denies N/T. Pt endorses weakness to RUE, pulses and sensation intact in all extremity. Pt ambulated in room with SBA walker. Pt Has been siting in chair this evening for dinner. Pt denies N/V, states that her throat gets slightly sore after eating but has no difficulty swallowing. LBM 11/26, passing gas, BS +. Voiding adequately. [place] collar in place. Cervical and L hip dressings CDI.  Pain: Pt reports significant amount of pain, prn Dilaudid, Robaxin, and scheduled Tylenol given. Both heat and cold pack provided, with little relief -per pt.   Plan: Pt will likely discharge to home today.     "

## 2022-11-29 NOTE — PROGRESS NOTES
"Orthopedic Surgery Progress Note:     Subjective:   No acute events overnight. Posterior and lateral shoulder pain. Right shoulder weakness unchanged. Working well with therapy, anticipated discharge home.    Objective:   /80 (BP Location: Left arm)   Pulse 60   Temp 97.8  F (36.6  C) (Oral)   Resp 16   Ht 1.676 m (5' 6\")   Wt 73.3 kg (161 lb 9.6 oz)   LMP  (LMP Unknown)   SpO2 97%   BMI 26.08 kg/m    No intake/output data recorded.  General: NAD. Resting comfortably in bed.  Respiratory: Breathing comfortably on RA.  Musculoskeletal:     Motor Strength Right Left   Deltoids: C5 2/5 5/5   Biceps: C5 4/5 5/5   Brachialis: C6 4/5 5/5   Wrist extension: C6 5/5 5/5   Triceps: C7  5/5 5/5   Wrist flexion: C7 5/5 5/5    strength: C8 5/5 5/5   Hand intrinsics: T1 5/5 5/5     Sensation from C4-T1 is preserved.    Motor Strength Right Left   Hip flexion: L1, L2, L3 5/5 5/5   Hip adduction: L2, L3 5/5 5/5   Knee flexion: S1 5/5 5/5   Knee extension: L3, L4 5/5 5/5   Ankle dosiflexion: L4, L5 5/5 5/5   EHL: L5 5/5 5/5   Ankle plantarflexion: S1 5/5 5/5     Sensation from L1-S2 is preserved.    Output by Drain (mL) 11/27/22 0700 - 11/27/22 1459 11/27/22 1500 - 11/27/22 2259 11/27/22 2300 - 11/28/22 0659 11/28/22 0700 - 11/28/22 1459 11/28/22 1500 - 11/28/22 2259 11/28/22 2300 - 11/29/22 0659 11/29/22 0700 - 11/29/22 0700   Closed/Suction Drain 1 Anterior Neck Accordion 10 [place]  0  15        Laboratory Data:  Lab Results   Component Value Date    WBC 6.6 10/18/2022    HGB 12.3 11/27/2022     10/18/2022     Assessment & Plan:   Christine Silverskyler Hu is a 42 year old female now s/p C4-6 ACDF on 11/26 with Dr. Murray. Postoperative course complicated by right C5 nerve palsy.    11/29/22   -Regular diet   -Monitor exam  -Discharge home    Ortho Primary  - Activity: Up with assist until independent. No excessive bending or twisting. No lifting >10 lbs x 6 weeks. No Davin lift for transfers.   - " Weightbearing Status: WBAT.  - Pain Management: Transition from IV to PO as tolerated. No NSAIDs.   - Antibiotics: Complete  - Diet: Begin with clear fluids and progress diet as tolerated.   - DVT Prophylaxis: SCDs only. No chemical DVT prophylaxis needed.  - Imaging: Complete  - Labs: Labs PRN.  - Bracing/Splinting: None.  - Dressings: Keep dressing c/d/i until POD 5-7.  - Drains: Removed  - Ogden catheter: Removed  - Physical Therapy/Occupational Therapy: Evaluation and treatment.  - Consults: Hospitalist, appreciate recs .  - Follow-up: Clinic with Dr. Murray in 6 weeks with repeat radiographs.    - Disposition: Pending progress with therapies, pain control on orals, and medical stability; anticipate discharge to home on POD3-5.    Orthopedic surgery staff for this patient is Dr. Murray.    Eric Oquendo MD  Orthopaedic Surgery PGY-4    FOLLOWUP:    Future Appointments   Date Time Provider Department Center   11/27/2022  8:15 AM Lawanda Maldonado, PT URPT [place]   11/28/2022  7:00 PM UR OT WAITLIST UROT [place]   1/5/2023 10:20 AM Mc Peterson MD [facility] [place]   1/10/2023 11:00 AM Wilbur Murray MD [facility]   2/9/2023 10:20 AM Mc Peterson MD [facility] [place]

## 2022-11-29 NOTE — PLAN OF CARE
0945: NPO since now for the surgery this PM. OK to drink water prior 2 hour of OR.     Alert and ox4, VSS, on RA.     C/o pain at RUE and left hip, managed with PRN oral dilaudid, Atarax, Robaxin, ice pack. RUE serve weakness,        Antrior cervical incision site dressing was changed after pre-or shower.      1300: report given to OR RN, pt was picked up at 1315.

## 2022-11-30 ENCOUNTER — APPOINTMENT (OUTPATIENT)
Dept: PHYSICAL THERAPY | Facility: CLINIC | Age: 42
DRG: 472 | End: 2022-11-30
Attending: ORTHOPAEDIC SURGERY
Payer: COMMERCIAL

## 2022-11-30 PROCEDURE — 250N000011 HC RX IP 250 OP 636: Performed by: INTERNAL MEDICINE

## 2022-11-30 PROCEDURE — 99232 SBSQ HOSP IP/OBS MODERATE 35: CPT | Performed by: INTERNAL MEDICINE

## 2022-11-30 PROCEDURE — 250N000011 HC RX IP 250 OP 636

## 2022-11-30 PROCEDURE — 250N000013 HC RX MED GY IP 250 OP 250 PS 637: Performed by: STUDENT IN AN ORGANIZED HEALTH CARE EDUCATION/TRAINING PROGRAM

## 2022-11-30 PROCEDURE — 97116 GAIT TRAINING THERAPY: CPT | Mod: GP | Performed by: PHYSICAL THERAPIST

## 2022-11-30 PROCEDURE — 120N000002 HC R&B MED SURG/OB UMMC

## 2022-11-30 PROCEDURE — 250N000013 HC RX MED GY IP 250 OP 250 PS 637

## 2022-11-30 PROCEDURE — 250N000013 HC RX MED GY IP 250 OP 250 PS 637: Performed by: ORTHOPAEDIC SURGERY

## 2022-11-30 PROCEDURE — 250N000013 HC RX MED GY IP 250 OP 250 PS 637: Performed by: CLINICAL NURSE SPECIALIST

## 2022-11-30 PROCEDURE — 97110 THERAPEUTIC EXERCISES: CPT | Mod: GP | Performed by: PHYSICAL THERAPIST

## 2022-11-30 PROCEDURE — 99207 PR CDG-CUT & PASTE-POTENTIAL IMPACT ON LEVEL: CPT | Performed by: INTERNAL MEDICINE

## 2022-11-30 PROCEDURE — 99223 1ST HOSP IP/OBS HIGH 75: CPT | Mod: GT | Performed by: PHYSICIAN ASSISTANT

## 2022-11-30 RX ORDER — ACETAMINOPHEN 325 MG/1
975 TABLET ORAL EVERY 8 HOURS
Status: DISCONTINUED | OUTPATIENT
Start: 2022-11-30 | End: 2022-12-02 | Stop reason: HOSPADM

## 2022-11-30 RX ORDER — DIAZEPAM 5 MG
5 TABLET ORAL EVERY 8 HOURS PRN
Status: DISCONTINUED | OUTPATIENT
Start: 2022-11-30 | End: 2022-12-02 | Stop reason: HOSPADM

## 2022-11-30 RX ORDER — GABAPENTIN 300 MG/1
300 CAPSULE ORAL 2 TIMES DAILY
Status: DISCONTINUED | OUTPATIENT
Start: 2022-11-30 | End: 2022-12-02 | Stop reason: HOSPADM

## 2022-11-30 RX ORDER — HYDROMORPHONE HYDROCHLORIDE 4 MG/1
4 TABLET ORAL
Status: DISCONTINUED | OUTPATIENT
Start: 2022-11-30 | End: 2022-12-02 | Stop reason: HOSPADM

## 2022-11-30 RX ORDER — HYDROMORPHONE HCL IN WATER/PF 6 MG/30 ML
PATIENT CONTROLLED ANALGESIA SYRINGE INTRAVENOUS
Status: COMPLETED
Start: 2022-11-30 | End: 2022-11-30

## 2022-11-30 RX ADMIN — GABAPENTIN 300 MG: 300 CAPSULE ORAL at 14:08

## 2022-11-30 RX ADMIN — HYDROMORPHONE HYDROCHLORIDE 0.4 MG: 1 INJECTION, SOLUTION INTRAMUSCULAR; INTRAVENOUS; SUBCUTANEOUS at 00:27

## 2022-11-30 RX ADMIN — METHOCARBAMOL 750 MG: 750 TABLET ORAL at 07:06

## 2022-11-30 RX ADMIN — HYDROMORPHONE HYDROCHLORIDE 0.4 MG: 1 INJECTION, SOLUTION INTRAMUSCULAR; INTRAVENOUS; SUBCUTANEOUS at 16:56

## 2022-11-30 RX ADMIN — ACETAMINOPHEN 650 MG: 325 TABLET, FILM COATED ORAL at 05:59

## 2022-11-30 RX ADMIN — HYDROMORPHONE HYDROCHLORIDE 0.4 MG: 1 INJECTION, SOLUTION INTRAMUSCULAR; INTRAVENOUS; SUBCUTANEOUS at 08:06

## 2022-11-30 RX ADMIN — HYDROMORPHONE HYDROCHLORIDE 0.4 MG: 1 INJECTION, SOLUTION INTRAMUSCULAR; INTRAVENOUS; SUBCUTANEOUS at 20:28

## 2022-11-30 RX ADMIN — HYDROMORPHONE HYDROCHLORIDE 0.4 MG: 1 INJECTION, SOLUTION INTRAMUSCULAR; INTRAVENOUS; SUBCUTANEOUS at 10:26

## 2022-11-30 RX ADMIN — DEXAMETHASONE SODIUM PHOSPHATE 4 MG: 10 INJECTION, SOLUTION INTRAMUSCULAR; INTRAVENOUS at 02:44

## 2022-11-30 RX ADMIN — METHOCARBAMOL 750 MG: 750 TABLET ORAL at 00:27

## 2022-11-30 RX ADMIN — METHOCARBAMOL 750 MG: 750 TABLET ORAL at 19:50

## 2022-11-30 RX ADMIN — ACETAMINOPHEN 975 MG: 325 TABLET, FILM COATED ORAL at 22:29

## 2022-11-30 RX ADMIN — GABAPENTIN 300 MG: 300 CAPSULE ORAL at 08:08

## 2022-11-30 RX ADMIN — HYDROMORPHONE HYDROCHLORIDE 4 MG: 2 TABLET ORAL at 11:11

## 2022-11-30 RX ADMIN — ACETAMINOPHEN 975 MG: 325 TABLET, FILM COATED ORAL at 14:08

## 2022-11-30 RX ADMIN — HYDROXYZINE HYDROCHLORIDE 25 MG: 25 TABLET, FILM COATED ORAL at 11:11

## 2022-11-30 RX ADMIN — HYDROMORPHONE HYDROCHLORIDE 4 MG: 4 TABLET ORAL at 14:08

## 2022-11-30 RX ADMIN — ACETAMINOPHEN 650 MG: 325 TABLET, FILM COATED ORAL at 01:21

## 2022-11-30 RX ADMIN — FAMOTIDINE 20 MG: 20 TABLET ORAL at 19:50

## 2022-11-30 RX ADMIN — HYDROMORPHONE HYDROCHLORIDE 4 MG: 4 TABLET ORAL at 22:30

## 2022-11-30 RX ADMIN — POLYETHYLENE GLYCOL 3350 17 G: 17 POWDER, FOR SOLUTION ORAL at 08:06

## 2022-11-30 RX ADMIN — HYDROMORPHONE HYDROCHLORIDE 4 MG: 2 TABLET ORAL at 07:07

## 2022-11-30 RX ADMIN — DIAZEPAM 2 MG: 2 TABLET ORAL at 02:31

## 2022-11-30 RX ADMIN — HYDROMORPHONE HYDROCHLORIDE 4 MG: 2 TABLET ORAL at 02:31

## 2022-11-30 RX ADMIN — HYDROMORPHONE HYDROCHLORIDE 0.2 MG: 1 INJECTION, SOLUTION INTRAMUSCULAR; INTRAVENOUS; SUBCUTANEOUS at 03:34

## 2022-11-30 RX ADMIN — SENNOSIDES AND DOCUSATE SODIUM 1 TABLET: 50; 8.6 TABLET ORAL at 08:06

## 2022-11-30 RX ADMIN — HYDROXYZINE HYDROCHLORIDE 50 MG: 50 TABLET, FILM COATED ORAL at 19:50

## 2022-11-30 RX ADMIN — HYDROMORPHONE HYDROCHLORIDE 0.2 MG: 0.2 INJECTION, SOLUTION INTRAMUSCULAR; INTRAVENOUS; SUBCUTANEOUS at 13:21

## 2022-11-30 RX ADMIN — GABAPENTIN 400 MG: 300 CAPSULE ORAL at 22:29

## 2022-11-30 RX ADMIN — METHOCARBAMOL 750 MG: 750 TABLET ORAL at 13:11

## 2022-11-30 RX ADMIN — HYDROMORPHONE HYDROCHLORIDE 4 MG: 4 TABLET ORAL at 18:20

## 2022-11-30 RX ADMIN — SENNOSIDES AND DOCUSATE SODIUM 1 TABLET: 50; 8.6 TABLET ORAL at 19:50

## 2022-11-30 RX ADMIN — HYDROMORPHONE HYDROCHLORIDE 0.4 MG: 1 INJECTION, SOLUTION INTRAMUSCULAR; INTRAVENOUS; SUBCUTANEOUS at 05:59

## 2022-11-30 RX ADMIN — FAMOTIDINE 20 MG: 20 TABLET ORAL at 08:06

## 2022-11-30 RX ADMIN — HYDROXYZINE HYDROCHLORIDE 25 MG: 25 TABLET, FILM COATED ORAL at 04:48

## 2022-11-30 RX ADMIN — MAGNESIUM HYDROXIDE 30 ML: 400 SUSPENSION ORAL at 10:36

## 2022-11-30 ASSESSMENT — ACTIVITIES OF DAILY LIVING (ADL)
ADLS_ACUITY_SCORE: 30
ADLS_ACUITY_SCORE: 26
ADLS_ACUITY_SCORE: 26
ADLS_ACUITY_SCORE: 24
ADLS_ACUITY_SCORE: 30
ADLS_ACUITY_SCORE: 24
ADLS_ACUITY_SCORE: 26
ADLS_ACUITY_SCORE: 30
ADLS_ACUITY_SCORE: 30
ADLS_ACUITY_SCORE: 24

## 2022-11-30 NOTE — PLAN OF CARE
Goal Outcome Evaluation:         Pt A&Ox4 this shift. Pt denies chest pain, SOB, N/V, N/T. Pt in significant pain. Managed with Q2 IV dilaudid, Q4 oral dilaudid, Tylenol, Robaxin,ice packs, valium for anxiety. Pt pain not being controlled. States excruciating pain. Writer was in pt room hourly and pt would be tearful and 10/10 pain with anxiety. Pt states right side cannot not move arm. Writer found throughout shift, arm was slightly used. Pt stated she contacted Dr. Murray on personal phone in hopes to set up a different pain plan. Hemovac 1 ML output. Pt up with SB assist with walker. Pt to wear aspen collar. Continue POC.

## 2022-11-30 NOTE — PLAN OF CARE
Goal Outcome Evaluation:                  Pt arrived to unit at 2050, pt c/o 9/10 pain to right neck and shoulder, spasming. Pt cant lift right arm, ortho aware, she can squeeze hands. Denied numbness or tingling. Appetite good, had 2 trays for dinner. Up voiding independently. No CP or SOB, on capno, VSS. Wants to take robaxin, decadron, and dilaudid together later tonight, watches time closely for meds. Applied ice pack to neck. Aspen collar on. Dressings CDI. Continue POC.

## 2022-11-30 NOTE — PROGRESS NOTES
Orthopaedic Post-op Check    S:  Doing well post-operatively.  Reports pain is under good control. No nausea/vomiting.  No new numbness/tingling. Reports neck soreness. Shoulder pain is unchanged from preop.    O:  General:  Pain is well controlled, no acute distress  Resp:  Breathing comfortably on room air  MSK  Cervical spine:    Appearance -no gross step-offs, kyphosis. Collar in place    Motor -     C5: Deltoids R 1/5 and L 5/5 strength    C6: Biceps R 2/5 and L 5/5 strength     C7: Triceps R 3/5 and L 5/5 strength     C8:  R 4/5 and L 5/5 strength     T1: Dorsal interossei R 3/5 and L 4/5 strength        Sensation: intact to light touch in C5-T1    A/P:  Christine HEARN Shruti Hu is a 42 year old female who is POD#0 status-post Right cervical 4-5 posterior foraminotomy with Dr. Murray.  Doing well in the immediate post-op period.    - CT C-spine ordered    Nathaniel Clifford MD  PGY-1, Orthopaedic Surgery

## 2022-11-30 NOTE — PROGRESS NOTES
PACU to Inpatient Nursing Handoff    Patient Christine Hu is a 42 year old female who speaks English.   Procedure Procedure(s):  Right cervical 4-5 posterior foraminotomy   Surgeon(s) Primary: Wilbur Murray MD  Resident - Assisting: Nathaniel Clifford MD     Allergies   Allergen Reactions     Codeine GI Disturbance     Nausea with use of Tylenol #3     Clindamycin Other (See Comments)     C diff, hospitalized      Penicillins Rash       Isolation  No active isolations     Past Medical History   has a past medical history of Alcohol dependence (H) (11/30/2012), Alcohol withdrawal (H) (06/29/2011), ASCUS favor benign (01/2015), Degeneration of cervical intervertebral disc, Knee effusion, left, Lumbago, Ménière's disease, and Overdose (06/2009).    Anesthesia General   Dermatome Level     Preop Meds gabapentin (Neurontin) - time given: 1327   Nerve block Not applicable   Intraop Meds dexamethasone (Decadron)  fentanyl (Sublimaze): 200 mcg total  hydromorphone (Dilaudid): 0.3 mg total  ondansetron (Zofran): last given at 4   Local Meds No   Antibiotics cefazolin (Ancef) - last given at 1542     Pain Patient Currently in Pain: yes   PACU meds  hydromorphone (Dilaudid): 1.2 mg (total dose) last given at 1857    PCA / epidural No   Capnography Respiratory Monitoring (EtCO2): 44 mmHg   Telemetry ECG Rhythm: Sinus tachycardia   Inpatient Telemetry Monitor Ordered? No        Labs Glucose Lab Results   Component Value Date    GLC 69 11/29/2022    GLC 87 06/21/2019       Hgb Lab Results   Component Value Date    HGB 11.9 11/29/2022    HGB 13.6 07/23/2019       INR No results found for: INR   PACU Imaging Completed     Wound/Incision Incision/Surgical Site 11/26/22 Left Hip (Active)   Incision Assessment UTV 11/29/22 2000   Aniya-Incision Assessment UTV 11/29/22 1357   Closure BENJAMÍN 11/29/22 2000   Incision Drainage Amount None 11/29/22 2000   Dressing Intervention Clean, dry, intact 11/29/22 2000   Number of  days: 3       Incision/Surgical Site 11/26/22 Neck (Active)   Incision Assessment UTV 11/29/22 2000   Aniya-Incision Assessment UTV 11/29/22 1357   Closure BENJAMÍN 11/29/22 2000   Incision Drainage Amount None 11/29/22 2000   Dressing Intervention Clean, dry, intact 11/29/22 2000   Number of days: 3       Incision/Surgical Site 11/29/22 Anterior;Right Neck (Active)   Incision Assessment UTV 11/29/22 2000   Closure BENJAMÍN 11/29/22 2000   Incision Drainage Amount None 11/29/22 2000   Dressing Intervention Clean, dry, intact 11/29/22 2000   Number of days: 0       Incision/Surgical Site 11/29/22 Right Neck (Active)   Incision Assessment UTV 11/29/22 2000   Closure BENJAMÍN 11/29/22 2000   Incision Drainage Amount None 11/29/22 2000   Dressing Intervention Clean, dry, intact 11/29/22 2000   Number of days: 0      CMS        Equipment ice pack   Other LDA       IV Access Peripheral IV 11/29/22 Left;Posterior Lower forearm (Active)   Site Assessment [facility] 11/29/22 1800   Line Status Saline locked 11/29/22 1800   Phlebitis Scale 0-->no symptoms 11/29/22 1800   Infiltration Scale 0 11/29/22 1800   Infiltration Site Treatment Method  None 11/29/22 1800   Number of days: 0       Peripheral IV 11/29/22 Left Upper forearm (Active)   Site Assessment [facility] 11/29/22 1800   Line Status Infusing 11/29/22 1800   Phlebitis Scale 0-->no symptoms 11/29/22 1800   Infiltration Scale 0 11/29/22 1800   Infiltration Site Treatment Method  None 11/29/22 1800   Number of days: 0      Blood Products Not applicable EBL  mL   Intake/Output Date 11/29/22 0700 - 11/30/22 0659   Shift 9536-8181 1816-5781 1971-3643 24 Hour Total   INTAKE   I.V.  1100  1100   Shift Total(mL/kg)  1100(15.01)  1100(15.01)   OUTPUT   Blood  75  75   Shift Total(mL/kg)  75(1.02)  75(1.02)   Weight (kg) 73.3 73.3 73.3 73.3      Drains / Ogden     Time of void PreOp Void Prior to Procedure: 1443 (11/29/22 1443)    PostOp Voided (mL): 450 mL (11/27/22 0554)  Urine Occurrence: 1 (11/28/22  7195)    Diapered? No   Bladder Scan      mL (11/27/22 0500)  tolerating sips     Vitals    B/P: 129/77  T: 99.1  F (37.3  C)    Temp src: Oral  P:  Pulse: 78 (11/29/22 2000)          R: 10  O2:  SpO2: 98 %    O2 Device: None (Room air) (11/29/22 0757)    Oxygen Delivery: 2 LPM (11/29/22 1900)         Family/support present No family present   Patient belongings     Patient transported on cart   DC meds/scripts (obs/outpt) Not applicable   Inpatient Pain Meds Released? Yes       Special needs/considerations None   Tasks needing completion None       Romi Sheth, STONE  ASCOM [id number]

## 2022-11-30 NOTE — ANESTHESIA CARE TRANSFER NOTE
Patient: Christine Hu    Procedure: Procedure(s):  Right cervical 4-5 posterior foraminotomy       Diagnosis: S/P spinal surgery [Z98.890]  Diagnosis Additional Information: No value filed.    Anesthesia Type:   General     Note:    Oropharynx: oropharynx clear of all foreign objects and spontaneously breathing  Level of Consciousness: awake  Oxygen Supplementation: nasal cannula  Level of Supplemental Oxygen (L/min / FiO2): 2  Independent Airway: airway patency satisfactory and stable  Dentition: dentition unchanged  Vital Signs Stable: post-procedure vital signs reviewed and stable  Report to RN Given: handoff report given  Patient transferred to: PACU    Handoff Report: Identifed the Patient, Identified the Reponsible Provider, Reviewed the pertinent medical history, Discussed the surgical course, Reviewed Intra-OP anesthesia mangement and issues during anesthesia, Set expectations for post-procedure period and Allowed opportunity for questions and acknowledgement of understanding      Vitals:  CRNA VITALS  11/29/2022 1724 - 11/29/2022 1802      11/29/2022             NIBP: 135/101    Pulse: 99    NIBP Mean: 110    Temp: 37.2  C (99  F)    SpO2: 98 %    Resp Rate (observed): 22        Vitals shown include unvalidated device data.    Electronically Signed By: MIO Salazar CRNA  November 29, 2022  6:01 PM

## 2022-11-30 NOTE — BRIEF OP NOTE
[facility]    Brief Operative Note    Pre-operative diagnosis: S/P spinal surgery [Z98.890]  Post-operative diagnosis Same as pre-operative diagnosis    Procedure: Procedure(s):  Right cervical 4-5 posterior foraminotomy  Surgeon: Surgeon(s) and Role:     * Wilbur Murray MD - Primary     * Nathaniel Clifford MD - Resident - Assisting  Anesthesia: General   Estimated Blood Loss: 75 mL from 11/29/2022  3:23 PM to 11/29/2022  5:55 PM      Drains: Hemovac  Specimens: * No specimens in log *  Findings:   None.  Complications: None.  Implants: * No implants in log *    Assessment and Plan: Christine Hu is a 42 year old female now s/p the following procedures with Dr. Murray.    - C4-6 ACDF on 11/26 C/b right C5 nerve palsy  - C4-5 Posterior foraminotomy on 11/29    Ortho Primary  - Activity: Up with assist until independent. No excessive bending or twisting. No lifting >10 lbs x 6 weeks. No Davin lift for transfers.   - Weightbearing Status: WBAT.  - Pain Management: Transition from IV to PO as tolerated. No NSAIDs.   - Antibiotics: Ancef until drain is removed  - Diet: Begin with clear fluids and progress diet as tolerated.   - DVT Prophylaxis: SCDs only. No chemical DVT prophylaxis needed.  - Imaging: Complete  - Labs: Labs PRN.  - Bracing/Splinting: Collar to remain on at all times.  - Dressings: Keep dressing c/d/i until POD 5-7.  - Drains: Hemovac x 1. Record output per shift. Drain will be removed at orthopaedic discretion.   - Ogden catheter: Removed  - Physical Therapy/Occupational Therapy: Evaluation and treatment.  - Consults: Hospitalist, appreciate recs .  - Follow-up: Clinic with Dr. Murray in 6 weeks with repeat radiographs.     - Disposition: Pending progress with therapies, pain control on orals, and medical stability; anticipate discharge to home on POD3-5.    Nathaniel Clifford MD   PGY1 - Orthopaedic Surgery

## 2022-11-30 NOTE — PROGRESS NOTES
"Orthopedic Surgery Progress Note:     Subjective:   NAEON. OR yesterday. Pain continues to be an ongoing issue. Ogden in place. OOB with walker.    Objective:   BP (!) 141/94 (BP Location: Left arm)   Pulse 82   Temp 98.7  F (37.1  C) (Oral)   Resp 13   Ht 1.676 m (5' 6\")   Wt 73.3 kg (161 lb 9.6 oz)   LMP  (LMP Unknown)   SpO2 97%   BMI 26.08 kg/m    No intake/output data recorded.  General: NAD. Resting comfortably in bed.COllar in place.   Respiratory: Breathing comfortably on RA.  Musculoskeletal:     Motor Strength Right Left   Deltoids: C5 2/5 5/5   Biceps: C5 4/5 5/5   Brachialis: C6 4/5 5/5   Wrist extension: C6 5/5 5/5   Triceps: C7  5/5 5/5   Wrist flexion: C7 5/5 5/5    strength: C8 5/5 5/5   Hand intrinsics: T1 5/5 5/5     Sensation from C4-T1 is preserved.    Motor Strength Right Left   Hip flexion: L1, L2, L3 5/5 5/5   Hip adduction: L2, L3 5/5 5/5   Knee flexion: S1 5/5 5/5   Knee extension: L3, L4 5/5 5/5   Ankle dosiflexion: L4, L5 5/5 5/5   EHL: L5 5/5 5/5   Ankle plantarflexion: S1 5/5 5/5     Sensation from L1-S2 is preserved.    Output by Drain (mL) 11/28/22 0700 - 11/28/22 1459 11/28/22 1500 - 11/28/22 2259 11/28/22 2300 - 11/29/22 0659 11/29/22 0700 - 11/29/22 1459 11/29/22 1500 - 11/29/22 2259 11/29/22 2300 - 11/30/22 0659 11/30/22 0700 - 11/30/22 1114   Patient has no LDAs of requested type attached.     Laboratory Data:  Lab Results   Component Value Date    WBC 9.9 11/29/2022    HGB 11.9 11/29/2022     11/29/2022     Assessment & Plan:   Assessment and Plan: Christine Gracee is a 42 year old female now s/p the following procedures with Dr. Murray.     -11/26 C4-6 ACDF C/b right C5 nerve palsy  - 11/29 C4-5 Posterior foraminotomy     11/30/22  PT/OT, OOB as able  Pain control- pain consult, appreciate recs  Continue drain      Ortho Primary  - Activity: Up with assist until independent. No excessive bending or twisting. No lifting >10 lbs x 6 weeks. No Davin lift " for transfers.   - Weightbearing Status: WBAT.  - Pain Management: Transition from IV to PO as tolerated. No NSAIDs.   - Antibiotics: Ancef until drain is removed  - Diet: Begin with clear fluids and progress diet as tolerated.   - DVT Prophylaxis: SCDs only. No chemical DVT prophylaxis needed.  - Imaging: Complete  - Labs: Labs PRN.  - Bracing/Splinting: Collar to remain on at all times.  - Dressings: Keep dressing c/d/i until POD 5-7.  - Drains: Hemovac x 1. Record output per shift. Drain will be removed at orthopaedic discretion.   - Ogden catheter: Removed  - Physical Therapy/Occupational Therapy: Evaluation and treatment.  - Consults: Hospitalist, PT/OT, pain team  - Follow-up: Clinic with Dr. Murray in 6 weeks with repeat radiographs.     - Disposition: Pending progress with therapies, pain control on orals, and medical stability; anticipate discharge to home on POD3-5.    Orthopedic surgery staff for this patient is Dr. Murray.    Eric Oquendo MD  Orthopaedic Surgery PGY-4    FOLLOWUP:    Future Appointments   Date Time Provider Department [place]   11/27/2022  8:15 AM Lawanda Maldonado, PT URPT [place]   11/28/2022  7:00 PM UR OT WAITLIST UROT [place]   1/5/2023 10:20 AM Mc Peterson MD [facility] [place]   1/10/2023 11:00 AM Wilbur Murray MD [facility]   2/9/2023 10:20 AM Mc Peterson MD [facility] [place]
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
## 2022-11-30 NOTE — OR NURSING
Per anesthesia, start with iv dilaudid because patient received dose of dilaudid IV upon arrival to pacu from [facility].

## 2022-11-30 NOTE — PROGRESS NOTES
Handoff taken from STONE meade. Pt continues to complain of 9-10/10 pain despite receiving dilaudid. [facility] notified and ordered 2.5mg IV valium to give. Pt then went to a STAT head CT ordered from Dr. Clifford. Per Dr. Clifford pt okay to transfer to floor after CT. Pt arrived back to PACU bay. [facility] gave signout. Pt will now transfer to floor room.    Romi Sheth RN on 11/29/2022 at 8:37 PM

## 2022-11-30 NOTE — PROGRESS NOTES
CLINICAL NUTRITION SERVICES - BRIEF NOTE    REASON FOR ASSESSMENT  Christine Hu is a/an 42 year old female assessed by the dietitian for Provider Order - Patient needs high-protein education and ordering of preferred supplements at 1.5 g protein per kg body weight. Patient is Post op Complex Spine.    FINDINGS  Patient is already being followed by RD services for a previous high protein consult, with a full assessment note done by RD on 11/26/22. At that time patient expressed understanding of protein sources and was ordering high protein containing foods. Patient consented to a protein supplement and is currently receiving Ensure Max protein BID.     Patient will continue to be followed by protocol and will be seen for follow-up on 12/2.     Bianca Duarte, MPH, RDN  5B RD pager: [phone]  Weekend/holiday pager: [phone]\

## 2022-11-30 NOTE — PLAN OF CARE
"Pt up with assist of 1 with walker. Pt has gotten up x2 without calling appropriately. Talked to pt about this again and told her that she can not be ambulating independently until cleared by PT. May consider implementing bed alarm. Pt continually tearful about pain. Pain consult completed this morning and plan set in place, however pt already asking for IV pain meds even though PO pain meds increased in frequency and pt told to decrease use of IV pain meds. Pt's perception of time very distorted as she reports that she called for help after 1230 and no one came for over a half hour, however writer was in pt's room at 1230 and returned to pt's room at 1300, so it was not possible that pt's call light went unanswered for \"over 30 minutes\" during that time. Pt continues to c/o of R arm weakness. Aspen collar in place. Denies numbness and tingling. Tolerating regular diet. Calls frequently. Pt able to make needs known.   "

## 2022-11-30 NOTE — PROGRESS NOTES
[facility]    Medicine Progress Note - Hospitalist Service, GOLD TEAM 19    Date of Admission:  11/26/2022    Assessment & Plan   Christine Hu is a 42 year old female admitted on 11/26/2022 after cervical spine surgery. She has history of myalgias chronic pain, cervical spine issues knee, shoulder hip wrist ankle issues.     She underwent surgery with Dr Murray 11/26:   1. Anterior cervical discectomy with removal of disc material and osteophytes at C4-5 and C5-6 for decompression of the spinal cord.  2. Bilateral Foraminotomies at C4-5 and C5-6for decompression of the nerve roots.  3. Placement of intervertebral prosthetic device at C4-5 and C5-6, Medtronic PEEK Cages.  4. Use of Iliac Crest Autograft for fusion, C4-5 and C5-6, which was harvested through a separate skin and fascial incision.  5. Placement of anterior Medtronic plate and screw instrumentation, C4-6.  The plate was placed separately from the interbody and was placed to aid in the fusion    Given concern for C-5 radiculopathy & palsy, patient again went to the OR with Dr. Murray on 11/29 for:  1. Right posterior cervical 4-5 foraminotomy      #Neck pain with radiculopathy   -- Status post above surgery     Rest of care per orthopedic surgery      F/up repeat C-spine    Pain control per primary team      #Right arm weakness  #?C5 palsy  -- Started 11/26 and has remained weaker, cannot shrug shoulder and lift right elbow for me, denies any numbness and normal sensation  -- She followed by orthopedic surgery  there are concerns for C5 palsy/neuroprecia and was stared on steroids 11/26 - CT c-spine & MRI C-spine also obtained; both showing post-surgical changes    Defer mgt to primary team       #Hypertension   -- Per patient is situational, not on PTA medications     Treat pain, also placed PRN hydralazine      #Multiple joint issues   #ACL tear  -- Planned to have surgery knee surgery nest   Tear  "of right supraspinatus, tenosynovitis of left ankle an ATFL tear    Defer back to orthopedic surgery         #Hx Meniere's disease   -- No issue currently      #Anxiety, depression  -- History previous overdose   -- Not on medications currently      #Mentioned thyrotoxicosis   -- Patient does not recall having any thyroid issues TSH was normal in April of this year, not on medications       #Recent COVID-19  -- Tested + 11/9 , considered COVID recovered   -- Would do good DVT prophylaxis          Diet: Snacks/Supplements Adult: Ensure Max Protein (bariatric); With Meals  Snacks/Supplements Adult: Ensure Max Protein (bariatric); Between Meals  Diet  Regular Diet Adult  Advance Diet as Tolerated: Regular Diet Adult; High Kcal/High Protein Diet, ADULT    DVT Prophylaxis: Pneumatic Compression Devices  Ogden Catheter: Not present  Central Lines: None  Cardiac Monitoring: None  Code Status: Full Code      Disposition Plan  Defer to primary team at this time.     Expected Discharge Date: 11/30/2022,  9:00 AM              The patient's care was discussed with the Bedside Nurse, Care Coordinator/, Patient and Orthopedic Surgery Consultant.    KATHY JANE MD  Hospitalist Service, [address]  Securely message with the Vocera Web Console (learn more here)  Text page via [facility] Paging/Directory   Please see signed in provider for up to date coverage information      Clinically Significant Risk Factors              # Hypoalbuminemia: Lowest albumin = 3.2 g/dL (Ref range: 3.5-5.2) at 11/29/2022  6:50 AM, will monitor as appropriate          # Overweight: Estimated body mass index is 26.08 kg/m  as calculated from the following:    Height as of this encounter: 1.676 m (5' 6\").    Weight as of this encounter: 73.3 kg (161 lb 9.6 oz)., PRESENT ON ADMISSION         ______________________________________________________________________    Interval History   - " Patient went to the OR yesterday given concern for C5 palsy  -- Reports her pain is now better after talking to pain providers.  -- Patient reports numbness in R-upper extremity has resolved, but there's still weakness in r-arm which is also improving.     Data reviewed today: I reviewed all medications, new labs and imaging results over the last 24 hours. I personally reviewed no images or EKG's today.    Physical Exam   Vital Signs: Temp: 98.7  F (37.1  C) Temp src: Oral BP: (!) 141/94 Pulse: 82   Resp: 13 SpO2: 97 % O2 Device: None (Room air) Oxygen Delivery: 2 LPM  Weight: 161 lbs 9.55 oz    GENERAL: Alert and oriented x 3; no acute distress; well-nourished.  HEENT: Normocephalic; atraumatic; PERRLA; MMM; cervical collar.  CV: RRR; normal S1, S2; no rubs, murmurs, or gallops.  RESP: Lung fields clear to aucultation B/L; no wheezing or crepitations.  GI: Abdomen is soft, nontender, nondistended; no organomegaly; normal bowel sounds.  : Deferred genital examination.   MSK: No clubbing, cyanosis, or edema.  DERM: Skin is intact; no rash, lesions, or skin breakdown.  NEURO:  decreased strength in R-upper extremity. Patient unable to raise R-arm, only against gravity.   PSYCH: No active hallucinations; affect, insight appear within normal limits.    Data   Recent Labs   Lab 11/29/22  0650 11/27/22  0602 11/26/22  0721   WBC 9.9  --   --    HGB 11.9 12.3  --    *  --   --      --   --      --   --    POTASSIUM 4.2  --   --    CHLORIDE 104  --   --    CO2 32  --   --    BUN 20  --   --    CR 0.52 0.44*  --    ANIONGAP 3  --   --    LOS 9.1  --   --    GLC 69*  --  113*   ALBUMIN 3.2*  --   --    PROTTOTAL 6.6*  --   --    BILITOTAL 0.3  --   --    ALKPHOS 58  --   --    ALT 23  --   --    AST 12  --   --      Recent Results (from the past 24 hour(s))   XR Surgery JONATAN L/T 5 Min Fluoro    Narrative    This exam was marked as non-reportable because it will not be read by a   radiologist or a  [facility] non-radiologist provider.         CT Cervical Spine w/o Contrast    Narrative    CT CERVICAL SPINE W/O CONTRAST 11/29/2022 8:03 PM    Provided History: Neck pain; hx Spine surgery; No suspected hardware  failure; None of the following: New/acute radiculopathy, weakness, or  worsening neck pain; No suspected cervical disc disease    Comparison: Cervical spine radiographs 11/20/2022, MRI cervical spine  11/27/2022, CT cervical spine 11/27/2022.    Technique: Using multidetector thin collimation helical acquisition  technique, axial, coronal and sagittal CT images through the cervical  spine were obtained without intravenous contrast.     Findings:  Post surgical changes of anterior instrumented spinal fusion C4-C6.  There is stable alignment of the cervical spine with minimal  anterolisthesis at C2-C3. New facetectomy changes on the right at C4-5  with associated subcutaneous emphysema and posterior drainage  catheter. Persistent postoperative prevertebral edema in addition to  superficial and deep neck edema. Decreased subcutaneous emphysema  anteriorly. Interval removal of the anterior left surgical drain. No  discrete fluid collection. No acute fracture or traumatic subluxation.  Mild to moderate loss of intervertebral disc height at C3-4. Mild loss  of disc height at C6-7. The findings on a level by level basis are as  follows:    C2-3: No spinal canal or neural foraminal stenosis.    C3-4:  Right greater than left uncinate hypertrophy. Mild to moderate  right and mild left neural foraminal stenosis. Mild spinal canal  narrowing.    C4-5:  Fusion level. Right facetectomy changes. Bilateral uncinate  hypertrophy. No spinal canal stenosis. Mild left neural foraminal  narrowing. No right neural foraminal stenosis.    C5-6:  Fusion level. Bilateral uncinate hypertrophy. No spinal canal  stenosis. Mild left neural foraminal narrowing. No right neural  foraminal stenosis.    C6-7:  Disc bulge. Mild spinal canal  narrowing. No neural foraminal  stenosis.    C7-T1:  No spinal canal or neural foraminal stenosis.     Heterogeneous thyroid gland without discrete nodule.      Impression    Impression:   1. Stable postsurgical changes of C4-C6 ACDF without hardware  complication.  2. Interval changes of right C4-5 facetectomy.  3. Multilevel cervical degenerative changes, greatest at C3-4 and  C6-7. No high-grade spinal canal or neural foraminal stenosis.    I have personally reviewed the examination and initial interpretation  and I agree with the findings.    JAMARCUS ESPINOSA MD         SYSTEM ID:  [id number]     Medications     lactated ringers Stopped (11/29/22 1540)     sodium chloride Stopped (11/27/22 0553)       famotidine  20 mg Oral BID    Or     famotidine  20 mg Intravenous BID     gabapentin  300 mg Oral TID     polyethylene glycol  17 g Oral Daily     senna-docusate  1 tablet Oral BID     sodium chloride (PF)  3 mL Intracatheter Q8H

## 2022-12-01 ENCOUNTER — APPOINTMENT (OUTPATIENT)
Dept: PHYSICAL THERAPY | Facility: CLINIC | Age: 42
DRG: 472 | End: 2022-12-01
Attending: ORTHOPAEDIC SURGERY
Payer: COMMERCIAL

## 2022-12-01 ENCOUNTER — APPOINTMENT (OUTPATIENT)
Dept: OCCUPATIONAL THERAPY | Facility: CLINIC | Age: 42
DRG: 472 | End: 2022-12-01
Attending: ORTHOPAEDIC SURGERY
Payer: COMMERCIAL

## 2022-12-01 LAB — GLUCOSE BLDC GLUCOMTR-MCNC: 95 MG/DL (ref 70–99)

## 2022-12-01 PROCEDURE — 99232 SBSQ HOSP IP/OBS MODERATE 35: CPT | Performed by: PHYSICIAN ASSISTANT

## 2022-12-01 PROCEDURE — 97530 THERAPEUTIC ACTIVITIES: CPT | Mod: GP | Performed by: PHYSICAL THERAPIST

## 2022-12-01 PROCEDURE — 250N000011 HC RX IP 250 OP 636

## 2022-12-01 PROCEDURE — 120N000002 HC R&B MED SURG/OB UMMC

## 2022-12-01 PROCEDURE — 250N000013 HC RX MED GY IP 250 OP 250 PS 637: Performed by: CLINICAL NURSE SPECIALIST

## 2022-12-01 PROCEDURE — 250N000013 HC RX MED GY IP 250 OP 250 PS 637: Performed by: PHYSICIAN ASSISTANT

## 2022-12-01 PROCEDURE — 250N000013 HC RX MED GY IP 250 OP 250 PS 637: Performed by: STUDENT IN AN ORGANIZED HEALTH CARE EDUCATION/TRAINING PROGRAM

## 2022-12-01 PROCEDURE — 99232 SBSQ HOSP IP/OBS MODERATE 35: CPT | Performed by: INTERNAL MEDICINE

## 2022-12-01 PROCEDURE — 97110 THERAPEUTIC EXERCISES: CPT | Mod: GO

## 2022-12-01 PROCEDURE — 97535 SELF CARE MNGMENT TRAINING: CPT | Mod: GO

## 2022-12-01 RX ORDER — GABAPENTIN 300 MG/1
600 CAPSULE ORAL AT BEDTIME
Status: DISCONTINUED | OUTPATIENT
Start: 2022-12-01 | End: 2022-12-02 | Stop reason: HOSPADM

## 2022-12-01 RX ORDER — METHOCARBAMOL 500 MG/1
1000 TABLET, FILM COATED ORAL EVERY 6 HOURS PRN
Status: DISCONTINUED | OUTPATIENT
Start: 2022-12-01 | End: 2022-12-02 | Stop reason: HOSPADM

## 2022-12-01 RX ADMIN — HYDROMORPHONE HYDROCHLORIDE 0.4 MG: 1 INJECTION, SOLUTION INTRAMUSCULAR; INTRAVENOUS; SUBCUTANEOUS at 16:55

## 2022-12-01 RX ADMIN — POLYETHYLENE GLYCOL 3350 17 G: 17 POWDER, FOR SOLUTION ORAL at 08:18

## 2022-12-01 RX ADMIN — HYDROMORPHONE HYDROCHLORIDE 4 MG: 4 TABLET ORAL at 11:25

## 2022-12-01 RX ADMIN — ACETAMINOPHEN 975 MG: 325 TABLET, FILM COATED ORAL at 13:29

## 2022-12-01 RX ADMIN — MAGNESIUM HYDROXIDE 30 ML: 400 SUSPENSION ORAL at 10:41

## 2022-12-01 RX ADMIN — SENNOSIDES AND DOCUSATE SODIUM 1 TABLET: 50; 8.6 TABLET ORAL at 19:59

## 2022-12-01 RX ADMIN — HYDROMORPHONE HYDROCHLORIDE 4 MG: 4 TABLET ORAL at 14:26

## 2022-12-01 RX ADMIN — METHOCARBAMOL 750 MG: 750 TABLET ORAL at 10:36

## 2022-12-01 RX ADMIN — HYDROMORPHONE HYDROCHLORIDE 4 MG: 4 TABLET ORAL at 01:34

## 2022-12-01 RX ADMIN — BISACODYL 10 MG: 10 SUPPOSITORY RECTAL at 22:01

## 2022-12-01 RX ADMIN — HYDROXYZINE HYDROCHLORIDE 25 MG: 25 TABLET, FILM COATED ORAL at 08:22

## 2022-12-01 RX ADMIN — GABAPENTIN 300 MG: 300 CAPSULE ORAL at 14:26

## 2022-12-01 RX ADMIN — SENNOSIDES AND DOCUSATE SODIUM 1 TABLET: 50; 8.6 TABLET ORAL at 08:18

## 2022-12-01 RX ADMIN — GABAPENTIN 600 MG: 300 CAPSULE ORAL at 22:04

## 2022-12-01 RX ADMIN — FAMOTIDINE 20 MG: 20 TABLET ORAL at 19:59

## 2022-12-01 RX ADMIN — METHOCARBAMOL 750 MG: 750 TABLET ORAL at 02:33

## 2022-12-01 RX ADMIN — FAMOTIDINE 20 MG: 20 TABLET ORAL at 08:18

## 2022-12-01 RX ADMIN — HYDROXYZINE HYDROCHLORIDE 50 MG: 50 TABLET, FILM COATED ORAL at 22:01

## 2022-12-01 RX ADMIN — HYDROMORPHONE HYDROCHLORIDE 0.4 MG: 1 INJECTION, SOLUTION INTRAMUSCULAR; INTRAVENOUS; SUBCUTANEOUS at 10:35

## 2022-12-01 RX ADMIN — GABAPENTIN 300 MG: 300 CAPSULE ORAL at 08:18

## 2022-12-01 RX ADMIN — HYDROMORPHONE HYDROCHLORIDE 0.2 MG: 1 INJECTION, SOLUTION INTRAMUSCULAR; INTRAVENOUS; SUBCUTANEOUS at 06:22

## 2022-12-01 RX ADMIN — METHOCARBAMOL 1000 MG: 500 TABLET ORAL at 19:58

## 2022-12-01 RX ADMIN — HYDROXYZINE HYDROCHLORIDE 25 MG: 25 TABLET, FILM COATED ORAL at 13:29

## 2022-12-01 RX ADMIN — Medication 1 LOZENGE: at 03:32

## 2022-12-01 RX ADMIN — HYDROMORPHONE HYDROCHLORIDE 4 MG: 4 TABLET ORAL at 05:15

## 2022-12-01 RX ADMIN — HYDROMORPHONE HYDROCHLORIDE 4 MG: 4 TABLET ORAL at 21:48

## 2022-12-01 RX ADMIN — HYDROMORPHONE HYDROCHLORIDE 4 MG: 4 TABLET ORAL at 17:59

## 2022-12-01 RX ADMIN — ACETAMINOPHEN 975 MG: 325 TABLET, FILM COATED ORAL at 06:22

## 2022-12-01 RX ADMIN — DIAZEPAM 5 MG: 5 TABLET ORAL at 00:42

## 2022-12-01 RX ADMIN — HYDROMORPHONE HYDROCHLORIDE 4 MG: 4 TABLET ORAL at 08:18

## 2022-12-01 ASSESSMENT — ACTIVITIES OF DAILY LIVING (ADL)
ADLS_ACUITY_SCORE: 30
ADLS_ACUITY_SCORE: 28
ADLS_ACUITY_SCORE: 30
ADLS_ACUITY_SCORE: 28
ADLS_ACUITY_SCORE: 30
ADLS_ACUITY_SCORE: 30
ADLS_ACUITY_SCORE: 28
ADLS_ACUITY_SCORE: 30
ADLS_ACUITY_SCORE: 28

## 2022-12-01 NOTE — PROGRESS NOTES
"Orthopedic Surgery Progress Note:     Subjective:   NAEON.  Pain much improving, valium working well. Satisfied with pain team recommendations. LBM 11/30.    Objective:   BP (!) 160/84 (BP Location: Right arm)   Pulse 70   Temp 98.8  F (37.1  C) (Oral)   Resp 18   Ht 1.676 m (5' 6\")   Wt 73.3 kg (161 lb 9.6 oz)   LMP  (LMP Unknown)   SpO2 91%   BMI 26.08 kg/m    No intake/output data recorded.  General: NAD. Resting comfortably in bed.COllar in place.   Respiratory: Breathing comfortably on RA.  Musculoskeletal:     Motor Strength Right Left   Deltoids: C5 2/5 5/5   Biceps: C5 4/5 5/5   Brachialis: C6 4/5 5/5   Wrist extension: C6 5/5 5/5   Triceps: C7  5/5 5/5   Wrist flexion: C7 5/5 5/5    strength: C8 5/5 5/5   Hand intrinsics: T1 5/5 5/5     Sensation from C4-T1 is preserved.    Motor Strength Right Left   Hip flexion: L1, L2, L3 5/5 5/5   Hip adduction: L2, L3 5/5 5/5   Knee flexion: S1 5/5 5/5   Knee extension: L3, L4 5/5 5/5   Ankle dosiflexion: L4, L5 5/5 5/5   EHL: L5 5/5 5/5   Ankle plantarflexion: S1 5/5 5/5     Sensation from L1-S2 is preserved.    Output by Drain (mL) 11/29/22 0700 - 11/29/22 1459 11/29/22 1500 - 11/29/22 2259 11/29/22 2300 - 11/30/22 0659 11/30/22 0700 - 11/30/22 1459 11/30/22 1500 - 11/30/22 2259 11/30/22 2300 - 12/01/22 0659 12/01/22 0700 - 12/01/22 0806   Patient has no LDAs of requested type attached.     Laboratory Data:  Lab Results   Component Value Date    WBC 9.9 11/29/2022    HGB 11.9 11/29/2022     11/29/2022     Assessment & Plan:   Assessment and Plan: Christine Hu is a 42 year old female now s/p the following procedures with Dr. Murray.     -11/26 C4-6 ACDF C/b right C5 nerve palsy  -11/29 C4-5 Posterior foraminotomy     12/1  PT/OT, OOB as able  Discontinue drain, ancef  Pain control- pain consult, appreciate recs  Discharge planning    Ortho Primary  - Activity: Up with assist until independent. No excessive bending or twisting. No lifting " >10 lbs x 6 weeks. No Davin lift for transfers.   - Weightbearing Status: WBAT.  - Pain Management: Transition from IV to PO as tolerated. No NSAIDs.   - Antibiotics: Ancef until drain is removed  - Diet: Begin with clear fluids and progress diet as tolerated.   - DVT Prophylaxis: SCDs only. No chemical DVT prophylaxis needed.  - Imaging: Complete  - Labs: Labs PRN.  - Bracing/Splinting: Collar to remain on at all times.  - Dressings: Keep dressing c/d/i until POD 5-7.  - Drains: Removed  - Ogden catheter: Removed  - Physical Therapy/Occupational Therapy: Evaluation and treatment.  - Consults: Hospitalist, PT/OT, pain team  - Follow-up: Clinic with Dr. Murray in 6 weeks with repeat radiographs.     - Disposition: Pending progress with therapies, pain control on orals, and medical stability; anticipate discharge to home on POD3-5.    Orthopedic surgery staff for this patient is Dr. Murray.    Eric Oquendo MD  Orthopaedic Surgery PGY-4    FOLLOWUP:    Future Appointments   Date Time Provider Department Center   11/27/2022  8:15 AM Lawanda Maldonado, PT [facility] [place]   11/28/2022  7:00 PM UR OT WAITLIST [facility] [place]   1/5/2023 10:20 AM Mc Peterson MD [facility] [place]   1/10/2023 11:00 AM Wilbur Murray MD [facility]   2/9/2023 10:20 AM Mc Peterson MD [facility] [place]

## 2022-12-01 NOTE — PROGRESS NOTES
A&Ox4, anxious but cooperative, able to make needs known with call light in reach.  VSS on RA, continent b&b, pt ambulated to bathroom 3x this shift, LBM 12/1 (per pt).  Denied SOB, CP, n/t, n/v.  C/o generalized edema and requested a diuretic at some point, message passed along to day shift RN.  Pt reported paralysis of R arm, but is intermittently able to use/move arm for ADL's.  Valium given overnight for anxiety, pt reported that this was very helpful in calming her down.  Pain managed with oral dilaudid 2x, robaxin, scheduled tylenol, and one IV dilaudid for breakthrough pain.  Hemovac had moderate drainage overnight.  Dressings on incision site and hemovac site CDI.  RPIV SL.   Aspen brace on neck.  Continue POC.

## 2022-12-01 NOTE — PROGRESS NOTES
"[facility], [place]   Internal Medicine Daily Note           Interval History/Events     Hand off taken  Overnight events reviewed  Reports doing well  Intermittent nausea  No chest pain, shortness of breath  Reports Right hand weakness is unchanged  Pain is better controlled compared to yesterday.          Review of Systems        4 point ROS including Respiratory, CV, GI and , other than that noted above is negative      Medications   I have reviewed current medications  in the \"current medication\" section of Epic.  Relevant changes include:     Physical Exam   General:       Vital signs:    Blood pressure (!) 142/83, pulse 73, temperature 97.8  F (36.6  C), temperature source Oral, resp. rate 16, height 1.676 m (5' 6\"), weight 73.3 kg (161 lb 9.6 oz), SpO2 96 %, not currently breastfeeding.  Estimated body mass index is 26.08 kg/m  as calculated from the following:    Height as of this encounter: 1.676 m (5' 6\").    Weight as of this encounter: 73.3 kg (161 lb 9.6 oz).    No intake or output data in the 24 hours ending 12/01/22 1324     Constitutional: Laying in bed in no acute distress  Eye: No icterus, no pallor  Mouth/ENT: Normal oral mucosa  Cardiovascular: S1, S2 normal.   Respiratory: B/L CTA  GI: Soft, NT, BS+  :   Neurology: Alert, awake, and oriented. RUE weakness. ROM limited by pain on the right shoulder. Strength 3+/5 (against gravity). Strength normal on fingers.   Psych:   MSK:   Integumentary:   Heme/Lymph/Imm:      Laboratory and Imaging Studies     I have reviewed  laboratory and imaging studies in the Epic. Pertinent findings are as below:    BMP  Recent Labs   Lab 12/01/22  0623 11/29/22  0650 11/27/22  0602 11/26/22  0721   NA  --  139  --   --    POTASSIUM  --  4.2  --   --    CHLORIDE  --  104  --   --    LOS  --  9.1  --   --    CO2  --  32  --   --    BUN  --  20  --   --    CR  --  0.52 0.44*  --    GLC 95 69*  --  113*     CBC  Recent Labs   Lab " 11/29/22  0650   WBC 9.9   RBC 3.53*   HGB 11.9   HCT 35.7   *   MCH 33.7*   MCHC 33.3   RDW 12.9        INRNo lab results found in last 7 days.  LFTs  Recent Labs   Lab 11/29/22  0650   ALKPHOS 58   AST 12   ALT 23   BILITOTAL 0.3   PROTTOTAL 6.6*   ALBUMIN 3.2*      PANCNo lab results found in last 7 days.        Impression/Plan        42 year old female admitted on 11/26/2022 after cervical spine surgery. She has history of myalgias chronic pain, cervical spine issues knee, shoulder hip wrist ankle issues.     She underwent surgery with Dr Murray 11/26:   1. Anterior cervical discectomy with removal of disc material and osteophytes at C4-5 and C5-6 for decompression of the spinal cord.  2. Bilateral Foraminotomies at C4-5 and C5-6for decompression of the nerve roots.  3. Placement of intervertebral prosthetic device at C4-5 and C5-6, Medtronic PEEK Cages.  4. Use of Iliac Crest Autograft for fusion, C4-5 and C5-6, which was harvested through a separate skin and fascial incision.  5. Placement of anterior Medtronic plate and screw instrumentation, C4-6.  The plate was placed separately from the interbody and was placed to aid in the fusion     Given concern for C-5 radiculopathy & palsy, patient again went to the OR with Dr. Murray on 11/29 for:  1. Right posterior cervical 4-5 foraminotomy        # Neck pain with radiculopathy   # S/p spinal surgery as above:   - Defer management to primary service.   - Pain control per primary team/pain medicine     # Right arm weakness  #?C5 palsy  -- Started 11/26 and has remained weaker, cannot shrug shoulder and lift right elbow for me, denies any numbness and normal sensation  -- She followed by orthopedic surgery  there are concerns for C5 palsy/neuroprecia and was stared on steroids 11/26 - CT c-spine & MRI C-spine also obtained; both showing post-surgical changes    Defer mgt to primary team     Unchanged from above findings on 12/01, discussed with Ortho  team.       # Hypertension   Per patient is situational, not on PTA medications   - Treat pain  - Hydralazine PRN     # Multiple joint issues   # ACL tear  -- Planned to have surgery knee surgery nest   Tear of right supraspinatus, tenosynovitis of left ankle an ATFL tear    Defer  to orthopedic surgery         # Hx Meniere's disease   -- No issue currently      # Anxiety, depression  -- History previous overdose   -- Not on medications currently      # Mentioned thyrotoxicosis   -Patient does not recall having any thyroid issues TSH was normal in April of this year, not on medications       # Recent COVID-19  -- Tested + 11/9 , considered COVID recovered   -- Would do good DVT prophylaxis         Diet: Snacks/Supplements Adult: Ensure Max Protein (bariatric); With Meals  Snacks/Supplements Adult: Ensure Max Protein (bariatric); Between Meals  Diet  Regular Diet Adult  Advance Diet as Tolerated: Regular Diet Adult; High Kcal/High Protein Diet, ADULT    DVT Prophylaxis: Pneumatic Compression Devices  Ogden Catheter: Not present  Central Lines: None  Cardiac Monitoring: None  Code Status: Full Code          Disposition Plan    Defer to primary team at this time.      Expected Discharge Date:  Upon pain control, and PT/OT         Pt's care was discussed with bedside RN, patient and  during Care Team Rounds.               Curtis Elliott MD  Hospitalist ( Internal medicine)  Pager: [phone]

## 2022-12-01 NOTE — PLAN OF CARE
Physical Therapy Discharge Summary    Reason for therapy discharge:    All goals and outcomes met, no further needs identified.    Progress towards therapy goal(s). See goals on Care Plan in [facility] electronic health record for goal details.  Goals partially met.  Barriers to achieving goals:   pt needed CGA on stairs instead of SBA, will have assist from spouse.    Therapy recommendation(s):    Continued therapy is recommended.  Rationale/Recommendations:  OP PT.

## 2022-12-01 NOTE — PLAN OF CARE
Pt continues to complain of severe pain in neck and R shoulder. Pt also continues to report severe weakness/paralysis of R arm. However, pt has been witnessed by multiple RN's to be using her R arm with minimal difficulty, but when MD's or PT/OT are around, pt will exhibit minimal mobility of RUE. Pt continues to call frequently for pain meds including IV dilaudid, despite repeated education on decreasing use of IV narcotics. Drain removed this morning. MOM given this AM. Pt self reports BM yesterday. PIV saline locked. Aspen collar in place. Pt able to make needs known.

## 2022-12-01 NOTE — PROGRESS NOTES
Pain Service Progress Note  [facility]  Date: 12/01/2022       Patient Name: Christine Hu  MRN: [id number]  Age: 42 year old  Sex: female      Assessment:  Christine Hu is a 42 year old female who has PMH of joint pain of shoulder from labral tears and rotator cuff tear, ACL/meniscal tears, ankle fracture  who was admitted on 11/26/22 for C4-C6 ACFD with left iliac crest autograft.  She then developed RUE weakness post operatively and RTOR on 11/29/22 for right posterior cervical 4-5 foraminotomy.     Pain service was consulted on 11/30/22 to assist with pain management.  PTA, Christine was not on a chronic opioid regimen, however has received short scripts of tramadol, vicodin for neck pain, shoulder pain, knee and ankle pain. Please see .    Interval history: Christine reports pain is being managed, especially overnight when she was receiving and staying on top of the doses.  She feels somewhat better today.  Pain is still in the posterior neck, right shoulder, anterior neck and hip.  She has been able to work with PT for a long period of time today. She states that she has been able to minimize IV Dilaudid doses, likely because the Q 3 hours of PO Dilaudid is helping.    Discussed reducing IV Dilaudid on her own today.  Tomorrow, will reduce to TID PRN dosing and then will discontinue on 12/3/22.  She is agreeable with plan.    Plan/Recommendations:  1. Conitnue Dilaudid 4mg PO Q 3 hours PRN.  2. Continue Dilaudid 0.2-0.4mg Q 2 hours PRN. use oral Dilaudid first, reserve IV Dilaudid for rescue  3. Continue acetaminophen to 975mg PO Q 8 hours. Stop acetaminophen PRN dosing.  4. Increase gabapentin to 300mg, 300mg, 600mg at bedtime  (was prescribed this PTA, states she tried some dose BID, which caused severe sedation)  5. Continue hydroxyzine  6. Continue acetaminophen to 975mg PO Q 8 hours.  7. Increase Robaxin 750mg to 1000mg PO Q 6 hours PRN.  8. Defer valium to ortho  "team, caution with commitant use with opioids. States Valium is very helpful. Offer psychiatry consult but she declined  9. Continue with spiritual health  10. Bowel regimen. BM on 11/29/22, 12/1/22-small           Thank you for the opportunity to participate in the care of Christine Hu  Pain Service will continue to follow.     Discussed with attending anesthesiologist  Primary Service Contacted with Recommendations? Yes     Please Page the Pain Team [facility]: \"PAIN MANAGEMENT ACUTE INPATIENT/ [facility]\"  Maria Del Carmen Orozco PA-C  12/01/2022         Diet: Snacks/Supplements Adult: Ensure Max Protein (bariatric); With Meals  Snacks/Supplements Adult: Ensure Max Protein (bariatric); Between Meals  Diet  Regular Diet Adult  Advance Diet as Tolerated: Regular Diet Adult; High Kcal/High Protein Diet, ADULT    Relevant Labs:  Recent Labs   Lab Test 11/29/22  0650      BUN 20       Physical Exam:  Vitals: BP (!) 142/83 (BP Location: Left arm)   Pulse 73   Temp 97.8  F (36.6  C) (Oral)   Resp 16   Ht 1.676 m (5' 6\")   Wt 73.3 kg (161 lb 9.6 oz)   LMP  (LMP Unknown)   SpO2 96%   BMI 26.08 kg/m      Physical Exam:   CONSTITUTIONAL/GENERAL APPEARANCE: Conversant.  EYES: EOMI, sclerae clear  ENT/NECK: neck is supple  RESPIRATORY:non labored breathing, on room air  CARDIOVASCULAR: HR within normal limits  GI: mildly distended but soft, nontender  MUSCULOSKELETAL/BACK/SPINE/EXTREMITIES: Moving UE and LE independently.     NEURO:  AAOx3.   SKIN/VASCULAR EXAM:  Dry and warm.  PSYCHIATRIC/BEHAVIORAL/OBSERVATIONS:  No objective signs of pain observed during our interview.   Judgment/Insight -fair   Orientation - x3   Memory -fair   Mood and affect - calm, pleasant, cooperative          Relevant Medications:  Current Pain Medications:  Medications related to Pain Management (From now, onward)    Start     Dose/Rate Route Frequency Ordered Stop    12/01/22 2200  gabapentin (NEURONTIN) capsule 600 mg         " 600 mg Oral AT BEDTIME 12/01/22 1056      12/01/22 1053  methocarbamol (ROBAXIN) tablet 1,000 mg         1,000 mg Oral EVERY 6 HOURS PRN 12/01/22 1056      11/30/22 1400  gabapentin (NEURONTIN) capsule 300 mg         300 mg Oral 2 TIMES DAILY 11/30/22 1123      11/30/22 1359  acetaminophen (TYLENOL) tablet 975 mg         975 mg Oral EVERY 8 HOURS 11/30/22 1123      11/30/22 1130  HYDROmorphone (DILAUDID) tablet 4 mg         4 mg Oral EVERY 3 HOURS PRN 11/30/22 1123      11/30/22 0708  diazepam (VALIUM) tablet 5 mg         5 mg Oral EVERY 8 HOURS PRN 11/30/22 0708      11/29/22 1827  HYDROmorphone (PF) (DILAUDID) injection 0.2 mg        See Hyperspace for full Linked Orders Report.    0.2 mg Intravenous EVERY 2 HOURS PRN 11/29/22 1827      11/29/22 1827  HYDROmorphone (PF) (DILAUDID) injection 0.4 mg        See Hyperspace for full Linked Orders Report.    0.4 mg Intravenous EVERY 2 HOURS PRN 11/29/22 1827      11/29/22 0000  acetaminophen (TYLENOL) 325 MG tablet         650 mg Oral EVERY 4 HOURS PRN 11/28/22 0804      11/29/22 0000  HYDROmorphone (DILAUDID) 2 MG tablet         2-4 mg Oral EVERY 4 HOURS PRN 11/29/22 0751      11/28/22 0732  hydrOXYzine (ATARAX) tablet 25 mg        See Hyperspace for full Linked Orders Report.    25 mg Oral EVERY 6 HOURS PRN 11/28/22 0732      11/28/22 0732  hydrOXYzine (ATARAX) tablet 50 mg        See Hyperspace for full Linked Orders Report.    50 mg Oral EVERY 6 HOURS PRN 11/28/22 0732      11/28/22 0000  gabapentin (NEURONTIN) 300 MG capsule         300 mg Oral 3 TIMES DAILY 11/28/22 0804      11/28/22 0000  hydrOXYzine (ATARAX) 25 MG tablet         25 mg Oral EVERY 6 HOURS PRN 11/28/22 0804      11/28/22 0000  methocarbamol (ROBAXIN) 750 MG tablet         750 mg Oral EVERY 6 HOURS PRN 11/28/22 0804      11/28/22 0000  polyethylene glycol (MIRALAX) 17 g packet         17 g Oral DAILY 11/28/22 0804      11/28/22 0000  senna-docusate (SENOKOT-S/PERICOLACE) 8.6-50 MG tablet         1  tablet Oral 2 TIMES DAILY 11/28/22 0804      11/27/22 0800  polyethylene glycol (MIRALAX) Packet 17 g         17 g Oral DAILY 11/26/22 1040      11/26/22 1240  lidocaine 1 % 0.1-1 mL         0.1-1 mL Other EVERY 1 HOUR PRN 11/26/22 1241      11/26/22 1240  lidocaine (LMX4) cream          Topical EVERY 1 HOUR PRN 11/26/22 1241      11/26/22 1100  senna-docusate (SENOKOT-S/PERICOLACE) 8.6-50 MG per tablet 1 tablet         1 tablet Oral 2 TIMES DAILY 11/26/22 1040      11/26/22 1035  magnesium hydroxide (MILK OF MAGNESIA) suspension 30 mL         30 mL Oral DAILY PRN 11/26/22 1040      11/26/22 1035  bisacodyl (DULCOLAX) suppository 10 mg         10 mg Rectal DAILY PRN 11/26/22 1040            Primary Service Contacted with Recommendations? Yes      Time/Communication:  I personally spent 25 minutes with greater than 50% in consultation, education, counseling and coordination of care.  See note above for details on conversation.

## 2022-12-01 NOTE — PLAN OF CARE
End of shift Summary: See flowsheet for VS and detail assessments.    Changes this Shift:     Pulmonary: Lung sounds clear throughout all lobes, denies SOB, no cough noted. Remains on RA.    Output: Voids spontaneously without difficulty, stated last BM 11/30. States she feels constipated even after Milk of Mag but declined suppository.     Activity: SBA with walker.    Skin: Intact other than incisions. C/o edema in BLE although both are non pitting and don't appear edematous.     Pain: C/o severe pain that is managed ok with PRNs.     Neuro/CMS: A&Ox4, moderate deficit of movement in RUE although pt has been seen using it on multiple occassions. Pt states she cannot straighten her RUE but will subconsciously do so or when no one is looking.     Dressing: Dressing remains C/D/I.     IV/Drains: L PIV SL.    Plan: Continue pain management per pain team and ortho.

## 2022-12-02 ENCOUNTER — DOCUMENTATION ONLY (OUTPATIENT)
Dept: ORTHOPEDICS | Facility: CLINIC | Age: 42
End: 2022-12-02

## 2022-12-02 ENCOUNTER — APPOINTMENT (OUTPATIENT)
Dept: OCCUPATIONAL THERAPY | Facility: CLINIC | Age: 42
DRG: 472 | End: 2022-12-02
Attending: ORTHOPAEDIC SURGERY
Payer: COMMERCIAL

## 2022-12-02 VITALS
RESPIRATION RATE: 16 BRPM | HEIGHT: 66 IN | OXYGEN SATURATION: 98 % | WEIGHT: 161.6 LBS | BODY MASS INDEX: 25.97 KG/M2 | SYSTOLIC BLOOD PRESSURE: 142 MMHG | TEMPERATURE: 98 F | HEART RATE: 91 BPM | DIASTOLIC BLOOD PRESSURE: 92 MMHG

## 2022-12-02 LAB
GLUCOSE BLD-MCNC: 93 MG/DL (ref 70–99)
HGB BLD-MCNC: 10.6 G/DL (ref 11.7–15.7)

## 2022-12-02 PROCEDURE — 97535 SELF CARE MNGMENT TRAINING: CPT | Mod: GO

## 2022-12-02 PROCEDURE — 250N000011 HC RX IP 250 OP 636

## 2022-12-02 PROCEDURE — 99232 SBSQ HOSP IP/OBS MODERATE 35: CPT | Performed by: INTERNAL MEDICINE

## 2022-12-02 PROCEDURE — 250N000013 HC RX MED GY IP 250 OP 250 PS 637: Performed by: PHYSICIAN ASSISTANT

## 2022-12-02 PROCEDURE — 250N000013 HC RX MED GY IP 250 OP 250 PS 637

## 2022-12-02 PROCEDURE — 99232 SBSQ HOSP IP/OBS MODERATE 35: CPT | Performed by: PHYSICIAN ASSISTANT

## 2022-12-02 PROCEDURE — 82947 ASSAY GLUCOSE BLOOD QUANT: CPT | Performed by: INTERNAL MEDICINE

## 2022-12-02 PROCEDURE — 36415 COLL VENOUS BLD VENIPUNCTURE: CPT | Performed by: INTERNAL MEDICINE

## 2022-12-02 PROCEDURE — 250N000013 HC RX MED GY IP 250 OP 250 PS 637: Performed by: STUDENT IN AN ORGANIZED HEALTH CARE EDUCATION/TRAINING PROGRAM

## 2022-12-02 PROCEDURE — 250N000013 HC RX MED GY IP 250 OP 250 PS 637: Performed by: CLINICAL NURSE SPECIALIST

## 2022-12-02 PROCEDURE — 85018 HEMOGLOBIN: CPT

## 2022-12-02 PROCEDURE — 99207 PR CDG-CUT & PASTE-POTENTIAL IMPACT ON LEVEL: CPT | Performed by: INTERNAL MEDICINE

## 2022-12-02 RX ORDER — BISACODYL 10 MG
10 SUPPOSITORY, RECTAL RECTAL DAILY PRN
Status: DISCONTINUED | OUTPATIENT
Start: 2022-12-02 | End: 2022-12-02 | Stop reason: HOSPADM

## 2022-12-02 RX ORDER — PROCHLORPERAZINE MALEATE 10 MG
10 TABLET ORAL EVERY 6 HOURS PRN
Status: DISCONTINUED | OUTPATIENT
Start: 2022-12-02 | End: 2022-12-02

## 2022-12-02 RX ORDER — ONDANSETRON 2 MG/ML
4 INJECTION INTRAMUSCULAR; INTRAVENOUS EVERY 6 HOURS PRN
Status: DISCONTINUED | OUTPATIENT
Start: 2022-12-02 | End: 2022-12-02

## 2022-12-02 RX ORDER — POLYETHYLENE GLYCOL 3350 17 G/17G
17 POWDER, FOR SOLUTION ORAL DAILY
Status: DISCONTINUED | OUTPATIENT
Start: 2022-12-02 | End: 2022-12-02

## 2022-12-02 RX ORDER — LIDOCAINE 40 MG/G
CREAM TOPICAL
Status: DISCONTINUED | OUTPATIENT
Start: 2022-12-02 | End: 2022-12-02 | Stop reason: HOSPADM

## 2022-12-02 RX ORDER — AMOXICILLIN 250 MG
1 CAPSULE ORAL 2 TIMES DAILY
Qty: 30 TABLET | Refills: 0 | Status: SHIPPED | OUTPATIENT
Start: 2022-12-02 | End: 2023-04-10

## 2022-12-02 RX ORDER — GABAPENTIN 300 MG/1
600 CAPSULE ORAL AT BEDTIME
Qty: 60 CAPSULE | Refills: 0 | Status: SHIPPED | OUTPATIENT
Start: 2022-12-02 | End: 2022-12-06

## 2022-12-02 RX ORDER — FAMOTIDINE 20 MG/1
20 TABLET, FILM COATED ORAL 2 TIMES DAILY
Status: DISCONTINUED | OUTPATIENT
Start: 2022-12-02 | End: 2022-12-02

## 2022-12-02 RX ORDER — DIAZEPAM 5 MG
5 TABLET ORAL EVERY 8 HOURS PRN
Qty: 6 TABLET | Refills: 0 | Status: SHIPPED | OUTPATIENT
Start: 2022-12-02 | End: 2022-12-09

## 2022-12-02 RX ORDER — CEFAZOLIN SODIUM 1 G/3ML
1 INJECTION, POWDER, FOR SOLUTION INTRAMUSCULAR; INTRAVENOUS EVERY 8 HOURS
Status: DISCONTINUED | OUTPATIENT
Start: 2022-12-02 | End: 2022-12-02 | Stop reason: HOSPADM

## 2022-12-02 RX ORDER — ONDANSETRON 4 MG/1
4 TABLET, ORALLY DISINTEGRATING ORAL EVERY 6 HOURS PRN
Status: DISCONTINUED | OUTPATIENT
Start: 2022-12-02 | End: 2022-12-02

## 2022-12-02 RX ORDER — GABAPENTIN 300 MG/1
300 CAPSULE ORAL 2 TIMES DAILY
Qty: 60 CAPSULE | Refills: 0 | Status: SHIPPED | OUTPATIENT
Start: 2022-12-02 | End: 2022-12-06

## 2022-12-02 RX ORDER — METHOCARBAMOL 1000 MG/1
1000 TABLET, FILM COATED ORAL EVERY 6 HOURS PRN
Qty: 40 TABLET | Refills: 0 | Status: SHIPPED | OUTPATIENT
Start: 2022-12-02 | End: 2022-12-06

## 2022-12-02 RX ORDER — AMOXICILLIN 250 MG
1 CAPSULE ORAL 2 TIMES DAILY
Status: DISCONTINUED | OUTPATIENT
Start: 2022-12-02 | End: 2022-12-02 | Stop reason: HOSPADM

## 2022-12-02 RX ORDER — SODIUM CHLORIDE 9 MG/ML
INJECTION, SOLUTION INTRAVENOUS CONTINUOUS
Status: DISCONTINUED | OUTPATIENT
Start: 2022-12-02 | End: 2022-12-02 | Stop reason: HOSPADM

## 2022-12-02 RX ORDER — HYDROMORPHONE HYDROCHLORIDE 4 MG/1
2-4 TABLET ORAL EVERY 4 HOURS
Qty: 30 TABLET | Refills: 0 | Status: SHIPPED | OUTPATIENT
Start: 2022-12-02 | End: 2022-12-06

## 2022-12-02 RX ADMIN — DIAZEPAM 5 MG: 5 TABLET ORAL at 13:42

## 2022-12-02 RX ADMIN — SENNOSIDES AND DOCUSATE SODIUM 1 TABLET: 50; 8.6 TABLET ORAL at 08:08

## 2022-12-02 RX ADMIN — METHOCARBAMOL 1000 MG: 500 TABLET ORAL at 12:21

## 2022-12-02 RX ADMIN — FAMOTIDINE 20 MG: 20 TABLET ORAL at 08:08

## 2022-12-02 RX ADMIN — CEFAZOLIN 1 G: 1 INJECTION, POWDER, FOR SOLUTION INTRAMUSCULAR; INTRAVENOUS at 10:12

## 2022-12-02 RX ADMIN — HYDROMORPHONE HYDROCHLORIDE 4 MG: 4 TABLET ORAL at 07:12

## 2022-12-02 RX ADMIN — CEFAZOLIN 1 G: 1 INJECTION, POWDER, FOR SOLUTION INTRAMUSCULAR; INTRAVENOUS at 02:35

## 2022-12-02 RX ADMIN — POLYETHYLENE GLYCOL 3350 17 G: 17 POWDER, FOR SOLUTION ORAL at 08:19

## 2022-12-02 RX ADMIN — HYDROMORPHONE HYDROCHLORIDE 4 MG: 4 TABLET ORAL at 14:53

## 2022-12-02 RX ADMIN — GABAPENTIN 300 MG: 300 CAPSULE ORAL at 13:42

## 2022-12-02 RX ADMIN — ACETAMINOPHEN 975 MG: 325 TABLET, FILM COATED ORAL at 13:42

## 2022-12-02 RX ADMIN — METHOCARBAMOL 1000 MG: 500 TABLET ORAL at 02:35

## 2022-12-02 RX ADMIN — HYDROMORPHONE HYDROCHLORIDE 4 MG: 4 TABLET ORAL at 01:22

## 2022-12-02 RX ADMIN — MAGNESIUM HYDROXIDE 30 ML: 400 SUSPENSION ORAL at 14:53

## 2022-12-02 RX ADMIN — DIAZEPAM 5 MG: 5 TABLET ORAL at 05:25

## 2022-12-02 RX ADMIN — GABAPENTIN 300 MG: 300 CAPSULE ORAL at 08:08

## 2022-12-02 RX ADMIN — HYDROMORPHONE HYDROCHLORIDE 0.2 MG: 1 INJECTION, SOLUTION INTRAMUSCULAR; INTRAVENOUS; SUBCUTANEOUS at 00:10

## 2022-12-02 RX ADMIN — SENNOSIDES AND DOCUSATE SODIUM 1 TABLET: 50; 8.6 TABLET ORAL at 08:19

## 2022-12-02 RX ADMIN — HYDROMORPHONE HYDROCHLORIDE 4 MG: 4 TABLET ORAL at 04:25

## 2022-12-02 RX ADMIN — HYDROMORPHONE HYDROCHLORIDE 0.2 MG: 1 INJECTION, SOLUTION INTRAMUSCULAR; INTRAVENOUS; SUBCUTANEOUS at 09:11

## 2022-12-02 RX ADMIN — HYDROMORPHONE HYDROCHLORIDE 4 MG: 4 TABLET ORAL at 10:20

## 2022-12-02 RX ADMIN — Medication 1 MG: at 03:07

## 2022-12-02 RX ADMIN — HYDROXYZINE HYDROCHLORIDE 50 MG: 50 TABLET, FILM COATED ORAL at 08:16

## 2022-12-02 RX ADMIN — ACETAMINOPHEN 975 MG: 325 TABLET, FILM COATED ORAL at 05:25

## 2022-12-02 ASSESSMENT — ACTIVITIES OF DAILY LIVING (ADL)
ADLS_ACUITY_SCORE: 28

## 2022-12-02 NOTE — PROGRESS NOTES
"Pain Service Progress Note  [facility]  Date: 12/02/2022       Patient Name: Christine Hu  MRN: [id number]  Age: 42 year old  Sex: female      Assessment:  Christine Hu is a 42 year old female who has PMH of joint pain of shoulder from labral tears and rotator cuff tear, ACL/meniscal tears, ankle fracture  who was admitted on 11/26/22 for C4-C6 ACFD with left iliac crest autograft.  She then developed RUE weakness post operatively and RTOR on 11/29/22 for right posterior cervical 4-5 foraminotomy.     Pain service was consulted on 11/30/22 to assist with pain management.  PTA, Christine was not on a chronic opioid regimen, however has received short scripts of tramadol, vicodin for neck pain, shoulder pain, knee and ankle pain. Please see .     Interval history: She states pain is mostly at the right shoulder/arm.  Concerned that she is not able to move that right shoulder/elbow.  Dr. Murray called and discussed with Christine and significant other current differential dx which he believes is due to surgical inflammation and will take time to see improvement.  Potentially, known right shoulder rotator cuff tear and labral tear may be contributing to pain-repeat right shoulder MRI can be considered outpatient.  Christine is medically ready for discharge and Christine is agreeable.    Plan/Recommendations:  1. Stop IV Dilaudid.  2. Dilaudid 4mg PO Q 3-4 hours PRN.  Discharge opioid tapering discussed, to taper by 1 dose/day every 1-2 days.  3. Continue with acetaminophen, Robaxin, Atarax, Gapabentin  4. Bowel regimen    Pain Service will sign off.    Discussed with attending anesthesiologist    Please Page the Pain Team Via [employer]: \"PAIN MANAGEMENT ACUTE INPATIENT/ [facility]\"    Maria Del Carmen Orozco PA-C  12/02/2022         Diet: Snacks/Supplements Adult: Ensure Max Protein (bariatric); With Meals  Snacks/Supplements Adult: Ensure Max Protein (bariatric); Between Meals  Diet  Regular " "Diet Adult  Advance Diet as Tolerated: Regular Diet Adult    Relevant Labs:  Recent Labs   Lab Test 11/29/22  0650      BUN 20       Physical Exam:  Vitals: BP (!) 142/90 (BP Location: Left arm)   Pulse 86   Temp 98.6  F (37  C) (Axillary)   Resp 16   Ht 1.676 m (5' 6\")   Wt 73.3 kg (161 lb 9.6 oz)   LMP  (LMP Unknown)   SpO2 96%   BMI 26.08 kg/m      Physical Exam:   CONSTITUTIONAL/GENERAL APPEARANCE: Conversant.  EYES: EOMI, sclerae clear  ENT/NECK: neck is supple  RESPIRATORY:non labored breathing, on room air  CARDIOVASCULAR: HR within normal limits  GI:soft, nontender,   MUSCULOSKELETAL/BACK/SPINE/EXTREMITIES: Moving LE independently. Able to move hands bilaterally.     NEURO:  AAOx3.   SKIN/VASCULAR EXAM:  Dry and warm.  PSYCHIATRIC/BEHAVIORAL/OBSERVATIONS:  No objective signs of pain observed during our interview.   Judgment/Insight -fair   Orientation - x3   Memory -fair   Mood and affect - calm, pleasant, cooperative        Relevant Medications:  Current Pain Medications:  Medications related to Pain Management (From now, onward)    Start     Dose/Rate Route Frequency Ordered Stop    12/02/22 0800  senna-docusate (SENOKOT-S/PERICOLACE) 8.6-50 MG per tablet 1 tablet         1 tablet Oral 2 TIMES DAILY 12/02/22 0052      12/02/22 0052  bisacodyl (DULCOLAX) suppository 10 mg         10 mg Rectal DAILY PRN 12/02/22 0052      12/02/22 0049  lidocaine 1 % 0.1-1 mL         0.1-1 mL Other EVERY 1 HOUR PRN 12/02/22 0049      12/02/22 0049  lidocaine (LMX4) cream          Topical EVERY 1 HOUR PRN 12/02/22 0049      12/02/22 0000  diazepam (VALIUM) 5 MG tablet         5 mg Oral EVERY 8 HOURS PRN 12/02/22 0940      12/02/22 0000  gabapentin (NEURONTIN) 300 MG capsule         300 mg Oral 2 TIMES DAILY 12/02/22 0940      12/02/22 0000  gabapentin (NEURONTIN) 300 MG capsule         600 mg Oral AT BEDTIME 12/02/22 0940      12/02/22 0000  HYDROmorphone (DILAUDID) 4 MG tablet         2-4 mg Oral EVERY 4 HOURS " 12/02/22 0940      12/02/22 0000  methocarbamol 1000 MG TABS         1,000 mg Oral EVERY 6 HOURS PRN 12/02/22 0940      12/02/22 0000  senna-docusate (SENOKOT-S/PERICOLACE) 8.6-50 MG tablet         1 tablet Oral 2 TIMES DAILY 12/02/22 0940      12/01/22 2200  gabapentin (NEURONTIN) capsule 600 mg         600 mg Oral AT BEDTIME 12/01/22 1056      12/01/22 1053  methocarbamol (ROBAXIN) tablet 1,000 mg         1,000 mg Oral EVERY 6 HOURS PRN 12/01/22 1056      11/30/22 1400  gabapentin (NEURONTIN) capsule 300 mg         300 mg Oral 2 TIMES DAILY 11/30/22 1123      11/30/22 1359  acetaminophen (TYLENOL) tablet 975 mg         975 mg Oral EVERY 8 HOURS 11/30/22 1123      11/30/22 1130  HYDROmorphone (DILAUDID) tablet 4 mg         4 mg Oral EVERY 3 HOURS PRN 11/30/22 1123      11/30/22 0708  diazepam (VALIUM) tablet 5 mg         5 mg Oral EVERY 8 HOURS PRN 11/30/22 0708      11/29/22 1827  HYDROmorphone (PF) (DILAUDID) injection 0.2 mg        See Hyperspace for full Linked Orders Report.    0.2 mg Intravenous EVERY 2 HOURS PRN 11/29/22 1827      11/29/22 1827  HYDROmorphone (PF) (DILAUDID) injection 0.4 mg        See Hyperspace for full Linked Orders Report.    0.4 mg Intravenous EVERY 2 HOURS PRN 11/29/22 1827      11/29/22 0000  acetaminophen (TYLENOL) 325 MG tablet         650 mg Oral EVERY 4 HOURS PRN 11/28/22 0804      11/28/22 0732  hydrOXYzine (ATARAX) tablet 25 mg        See Hyperspace for full Linked Orders Report.    25 mg Oral EVERY 6 HOURS PRN 11/28/22 0732      11/28/22 0732  hydrOXYzine (ATARAX) tablet 50 mg        See Hyperspace for full Linked Orders Report.    50 mg Oral EVERY 6 HOURS PRN 11/28/22 0732      11/28/22 0000  hydrOXYzine (ATARAX) 25 MG tablet         25 mg Oral EVERY 6 HOURS PRN 11/28/22 0804      11/28/22 0000  polyethylene glycol (MIRALAX) 17 g packet         17 g Oral DAILY 11/28/22 0804      11/27/22 0800  polyethylene glycol (MIRALAX) Packet 17 g         17 g Oral DAILY 11/26/22 1040       11/26/22 1240  lidocaine 1 % 0.1-1 mL         0.1-1 mL Other EVERY 1 HOUR PRN 11/26/22 1241      11/26/22 1240  lidocaine (LMX4) cream          Topical EVERY 1 HOUR PRN 11/26/22 1241      11/26/22 1100  senna-docusate (SENOKOT-S/PERICOLACE) 8.6-50 MG per tablet 1 tablet         1 tablet Oral 2 TIMES DAILY 11/26/22 1040      11/26/22 1035  magnesium hydroxide (MILK OF MAGNESIA) suspension 30 mL         30 mL Oral DAILY PRN 11/26/22 1040      11/26/22 1035  bisacodyl (DULCOLAX) suppository 10 mg         10 mg Rectal DAILY PRN 11/26/22 1040            Primary Service Contacted with Recommendations? Yes      Time/Communication:  I personally spent 27 minutes with greater than 50% in consultation, education, counseling and coordination of care.  See note above for details on conversation.

## 2022-12-02 NOTE — PROGRESS NOTES
"[facility] [place]   Internal Medicine Daily Note           Interval History/Events     Overnight events reviewed  Patient stated her frustration about everything going on. Writer provided the sympathetic listening  No nausea, vomiting  No chest pain, shortness of breath       Review of Systems        4 point ROS including Respiratory, CV, GI and , other than that noted above is negative      Medications   I have reviewed current medications  in the \"current medication\" section of Epic.  Relevant changes include:     Physical Exam   General:       Vital signs:    Blood pressure (!) 142/92, pulse 91, temperature 98  F (36.7  C), temperature source Oral, resp. rate 16, height 1.676 m (5' 6\"), weight 73.3 kg (161 lb 9.6 oz), SpO2 98 %, not currently breastfeeding.  Estimated body mass index is 26.08 kg/m  as calculated from the following:    Height as of this encounter: 1.676 m (5' 6\").    Weight as of this encounter: 73.3 kg (161 lb 9.6 oz).    No intake or output data in the 24 hours ending 12/01/22 1324     Constitutional: Laying in bed in no acute distress  Eye: No icterus, no pallor  Mouth/ENT: Normal oral mucosa  Cardiovascular: S1, S2 normal.   Respiratory: B/L CTA  GI: Soft, NT, BS+  :   Neurology: Alert, awake, and oriented. RUE weakness. ROM limited by pain on the right shoulder. Strength 3+/5 (against gravity). Strength normal on fingers.   Psych:   MSK:   Integumentary:   Heme/Lymph/Imm:      Laboratory and Imaging Studies     I have reviewed  laboratory and imaging studies in the Epic. Pertinent findings are as below:    BMP  Recent Labs   Lab 12/02/22  0810 12/01/22  0623 11/29/22  0650 11/27/22  0602 11/26/22  0721   NA  --   --  139  --   --    POTASSIUM  --   --  4.2  --   --    CHLORIDE  --   --  104  --   --    LOS  --   --  9.1  --   --    CO2  --   --  32  --   --    BUN  --   --  20  --   --    CR  --   --  0.52 0.44*  --    GLC 93 95 69*  --  113*     CBC  Recent " Labs   Lab 12/02/22  0810 11/29/22  0650   WBC  --  9.9   RBC  --  3.53*   HGB 10.6* 11.9   HCT  --  35.7   MCV  --  101*   MCH  --  33.7*   MCHC  --  33.3   RDW  --  12.9   PLT  --  189     INRNo lab results found in last 7 days.  LFTs  Recent Labs   Lab 11/29/22  0650   ALKPHOS 58   AST 12   ALT 23   BILITOTAL 0.3   PROTTOTAL 6.6*   ALBUMIN 3.2*      PANCNo lab results found in last 7 days.        Impression/Plan        42 year old female admitted on 11/26/2022 after cervical spine surgery. She has history of myalgias chronic pain, cervical spine issues knee, shoulder hip wrist ankle issues.     She underwent surgery with Dr Murray 11/26:   1. Anterior cervical discectomy with removal of disc material and osteophytes at C4-5 and C5-6 for decompression of the spinal cord.  2. Bilateral Foraminotomies at C4-5 and C5-6for decompression of the nerve roots.  3. Placement of intervertebral prosthetic device at C4-5 and C5-6, Medtronic PEEK Cages.  4. Use of Iliac Crest Autograft for fusion, C4-5 and C5-6, which was harvested through a separate skin and fascial incision.  5. Placement of anterior Medtronic plate and screw instrumentation, C4-6.  The plate was placed separately from the interbody and was placed to aid in the fusion     Given concern for C-5 radiculopathy & palsy, patient again went to the OR with Dr. Murray on 11/29 for:  1. Right posterior cervical 4-5 foraminotomy        # Neck pain with radiculopathy   # S/p spinal surgery as above:   - Defer management to primary service.   - Pain control per primary team/pain medicine     # Right arm weakness  # C5 palsy  -- Started 11/26 and has remained weaker, cannot shrug shoulder and lift right elbow for me, denies any numbness and normal sensation  -- She followed by orthopedic surgery  there are concerns for C5 palsy/neuroprecia and was stared on steroids 11/26 - CT c-spine & MRI C-spine also obtained; both showing post-surgical changes    Defer mgt to primary  team     Unchanged from above findings on 12/01, discussed with Ortho team  Discussed with Ortho NP on 12/02.. Dr. Murray will be speaking with the patient today. I will defer it to primary team to address.       # Hypertension   Per patient is situational, not on PTA medications   - Treat pain  - Hydralazine PRN     # Multiple joint issues   # ACL tear  -- Planned to have surgery knee surgery nest   Tear of right supraspinatus, tenosynovitis of left ankle an ATFL tear    Defer  to orthopedic surgery         # Hx Meniere's disease   -- No issue currently      # Anxiety, depression  -- History previous overdose   -- Not on medications currently      # Mentioned thyrotoxicosis   -Patient does not recall having any thyroid issues TSH was normal in April of this year, not on medications       # Recent COVID-19  -- Tested + 11/9 , considered COVID recovered   -- Would do good DVT prophylaxis         Diet: Snacks/Supplements Adult: Ensure Max Protein (bariatric); With Meals  Snacks/Supplements Adult: Ensure Max Protein (bariatric); Between Meals  Diet  Regular Diet Adult  Advance Diet as Tolerated: Regular Diet Adult; High Kcal/High Protein Diet, ADULT    DVT Prophylaxis: Pneumatic Compression Devices  Ogden Catheter: Not present  Central Lines: None  Cardiac Monitoring: None  Code Status: Full Code          Disposition Plan    Defer to primary team at this time.      Expected Discharge Date:  Upon pain control, and PT/OT         Pt's care was discussed with bedside RN, patient and  during Care Team Rounds.               Curtis Elliott MD  Hospitalist ( Internal medicine)  Pager: [phone]

## 2022-12-02 NOTE — PROGRESS NOTES
Writer scheduled pt for a 2 week follow up post surgery 11/29/22 cervical fusion. Pt is already scheduled 12/6/22. Writer forwarded onto provider team to review and notify pt which appointment to attend.     Antonella Pedroza LPN

## 2022-12-02 NOTE — PROGRESS NOTES
Informed that postoperative orders from 11/29 were not released until this morning; question whether patient still needs postoperative antibiotics this far out. Will give cefazolin until discussion with ortho team.    John Bhatti MD

## 2022-12-02 NOTE — SIGNIFICANT EVENT
I spent approximately 40 minutes today speaking with Marilynn and her significant other.    To recap the conversation, marilynn underwent surgery with myself last Saturday for a two-level anterior decompression and fusion, which was complicated by a postoperative C5 palsy.  Initially I had recommended observation given that a large number of these will improve.  We did get postoperative imaging which showed mild to moderate residual foraminal stenosis, but it was improved compared to the stenosis before surgery.  Please see the prior note for details.    Over the next 2 days things seemed to worsen and she continued having pain, which was really debilitating for her and began to have less and less function in the arm.  Given her ongoing severe pain which was really debilitating, and the apparent progression, I felt it was then necessary to do a right C4-5 foraminotomy to make sure there is no residual compression of the nerve and this was done on Tuesday night.    Unfortunately she has not recovered after the second procedure.  She has been quite anxious and unhappy with her ongoing pain and the lack of function in her right arm and shoulder.  She feels like her whole body is swollen.  She thinks her skin is distended everywhere.  She has not been sleeping well.    We consulted the pain team and they largely affirmed our previous recommendations consisting of low-dose oral narcotics, muscle relaxants, Valium, gabapentin.  I also given her IV steroids as well as some oral steroids and I explained to her that I did a steroid injection around the nerve at the time of surgery so I think we have maximized the steroid options.    I explained right now the best thing we can do is support her through this with the oral medications.  I do not think there are any other interventions that could help her at this time.  She does have some underlying rotator cuff problems, but I certainly would not recommend a shoulder surgery at this  point.    We discussed the prognosis and I explained that with a partial nerve palsy the vast majority do recover.  It is hard to prognosticate with certainty and I guess there is a chance that it may not recover, but I would try to remain optimistic given that the majority of C5 palsy do recover.    We are going to send her with outpatient therapy and an aggressive oral medication regimen to try and help with her pain symptoms.  I am very sympathetic for her and I am sorry she is dealing with this pain symptoms.  I am committed to doing everything I can to try to help her through this.  She has my personal cell phone and I will see her back in clinic in 1 to 2 weeks.    Wilbur Murray MD

## 2022-12-02 NOTE — PLAN OF CARE
A&Ox4. Very anxious. RUE weakness. Cannot raise her right arm per patient. Patient expressed frustration at not knowing what is going on with her right upper extremity weakness. Stated that IV dilaudid and robaxin is the only thing helping to mildly relieve the pain. PO analgesic will mildly tame pain after IV dilaudid. Independent in room with walker and gait belt.       Patient would like to talk to Dr. Murray about her right side weakness. Would like additional imaging of her shoulder per patient.

## 2022-12-02 NOTE — PROGRESS NOTES
CLINICAL NUTRITION SERVICES - REASSESSMENT NOTE     Nutrition Prescription    RECOMMENDATIONS FOR MDs/PROVIDERS TO ORDER:  None at this time.     Malnutrition Status:    Patient does not meet two of the established criteria necessary for diagnosing malnutrition.     Recommendations already ordered by Registered Dietitian (RD):  -Weight order placed   -Continue supplements as ordered    Future/Additional Recommendations:  Pt reports possible discharge over the weekend, if not follow up per protocol - 12/9.      EVALUATION OF THE PROGRESS TOWARD GOALS   Diet: Regular  Supplement: Ensure Max at breakfast and 2PM   Intake: 100% per I/Os      NEW FINDINGS   Per chart review, patient has a good appetite and per HT she is meeting her kcal and elevated protein needs. Visited briefly with patient today who endorses that eating is going well. She has a good appetite an is enjoying her BID Ensures. She had no additional questions or concerns.     Weights:  11/26/22 0630 73.3 kg (161 lb 9.6 oz)   No updated weight order since admission, no wt loss PTA.     MALNUTRITION  % Intake: No decreased intake noted  % Weight Loss: None noted  Subcutaneous Fat Loss: None observed  Muscle Loss: None observed  Fluid Accumulation/Edema: None noted  Malnutrition Diagnosis: Patient does not meet two of the established criteria necessary for diagnosing malnutrition.     Previous Goals   Patient to consume % of nutritionally adequate meal trays TID, or the equivalent with supplements/snacks  Pt weight will remain stable during admission  Pt will tolerate nutritional supplement  Evaluation: Met    Previous Nutrition Diagnosis  Increased protein needs related to spinal surgery as evidenced by slow-healing surgical wound necessitating increased protein above baseline needs.     Evaluation: No change    CURRENT NUTRITION DIAGNOSIS  Increased protein needs related to spinal surgery as evidenced by slow-healing surgical wound necessitating  increased protein above baseline needs.     INTERVENTIONS  Implementation  Continue supplements as ordered.     Goals  Patient to consume % of nutritionally adequate meal trays TID, or the equivalent with supplements/snacks.    Monitoring/Evaluation  Progress toward goals will be monitored and evaluated per protocol.    Bianca Duarte, MPH, RD, LD  5B RD pager: [phone]  Weekend/holiday pager: [phone]

## 2022-12-02 NOTE — PROGRESS NOTES
"Orthopedic Surgery Progress Note:     Subjective:   NAEON.  Ongoing concerns regarding stable and unchanged shoulder weakness and radiculitis. Pain management assisting with pain control. Voiding spontaneously, tolerating diet.     Objective:   BP (!) 142/90 (BP Location: Left arm)   Pulse 86   Temp 98.6  F (37  C) (Axillary)   Resp 16   Ht 1.676 m (5' 6\")   Wt 73.3 kg (161 lb 9.6 oz)   LMP  (LMP Unknown)   SpO2 96%   BMI 26.08 kg/m    No intake/output data recorded.  General: NAD. Resting comfortably in bed.Collar in place.   Respiratory: Breathing comfortably on RA.  Musculoskeletal:     Motor Strength Right Left   Deltoids: C5 2/5 5/5   Biceps: C5 4/5 5/5   Brachialis: C6 4/5 5/5   Wrist extension: C6 5/5 5/5   Triceps: C7  5/5 5/5   Wrist flexion: C7 5/5 5/5    strength: C8 5/5 5/5   Hand intrinsics: T1 5/5 5/5     Sensation from C4-T1 is preserved.    Motor Strength Right Left   Hip flexion: L1, L2, L3 5/5 5/5   Hip adduction: L2, L3 5/5 5/5   Knee flexion: S1 5/5 5/5   Knee extension: L3, L4 5/5 5/5   Ankle dosiflexion: L4, L5 5/5 5/5   EHL: L5 5/5 5/5   Ankle plantarflexion: S1 5/5 5/5     Sensation from L1-S2 is preserved.    Output by Drain (mL) 11/30/22 0700 - 11/30/22 1459 11/30/22 1500 - 11/30/22 2259 11/30/22 2300 - 12/01/22 0659 12/01/22 0700 - 12/01/22 1459 12/01/22 1500 - 12/01/22 2259 12/01/22 2300 - 12/02/22 0659 12/02/22 0700 - 12/02/22 0818   Patient has no LDAs of requested type attached.     Laboratory Data:  Lab Results   Component Value Date    WBC 9.9 11/29/2022    HGB 11.9 11/29/2022     11/29/2022     Assessment & Plan:   Assessment and Plan: Christine L Vonderohe Mayte is a 42 year old female now s/p the following procedures with Dr. Murray.     -11/26 C4-6 ACDF C/b right C5 nerve palsy  -11/29 C4-5 Posterior foraminotomy     12/2  PT/OT, OOB as able  Pain control- pain consult, appreciate recs  Discharge planning    Ortho Primary  - Activity: Up with assist until " independent. No excessive bending or twisting. No lifting >10 lbs x 6 weeks. No Davin lift for transfers.   - Weightbearing Status: WBAT.  - Pain Management: Transition from IV to PO as tolerated. No NSAIDs.   - Antibiotics: Complete   - Diet: Begin with clear fluids and progress diet as tolerated.   - DVT Prophylaxis: SCDs only. No chemical DVT prophylaxis needed.  - Imaging: Complete  - Labs: Labs PRN.  - Bracing/Splinting: Collar to remain on at all times.  - Dressings: Keep dressing c/d/i until POD 5-7.  - Drains: Removed  - Ogden catheter: Removed  - Physical Therapy/Occupational Therapy: Evaluation and treatment.  - Consults: Hospitalist, PT/OT, pain team  - Follow-up: Clinic with Dr. Murray in 6 weeks with repeat radiographs.     - Disposition: Pending progress with therapies, pain control on orals, and medical stability; anticipate discharge to home on POD3-5.    Orthopedic surgery staff for this patient is Dr. Murray.    Eric Oquendo MD  Orthopaedic Surgery PGY-4    FOLLOWUP:    Future Appointments   Date Time Provider Department [place]   11/27/2022  8:15 AM Lawanda Maldonado, PT [facility] [place]   11/28/2022  7:00 PM UR OT WAITLIST [facility] [place]   1/5/2023 10:20 AM Mc Peterson MD [facility] [place]   1/10/2023 11:00 AM Wilbur Murray MD [facility]   2/9/2023 10:20 AM Mc Peterson MD [facility] [place]

## 2022-12-02 NOTE — PLAN OF CARE
Goal Outcome Evaluation:    End of shift Summary: See flowsheet for VS and detail assessments.    Changes this Shift:     Pulmonary: Lung sound clear bilateral. SOB due to pain and anxiety. No cough. Deep breathing encouraged. Pt is on RA.    Output: Reported BM X1 during this shift. Void a few times without difficulty.    Activity: Pt use walker with supervision.    Skin: cervical incision, dressing CDI     Pain: Co pain on right shoulder radiated to arm. Restlessness during pain episode. PRN pain control with Hydromorphone 0.2-0.4 mg IV, 4 mg PO Q3hr. Using heat pack.    Neuro/CMS: Restlessness during pain episode. Oriented x4, CMS instact, denies n/t. Pain on R shoulder reported numbness.     Dressing: on pt neck. Dry clean and intact.    IV/Drains: Left PIV use for infusion and saline locked now.    Plan: Discharge home when medically stable.

## 2022-12-02 NOTE — ANESTHESIA POSTPROCEDURE EVALUATION
Patient: Christine Hu    Procedure: Procedure(s):  Right cervical 4-5 posterior foraminotomy       Anesthesia Type:  General    Note:  Disposition: Inpatient   Postop Pain Control: Uneventful            Sign Out: Well controlled pain   PONV: No   Neuro/Psych: Uneventful            Sign Out: Acceptable/Baseline neuro status   Airway/Respiratory: Uneventful            Sign Out: Acceptable/Baseline resp. status   CV/Hemodynamics: Uneventful            Sign Out: Acceptable CV status; No obvious hypovolemia; No obvious fluid overload   Other NRE:    DID A NON-ROUTINE EVENT OCCUR?            Last vitals:  Vitals Value Taken Time   /79 11/29/22 2015   Temp 37.3  C (99.1  F) 11/29/22 1900   Pulse 64 11/29/22 2025   Resp 21 11/29/22 2025   SpO2 98 % 11/29/22 2024   Vitals shown include unvalidated device data.    Electronically Signed By: Jeff Mccarty MD  November 29, 2022  8:30 PM

## 2022-12-02 NOTE — DISCHARGE SUMMARY
Pt discharged home with belongings and walker. Pt transported by [facility]. Pt aware of follow-up. Discharge education done, AVS given, IV's removed, discharge medications given.

## 2022-12-02 NOTE — PLAN OF CARE
Goal Outcome Evaluation:      Plan of Care Reviewed With: patient    Overall Patient Progress: improving    Outcome Evaluation: Patient endorsing good appetite and PO intake, drinking BID Ensures to meet elevated protein needs for healing.    Bianca Duarte, MPH, RD, LD  5B RD pager: [phone]  Weekend/holiday pager: [phone]

## 2022-12-02 NOTE — PLAN OF CARE
Occupational Therapy Discharge Summary    Reason for therapy discharge:    Discharged to home.    Progress towards therapy goal(s). See goals on Care Plan in [facility] electronic health record for goal details.  Goals met    Therapy recommendation(s):    Pt moving well and improving with ADL, mostly limited by shoulder pain and limited AROM affecting use of R UE and bilateral tasks. Pt does have good support in place, recommend OP PT or OT for shoulder rehab.

## 2022-12-04 DIAGNOSIS — M54.2 CERVICAL PAIN: Primary | ICD-10-CM

## 2022-12-04 RX ORDER — HYDROCODONE BITARTRATE AND ACETAMINOPHEN 5; 325 MG/1; MG/1
1 TABLET ORAL EVERY 4 HOURS PRN
Qty: 50 TABLET | Refills: 0 | Status: SHIPPED | OUTPATIENT
Start: 2022-12-04 | End: 2022-12-09

## 2022-12-05 ENCOUNTER — TELEPHONE (OUTPATIENT)
Dept: ORTHOPEDICS | Facility: CLINIC | Age: 42
End: 2022-12-05

## 2022-12-05 DIAGNOSIS — M25.511 ACUTE PAIN OF RIGHT SHOULDER: Primary | ICD-10-CM

## 2022-12-05 NOTE — TELEPHONE ENCOUNTER
RN called and spoke with patient.  Per Dr. Murray, he would like to see patient in person for wound check and also assess her right shoulder pain. XR order is placed. RN scheduled both appts same day.  Patient expressed understanding.    Teo Chacko RN

## 2022-12-06 ENCOUNTER — ANCILLARY PROCEDURE (OUTPATIENT)
Dept: GENERAL RADIOLOGY | Facility: CLINIC | Age: 42
End: 2022-12-06
Attending: ORTHOPAEDIC SURGERY
Payer: COMMERCIAL

## 2022-12-06 ENCOUNTER — OFFICE VISIT (OUTPATIENT)
Dept: ORTHOPEDICS | Facility: CLINIC | Age: 42
End: 2022-12-06
Payer: COMMERCIAL

## 2022-12-06 DIAGNOSIS — Z98.890 S/P SPINAL SURGERY: ICD-10-CM

## 2022-12-06 DIAGNOSIS — M25.511 ACUTE PAIN OF RIGHT SHOULDER: ICD-10-CM

## 2022-12-06 DIAGNOSIS — M25.511 ACUTE PAIN OF RIGHT SHOULDER: Primary | ICD-10-CM

## 2022-12-06 DIAGNOSIS — M54.2 CERVICAL PAIN: Primary | ICD-10-CM

## 2022-12-06 DIAGNOSIS — M50.30 DEGENERATION OF CERVICAL INTERVERTEBRAL DISC: ICD-10-CM

## 2022-12-06 PROCEDURE — 73030 X-RAY EXAM OF SHOULDER: CPT | Mod: RT | Performed by: RADIOLOGY

## 2022-12-06 PROCEDURE — 20605 DRAIN/INJ JOINT/BURSA W/O US: CPT | Mod: RT | Performed by: ORTHOPAEDIC SURGERY

## 2022-12-06 RX ORDER — LIDOCAINE HYDROCHLORIDE 10 MG/ML
5 INJECTION, SOLUTION EPIDURAL; INFILTRATION; INTRACAUDAL; PERINEURAL
Status: DISCONTINUED | OUTPATIENT
Start: 2022-12-06 | End: 2024-02-14

## 2022-12-06 RX ORDER — HYDROMORPHONE HYDROCHLORIDE 4 MG/1
2-4 TABLET ORAL EVERY 4 HOURS
Qty: 30 TABLET | Refills: 0 | Status: SHIPPED | OUTPATIENT
Start: 2022-12-06 | End: 2022-12-09

## 2022-12-06 RX ORDER — GABAPENTIN 300 MG/1
600 CAPSULE ORAL AT BEDTIME
Qty: 60 CAPSULE | Refills: 0 | Status: SHIPPED | OUTPATIENT
Start: 2022-12-06 | End: 2023-01-06

## 2022-12-06 RX ORDER — METHOCARBAMOL 1000 MG/1
1000 TABLET, FILM COATED ORAL EVERY 6 HOURS PRN
Qty: 40 TABLET | Refills: 0 | Status: CANCELLED | OUTPATIENT
Start: 2022-12-06

## 2022-12-06 RX ORDER — METHOCARBAMOL 1000 MG/1
1000 TABLET, FILM COATED ORAL EVERY 6 HOURS PRN
Qty: 40 TABLET | Refills: 0 | Status: SHIPPED | OUTPATIENT
Start: 2022-12-06 | End: 2022-12-09

## 2022-12-06 RX ORDER — GABAPENTIN 300 MG/1
300 CAPSULE ORAL 2 TIMES DAILY
Qty: 60 CAPSULE | Refills: 0 | Status: SHIPPED | OUTPATIENT
Start: 2022-12-06 | End: 2023-01-06

## 2022-12-06 RX ORDER — HYDROMORPHONE HYDROCHLORIDE 4 MG/1
2-4 TABLET ORAL EVERY 4 HOURS
Qty: 30 TABLET | Refills: 0 | Status: CANCELLED | OUTPATIENT
Start: 2022-12-06

## 2022-12-06 RX ORDER — HYDROXYZINE HYDROCHLORIDE 25 MG/1
25 TABLET, FILM COATED ORAL EVERY 6 HOURS PRN
Qty: 30 TABLET | Refills: 0 | Status: SHIPPED | OUTPATIENT
Start: 2022-12-06 | End: 2022-12-14

## 2022-12-06 RX ORDER — TRIAMCINOLONE ACETONIDE 40 MG/ML
40 INJECTION, SUSPENSION INTRA-ARTICULAR; INTRAMUSCULAR
Status: DISCONTINUED | OUTPATIENT
Start: 2022-12-06 | End: 2024-02-14

## 2022-12-06 RX ADMIN — TRIAMCINOLONE ACETONIDE 40 MG: 40 INJECTION, SUSPENSION INTRA-ARTICULAR; INTRAMUSCULAR at 10:05

## 2022-12-06 RX ADMIN — LIDOCAINE HYDROCHLORIDE 5 ML: 10 INJECTION, SOLUTION EPIDURAL; INFILTRATION; INTRACAUDAL; PERINEURAL at 10:05

## 2022-12-06 NOTE — PROGRESS NOTES
Medium Joint Injection/Arthrocentesis: R acromioclavicular    Date/Time: 2022 10:05 AM  Performed by: Wilbur Murray MD  Authorized by: Wilbur Murray MD     Indications:  Pain  Needle Size:  22 G  Guidance: surface landmarks    Location:  Shoulder  Location comment:  R. Subacromial bursa  Site:  R acromioclavicular  Medications:  40 mg triamcinolone 40 MG/ML; 5 mL lidocaine (PF) 1 %  Outcome:  Tolerated well, no immediate complications  Procedure discussed: discussed risks, benefits, and alternatives    Consent Given by:  Patient  Timeout: timeout called immediately prior to procedure    Prep: patient was prepped and draped in usual sterile fashion          [facility] [address] [address]  [phone]  Dept: [phone]  ______________________________________________________________________________    Patient: Christine Hu   : 1980   MRN: [id number]   2022    INVASIVE PROCEDURE SAFETY CHECKLIST    Date: 2022   Procedure: R. Shoulder subacromial bursa steroid injection  Patient Name: Christine Hu  MRN: [id number]  YOB: 1980    Action: Complete sections as appropriate. Any discrepancy results in a HARD COPY until resolved.     PRE PROCEDURE:  Patient ID verified with 2 identifiers (name and  or MRN): Yes  Procedure and site verified with patient/designee (when able): Yes  Accurate consent documentation in medical record: Yes  H&P (or appropriate assessment) documented in medical record: Yes  H&P must be up to 20 days prior to procedure and updates within 24 hours of procedure as applicable: Yes  Relevant diagnostic and radiology test results appropriately labeled and displayed as applicable: Yes  Procedure site(s) marked with provider initials: NA    TIMEOUT:  Time-Out performed immediately prior to starting procedure, including verbal and active  participation of all team members addressing the following:Yes  * Correct patient identify  * Confirmed that the correct side and site are marked  * An accurate procedure consent form  * Agreement on the procedure to be done  * Correct patient position  * Relevant images and results are properly labeled and appropriately displayed  * The need to administer antibiotics or fluids for irrigation purposes during the procedure as applicable   * Safety precautions based on patient history or medication use    DURING PROCEDURE: Verification of correct person, site, and procedures any time the responsibility for care of the patient is transferred to another member of the care team.       The following medications were given:         Prior to injection, verified patient identity using patient's name and date of birth.  Due to injection administration, patient instructed to remain in clinic for 15 minutes  afterwards, and to report any adverse reaction to me immediately.    Bursa injection was performed.    Medication Name: Triamcinolone 40 [address]-8  Drug Amount Wasted:  None.  Vial/Syringe: Single dose vial  Expiration Date:  8/1/24    Medication Name Lidocaine 1% 12691-926-61  Drug Amount Wasted:  None.  Vial/Syringe: Single dose vial  Expiration Date:  8/1/26    Scribed by Rigo Curiel ATC for Dr. Murray on December 6, 2022 at 10:09am based on the provider's statements to me.     Rigo Curiel ATC

## 2022-12-06 NOTE — PROGRESS NOTES
Spine Surgery Return Clinic Visit      Chief Complaint:   No chief complaint on file.      Interval HPI:  Symptom Profile Including: location of symptoms, onset, severity, exacerbating/alleviating factors, previous treatments:        Christine Hu is a 42 year old female who turns today status post two-level ACDF which was complicated by C5 palsy.  She underwent a right C4-5 foraminotomy.  She has had some improvement in motor strength but continues to have severe pain and muscle spasms throughout the right shoulder girdle and this is very disabling to her and preventing her from sleeping.            Past Medical History:     Past Medical History:   Diagnosis Date     Alcohol dependence (H) 2012     Alcohol withdrawal (H) 2011     ASCUS favor benign 2015    Neg HPV     Degeneration of cervical intervertebral disc      Knee effusion, left      Lumbago      Ménière's disease      Overdose 2009    TRAZADONE            Past Surgical History:     Past Surgical History:   Procedure Laterality Date     C/SECTION, LOW TRANSVERSE  2005    , Low Transverse     COLONOSCOPY      5 yrs     DECOMPRESSION, FUSION CERVICAL ANTERIOR TWO LEVELS, COMBINED N/A 2022    Procedure: Cervical 4 to 6 Anterior Cervical Decompression and Fusion with Medtronic Plate and Screws, Interbody Device, Use of Fluoroscopy, Microscope;  Surgeon: Wilbur Murray MD;  [facility]     GRAFT BONE FROM ILIAC CREST Left 2022    Procedure: Left Sided Iliac Crest Autograft;  Surgeon: Wilbur Murray MD;  [facility]: [facility]     LAMINECTOMY CERIVCAL POSTERIOR THREE+ LEVELS N/A 2022    Procedure: Right cervical 4-5 posterior foraminotomy;  Surgeon: Wilbur Murray MD;  [facility]     MASTOIDECTOMY       TONSILLECTOMY              Social History:     Social History     Tobacco Use     Smoking status: Every Day     Packs/day: 0.25     Types: Cigarettes     Smokeless  tobacco: Never     Tobacco comments:     Reduced to 6 per day started 10/25/22   Substance Use Topics     Alcohol use: Not Currently            Family History:     Family History   Problem Relation Age of Onset     Allergies Mother      Psychotic Disorder Father         depression     Heart Failure Father         heart attack in NOV. 2015?     Allergies Sister      Allergies Sister      Psychotic Disorder Sister         anxiety     Psychotic Disorder Sister         depression     Psychotic Disorder Maternal Uncle         anxiety     Asthma Other      C.A.D. No family hx of      Diabetes No family hx of      Hypertension No family hx of      Cerebrovascular Disease No family hx of      Breast Cancer No family hx of      Cancer - colorectal No family hx of      Prostate Cancer No family hx of      Anesthesia Reaction No family hx of             Allergies:     Allergies   Allergen Reactions     Codeine GI Disturbance     Nausea with use of Tylenol #3     Clindamycin Other (See Comments)     C diff, hospitalized      Penicillins Rash            Medications:     Current Outpatient Medications   Medication     acetaminophen (TYLENOL) 325 MG tablet     diazepam (VALIUM) 5 MG tablet     gabapentin (NEURONTIN) 300 MG capsule     gabapentin (NEURONTIN) 300 MG capsule     HYDROcodone-acetaminophen (NORCO) 5-325 MG tablet     HYDROmorphone (DILAUDID) 4 MG tablet     hydrOXYzine (ATARAX) 25 MG tablet     methocarbamol 1000 MG TABS     polyethylene glycol (MIRALAX) 17 g packet     senna-docusate (SENOKOT-S/PERICOLACE) 8.6-50 MG tablet     No current facility-administered medications for this visit.             Review of Systems:   A focused musculoskeletal and neurologic ROS was performed with pertinent positives and negatives noted in the HPI.  Additional systems were also reviewed and are documented at the bottom of the note.         Physical Exam:   Vitals: LMP  (LMP Unknown)   Musculoskeletal, Neurologic, and Spine:     I  examined the right arm in detail and she is very guarded.  She is at least 4 out of 5 deltoids triceps wrist extension wrist flexion hand intrinsics and .  With her right shoulder when I raise the arm up overhead passively, she is extremely guarded and difficult in her range of motion, if I release it she can slowly bring it down on her own which implies roughly 3 out of 5 strength at least, limited severely by pain and guarding.  Sensation intact C5-T1         Imaging:   We ordered and independently reviewed new radiographs at this clinic visit. The results were discussed with the patient. Findings include:     Right shoulder x-rays were reviewed which showed no acute abnormality I also reviewed her intraoperative and postoperative imaging       Assessment and Plan:     42 year old female with right C4-5 distribution pains and severe shoulder pain and dyskinesia    I think it is very important that she be doing some passive and range of motion exercises to keep the shoulder from getting stiff.  There is clearly function in the C5 nerve and I think it is likely things will recover for her but I do not want the shoulder to get stiff all that is occurring.  The whole area is in spasm and I gave her a subacromial injection today to try to calm this down somewhat and we are also going to refill her medications including methocarbamol tizanidine gabapentin and Dilaudid.    Follow-up in 4 weeks in person.  She should have cervical radiographs at that time.           Respectfully,  Wilbur Murray MD  Spine Surgery  [facility]

## 2022-12-06 NOTE — LETTER
2022         RE: Christine Hu  [address] [facility] [address]        Dear Colleague,    Thank you for referring your patient, Christine Hu, to the [facility]. Please see a copy of my visit note below.    Medium Joint Injection/Arthrocentesis: R acromioclavicular    Date/Time: 2022 10:05 AM  Performed by: Wilbur Murray MD  Authorized by: Wilbur Murray MD     Indications:  Pain  Needle Size:  22 G  Guidance: surface landmarks    Location:  Shoulder  Location comment:  R. Subacromial bursa  Site:  R acromioclavicular  Medications:  40 mg triamcinolone 40 MG/ML; 5 mL lidocaine (PF) 1 %  Outcome:  Tolerated well, no immediate complications  Procedure discussed: discussed risks, benefits, and alternatives    Consent Given by:  Patient  Timeout: timeout called immediately prior to procedure    Prep: patient was prepped and draped in usual sterile fashion          [facility] [address] [address] [phone]  Dept: [phone]  ______________________________________________________________________________    Patient: Christine Hu   : 1980   MRN: [id number]   2022    INVASIVE PROCEDURE SAFETY CHECKLIST    Date: 2022   Procedure: R. Shoulder subacromial bursa steroid injection  Patient Name: Christine Hu  MRN: [id number]  YOB: 1980    Action: Complete sections as appropriate. Any discrepancy results in a HARD COPY until resolved.     PRE PROCEDURE:  Patient ID verified with 2 identifiers (name and  or MRN): Yes  Procedure and site verified with patient/designee (when able): Yes  Accurate consent documentation in medical record: Yes  H&P (or appropriate assessment) documented in medical record: Yes  H&P must be up to 20 days prior to procedure and updates within 24 hours of procedure as  applicable: Yes  Relevant diagnostic and radiology test results appropriately labeled and displayed as applicable: Yes  Procedure site(s) marked with provider initials: NA    TIMEOUT:  Time-Out performed immediately prior to starting procedure, including verbal and active participation of all team members addressing the following:Yes  * Correct patient identify  * Confirmed that the correct side and site are marked  * An accurate procedure consent form  * Agreement on the procedure to be done  * Correct patient position  * Relevant images and results are properly labeled and appropriately displayed  * The need to administer antibiotics or fluids for irrigation purposes during the procedure as applicable   * Safety precautions based on patient history or medication use    DURING PROCEDURE: Verification of correct person, site, and procedures any time the responsibility for care of the patient is transferred to another member of the care team.       The following medications were given:         Prior to injection, verified patient identity using patient's name and date of birth.  Due to injection administration, patient instructed to remain in clinic for 15 minutes  afterwards, and to report any adverse reaction to me immediately.    Bursa injection was performed.    Medication Name: Triamcinolone 40 [address]-4  Drug Amount Wasted:  None.  Vial/Syringe: Single dose vial  Expiration Date:  8/1/24    Medication Name Lidocaine 1% 00384-129-19  Drug Amount Wasted:  None.  Vial/Syringe: Single dose vial  Expiration Date:  8/1/26    Scribed by Rigo Curiel ATC for Dr. Murray on December 6, 2022 at 10:09am based on the provider's statements to me.     Rigo Curiel ATC      Spine Surgery Return Clinic Visit      Chief Complaint:   No chief complaint on file.      Interval HPI:  Symptom Profile Including: location of symptoms, onset, severity, exacerbating/alleviating factors, previous treatments:        Christine HEARN  Shruti Hu is a 42 year old female who turns today status post two-level ACDF which was complicated by C5 palsy.  She underwent a right C4-5 foraminotomy.  She has had some improvement in motor strength but continues to have severe pain and muscle spasms throughout the right shoulder girdle and this is very disabling to her and preventing her from sleeping.            Past Medical History:     Past Medical History:   Diagnosis Date     Alcohol dependence (H) 2012     Alcohol withdrawal (H) 2011     ASCUS favor benign 2015    Neg HPV     Degeneration of cervical intervertebral disc      Knee effusion, left      Lumbago      Ménière's disease      Overdose 2009    TRAZADONE            Past Surgical History:     Past Surgical History:   Procedure Laterality Date     C/SECTION, LOW TRANSVERSE  2005    , Low Transverse     COLONOSCOPY      5 yrs     DECOMPRESSION, FUSION CERVICAL ANTERIOR TWO LEVELS, COMBINED N/A 2022    Procedure: Cervical 4 to 6 Anterior Cervical Decompression and Fusion with Medtronic Plate and Screws, Interbody Device, Use of Fluoroscopy, Microscope;  Surgeon: Wilbur Murray MD;  [facility]     GRAFT BONE FROM ILIAC CREST Left 2022    Procedure: Left Sided Iliac Crest Autograft;  Surgeon: Wilbur Murray MD;  [facility]     LAMINECTOMY CERIVCAL POSTERIOR THREE+ LEVELS N/A 2022    Procedure: Right cervical 4-5 posterior foraminotomy;  Surgeon: Wilbur Murray MD;  [facility]     MASTOIDECTOMY       TONSILLECTOMY              Social History:     Social History     Tobacco Use     Smoking status: Every Day     Packs/day: 0.25     Types: Cigarettes     Smokeless tobacco: Never     Tobacco comments:     Reduced to 6 per day started 10/25/22   Substance Use Topics     Alcohol use: Not Currently            Family History:     Family History   Problem Relation Age of Onset     Allergies Mother       Psychotic Disorder Father         depression     Heart Failure Father         heart attack in NOV. 2015?     Allergies Sister      Allergies Sister      Psychotic Disorder Sister         anxiety     Psychotic Disorder Sister         depression     Psychotic Disorder Maternal Uncle         anxiety     Asthma Other      C.A.D. No family hx of      Diabetes No family hx of      Hypertension No family hx of      Cerebrovascular Disease No family hx of      Breast Cancer No family hx of      Cancer - colorectal No family hx of      Prostate Cancer No family hx of      Anesthesia Reaction No family hx of             Allergies:     Allergies   Allergen Reactions     Codeine GI Disturbance     Nausea with use of Tylenol #3     Clindamycin Other (See Comments)     C diff, hospitalized      Penicillins Rash            Medications:     Current Outpatient Medications   Medication     acetaminophen (TYLENOL) 325 MG tablet     diazepam (VALIUM) 5 MG tablet     gabapentin (NEURONTIN) 300 MG capsule     gabapentin (NEURONTIN) 300 MG capsule     HYDROcodone-acetaminophen (NORCO) 5-325 MG tablet     HYDROmorphone (DILAUDID) 4 MG tablet     hydrOXYzine (ATARAX) 25 MG tablet     methocarbamol 1000 MG TABS     polyethylene glycol (MIRALAX) 17 g packet     senna-docusate (SENOKOT-S/PERICOLACE) 8.6-50 MG tablet     No current facility-administered medications for this visit.             Review of Systems:   A focused musculoskeletal and neurologic ROS was performed with pertinent positives and negatives noted in the HPI.  Additional systems were also reviewed and are documented at the bottom of the note.         Physical Exam:   Vitals: LMP  (LMP Unknown)   Musculoskeletal, Neurologic, and Spine:     I examined the right arm in detail and she is very guarded.  She is at least 4 out of 5 deltoids triceps wrist extension wrist flexion hand intrinsics and .  With her right shoulder when I raise the arm up overhead passively, she is  extremely guarded and difficult in her range of motion, if I release it she can slowly bring it down on her own which implies roughly 3 out of 5 strength at least, limited severely by pain and guarding.  Sensation intact C5-T1         Imaging:   We ordered and independently reviewed new radiographs at this clinic visit. The results were discussed with the patient. Findings include:     Right shoulder x-rays were reviewed which showed no acute abnormality I also reviewed her intraoperative and postoperative imaging       Assessment and Plan:     42 year old female with right C4-5 distribution pains and severe shoulder pain and dyskinesia    I think it is very important that she be doing some passive and range of motion exercises to keep the shoulder from getting stiff.  There is clearly function in the C5 nerve and I think it is likely things will recover for her but I do not want the shoulder to get stiff all that is occurring.  The whole area is in spasm and I gave her a subacromial injection today to try to calm this down somewhat and we are also going to refill her medications including methocarbamol tizanidine gabapentin and Dilaudid.    Follow-up in 4 weeks in person.  She should have cervical radiographs at that time.           Respectfully,  Wilbur Murray MD  Spine Surgery  [facility]

## 2022-12-06 NOTE — TELEPHONE ENCOUNTER
Patient seen in clinic. Dr. Murray agreed to fill all her meds  Dilaudid, Robaxin, Tizanidine and Gabapentin.  Of note, Dr. Murray has given her Norco 50 tabs several days ago and verified that patient can go back to Dilaudid for pain control.  RN send meds to Dona KWAN to sign and authorize.    Teo Chacko RN

## 2022-12-07 ENCOUNTER — THERAPY VISIT (OUTPATIENT)
Dept: PHYSICAL THERAPY | Facility: CLINIC | Age: 42
End: 2022-12-07
Attending: CLINICAL NURSE SPECIALIST
Payer: COMMERCIAL

## 2022-12-07 DIAGNOSIS — Z98.890 S/P SPINAL SURGERY: ICD-10-CM

## 2022-12-07 DIAGNOSIS — M25.511 RIGHT SHOULDER PAIN: Primary | ICD-10-CM

## 2022-12-07 PROCEDURE — 97161 PT EVAL LOW COMPLEX 20 MIN: CPT | Mod: GP

## 2022-12-07 PROCEDURE — 97110 THERAPEUTIC EXERCISES: CPT | Mod: GP

## 2022-12-07 PROCEDURE — 97530 THERAPEUTIC ACTIVITIES: CPT | Mod: GP

## 2022-12-07 NOTE — PROGRESS NOTES
"Physical Therapy Initial Evaluation  Subjective:  The history is provided by the patient.   Patient Health History  Christine Hu being seen for R shoulder pain.     Problem began: 11/26/2022.   Problem occurred: surgery   Pain is reported as 9/10 on pain scale.  General health as reported by patient is good.  Pertinent medical history includes: high blood pressure, smoking and numbness/tingling.   Red flags:  Pain at rest/night.     Surgeries include:  Orthopedic surgery.        Current occupation is .   Primary job tasks include:  Lifting/carrying, prolonged standing and driving.                  Therapist Generated HPI Evaluation  Problem details: Pt presents to therapy with significant R shoulder pain s/p cervical fusion with complication.     Pt originally had anterior decompression and fusion at C4-C6 on 11/26. This was complicated by a post-op C5 nerve palsy of which the pt then underwent a R C4/C5 posterior foraminotomy d/t increasing pain and paralysis in R arm (this was on 11/29/22). Pt states she had little to no use of RUE after surgery, could only move her fingers. Pt now has more mobility and strength back, however is in significant amounts of pain.    Pt relayed her precautions as: wearing the c-collar all the time unless eating/bathing, no BLT, 5-10lb lifting restriction, no bending over, and no strenuous reaching.     Pt states she chopped up veggies for a few hours yesterday and today she \"paid for it\" meaning she hasn't been in this high amounts of pain since the surgery.    Pt states her pain management isn't the best, she has a schedule with her pills that she stays on top of, but she states she has been having trouble decreasing the pain overall throughout the day.     Pt is also complaining of significant muscle spasms whenever pt begins to use RUE. Pt has been switching between ice and heat, ice for the inflammation of the incisions but heat for the spastic muscles, this " has been working well.     Pt liked to exercise before this surgery, enjoyed going on walks and going to the gym. Pt is scheduled to have a L knee surgery in late December.     PT goals are to decrease pain/discomfort and improve use of RUE..         Type of problem:  Right shoulder.    This is a new condition.  Condition occurred with:  Other (surgical complications).    Patient reports pain:  Anterior, scapular area, lateral, upper trap and upper arm.  Pain is described as aching and shooting   Pain radiates to:  Cervical, shoulder and upper arm.   Since onset symptoms are gradually improving (pain is worsening, mobility and strength is improving).  Symptoms are exacerbated by lifting, carrying and using arm at shoulder level  and relieved by rest.              System    Physical Exam    General     ROS    SHOULDER OBJECTIVE    Handedness: Right    Cervical Screen  AROM: NA due to surgical precautions    Thoracic Spine Screen  Did not assess due to pts high reported pain levels    Static Posture  Forward head on neck: mild     Rounded shoulders:mild  Shoulder internally rotated: mild     Palpation  Right: Severe tenderness to palpation at upper trap, levator, scalenes, deltoid muscle belly, pec major, posterior cuff      Kinematic Scapular Assessment  Did not assess due to pts pain and weakness    Shoulder Range of Motion  AROM Flexion Abduction   Left 170 170   Right 25 8   Pain: significant pain with any shoulder motion today    Unable to assess full PROM of R shoulder today d/t pain    Shoulder Strength  MMT Flexion Abduction Scaption ER IR   Left 5/5 5/5 5/5 5/5 5/5   Right 2-/5 2-/5 0/5 3/5 3/5   Elbow flexion: 2+/5 on R  Supination: 3/5 on R  Elbow extension: 3+/5 on R   strength on R: 4/5        ASSESSMENT/PLAN    KEY PT FINDINGS:  1) significant R shoulder weakness  2) decreased R shoulder mobility  3) pain  4) limited cervical mobility     Precautions/Restrictions/MD instructions: wearing the c-collar  all the time unless eating/bathing, no BLT, 5-10lb lifting restriction, no bending over, and no strenuous reaching.    Therapist Assessment: Christine Hu is a 42 year old female patient presenting to Physical Therapy with R shoulder pain and weakness. Patient demonstrates significant RUE weakness, decreased R shoulder mobility, and limited cervical mobility. These impairments limit this patient's ability to complete ADLs, participate in exercise, and perform light household duties. Skilled PT services are necessary in order to reduce impairments and maximize independent function.    Patient is a 42 year old female with right side shoulder complaints.    Patient has the following significant findings with corresponding treatment plan.                Diagnosis 1:  R shoulder pain  Pain -  manual therapy, self management, education and home program  Decreased ROM/flexibility - manual therapy, therapeutic exercise, therapeutic activity and home program  Decreased joint mobility - manual therapy, therapeutic exercise, therapeutic activity and home program  Decreased strength - therapeutic exercise, therapeutic activities and home program  Impaired muscle performance - neuro re-education and home program    Cumulative Therapy Evaluation is: Low complexity.    Previous and current functional limitations:  (See Goal Flow Sheet for this information)    Short term and Long term goals: (See Goal Flow Sheet for this information)     Communication ability:  Patient appears to be able to clearly communicate and understand verbal and written communication and follow directions correctly.  Treatment Explanation - The following has been discussed with the patient:   RX ordered/plan of care  Anticipated outcomes  Possible risks and side effects  This patient would benefit from PT intervention to resume normal activities.   Rehab potential is good.    Frequency:  1 X week, once daily  Duration:  for 8 weeks tapering to 2 X a  month over 8 weeks  Discharge Plan:  Achieve all LTG.  Independent in home treatment program.  Reach maximal therapeutic benefit.    Please refer to the daily flowsheet for treatment today, total treatment time and time spent performing 1:1 timed codes.

## 2022-12-08 ENCOUNTER — MYC MEDICAL ADVICE (OUTPATIENT)
Dept: ORTHOPEDICS | Facility: CLINIC | Age: 42
End: 2022-12-08

## 2022-12-08 DIAGNOSIS — M25.511 ACUTE PAIN OF RIGHT SHOULDER: ICD-10-CM

## 2022-12-08 DIAGNOSIS — M54.2 CERVICAL PAIN: ICD-10-CM

## 2022-12-08 DIAGNOSIS — Z98.890 S/P SPINAL SURGERY: ICD-10-CM

## 2022-12-08 DIAGNOSIS — M50.30 DEGENERATION OF CERVICAL INTERVERTEBRAL DISC: Primary | ICD-10-CM

## 2022-12-08 RX ORDER — GABAPENTIN 300 MG/1
600 CAPSULE ORAL 3 TIMES DAILY
Qty: 120 CAPSULE | Refills: 1 | Status: SHIPPED | OUTPATIENT
Start: 2022-12-08 | End: 2023-01-06

## 2022-12-08 NOTE — TELEPHONE ENCOUNTER
RN send Gabapentin 600mg TID per Dr. Murray's request.    RN then mycharted patient.    Teo Chacko RN

## 2022-12-09 RX ORDER — CYCLOBENZAPRINE HCL 10 MG
10 TABLET ORAL EVERY 8 HOURS PRN
Qty: 30 TABLET | Refills: 1 | Status: SHIPPED | OUTPATIENT
Start: 2022-12-09 | End: 2023-01-06

## 2022-12-09 RX ORDER — DIAZEPAM 5 MG
5 TABLET ORAL EVERY 8 HOURS PRN
Qty: 15 TABLET | Refills: 0 | Status: SHIPPED | OUTPATIENT
Start: 2022-12-09 | End: 2023-01-06

## 2022-12-09 RX ORDER — HYDROCODONE BITARTRATE AND ACETAMINOPHEN 5; 325 MG/1; MG/1
1 TABLET ORAL EVERY 4 HOURS PRN
Qty: 56 TABLET | Refills: 0 | Status: SHIPPED | OUTPATIENT
Start: 2022-12-09 | End: 2022-12-21

## 2022-12-09 NOTE — TELEPHONE ENCOUNTER
PDMP Review       Value Time User    State PDMP site checked  Yes 12/9/2022 11:21 AM Dona Hernández PA-C           PDMP reviewed today:  #30 dilaudid 4mg filled on 12/6/22 by Dr. Murray  #50 norco 5/325mg filled on 12/4/22 by Dr. Murray  Not on narcotics immediately pre-op.    Patient reports that norco has been much more helpful for pain relief. She will bring dilaudid to [facility] for disposal.    Today, sent 7 day refill of norco as requested. Weaned to maximum 8 tablets per day (#56). Next refill would be available on 12/16/22.    Dona Hernández PA-C

## 2022-12-09 NOTE — TELEPHONE ENCOUNTER
I can confirm e-receipt by [facility] pharmacy at 3:39pm.    I called patient and confirmed that this is the [facility] she wanted rx sent to.  I requested she call pharmacy. I tried calling pharmacy but was on hold for over 10minutes before I hung up.    HILDA SchmidC

## 2022-12-12 ENCOUNTER — PRE VISIT (OUTPATIENT)
Dept: ORTHOPEDICS | Facility: CLINIC | Age: 42
End: 2022-12-12

## 2022-12-12 ENCOUNTER — OFFICE VISIT (OUTPATIENT)
Dept: ORTHOPEDICS | Facility: CLINIC | Age: 42
End: 2022-12-12
Payer: COMMERCIAL

## 2022-12-12 ENCOUNTER — TELEPHONE (OUTPATIENT)
Dept: ORTHOPEDICS | Facility: CLINIC | Age: 42
End: 2022-12-12

## 2022-12-12 DIAGNOSIS — M54.12 CERVICAL RADICULOPATHY: ICD-10-CM

## 2022-12-12 DIAGNOSIS — M54.2 NECK PAIN: Primary | ICD-10-CM

## 2022-12-12 DIAGNOSIS — M50.30 DEGENERATION OF CERVICAL INTERVERTEBRAL DISC: ICD-10-CM

## 2022-12-12 DIAGNOSIS — Z98.1 HISTORY OF FUSION OF CERVICAL SPINE: ICD-10-CM

## 2022-12-12 PROCEDURE — 99024 POSTOP FOLLOW-UP VISIT: CPT | Performed by: ORTHOPAEDIC SURGERY

## 2022-12-12 NOTE — TELEPHONE ENCOUNTER
RECORDS RECEIVED FROM: [facility]   REASON FOR VISIT: neck pain    Date of Appt: 09/16/2022   NOTES (FOR ALL VISITS) STATUS DETAILS   OFFICE NOTE from referring provider Internal 12/06/2022 Dr Murray [facility]   OFFICE NOTE from other specialist Internal 09/13/2022 Dr Duran [facility] 08/18/2022 Dr Marroquin [facility]  08/11/2022 Dr Duran [facility]   07/18/2022 [facility]   07/14/2022 [facility]  07/08/2022 [facility]   OPERATIVE REPORT Internal 11/29/2022 - Right C4-5 Posterior Foraminotomy    11/26/2022 - C4-6 Cervical Decompression and Fusion   DISCHARGE SUMMARY from hospital N/A    DISCHARGE REPORT from ER N/A    EMG REPORT N/A    MEDICATION LIST N/A    IMAGING  (FOR ALL VISITS)     MRI (HEAD, NECK, SPINE) Internal    XRAY (SPINE) *NEUROSURGERY* Internal    CT (HEAD, NECK, SPINE) N/A

## 2022-12-12 NOTE — NURSING NOTE
Reason For Visit:   Chief Complaint   Patient presents with     RECHECK     2nd opinion to see Dr. Rayo.Post op C4-C6 ACDF. This is referred by Dr. Murray       Primary MD: Angi Marroquin  Ref. MD: Dr. Murray    ?  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 Yes.  Work status?  Medical Leave    Date of surgery: 11/11/22  Type of surgery: C4-6 ACDF    Smoker: Yes.  Is quitting now  Request smoking cessation information: No    LMP  (LMP Unknown)     Pain Assessment  Patient Currently in Pain: Yes  0-10 Pain Scale: 9  Primary Pain Location: Neck    Oswestry (EVA) Questionnaire    OSWESTRY DISABILITY INDEX 12/7/2022   Count 10   Sum 38   Oswestry Score (%) 76   Some recent data might be hidden            Neck Disability Index (NDI) Questionnaire    No flowsheet data found.           Visual Analog Pain Scale  Back Pain Scale 0-10: 0  Right leg pain: 0  Left leg pain: 0  Neck Pain Scale 0-10: 8.5  Right arm pain: 9.5 (numb and tingling)  Left arm pain: 0 (numb and tingling)    Promis 10 Assessment    No flowsheet data found.             Antonella Pedroza LPN

## 2022-12-12 NOTE — LETTER
12/12/2022         RE: Christine Hu  [address]  [place] [address]        Dear Colleague,    Thank you for referring your patient, Christine Hu, to the [facility]. Please see a copy of my visit note below.    Chief Concern:  Second opinion following cervical spinal surgery    History of Present Illness:  Christine Hu is a 42 year old female with recent history of C4-5 an C5-6 ACDF with iliac crest autograft on 11/26/2022 with right C5 palsy and radiculopathy postoperatively addressed with postoperative IV steroids and eventually right C4-5 foraminotomy on 1/29/2022.  She does have a history of anxiety and depression.  She is accompanied today by her significant other who is an .  Since surgery she has had limited strength with right shoulder forward flexion and abduction as well as severe pain radiating to the right shoulder girdle in a mixed C4 and C5 distribution.  Today she is quite tearful when recounting her symptoms and expressing her concern that she may not recover function of her right upper extremity.  She does note improvement in her symptoms since the posterior foraminotomy but continues to have significant limitation due to pain and perceived weakness in the right shoulder.   She has had extreme difficulty with sleeping which seems to be exacerbating her pain and anxiety regarding her challenging postoperative course. She notes she did not have weakness in the right shoulder prior to surgery. She is not experiencing myelopathic symptoms at this time.     She did have an AC joint injection with Dr. Murray which she states did not provide any relief of her symptoms. She reports that prior to surgery she used over the counter NSAIDs on a daily basis but has not used them since surgery at direction of her surgeon Dr. Murray.     On examination today patient is anxious regarding her recovery and at times tearful.   Respiration is  nonlabored without audible wheeze  She walks without antalgic gait or imbalance  She is wearing a hard cervical orthosis  Anterior cervical incision has no surrounding erythema or fluctuance. Posterior cervical incision has surrounding friction erythema and is tender to touch. No evidence of dehiscence or drainage.  There is significant tenderness to palpation in the shoulder girdle over the trapezius and deltoid  Shoulder forward flexion and abduction are markedly limited by guarding but she has at least 4/5 resisted strength.  Elbow flexion and elbow extension have breakaway but are 5/5 with resisted strength testing on the right. Wrist flexion and extension, finger abduction and composite  is 5/5.  Resisted strength testing on the left is 5/5 with shoulder flexion and abduction, elbow flexion and extension, wrist flexion and extension, finger abduction and composite .   Sensation in the right C4 and C5 distributions is subjectively decreased compared to the left side. Sensation on the left is intact to gliding light touch in the C4-T1 distributions.  Reflexes are 2+ for biceps, triceps, brachioradialis.  Negative Rodriguez's bilaterally. Patella tendon reflexes are 2+.    I do not appreciate any impingement signs on the right with passive glenohumeral range of motion. No tenderness to palpation over the AC joint.     Imaging review:   I personally reviewed her imaging including AP and lateral radiographs, CT scan of the cervical spine obtained after the posterior foraminotomy.  Instrumentation from C4 to C6 anterior cervical discectomy and fusion is in ideal position without evidence of encroachment on surrounding neural structures.  There is an osteophytic process projecting into the right C3-4 foramen causing moderate stenosis which could be causing C4 radiculopathy.  The C5 nerve root is widely decompressed from both ventral and dorsal aspect.     Impression and Plan:  42 year old female status post C4-5  and C5-6 ACDF on 11/26/2022 with postoperative radicular pain and weakness in right shoulder treated with IV steroids as well as posterior C4-5 foraminotomy on 11/29/2022. Patient is understandably anxious regarding her postoperative pain and limitations.  I counseled the patient that it appears the C5 nerve has been thoroughly decompressed. I do think that her symptoms overlap the C4 and C5 distributions. Although she did not have C4 radicular symptoms prior to surgery the C4 nerve root could be symptomatic from inflammation around the surgical site as expected surgical site bleeding and inflammatory healing process could lead to radicular symptoms of a previously asymptomatic nerve root with preexisting moderate bony stenosis.  I also believe that the patient's inability to sleep at night is likely exacerbating her symptoms.     I discussed the following potential interventions to help manage her symptoms while her body recovers from surgery:  1. Recommended considering trazodone for short period to help with sleep initiation and maintenance.  2. Recommended considering 3-5 days of oral NSAID to help decrease inflammation which is likely causing irritation of the C4 nerve root; this must be considered in the context of known negative effect on bone healing however in my opinion there is no evidence that 3-5 days of NSAID after surgery is detrimental to subsequent bone healing  3. Patient requested transitioning from hydrocodone/acetaminophen 5 mg/325 mg to 10 mg/325 mg for pain control; While I understand her reasoning for requesting this specific medication I do have reservations about changing to 10/325 and would prefer staying on 5/325 which she can take up to 12 tablets per day and remain under the safe limit of acetaminophen daily.   4. Consideration of right C4 selective nerve root block; this may provide good relief of her C4 radicular symptoms but must also be considered in the context of infection risk  associated with injection into the surgical site area.  In an otherwise young and healthy patient I think the risk of surgical site infection after injection is low enough that the potential benefit outweighs the potential risk.     I have discussed my findings and recommendations with Dr. Murray by telephone.  I ultimately will defer to Dr. Murray as the patient's treating surgeon.      Time spent on this clinical encounter including previsit chart review, history and physical examination, patient counseling and documentation was 60 minutes on the date of encounter.  40 minutes of this encounter was spent in patient counseling and care coordination.     Chicho Rayo MD  Orthopedic Spine Surgeon  , Department of Orthopedic Surgery

## 2022-12-12 NOTE — TELEPHONE ENCOUNTER
[facility] Call Center    Phone Message    May a detailed message be left on voicemail: yes     Reason for Call: Other: Pt is calling to confirm her appt she said it was booked for today but I see that it is scheduled for next week. She believes this was made in error. Is there anyway she can have the 3pm time slot for today? Please call her to confirm      Action Taken: Other: ortrho be     Travel Screening: Not Applicable

## 2022-12-13 DIAGNOSIS — M54.2 CERVICAL PAIN: ICD-10-CM

## 2022-12-13 DIAGNOSIS — M50.30 DEGENERATION OF CERVICAL INTERVERTEBRAL DISC: ICD-10-CM

## 2022-12-13 DIAGNOSIS — G47.00 INSOMNIA: ICD-10-CM

## 2022-12-13 DIAGNOSIS — Z98.890 S/P SPINAL SURGERY: Primary | ICD-10-CM

## 2022-12-13 PROBLEM — Z98.1 HISTORY OF FUSION OF CERVICAL SPINE: Status: ACTIVE | Noted: 2022-12-13

## 2022-12-13 PROBLEM — M54.12 CERVICAL RADICULOPATHY: Status: ACTIVE | Noted: 2022-12-13

## 2022-12-13 RX ORDER — TRAZODONE HYDROCHLORIDE 50 MG/1
50 TABLET, FILM COATED ORAL AT BEDTIME
Qty: 21 TABLET | Refills: 0 | Status: SHIPPED | OUTPATIENT
Start: 2022-12-13 | End: 2023-01-09

## 2022-12-13 NOTE — PROGRESS NOTES
Chief Concern:  Second opinion following cervical spinal surgery    History of Present Illness:  Christine Hu is a 42 year old female with recent history of C4-5 an C5-6 ACDF with iliac crest autograft on 11/26/2022 with right C5 palsy and radiculopathy postoperatively addressed with postoperative IV steroids and eventually right C4-5 foraminotomy on 1/29/2022.  She does have a history of anxiety and depression.  She is accompanied today by her significant other who is an .  Since surgery she has had limited strength with right shoulder forward flexion and abduction as well as severe pain radiating to the right shoulder girdle in a mixed C4 and C5 distribution.  Today she is quite tearful when recounting her symptoms and expressing her concern that she may not recover function of her right upper extremity.  She does note improvement in her symptoms since the posterior foraminotomy but continues to have significant limitation due to pain and perceived weakness in the right shoulder.   She has had extreme difficulty with sleeping which seems to be exacerbating her pain and anxiety regarding her challenging postoperative course. She notes she did not have weakness in the right shoulder prior to surgery. She is not experiencing myelopathic symptoms at this time.     She did have an AC joint injection with Dr. Murray which she states did not provide any relief of her symptoms. She reports that prior to surgery she used over the counter NSAIDs on a daily basis but has not used them since surgery at direction of her surgeon Dr. Murray.     On examination today patient is anxious regarding her recovery and at times tearful.   Respiration is nonlabored without audible wheeze  She walks without antalgic gait or imbalance  She is wearing a hard cervical orthosis  Anterior cervical incision has no surrounding erythema or fluctuance. Posterior cervical incision has surrounding friction erythema and is  tender to touch. No evidence of dehiscence or drainage.  There is significant tenderness to palpation in the shoulder girdle over the trapezius and deltoid  Shoulder forward flexion and abduction are markedly limited by guarding but she has at least 4/5 resisted strength.  Elbow flexion and elbow extension have breakaway but are 5/5 with resisted strength testing on the right. Wrist flexion and extension, finger abduction and composite  is 5/5.  Resisted strength testing on the left is 5/5 with shoulder flexion and abduction, elbow flexion and extension, wrist flexion and extension, finger abduction and composite .   Sensation in the right C4 and C5 distributions is subjectively decreased compared to the left side. Sensation on the left is intact to gliding light touch in the C4-T1 distributions.  Reflexes are 2+ for biceps, triceps, brachioradialis.  Negative Rodriguez's bilaterally. Patella tendon reflexes are 2+.    I do not appreciate any impingement signs on the right with passive glenohumeral range of motion. No tenderness to palpation over the AC joint.     Imaging review:   I personally reviewed her imaging including AP and lateral radiographs, CT scan of the cervical spine obtained after the posterior foraminotomy.  Instrumentation from C4 to C6 anterior cervical discectomy and fusion is in ideal position without evidence of encroachment on surrounding neural structures.  There is an osteophytic process projecting into the right C3-4 foramen causing moderate stenosis which could be causing C4 radiculopathy.  The C5 nerve root is widely decompressed from both ventral and dorsal aspect.     Impression and Plan:  42 year old female status post C4-5 and C5-6 ACDF on 11/26/2022 with postoperative radicular pain and weakness in right shoulder treated with IV steroids as well as posterior C4-5 foraminotomy on 11/29/2022. Patient is understandably anxious regarding her postoperative pain and limitations.  I  counseled the patient that it appears the C5 nerve has been thoroughly decompressed. I do think that her symptoms overlap the C4 and C5 distributions. Although she did not have C4 radicular symptoms prior to surgery the C4 nerve root could be symptomatic from inflammation around the surgical site as expected surgical site bleeding and inflammatory healing process could lead to radicular symptoms of a previously asymptomatic nerve root with preexisting moderate bony stenosis.  I also believe that the patient's inability to sleep at night is likely exacerbating her symptoms.     I discussed the following potential interventions to help manage her symptoms while her body recovers from surgery:  1. Recommended considering trazodone for short period to help with sleep initiation and maintenance.  2. Recommended considering 3-5 days of oral NSAID to help decrease inflammation which is likely causing irritation of the C4 nerve root; this must be considered in the context of known negative effect on bone healing however in my opinion there is no evidence that 3-5 days of NSAID after surgery is detrimental to subsequent bone healing  3. Patient requested transitioning from hydrocodone/acetaminophen 5 mg/325 mg to 10 mg/325 mg for pain control; While I understand her reasoning for requesting this specific medication I do have reservations about changing to 10/325 and would prefer staying on 5/325 which she can take up to 12 tablets per day and remain under the safe limit of acetaminophen daily.   4. Consideration of right C4 selective nerve root block; this may provide good relief of her C4 radicular symptoms but must also be considered in the context of infection risk associated with injection into the surgical site area.  In an otherwise young and healthy patient I think the risk of surgical site infection after injection is low enough that the potential benefit outweighs the potential risk.     I have discussed my findings  and recommendations with Dr. Murray by telephone.  I ultimately will defer to Dr. Murray as the patient's treating surgeon.      Time spent on this clinical encounter including previsit chart review, history and physical examination, patient counseling and documentation was 60 minutes on the date of encounter.  40 minutes of this encounter was spent in patient counseling and care coordination.     Chicho Rayo MD  Orthopedic Spine Surgeon  , Department of Orthopedic Surgery

## 2022-12-13 NOTE — TELEPHONE ENCOUNTER
Per Dr. Murray, prescribing a few weeks supply of Trazadone for patient to help her with sleep to be taken at bed time.    Teo Chacko RN

## 2022-12-14 ENCOUNTER — TELEPHONE (OUTPATIENT)
Dept: ORTHOPEDICS | Facility: CLINIC | Age: 42
End: 2022-12-14

## 2022-12-14 DIAGNOSIS — Z98.890 S/P SPINAL SURGERY: Primary | ICD-10-CM

## 2022-12-14 DIAGNOSIS — Z98.890 S/P SPINAL SURGERY: ICD-10-CM

## 2022-12-14 DIAGNOSIS — M50.30 DEGENERATION OF CERVICAL INTERVERTEBRAL DISC: ICD-10-CM

## 2022-12-14 DIAGNOSIS — M54.2 CERVICAL PAIN: ICD-10-CM

## 2022-12-14 RX ORDER — HYDROXYZINE HYDROCHLORIDE 25 MG/1
25 TABLET, FILM COATED ORAL EVERY 6 HOURS PRN
Qty: 30 TABLET | Refills: 0 | Status: SHIPPED | OUTPATIENT
Start: 2022-12-14 | End: 2022-12-18

## 2022-12-14 RX ORDER — HYDROCODONE BITARTRATE AND ACETAMINOPHEN 10; 325 MG/1; MG/1
1 TABLET ORAL EVERY 4 HOURS PRN
Qty: 56 TABLET | Refills: 0 | Status: SHIPPED | OUTPATIENT
Start: 2022-12-14 | End: 2022-12-15

## 2022-12-14 NOTE — TELEPHONE ENCOUNTER
[facility] Call Center    Phone Message    May a detailed message be left on voicemail: yes     Reason for Call: Other: New pharmacy info as current pharmacy has no phone lines or internet service New info is:  [facility] [address] [place] [address] HARMAN DIAZ [phone]     Action Taken: Message routed to:  [facility] ([facility]): roseann    Travel Screening: Not Applicable

## 2022-12-14 NOTE — TELEPHONE ENCOUNTER
RN send med refill to [facility] to sign and authorize.  Switch from Seco 5/325 to 10/325.  Please refer [employer] messages for background dated today.    Teo Chacko RN

## 2022-12-14 NOTE — TELEPHONE ENCOUNTER
Sent prescription of Norco, transition to Norco 10.  Max of 5 tabs per day for 9 days.  PDMP reviewed    [facility] ISAI Kent PA-C

## 2022-12-15 RX ORDER — HYDROCODONE BITARTRATE AND ACETAMINOPHEN 10; 325 MG/1; MG/1
1 TABLET ORAL EVERY 4 HOURS PRN
Qty: 42 TABLET | Refills: 0 | Status: SHIPPED | OUTPATIENT
Start: 2022-12-15 | End: 2022-12-21

## 2022-12-15 NOTE — TELEPHONE ENCOUNTER
Sent norco 10/325mg to new pharmacy as requested. I can see that transmission of norco to pharmacy failed yesterday.    HILDA SchmidC

## 2022-12-16 ENCOUNTER — THERAPY VISIT (OUTPATIENT)
Dept: PHYSICAL THERAPY | Facility: CLINIC | Age: 42
End: 2022-12-16
Payer: COMMERCIAL

## 2022-12-16 DIAGNOSIS — M54.2 NECK PAIN: Primary | ICD-10-CM

## 2022-12-16 DIAGNOSIS — Z98.1 HISTORY OF FUSION OF CERVICAL SPINE: Primary | ICD-10-CM

## 2022-12-16 PROCEDURE — 97530 THERAPEUTIC ACTIVITIES: CPT | Mod: GP

## 2022-12-16 PROCEDURE — 97035 APP MDLTY 1+ULTRASOUND EA 15: CPT | Mod: GP

## 2022-12-16 PROCEDURE — 97110 THERAPEUTIC EXERCISES: CPT | Mod: GP

## 2022-12-17 DIAGNOSIS — M54.2 CERVICAL PAIN: ICD-10-CM

## 2022-12-17 DIAGNOSIS — Z98.890 S/P SPINAL SURGERY: ICD-10-CM

## 2022-12-17 DIAGNOSIS — M50.30 DEGENERATION OF CERVICAL INTERVERTEBRAL DISC: ICD-10-CM

## 2022-12-17 RX ORDER — HYDROMORPHONE HYDROCHLORIDE 2 MG/1
6 TABLET ORAL EVERY 6 HOURS PRN
Qty: 10 TABLET | Refills: 0 | Status: SHIPPED | OUTPATIENT
Start: 2022-12-17 | End: 2022-12-18

## 2022-12-18 DIAGNOSIS — G47.01 INSOMNIA DUE TO MEDICAL CONDITION: ICD-10-CM

## 2022-12-18 DIAGNOSIS — Z98.890 S/P SPINAL SURGERY: ICD-10-CM

## 2022-12-18 DIAGNOSIS — M54.2 CERVICAL PAIN: ICD-10-CM

## 2022-12-18 DIAGNOSIS — M50.30 DEGENERATION OF CERVICAL INTERVERTEBRAL DISC: ICD-10-CM

## 2022-12-18 RX ORDER — HYDROXYZINE HYDROCHLORIDE 25 MG/1
25 TABLET, FILM COATED ORAL EVERY 6 HOURS PRN
Qty: 30 TABLET | Refills: 0 | Status: SHIPPED | OUTPATIENT
Start: 2022-12-18 | End: 2023-01-06

## 2022-12-18 RX ORDER — METHOCARBAMOL 750 MG/1
750 TABLET, FILM COATED ORAL 4 TIMES DAILY PRN
Qty: 60 TABLET | Refills: 1 | Status: SHIPPED | OUTPATIENT
Start: 2022-12-18 | End: 2023-01-06

## 2022-12-18 RX ORDER — HYDROMORPHONE HYDROCHLORIDE 2 MG/1
6 TABLET ORAL EVERY 6 HOURS PRN
Qty: 10 TABLET | Refills: 0 | Status: SHIPPED | OUTPATIENT
Start: 2022-12-18 | End: 2022-12-21

## 2022-12-18 NOTE — PROGRESS NOTES
Christine has sent a number of messages requesting more medications.  Our team has made significant efforts to help her.  I have told her this will be the last time we refill prescriptions after hours.  I will speak to her in person on Tuesday to review her medication usage at that time and come up with a pain plan.    CTM

## 2022-12-19 ENCOUNTER — TELEPHONE (OUTPATIENT)
Dept: FAMILY MEDICINE | Facility: CLINIC | Age: 42
End: 2022-12-19

## 2022-12-19 ENCOUNTER — MYC MEDICAL ADVICE (OUTPATIENT)
Dept: ORTHOPEDICS | Facility: CLINIC | Age: 42
End: 2022-12-19

## 2022-12-19 ENCOUNTER — TELEPHONE (OUTPATIENT)
Dept: ORTHOPEDICS | Facility: CLINIC | Age: 42
End: 2022-12-19

## 2022-12-19 DIAGNOSIS — M54.2 CERVICAL PAIN: ICD-10-CM

## 2022-12-19 DIAGNOSIS — Z98.890 S/P SPINAL SURGERY: ICD-10-CM

## 2022-12-19 DIAGNOSIS — M50.30 DEGENERATION OF CERVICAL INTERVERTEBRAL DISC: ICD-10-CM

## 2022-12-19 RX ORDER — HYDROXYZINE HYDROCHLORIDE 25 MG/1
TABLET, FILM COATED ORAL
Qty: 30 TABLET | Refills: 0 | OUTPATIENT
Start: 2022-12-19

## 2022-12-19 RX ORDER — HYDROCODONE BITARTRATE AND ACETAMINOPHEN 10; 325 MG/1; MG/1
1 TABLET ORAL EVERY 4 HOURS PRN
Qty: 42 TABLET | Refills: 0 | OUTPATIENT
Start: 2022-12-19

## 2022-12-19 NOTE — TELEPHONE ENCOUNTER
RN called and spoke with patient and at the request of Laisha Peacock, and patient agreed, RN made a telephone appt for patient. This is to discuss pain meds management.  Of note, patient also responded via [employer] as RN was documenting this. Patient stated Dilaudid does not work for her but Norco does. She will run out by tomorrow morning and is hoping that Laisha will make an exception in regards to this script. Patient relies on boyfriend for transportation. RN told patient he will relay that request back to Laisha and include Dr. Murray so he is aware.    Teo Chacko RN

## 2022-12-19 NOTE — TELEPHONE ENCOUNTER
Message sent to RN to ensure patient has telephone visit set up for tomorrow. She is not due for any narcotic refills today.

## 2022-12-19 NOTE — TELEPHONE ENCOUNTER
RN sent med refill to Laisha KWAN to sign and authorize.    Teo Chacko RN        [facility]    Phone Message    May a detailed message be left on voicemail: yes     Reason for Call: Other: Med refill Christine is calling for a refill she may not be able to keep her appt on Wed this week she has another appt scheduled the following week just in case but she will run out of meds by then so she is hoping for a med refill this week of HYDROcodone-acetaminophen (NORCO)  MG per tablet sent to [id number] - [place], [place] - [address] [facility] AT [address]     Action Taken: Other: ortho     Travel Screening: Not Applicable

## 2022-12-19 NOTE — TELEPHONE ENCOUNTER
Patient called over weekend and got refill of dilaudid. Also received 1 week's worth of norco on 12/15. She needs telephone visit with Dr. Murray to discuss pain mgmt per his note this weekend. Will not refill.    Per Dr. Murray's last note:  Christine has sent a number of messages requesting more medications.  Our team has made significant efforts to help her.  I have told her this will be the last time we refill prescriptions after hours.  I will speak to her in person on Tuesday to review her medication usage at that time and come up with a pain plan.     CTM

## 2022-12-20 ENCOUNTER — VIRTUAL VISIT (OUTPATIENT)
Dept: ORTHOPEDICS | Facility: CLINIC | Age: 42
End: 2022-12-20
Payer: COMMERCIAL

## 2022-12-20 DIAGNOSIS — M54.2 CERVICAL PAIN: Primary | ICD-10-CM

## 2022-12-20 PROCEDURE — 99024 POSTOP FOLLOW-UP VISIT: CPT | Mod: TEL | Performed by: ORTHOPAEDIC SURGERY

## 2022-12-20 RX ORDER — HYDROCODONE BITARTRATE AND ACETAMINOPHEN 5; 325 MG/1; MG/1
1 TABLET ORAL EVERY 6 HOURS PRN
Qty: 30 TABLET | Refills: 0 | Status: CANCELLED | OUTPATIENT
Start: 2022-12-20 | End: 2022-12-28

## 2022-12-20 RX ORDER — HYDROCODONE BITARTRATE AND ACETAMINOPHEN 10; 300 MG/1; MG/1
1 TABLET ORAL 3 TIMES DAILY PRN
Qty: 30 TABLET | Refills: 0 | Status: SHIPPED | OUTPATIENT
Start: 2022-12-20 | End: 2022-12-28

## 2022-12-20 NOTE — PROGRESS NOTES
Christine is a 42 year old who is being evaluated via a billable telephone visit.      What phone number would you like to be contacted at? Home  How would you like to obtain your AVS? Rhea    Distant Location (provider location):  On-site  Phone call duration: 16 minutes    Diagnosis: Right C5 radiculopathy    I called and spoke with Marilynn.  She has noticed some slight changes and improvement in her range of motion.  She is able to lift the arm a couple of feet away from her side which she is not able to do previously.  She is been very resistant to range of motion to this point because of severe pain and spasms in the shoulder.  She has been alternating hydrocodone and Dilaudid but really has not used much of the Dilaudid.  She wants a refill on the hydrocodone.    I emphasized the importance of shoulder range of motion and I explained the shoulder will get stiff.  I asked her to try using a belt or a rope over the top of the door and using her good arm to pull the right arm up overhead.  This could also be done passively with the help of her partner.  I explained that she will not damage anything doing this type of passive range of motion.  It is okay to move the arm through this motion and it will not hurt the spine.  I encouraged her to be doing this daily and be working out the spasms in the muscle.  I think the more she can move and use it the better the spasms will get.    I would like to see her in person next week.  I will refill her hydrocodone until that time.    Wilbur Murray MD

## 2022-12-20 NOTE — LETTER
12/20/2022         RE: Christine Hu  [address] [place] [address]        Dear Colleague,    Thank you for referring your patient, Christine Hu, to the [facility] [place]. Please see a copy of my visit note below.    Christine is a 42 year old who is being evaluated via a billable telephone visit.      What phone number would you like to be contacted at? Home  How would you like to obtain your AVS? MyChart    Distant Location (provider location):  On-site  Phone call duration: 16 minutes    Diagnosis: Right C5 radiculopathy    I called and spoke with Marilynn.  She has noticed some slight changes and improvement in her range of motion.  She is able to lift the arm a couple of feet away from her side which she is not able to do previously.  She is been very resistant to range of motion to this point because of severe pain and spasms in the shoulder.  She has been alternating hydrocodone and Dilaudid but really has not used much of the Dilaudid.  She wants a refill on the hydrocodone.    I emphasized the importance of shoulder range of motion and I explained the shoulder will get stiff.  I asked her to try using a belt or a rope over the top of the door and using her good arm to pull the right arm up overhead.  This could also be done passively with the help of her partner.  I explained that she will not damage anything doing this type of passive range of motion.  It is okay to move the arm through this motion and it will not hurt the spine.  I encouraged her to be doing this daily and be working out the spasms in the muscle.  I think the more she can move and use it the better the spasms will get.    I would like to see her in person next week.  I will refill her hydrocodone until that time.    Wilbur Murray MD

## 2022-12-21 RX ORDER — HYDROCODONE BITARTRATE AND ACETAMINOPHEN 5; 325 MG/1; MG/1
1-2 TABLET ORAL EVERY 4 HOURS PRN
Qty: 81 TABLET | Refills: 0 | Status: SHIPPED | OUTPATIENT
Start: 2022-12-21 | End: 2022-12-26

## 2022-12-26 DIAGNOSIS — Z98.890 S/P SPINAL SURGERY: Primary | ICD-10-CM

## 2022-12-26 DIAGNOSIS — M50.30 DEGENERATION OF CERVICAL INTERVERTEBRAL DISC: ICD-10-CM

## 2022-12-26 DIAGNOSIS — M54.2 CERVICAL PAIN: ICD-10-CM

## 2022-12-26 RX ORDER — HYDROCODONE BITARTRATE AND ACETAMINOPHEN 5; 325 MG/1; MG/1
1-2 TABLET ORAL EVERY 4 HOURS PRN
Qty: 81 TABLET | Refills: 0 | Status: SHIPPED | OUTPATIENT
Start: 2022-12-26 | End: 2023-01-06

## 2022-12-26 NOTE — TELEPHONE ENCOUNTER
RN send med refill to Jae KWAN to sign and authorize.  RN responded to patient via [employer].    Teo Chacko RN

## 2022-12-27 ENCOUNTER — ANCILLARY PROCEDURE (OUTPATIENT)
Dept: GENERAL RADIOLOGY | Facility: CLINIC | Age: 42
End: 2022-12-27
Attending: ORTHOPAEDIC SURGERY
Payer: COMMERCIAL

## 2022-12-27 ENCOUNTER — OFFICE VISIT (OUTPATIENT)
Dept: ORTHOPEDICS | Facility: CLINIC | Age: 42
End: 2022-12-27
Payer: COMMERCIAL

## 2022-12-27 DIAGNOSIS — M54.12 CERVICAL RADICULOPATHY: ICD-10-CM

## 2022-12-27 DIAGNOSIS — Z98.1 HISTORY OF FUSION OF CERVICAL SPINE: ICD-10-CM

## 2022-12-27 DIAGNOSIS — M54.2 CERVICAL PAIN: ICD-10-CM

## 2022-12-27 DIAGNOSIS — M50.30 DEGENERATION OF CERVICAL INTERVERTEBRAL DISC: Primary | ICD-10-CM

## 2022-12-27 DIAGNOSIS — Z98.890 S/P SPINAL SURGERY: ICD-10-CM

## 2022-12-27 DIAGNOSIS — Z98.890 S/P SPINAL SURGERY: Primary | ICD-10-CM

## 2022-12-27 PROCEDURE — 99024 POSTOP FOLLOW-UP VISIT: CPT | Performed by: ORTHOPAEDIC SURGERY

## 2022-12-27 PROCEDURE — 72040 X-RAY EXAM NECK SPINE 2-3 VW: CPT | Performed by: RADIOLOGY

## 2022-12-27 RX ORDER — OMEPRAZOLE 20 MG/1
20 TABLET, DELAYED RELEASE ORAL DAILY
COMMUNITY

## 2022-12-27 RX ORDER — METHOCARBAMOL 500 MG/1
1000 TABLET, FILM COATED ORAL 4 TIMES DAILY PRN
Qty: 60 TABLET | Refills: 1 | Status: SHIPPED | OUTPATIENT
Start: 2022-12-27 | End: 2023-04-10

## 2022-12-27 RX ORDER — SPIRONOLACTONE 100 MG/1
TABLET, FILM COATED ORAL
COMMUNITY
Start: 2022-12-25 | End: 2023-01-06

## 2022-12-27 RX ORDER — GABAPENTIN 300 MG/1
CAPSULE ORAL
Qty: 120 CAPSULE | Refills: 3 | Status: SHIPPED | OUTPATIENT
Start: 2022-12-27 | End: 2023-04-10

## 2022-12-27 NOTE — LETTER
2022         RE: Christine Hu  [address] [place] [address]        Dear Colleague,    Thank you for referring your patient, Christine Hu, to the [facility] [place]. Please see a copy of my visit note below.    Spine Surgery Return Clinic Visit      Chief Complaint:   RECHECK (follow up per Dr Murray )      Interval HPI:  Symptom Profile Including: location of symptoms, onset, severity, exacerbating/alleviating factors, previous treatments:        Christine Hu is a 42 year old female who returns today in follow-up.  There have been some progress in terms of her shoulder strength and range of motion, but she still dealing with quite a bit of pain issues.  She has been going to therapy and working through some dyskinesia in the shoulder.            Past Medical History:     Past Medical History:   Diagnosis Date     Alcohol dependence (H) 2012     Alcohol withdrawal (H) 2011     ASCUS favor benign 2015    Neg HPV     Degeneration of cervical intervertebral disc      Knee effusion, left      Lumbago      Ménière's disease      Overdose 2009    TRAZADONE            Past Surgical History:     Past Surgical History:   Procedure Laterality Date     C/SECTION, LOW TRANSVERSE  2005    , Low Transverse     COLONOSCOPY      5 yrs     DECOMPRESSION, FUSION CERVICAL ANTERIOR TWO LEVELS, COMBINED N/A 2022    Procedure: Cervical 4 to 6 Anterior Cervical Decompression and Fusion with Medtronic Plate and Screws, Interbody Device, Use of Fluoroscopy, Microscope;  Surgeon: Wilbur Murray MD;  [facility]: [facility]     GRAFT BONE FROM ILIAC CREST Left 2022    Procedure: Left Sided Iliac Crest Autograft;  Surgeon: Wilbur Murray MD;  [facility]: [facility]     LAMINECTOMY CERIVCAL POSTERIOR THREE+ LEVELS N/A 2022    Procedure: Right cervical 4-5 posterior foraminotomy;  Surgeon: Wilbur Murray  MD Royce;  Location: [facility] OR     MASTOIDECTOMY       TONSILLECTOMY              Social History:     Social History     Tobacco Use     Smoking status: Every Day     Packs/day: 0.25     Types: Cigarettes     Smokeless tobacco: Never     Tobacco comments:     Reduced to 6 per day started 10/25/22   Substance Use Topics     Alcohol use: Not Currently            Family History:     Family History   Problem Relation Age of Onset     Allergies Mother      Psychotic Disorder Father         depression     Heart Failure Father         heart attack in NOV. 2015?     Allergies Sister      Allergies Sister      Psychotic Disorder Sister         anxiety     Psychotic Disorder Sister         depression     Psychotic Disorder Maternal Uncle         anxiety     Asthma Other      C.A.D. No family hx of      Diabetes No family hx of      Hypertension No family hx of      Cerebrovascular Disease No family hx of      Breast Cancer No family hx of      Cancer - colorectal No family hx of      Prostate Cancer No family hx of      Anesthesia Reaction No family hx of             Allergies:     Allergies   Allergen Reactions     Codeine GI Disturbance     Nausea with use of Tylenol #3     Clindamycin Other (See Comments)     C diff, hospitalized      Penicillins Rash            Medications:     Current Outpatient Medications   Medication     acetaminophen (TYLENOL) 325 MG tablet     cyclobenzaprine (FLEXERIL) 10 MG tablet     diazepam (VALIUM) 5 MG tablet     gabapentin (NEURONTIN) 300 MG capsule     gabapentin (NEURONTIN) 300 MG capsule     gabapentin (NEURONTIN) 300 MG capsule     HYDROcodone-acetaminophen (NORCO) 5-325 MG tablet     HYDROcodone-Acetaminophen  MG TABS     hydrOXYzine (ATARAX) 25 MG tablet     methocarbamol (ROBAXIN) 750 MG tablet     naloxone (NARCAN) 4 MG/0.1ML nasal spray     omeprazole (PRILOSEC OTC) 20 MG EC tablet     polyethylene glycol (MIRALAX) 17 g packet     senna-docusate (SENOKOT-S/PERICOLACE) 8.6-50 MG  tablet     traZODone (DESYREL) 50 MG tablet     gabapentin (NEURONTIN) 300 MG capsule     methocarbamol (ROBAXIN) 500 MG tablet     spironolactone (ALDACTONE) 100 MG tablet     Current Facility-Administered Medications   Medication     lidocaine (PF) (XYLOCAINE) 1 % injection 5 mL     triamcinolone (KENALOG-40) injection 40 mg             Review of Systems:   A focused musculoskeletal and neurologic ROS was performed with pertinent positives and negatives noted in the HPI.  Additional systems were also reviewed and are documented at the bottom of the note.         Physical Exam:   Vitals: LMP  (LMP Unknown)   Musculoskeletal, Neurologic, and Spine:     Cervical spine:    Appearance -no gross step-offs, kyphosis.    Motor -     C5: Deltoids R 4/5 and L 5/5 strength    C6: Biceps R 5/5 and L 5/5 strength     C7: Triceps R 5/5 and L 5/5 strength     C8:  R 5/5 and L 5/5 strength     T1: Dorsal interossei R 4/5 and L 4/5 strength        Sensation: intact to light touch in C5-T1    She really has quite a bit of spasm in the shoulder.  When I move her passively we can get the arm all the way up overhead to 180 degrees and then she can hold it there against gravity with at least 4 out of 5 strength which is certainly much better than on previous exams, but she still has quite a bit of sensitivity to touch in the area and really any touch on the skin is very painful for her.             Imaging:   We ordered and independently reviewed new radiographs at this clinic visit. The results were discussed with the patient. Findings include:     Radiographs today show well-positioned implants       Assessment and Plan:     42 year old female with significant improvement in deltoid strength, improving range of motion but still really severe spasms and skin hypersensitivity in a C5 distribution.    I explained to the patient that I am very encouraged and optimistic by her apparent nerve recovery.  While she does have significant  limitations in her range of motion this seems to be due to guarding and muscle spasm.  I demonstrated some exercises for her and I want her to work with pulleys and really work the range of motion even up to 10 times a day trying to get the arm up above her head and work that range of motion.  I think it is the most important thing for her to get over these muscle spasms is to try and train her body that the motion is not going to damage anything and I want her to be doing this multiple times a day.  I will see her back in 2 weeks.  We set up a medication plan.           Respectfully,  Wilbur Murray MD  Spine Surgery  [facility]

## 2022-12-27 NOTE — TELEPHONE ENCOUNTER
Per Dr. Murray, patient seen today. He agrees to fill her Norco back to the 10/325 script. Increase Gabapentin 900mg. Increase Robaxin to 1000mg.  Of note, Jae was able to assist patient with Norco script yesterday for 5/325. Patient stated she will run out come Sunday and she would want to have another repeat of medication issues.   RN told patient he will informed all of the spine PA team about this.  RN today will send the Robaxin and Gabapentin script.    Teo Chacko RN

## 2022-12-28 DIAGNOSIS — M54.2 CERVICAL PAIN: ICD-10-CM

## 2022-12-28 DIAGNOSIS — Z98.890 S/P SPINAL SURGERY: ICD-10-CM

## 2022-12-28 DIAGNOSIS — M50.30 DEGENERATION OF CERVICAL INTERVERTEBRAL DISC: ICD-10-CM

## 2022-12-28 RX ORDER — HYDROCODONE BITARTRATE AND ACETAMINOPHEN 5; 325 MG/1; MG/1
1 TABLET ORAL EVERY 6 HOURS PRN
Qty: 10 TABLET | Refills: 0 | Status: CANCELLED | OUTPATIENT
Start: 2022-12-28 | End: 2022-12-31

## 2022-12-28 RX ORDER — HYDROCODONE BITARTRATE AND ACETAMINOPHEN 10; 325 MG/1; MG/1
1 TABLET ORAL EVERY 6 HOURS PRN
Qty: 20 TABLET | Refills: 0 | Status: SHIPPED | OUTPATIENT
Start: 2023-01-02 | End: 2023-01-06

## 2022-12-28 NOTE — CONFIDENTIAL NOTE
Sent refill of [place]. Patient states she does not have a ride given her level of pain and does not feel comfortable driving. Refill of [place] sent. She was educated that we are at the end of global period narcotic refills and no Narcotic additional refills will be coming from our team. PDMP reviewed. Educated patient of concerns of not utilizing narcotics as prescribed. She is aware of concerns and notes she spoke with Dr. Castro as she does not have a ride and only time her script can be filled is tomorrow. Educated potentially the pharmacy may deny her refill and if that is the case, refill will have to come on 1/2/2022.     No additional refills from us     Bishop ISAI Kent PA-C

## 2022-12-28 NOTE — PROGRESS NOTES
Spine Surgery Return Clinic Visit      Chief Complaint:   RECHECK (follow up per Dr Murray )      Interval HPI:  Symptom Profile Including: location of symptoms, onset, severity, exacerbating/alleviating factors, previous treatments:        Christine Hu is a 42 year old female who returns today in follow-up.  There have been some progress in terms of her shoulder strength and range of motion, but she still dealing with quite a bit of pain issues.  She has been going to therapy and working through some dyskinesia in the shoulder.            Past Medical History:     Past Medical History:   Diagnosis Date     Alcohol dependence (H) 2012     Alcohol withdrawal (H) 2011     ASCUS favor benign 2015    Neg HPV     Degeneration of cervical intervertebral disc      Knee effusion, left      Lumbago      Ménière's disease      Overdose 2009    TRAZADONE            Past Surgical History:     Past Surgical History:   Procedure Laterality Date     C/SECTION, LOW TRANSVERSE  2005    , Low Transverse     COLONOSCOPY      5 yrs     DECOMPRESSION, FUSION CERVICAL ANTERIOR TWO LEVELS, COMBINED N/A 2022    Procedure: Cervical 4 to 6 Anterior Cervical Decompression and Fusion with Medtronic Plate and Screws, Interbody Device, Use of Fluoroscopy, Microscope;  Surgeon: Wilbur Murray MD;  [facility]     GRAFT BONE FROM ILIAC CREST Left 2022    Procedure: Left Sided Iliac Crest Autograft;  Surgeon: Wilbur Murray MD;  [facility]     LAMINECTOMY CERIVCAL POSTERIOR THREE+ LEVELS N/A 2022    Procedure: Right cervical 4-5 posterior foraminotomy;  Surgeon: Wilbur Murray MD;  [facility]     MASTOIDECTOMY       TONSILLECTOMY              Social History:     Social History     Tobacco Use     Smoking status: Every Day     Packs/day: 0.25     Types: Cigarettes     Smokeless tobacco: Never     Tobacco comments:     Reduced to 6 per day  started 10/25/22   Substance Use Topics     Alcohol use: Not Currently            Family History:     Family History   Problem Relation Age of Onset     Allergies Mother      Psychotic Disorder Father         depression     Heart Failure Father         heart attack in NOV. 2015?     Allergies Sister      Allergies Sister      Psychotic Disorder Sister         anxiety     Psychotic Disorder Sister         depression     Psychotic Disorder Maternal Uncle         anxiety     Asthma Other      C.A.D. No family hx of      Diabetes No family hx of      Hypertension No family hx of      Cerebrovascular Disease No family hx of      Breast Cancer No family hx of      Cancer - colorectal No family hx of      Prostate Cancer No family hx of      Anesthesia Reaction No family hx of             Allergies:     Allergies   Allergen Reactions     Codeine GI Disturbance     Nausea with use of Tylenol #3     Clindamycin Other (See Comments)     C diff, hospitalized      Penicillins Rash            Medications:     Current Outpatient Medications   Medication     acetaminophen (TYLENOL) 325 MG tablet     cyclobenzaprine (FLEXERIL) 10 MG tablet     diazepam (VALIUM) 5 MG tablet     gabapentin (NEURONTIN) 300 MG capsule     gabapentin (NEURONTIN) 300 MG capsule     gabapentin (NEURONTIN) 300 MG capsule     HYDROcodone-acetaminophen (NORCO) 5-325 MG tablet     HYDROcodone-Acetaminophen  MG TABS     hydrOXYzine (ATARAX) 25 MG tablet     methocarbamol (ROBAXIN) 750 MG tablet     naloxone (NARCAN) 4 MG/0.1ML nasal spray     omeprazole (PRILOSEC OTC) 20 MG EC tablet     polyethylene glycol (MIRALAX) 17 g packet     senna-docusate (SENOKOT-S/PERICOLACE) 8.6-50 MG tablet     traZODone (DESYREL) 50 MG tablet     gabapentin (NEURONTIN) 300 MG capsule     methocarbamol (ROBAXIN) 500 MG tablet     spironolactone (ALDACTONE) 100 MG tablet     Current Facility-Administered Medications   Medication     lidocaine (PF) (XYLOCAINE) 1 % injection 5  mL     triamcinolone (KENALOG-40) injection 40 mg             Review of Systems:   A focused musculoskeletal and neurologic ROS was performed with pertinent positives and negatives noted in the HPI.  Additional systems were also reviewed and are documented at the bottom of the note.         Physical Exam:   Vitals: LMP  (LMP Unknown)   Musculoskeletal, Neurologic, and Spine:     Cervical spine:    Appearance -no gross step-offs, kyphosis.    Motor -     C5: Deltoids R 4/5 and L 5/5 strength    C6: Biceps R 5/5 and L 5/5 strength     C7: Triceps R 5/5 and L 5/5 strength     C8:  R 5/5 and L 5/5 strength     T1: Dorsal interossei R 4/5 and L 4/5 strength        Sensation: intact to light touch in C5-T1    She really has quite a bit of spasm in the shoulder.  When I move her passively we can get the arm all the way up overhead to 180 degrees and then she can hold it there against gravity with at least 4 out of 5 strength which is certainly much better than on previous exams, but she still has quite a bit of sensitivity to touch in the area and really any touch on the skin is very painful for her.             Imaging:   We ordered and independently reviewed new radiographs at this clinic visit. The results were discussed with the patient. Findings include:     Radiographs today show well-positioned implants       Assessment and Plan:     42 year old female with significant improvement in deltoid strength, improving range of motion but still really severe spasms and skin hypersensitivity in a C5 distribution.    I explained to the patient that I am very encouraged and optimistic by her apparent nerve recovery.  While she does have significant limitations in her range of motion this seems to be due to guarding and muscle spasm.  I demonstrated some exercises for her and I want her to work with pulleys and really work the range of motion even up to 10 times a day trying to get the arm up above her head and work that  range of motion.  I think it is the most important thing for her to get over these muscle spasms is to try and train her body that the motion is not going to damage anything and I want her to be doing this multiple times a day.  I will see her back in 2 weeks.  We set up a medication plan.           Respectfully,  Wilbur Murray MD  Spine Surgery  [facility]

## 2022-12-29 ENCOUNTER — TELEPHONE (OUTPATIENT)
Dept: ORTHOPEDICS | Facility: CLINIC | Age: 42
End: 2022-12-29

## 2022-12-29 NOTE — TELEPHONE ENCOUNTER
Health Call Center    Phone Message    May a detailed message be left on voicemail: yes     Reason for Call: Medication Question or concern regarding medication   Prescription Clarification  Name of Medication: hydrocodone  Prescribing Provider: Bishop Kent   Pharmacy: [facility] in [place]   What on the order needs clarification? Natasha is inquiring if the current prescription is correct?    Patient's prescription was supposed to last 2 weeks but has only had 1 week go by so far and patient is requesting more medication.    Please call pharmacy to discuss.  Anyone who answers can help.    Action Taken: Message routed to:  [facility] ortho    Travel Screening: Not Applicable

## 2022-12-29 NOTE — TELEPHONE ENCOUNTER
RN called and spoke with Natasha, pharmacist. Per Natasha, patient  the meds today even though start date is on the 2nd. Just want to inform team that her next earliest refill will be the 6th and no sooner than that.    Teo Chacko RN

## 2022-12-30 ENCOUNTER — THERAPY VISIT (OUTPATIENT)
Dept: PHYSICAL THERAPY | Facility: CLINIC | Age: 42
End: 2022-12-30
Payer: COMMERCIAL

## 2022-12-30 DIAGNOSIS — M25.511 RIGHT SHOULDER PAIN: Primary | ICD-10-CM

## 2022-12-30 PROCEDURE — 97140 MANUAL THERAPY 1/> REGIONS: CPT | Mod: GP

## 2022-12-30 PROCEDURE — 97110 THERAPEUTIC EXERCISES: CPT | Mod: GP

## 2022-12-30 NOTE — PATIENT INSTRUCTIONS
For informational purposes only. Not to replace the advice of your health care provider. Copyright   2003,  [employer] [facility]. All rights reserved. Clinically reviewed by Lisa Banda MD. [employer] [id number] - REV .  Preparing for Your Surgery  Getting started  A nurse will call you to review your health history and instructions. They will give you an arrival time based on your scheduled surgery time. Please be ready to share:    Your doctor's clinic name and phone number    Your medical, surgical, and anesthesia history    A list of allergies and sensitivities    A list of medicines, including herbal treatments and over-the-counter drugs    Whether the patient has a legal guardian (ask how to send us the papers in advance)  Please tell us if you're pregnant--or if there's any chance you might be pregnant. Some surgeries may injure a fetus (unborn baby), so they require a pregnancy test. Surgeries that are safe for a fetus don't always need a test, and you can choose whether to have one.   If you have a child who's having surgery, please ask for a copy of Preparing for Your Child's Surgery.    Preparing for surgery    Within 10 to 30 days of surgery: Have a pre-op exam (sometimes called an H&P, or History and Physical). This can be done at a clinic or pre-operative center.  ? If you're having a , you may not need this exam. Talk to your care team.    At your pre-op exam, talk to your care team about all medicines you take. If you need to stop any medicines before surgery, ask when to start taking them again.  ? We do this for your safety. Many medicines can make you bleed too much during surgery. Some change how well surgery (anesthesia) drugs work.    Call your insurance company to let them know you're having surgery. (If you don't have insurance, call [phone].)    Call your clinic if there's any change in your health. This includes signs of a cold or flu (sore throat, runny nose,  cough, rash, fever). It also includes a scrape or scratch near the surgery site.    If you have questions on the day of surgery, call your hospital or surgery center.  Eating and drinking guidelines  For your safety: Unless your surgeon tells you otherwise, follow the guidelines below.    Eat and drink as usual until 8 hours before you arrive for surgery. After that, no food or milk.    Drink clear liquids until 2 hours before you arrive. These are liquids you can see through, like water, Gatorade, and Propel Water. They also include plain black coffee and tea (no cream or milk), candy, and breath mints. You can spit out gum when you arrive.    If you drink alcohol: Stop drinking it the night before surgery.    If your care team tells you to take medicine on the morning of surgery, it's okay to take it with a sip of water.  Preventing infection    Shower or bathe the night before and morning of your surgery. Follow the instructions your clinic gave you. (If no instructions, use regular soap.)    Don't shave or clip hair near your surgery site. We'll remove the hair if needed.    Don't smoke or vape the morning of surgery. You may chew nicotine gum up to 2 hours before surgery. A nicotine patch is okay.  ? Note: Some surgeries require you to completely quit smoking and nicotine. Check with your surgeon.    Your care team will make every effort to keep you safe from infection. We will:  ? Clean our hands often with soap and water (or an alcohol-based hand rub).  ? Clean the skin at your surgery site with a special soap that kills germs.  ? Give you a special gown to keep you warm. (Cold raises the risk of infection.)  ? Wear special hair covers, masks, gowns and gloves during surgery.  ? Give antibiotic medicine, if prescribed. Not all surgeries need antibiotics.  What to bring on the day of surgery    Photo ID and insurance card    Copy of your health care directive, if you have one    Glasses and hearing aids (bring  cases)  ? You can't wear contacts during surgery    Inhaler and eye drops, if you use them (tell us about these when you arrive)    CPAP machine or breathing device, if you use them    A few personal items, if spending the night    If you have . . .  ? A pacemaker, ICD (cardiac defibrillator) or other implant: Bring the ID card.  ? An implanted stimulator: Bring the remote control.  ? A legal guardian: Bring a copy of the certified (court-stamped) guardianship papers.  Please remove any jewelry, including body piercings. Leave jewelry and other valuables at home.  If you're going home the day of surgery    You must have a responsible adult drive you home. They should stay with you overnight as well.    If you don't have someone to stay with you, and you aren't safe to go home alone, we may keep you overnight. Insurance often won't pay for this.  After surgery  If it's hard to control your pain or you need more pain medicine, please call your surgeon's office.  Questions?   If you have any questions for your care team, list them here: _________________________________________________________________________________________________________________________________________________________________________ ____________________________________ ____________________________________ ____________________________________

## 2022-12-30 NOTE — PROGRESS NOTES
[facility]  [address] [address]  Phone: [phone]  Primary Provider: Angi Carter  Pre-op Performing Provider: ANGI CARTER      PREOPERATIVE EVALUATION:  Today's date: 1/6/2023    Christine Hu is a 42 year old female who presents for a preoperative evaluation.    Surgical Information:  Surgery/Procedure: L knee surgery  Surgery Location: [facility]  Surgeon: Dr. Peterson  Surgery Date: 01/13/2023  Time of Surgery: 2pm  Where patient plans to recover: At home with family  Fax number for surgical facility: Note does not need to be faxed, will be available electronically in Epic.    Type of Anesthesia Anticipated: to be determined    Assessment & Plan     The proposed surgical procedure is considered LOW risk.    Preop general physical exam    - Hemoglobin; Future  - HCG qualitative, Blood (HBR454); Future    Rupture of anterior cruciate ligament of left knee, initial encounter      S/P spinal surgery    - hydrOXYzine (ATARAX) 25 MG tablet; Take 1-2 tablets (25-50 mg) by mouth every 6 hours as needed for anxiety    Cervical pain  See hpi  - hydrOXYzine (ATARAX) 25 MG tablet; Take 1-2 tablets (25-50 mg) by mouth every 6 hours as needed for anxiety  - HYDROcodone-acetaminophen (NORCO)  MG per tablet; Take 1 tablet by mouth every 6 hours as needed for severe pain (7-10) (Max of 4 tabs per day) Stop tramadol. Keep pain doctor appointment. After one week of full tablet,  take half tablet every 6 hours.    Degeneration of cervical intervertebral disc    - hydrOXYzine (ATARAX) 25 MG tablet; Take 1-2 tablets (25-50 mg) by mouth every 6 hours as needed for anxiety    Other chronic pain  See hpi  - HYDROcodone-acetaminophen (NORCO)  MG per tablet; Take 1 tablet by mouth every 6 hours as needed for severe pain (7-10) (Max of 4 tabs per day) Stop tramadol. Keep pain doctor appointment. After one week of full tablet,  take half tablet every  6 hours.           Risks and Recommendations:  The patient has the following additional risks and recommendations for perioperative complications:   - No identified additional risk factors other than previously addressed    Medication Instructions:  Patient is to take all scheduled medications on the day of surgery    RECOMMENDATION:  APPROVAL GIVEN to proceed with proposed procedure, without further diagnostic evaluation.    Billin  min spent on patient today including chart review, history, exam, and explaining treatment plan and follow-up.         Subjective     HPI related to upcoming procedure:  Rupture of acl. Will be having surgery to repair, unsure if arthroscopic per patient. Neck doctor is aware of this and has okayed the surgery from the aspect of her neck.       Preop Questions 2023   1. Have you ever had a heart attack or stroke? No   2. Have you ever had surgery on your heart or blood vessels, such as a stent placement, a coronary artery bypass, or surgery on an artery in your head, neck, heart, or legs? No   3. Do you have chest pain with activity? No   4. Do you have a history of  heart failure? No   5. Do you currently have a cold, bronchitis or symptoms of other infection? No   6. Do you have a cough, shortness of breath, or wheezing? No   7. Do you or anyone in your family have previous history of blood clots? No   8. Do you or does anyone in your family have a serious bleeding problem such as prolonged bleeding following surgeries or cuts? No   9. Have you ever had problems with anemia or been told to take iron pills? No   10. Have you had any abnormal blood loss such as black, tarry or bloody stools, or abnormal vaginal bleeding? No   11. Have you ever had a blood transfusion? No   12. Are you willing to have a blood transfusion if it is medically needed before, during, or after your surgery? Yes   13. Have you or any of your relatives ever had problems with anesthesia? No   14. Do you  have sleep apnea, excessive snoring or daytime drowsiness? No   15. Do you have any artifical heart valves or other implanted medical devices like a pacemaker, defibrillator, or continuous glucose monitor? No   16. Do you have artificial joints? No   17. Are you allergic to latex? No   18. Is there any chance that you may be pregnant? UNKNOWN -      Health Care Directive:  Patient does not have a Health Care Directive or Living Will: Discussed advance care planning with patient; however, patient declined at this time.    Preoperative Review of :   reviewed - controlled substances reflected in medication list.       reviewed - controlled substances prescribed by other outside provider(s).        Chronic pain-neck, shoulders, knee, ankle. Surgeon has been tapering patient down on pain meds after neck surgery last year. Needs a pain clinic if needing ongoing treatment for this, we have discussed this many times and I have given referrals. Complex history of overdose and alcohol intoxication er visits, I do not feel comfortable managing her chronic pain which we have discussed many times.  She now does have an appt on 2-28-22 with pain clinic.    Has been taking gabapentin 900 tid (they weaned her up) makes her tired but then fatigue improves. She is hoping this will eventually help her nerve pain. Is taking robaxin for constant muscle spasms.   Tramadol has never worked for her per patient. dilaudid didn't help in the hospital either per patient. norco gave her some relief. She filled the tramadol unfortunately.         Per patient her surgeon said to go to pcp to wean off pain meds or address pain issues as they no longer can prescribe. We went over weaning off nocro today see Rx for instructions.                         Neck surgery done -cervical 4-6 anterior cervical decompression and fusion with medtronic plates  And screws 2022.  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 Doing pt.  Unable to work right now.     Anxiety-stable. Participating in Exercise has helped with this. Has a therapist.   Atarax was helping anxiety, would like to restart this.       Hemoglobin   Date Value Ref Range Status   12/02/2022 10.6 (L) 11.7 - 15.7 g/dL Final   07/23/2019 13.6 11.7 - 15.7 g/dL Final   ]    Review of Systems  Constitutional, neuro, ENT, endocrine, pulmonary, cardiac, gastrointestinal, genitourinary, musculoskeletal, integument and psychiatric systems are negative, except as otherwise noted.    Patient Active Problem List    Diagnosis Date Noted     Cervical radiculopathy 12/13/2022     Priority: Medium     History of fusion of cervical spine 12/13/2022     Priority: Medium     S/P spinal surgery 11/26/2022     Priority: Medium     Rupture of anterior cruciate ligament of left knee, initial encounter 09/20/2022     Priority: Medium     Added automatically from request for surgery [id number]       Myalgia 07/14/2022     Priority: Medium     Heart murmur 01/13/2017     Priority: Medium     History of Clostridium difficile 04/01/2016     Priority: Medium     Clindamycin caused       Primary insomnia 02/09/2016     Priority: Medium     Tobacco use 05/21/2015     Priority: Medium     Neck pain 07/28/2014     Priority: Medium     Acute alcoholic intoxication (H) 05/28/2011     Priority: Medium     Seen in emergency room 2020 after 14 months sobriety       Bladder retention of urine 05/28/2011     Priority: Medium     Hypomagnesemia 03/24/2011     Priority: Medium     Hepatitis 03/23/2011     Priority: Medium     Acute gastritis 03/22/2011     Priority: Medium     Sprain of ankle 12/03/2009     Priority: Medium     Overdose      Priority: Medium     TRAZADONE       Anxiety state 04/01/2009     Priority: Medium     Palpitations 04/01/2009     Priority: Medium     Mastoiditis 03/09/2009     Priority: Medium     Generalized anxiety disorder 10/24/2008     Priority: Medium     Diagnosis updated by automated  process. Provider to review and confirm.       Low back pain 2008     Priority: Medium     Thyrotoxicosis 10/24/2007     Priority: Medium     Normal thyroid ab and thyroid uptake scan  Problem list name updated by automated process. Provider to review       History of abnormal Pap smear 10/01/2007     Priority: Medium     Depressive disorder, not elsewhere classified 10/01/2007     Priority: Medium     Contraceptive management 2007     Priority: Medium     Problem list name updated by automated process. Provider to review       Degeneration of cervical intervertebral disc      Priority: Medium     Being treated by Pain clinic        Past Medical History:   Diagnosis Date     Alcohol dependence (H) 2012     Alcohol withdrawal (H) 2011     ASCUS favor benign 2015    Neg HPV     Degeneration of cervical intervertebral disc      Knee effusion, left      Lumbago      Ménière's disease      Overdose 2009    TRAZADONE     Past Surgical History:   Procedure Laterality Date     C/SECTION, LOW TRANSVERSE  2005    , Low Transverse     COLONOSCOPY      5 yrs     DECOMPRESSION, FUSION CERVICAL ANTERIOR TWO LEVELS, COMBINED N/A 2022    Procedure: Cervical 4 to 6 Anterior Cervical Decompression and Fusion with Medtronic Plate and Screws, Interbody Device, Use of Fluoroscopy, Microscope;  Surgeon: Wilbur Murray MD;  Location: [facility]     GRAFT BONE FROM ILIAC CREST Left 2022    Procedure: Left Sided Iliac Crest Autograft;  Surgeon: Wilbur Murray MD;  Location: [facility] OR     LAMINECTOMY CERIVCAL POSTERIOR THREE+ LEVELS N/A 2022    Procedure: Right cervical 4-5 posterior foraminotomy;  Surgeon: Wilbur Murray MD;  Location: [facility] OR     MASTOIDECTOMY       TONSILLECTOMY       Current Outpatient Medications   Medication Sig Dispense Refill     acetaminophen (TYLENOL) 325 MG tablet Take 2 tablets (650 mg) by mouth every 4 hours as needed for  "other 60 tablet 0     gabapentin (NEURONTIN) 300 MG capsule Please take 3 tablets 3 times a day for nerve pain and numbness and tingling pain. 120 capsule 3     HYDROcodone-acetaminophen (NORCO)  MG per tablet Take 1 tablet by mouth every 6 hours as needed for severe pain (7-10) (Max of 4 tabs per day) 20 tablet 0     hydrOXYzine (ATARAX) 25 MG tablet Take 1 tablet (25 mg) by mouth every 6 hours as needed for other (adjuvant pain) 30 tablet 0     methocarbamol (ROBAXIN) 500 MG tablet Take 2 tablets (1,000 mg) by mouth 4 times daily as needed for muscle spasms 60 tablet 1     naloxone (NARCAN) 4 MG/0.1ML nasal spray Spray 1 spray (4 mg) into one nostril alternating nostrils as needed for opioid reversal every 2-3 minutes until assistance arrives 0.2 mL 1     omeprazole (PRILOSEC OTC) 20 MG EC tablet Take 20 mg by mouth daily       polyethylene glycol (MIRALAX) 17 g packet Take 17 g by mouth daily 10 packet 0     senna-docusate (SENOKOT-S/PERICOLACE) 8.6-50 MG tablet Take 1 tablet by mouth 2 times daily 30 tablet 0     traZODone (DESYREL) 50 MG tablet Take 1 tablet (50 mg) by mouth At Bedtime for 21 days 21 tablet 0       Allergies   Allergen Reactions     Codeine GI Disturbance     Nausea with use of Tylenol #3     Clindamycin Other (See Comments)     C diff, hospitalized      Penicillins Rash        Social History     Tobacco Use     Smoking status: Every Day     Packs/day: 0.25     Types: Cigarettes     Smokeless tobacco: Never     Tobacco comments:     Reduced to 6 per day started 10/25/22   Substance Use Topics     Alcohol use: Not Currently       History   Drug Use No         Objective     /83   Pulse 99   Temp 97.9  F (36.6  C) (Tympanic)   Resp 14   Ht 1.676 m (5' 6\")   Wt 76.2 kg (168 lb)   LMP  (LMP Unknown)   SpO2 97%   Breastfeeding No   BMI 27.12 kg/m      Physical Exam    GENERAL APPEARANCE: alert and active     NECK: {:wearing neck brace can take it off but helps when her neck gets " tired per patient     RESP: lungs clear to auscultation - no rales, rhonchi or wheezes     CV: regular rates and rhythm, normal S1 S2, no S3 or S4 and no murmur, click or rub     MS: extremities normal- no gross deformities noted, no evidence of inflammation in joints, FROM in all extremities.     SKIN: no suspicious lesions or rashes     NEURO: Normal strength and tone, sensory exam grossly normal, mentation intact and speech normal     PSYCH: {:teary at times. Normal eye contact and speech     LYMPHATICS: No cervical adenopathy    Recent Labs   Lab Test 12/02/22  0810 11/29/22  0650 11/27/22  0602 10/18/22  1046 07/10/22  1003   HGB 10.6* 11.9 12.3 12.7 13.8   PLT  --  189  --  193 297   NA  --  139  --   --  142   POTASSIUM  --  4.2  --   --  4.7   CR  --  0.52 0.44*  --  0.57        Diagnostics:  Labs pending at this time.  Results will be reviewed when available.   No EKG required, no history of coronary heart disease, significant arrhythmia, peripheral arterial disease or other structural heart disease.    Revised Cardiac Risk Index (RCRI):  The patient has the following serious cardiovascular risks for perioperative complications:   - No serious cardiac risks = 0 points     RCRI Interpretation: 0 points: Class I (very low risk - 0.4% complication rate)           Signed Electronically by: Angi Marroquin PA-C  Copy of this evaluation report is provided to requesting physician.

## 2023-01-03 DIAGNOSIS — G89.18 POST-OPERATIVE PAIN: Primary | ICD-10-CM

## 2023-01-03 RX ORDER — TRAMADOL HYDROCHLORIDE 50 MG/1
50-100 TABLET ORAL EVERY 8 HOURS PRN
Qty: 42 TABLET | Refills: 0 | Status: SHIPPED | OUTPATIENT
Start: 2023-01-05 | End: 2023-01-06

## 2023-01-03 NOTE — TELEPHONE ENCOUNTER
PDMP reviewed. Patient received norco #20 10/325 on 12/29, was scheduled to start 1/2. She was given early refill due to ride issues. Se So's note for plan and discussion with patient. Patient will be out of meds earlier that prescribed, on Thursday. Prescription was to last until Saturday.      Received staff message from Dr. Murray: I called her again today at her request and I agreed that we would go to tramadol moving forward, no more hydrocodone, and we should do this for no more than one more month.    Tramadol Rx sent per Dr. Murray's request, patient can fill on Thursday. Given one week's worth, next refill after this will be 1/12.

## 2023-01-06 ENCOUNTER — OFFICE VISIT (OUTPATIENT)
Dept: FAMILY MEDICINE | Facility: CLINIC | Age: 43
End: 2023-01-06
Payer: COMMERCIAL

## 2023-01-06 ENCOUNTER — TELEPHONE (OUTPATIENT)
Dept: FAMILY MEDICINE | Facility: CLINIC | Age: 43
End: 2023-01-06

## 2023-01-06 VITALS
BODY MASS INDEX: 27 KG/M2 | RESPIRATION RATE: 14 BRPM | WEIGHT: 168 LBS | OXYGEN SATURATION: 97 % | SYSTOLIC BLOOD PRESSURE: 130 MMHG | HEIGHT: 66 IN | TEMPERATURE: 97.9 F | HEART RATE: 99 BPM | DIASTOLIC BLOOD PRESSURE: 83 MMHG

## 2023-01-06 DIAGNOSIS — Z01.818 PREOP GENERAL PHYSICAL EXAM: Primary | ICD-10-CM

## 2023-01-06 DIAGNOSIS — M54.2 CERVICAL PAIN: ICD-10-CM

## 2023-01-06 DIAGNOSIS — M50.30 DEGENERATION OF CERVICAL INTERVERTEBRAL DISC: ICD-10-CM

## 2023-01-06 DIAGNOSIS — G89.29 OTHER CHRONIC PAIN: ICD-10-CM

## 2023-01-06 DIAGNOSIS — Z98.890 S/P SPINAL SURGERY: ICD-10-CM

## 2023-01-06 DIAGNOSIS — S83.512A RUPTURE OF ANTERIOR CRUCIATE LIGAMENT OF LEFT KNEE, INITIAL ENCOUNTER: ICD-10-CM

## 2023-01-06 LAB
HCG SERPL QL: NEGATIVE
HGB BLD-MCNC: 13.8 G/DL (ref 11.7–15.7)

## 2023-01-06 PROCEDURE — 84703 CHORIONIC GONADOTROPIN ASSAY: CPT | Performed by: PHYSICIAN ASSISTANT

## 2023-01-06 PROCEDURE — 99215 OFFICE O/P EST HI 40 MIN: CPT | Performed by: PHYSICIAN ASSISTANT

## 2023-01-06 PROCEDURE — 36415 COLL VENOUS BLD VENIPUNCTURE: CPT | Performed by: PHYSICIAN ASSISTANT

## 2023-01-06 PROCEDURE — 85018 HEMOGLOBIN: CPT | Performed by: PHYSICIAN ASSISTANT

## 2023-01-06 RX ORDER — HYDROXYZINE HYDROCHLORIDE 25 MG/1
25-50 TABLET, FILM COATED ORAL EVERY 6 HOURS PRN
Qty: 90 TABLET | Refills: 5 | Status: SHIPPED | OUTPATIENT
Start: 2023-01-06 | End: 2023-04-10

## 2023-01-06 RX ORDER — HYDROCODONE BITARTRATE AND ACETAMINOPHEN 10; 325 MG/1; MG/1
1 TABLET ORAL EVERY 6 HOURS PRN
Qty: 70 TABLET | Refills: 0 | Status: SHIPPED | OUTPATIENT
Start: 2023-01-06 | End: 2023-01-06

## 2023-01-06 ASSESSMENT — PAIN SCALES - GENERAL: PAINLEVEL: MODERATE PAIN (4)

## 2023-01-06 NOTE — TELEPHONE ENCOUNTER
Central Prior Authorization Team - Phone: [phone]     PA Initiation    Medication: HYDROcodone-acetaminophen (NORCO)  MG per tablet- PA INITIATED  Insurance Company:    Pharmacy Filling the Rx: [id number] - [place] - [id number]  Filling Pharmacy Phone: [phone]  Filling Pharmacy Fax:    Start Date: 1/6/2023

## 2023-01-06 NOTE — RESULT ENCOUNTER NOTE
Jacquelyn King,       Your recent test results are attached, if you have any questions or concerns please feel free to contact me via e-mail or call [phone].  Hemoglobin normalized.       It was a pleasure to see you at your recent office visit.      Sincerely,  Angi Marroquin PA-C

## 2023-01-06 NOTE — RESULT ENCOUNTER NOTE
Jacquelyn King,       Your recent test results are attached, if you have any questions or concerns please feel free to contact me via e-mail or call [phone].  Negative pregnancy test.     Sincerely,  Angi Marroquin PA-C

## 2023-01-06 NOTE — TELEPHONE ENCOUNTER
Prior Authorization Retail Medication Request    Medication/Dose:   ICD code (if different than what is on RX):  Cervical pain, other chronic pain  Previously Tried and Failed:    Rationale:      Insurance Name:  [employer]  Insurance ID:  [id number]      Pharmacy Information (if different than what is on RX)  Name:  [facility]  Phone:  [phone] [facility] [id number]

## 2023-01-06 NOTE — TELEPHONE ENCOUNTER
Central Prior Authorization Team - Phone: [phone]     Prior Authorization Approval    Authorization Effective Date: 1/6/2023  Authorization Expiration Date: 7/5/2023  Medication: HYDROcodone-acetaminophen (NORCO)  MG per tablet- PA APPROVED  Approved Dose/Quantity: 70  Reference #:     Insurance Company:    Expected CoPay:       CoPay Card Available:      Foundation Assistance Needed:    Which Pharmacy is filling the prescription (Not needed for infusion/clinic administered): [id number] - [place] - [address] [facility]  Pharmacy Notified: Yes  Patient Notified: YesComment:  pharmacy will notify when ready

## 2023-01-07 ENCOUNTER — HEALTH MAINTENANCE LETTER (OUTPATIENT)
Age: 43
End: 2023-01-07

## 2023-01-07 DIAGNOSIS — Z98.890 S/P SPINAL SURGERY: ICD-10-CM

## 2023-01-07 DIAGNOSIS — M54.2 CERVICAL PAIN: ICD-10-CM

## 2023-01-07 DIAGNOSIS — M50.30 DEGENERATION OF CERVICAL INTERVERTEBRAL DISC: ICD-10-CM

## 2023-01-07 DIAGNOSIS — G47.00 INSOMNIA: ICD-10-CM

## 2023-01-09 ENCOUNTER — THERAPY VISIT (OUTPATIENT)
Dept: PHYSICAL THERAPY | Facility: CLINIC | Age: 43
End: 2023-01-09
Payer: COMMERCIAL

## 2023-01-09 DIAGNOSIS — M54.2 NECK PAIN: Primary | ICD-10-CM

## 2023-01-09 PROCEDURE — 97530 THERAPEUTIC ACTIVITIES: CPT | Mod: GP

## 2023-01-09 PROCEDURE — 97140 MANUAL THERAPY 1/> REGIONS: CPT | Mod: GP

## 2023-01-09 RX ORDER — TRAZODONE HYDROCHLORIDE 50 MG/1
TABLET, FILM COATED ORAL
Qty: 21 TABLET | Refills: 0 | Status: SHIPPED | OUTPATIENT
Start: 2023-01-09 | End: 2023-04-10

## 2023-01-10 ENCOUNTER — OFFICE VISIT (OUTPATIENT)
Dept: ORTHOPEDICS | Facility: CLINIC | Age: 43
End: 2023-01-10
Payer: COMMERCIAL

## 2023-01-10 DIAGNOSIS — M54.2 CERVICAL PAIN: Primary | ICD-10-CM

## 2023-01-10 PROCEDURE — 99024 POSTOP FOLLOW-UP VISIT: CPT | Performed by: ORTHOPAEDIC SURGERY

## 2023-01-10 NOTE — PROGRESS NOTES
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  I last saw her about 2 weeks ago.  She says that she has made significant progress.  This is the best I personally seen her.  She says the desensitization therapy is helping and she is able to better tolerate touch over the right shoulder.  She feels like her range of motion is improving but certainly not where she wants to be yet but overall she is making progress and I provided congratulations to her.            Past Medical History:     Past Medical History:   Diagnosis Date     Acute alcoholic intoxication (H) 2011    Seen in emergency room 2020 after 14 months sobriety     Alcohol dependence (H) 2012     Alcohol withdrawal (H) 2011     ASCUS favor benign 2015    Neg HPV     Degeneration of cervical intervertebral disc      Knee effusion, left      Lumbago      Ménière's disease      Overdose 2009    TRAZADONE            Past Surgical History:     Past Surgical History:   Procedure Laterality Date     C/SECTION, LOW TRANSVERSE  2005    , Low Transverse     COLONOSCOPY      5 yrs     DECOMPRESSION, FUSION CERVICAL ANTERIOR TWO LEVELS, COMBINED N/A 2022    Procedure: Cervical 4 to 6 Anterior Cervical Decompression and Fusion with Medtronic Plate and Screws, Interbody Device, Use of Fluoroscopy, Microscope;  Surgeon: Wilbur Murray MD;  Location:  OR      GRAFT BONE FROM ILIAC CREST Left 11/26/2022    Procedure: Left Sided Iliac Crest Autograft;  Surgeon: Wilbur Murray MD;  [facility]     LAMINECTOMY CERIVCAL POSTERIOR THREE+ LEVELS N/A 11/29/2022    Procedure: Right cervical 4-5 posterior foraminotomy;  Surgeon: Wilbur Murray MD;  [facility]     MASTOIDECTOMY       TONSILLECTOMY              Social History:     Social History     Tobacco Use     Smoking status: Every Day     Packs/day: 0.25     Types: Cigarettes     Smokeless tobacco: Never     Tobacco comments:     Reduced to 6 per day started 10/25/22   Substance Use Topics     Alcohol use: Not Currently            Family History:     Family History   Problem Relation Age of Onset     Allergies Mother      Psychotic Disorder Father         depression     Heart Failure Father         heart attack in NOV. 2015?     Allergies Sister      Allergies Sister      Psychotic Disorder Sister         anxiety     Psychotic Disorder Sister         depression     Psychotic Disorder Maternal Uncle         anxiety     Asthma Other      C.A.D. No family hx of      Diabetes No family hx of      Hypertension No family hx of      Cerebrovascular Disease No family hx of      Breast Cancer No family hx of      Cancer - colorectal No family hx of      Prostate Cancer No family hx of      Anesthesia Reaction No family hx of             Allergies:     Allergies   Allergen Reactions     Codeine GI Disturbance     Nausea with use of Tylenol #3     Clindamycin Other (See Comments)     C diff, hospitalized      Penicillins Rash            Medications:     Current Outpatient Medications   Medication     acetaminophen (TYLENOL) 325 MG tablet     gabapentin (NEURONTIN) 300 MG capsule     HYDROcodone-acetaminophen (NORCO)  MG per tablet     hydrOXYzine (ATARAX) 25 MG tablet     methocarbamol (ROBAXIN) 500 MG tablet     naloxone (NARCAN) 4 MG/0.1ML nasal spray     omeprazole (PRILOSEC OTC) 20 MG EC  tablet     polyethylene glycol (MIRALAX) 17 g packet     senna-docusate (SENOKOT-S/PERICOLACE) 8.6-50 MG tablet     traZODone (DESYREL) 50 MG tablet     Current Facility-Administered Medications   Medication     lidocaine (PF) (XYLOCAINE) 1 % injection 5 mL     triamcinolone (KENALOG-40) injection 40 mg             Review of Systems:   A focused musculoskeletal and neurologic ROS was performed with pertinent positives and negatives noted in the HPI.  Additional systems were also reviewed and are documented at the bottom of the note.         Physical Exam:   Vitals: LMP  (LMP Unknown)   Musculoskeletal, Neurologic, and Spine:     I focused my exam on her right shoulder.  She can actively raise to about 80 degrees.  At that point she says there is a hitch in the scapula and she cannot quite get it unstuck but if I gently help her past that point I can see her move the right shoulder blade up and out to disengage it, and then she can get all the way up to about 180.  She can hold the arm up overhead there on her own for about 10 or 15 seconds and then it starts to fatigue and then she can provide resistance all the way down and slowly lower it.  Thus I would graded her motor strength is about 4 out of 5, and she has good range of motion as well, but she is limited by her scapular dyskinesia.  The scapula itself is not moving normally and she holds it rigidly against the rib cage, which is why she gets this hitch in her movement because the scapula is not moving out normally with the humerus.  She has intact sensation throughout and there is less hypersensitivity than at the last visit.         Imaging:     I reviewed her December 27 radiographs which show well-positioned implants       Assessment and Plan:     42 year old female with improving right C5 nerve irritation.  I asked the nerve and muscles are functioning quite well and she has good range of motion.  She still dealing with really severe scapular dyskinesia  and some hypersensitivity in the area.  I think she is splinting the shoulder blade against her body to try to limit painful stimuli.  I told her this is the main thing limiting her at this time.  I think she is making progress with her therapy.  We again discussed range of motion exercises and desensitization therapy and I appreciate her efforts with physical therapy on this.  She should follow-up with me in 6 weeks with repeat AP and lateral cervical radiographs and I provided encouragement and I think she will continue to make improvements as long as she continues to maximize her therapy efforts.    Also, she has been wanting to have a left ACL reconstruction.  I asked her if she feels ready for this or not.  She wants to try to have all the recoveries from surgeries to be at the same time so that she can get back to work and be active.  At this time she feels she could operate a walker if she cannot bear weight on that left leg and in fact she did use a walker a couple of weeks after my surgery with her just because the iliac crest site was hurting her.  Given that she could be safe with a walker I think it is therefore safe from a spine standpoint for her to have the ACL reconstruction, I just counseled her that it may exacerbate her pain, and I certainly want her to keep up with the right shoulder range of motion and therapies to maximize her recovery from the C5 irritation.           Respectfully,  Wilbur Murray MD  Spine Surgery  [facility]

## 2023-01-10 NOTE — NURSING NOTE
Reason For Visit:   Chief Complaint   Patient presents with     RECHECK     DOS: 11/26/22 Cervical 4 to 6 anterior cervical decompression and fusion with medtronic plate and screws, interbody device, use of fluoroscopy, microscope, and left sided iliac crest autograft        Primary MD: Angi Marroquin  Ref. MD: est     ?  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 Yes.  Work status?  Full time.   Date of surgery: 11/26/22  Type of surgery: C4-6 ACDF  Smoker: Yes.  Is quitting now  Request smoking cessation information: No    LMP  (LMP Unknown)     Pain Assessment  Patient Currently in Pain: Yes  Patient's Stated Pain Goal: 6 (ranges)    Oswestry (EVA) Questionnaire    OSWESTRY DISABILITY INDEX 12/7/2022   Count 10   Sum 38   Oswestry Score (%) 76   Some recent data might be hidden            Neck Disability Index (NDI) Questionnaire    No flowsheet data found.                Promis 10 Assessment    No flowsheet data found.             Devika Liao

## 2023-01-10 NOTE — LETTER
1/10/2023         RE: Christine Hu  [address] [place] [address]        Dear Colleague,    Thank you for referring your patient, Christine Hu, to the [facility] [place]. Please see a copy of my visit note below.    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  I last saw her about 2 weeks ago.  She says that she has made significant progress.  This is the best I personally seen her.  She says the desensitization therapy is helping and she is able to better tolerate touch over the right shoulder.  She feels like her range of motion is improving but certainly not where she wants to be yet but overall she is making progress and I provided congratulations to her.            Past Medical History:     Past Medical History:   Diagnosis Date     Acute alcoholic intoxication (H) 2011    Seen in emergency room 2020 after 14 months sobriety     Alcohol dependence (H) 2012     Alcohol withdrawal (H) 2011     ASCUS favor benign 2015    Neg HPV     Degeneration of cervical intervertebral disc      Knee effusion, left      Lumbago      Ménière's disease      Overdose 2009    TRAZADONE            Past Surgical History:     Past Surgical History:   Procedure Laterality Date     C/SECTION, LOW TRANSVERSE  2005    , Low Transverse     COLONOSCOPY      5 yrs     DECOMPRESSION, FUSION  CERVICAL ANTERIOR TWO LEVELS, COMBINED N/A 11/26/2022    Procedure: Cervical 4 to 6 Anterior Cervical Decompression and Fusion with Medtronic Plate and Screws, Interbody Device, Use of Fluoroscopy, Microscope;  Surgeon: Wilbur Murray MD;  [facility]     GRAFT BONE FROM ILIAC CREST Left 11/26/2022    Procedure: Left Sided Iliac Crest Autograft;  Surgeon: Wilbur Murray MD;  [facility]     LAMINECTOMY CERIVCAL POSTERIOR THREE+ LEVELS N/A 11/29/2022    Procedure: Right cervical 4-5 posterior foraminotomy;  Surgeon: Wilbur Murray MD;  [facility]     MASTOIDECTOMY       TONSILLECTOMY              Social History:     Social History     Tobacco Use     Smoking status: Every Day     Packs/day: 0.25     Types: Cigarettes     Smokeless tobacco: Never     Tobacco comments:     Reduced to 6 per day started 10/25/22   Substance Use Topics     Alcohol use: Not Currently            Family History:     Family History   Problem Relation Age of Onset     Allergies Mother      Psychotic Disorder Father         depression     Heart Failure Father         heart attack in NOV. 2015?     Allergies Sister      Allergies Sister      Psychotic Disorder Sister         anxiety     Psychotic Disorder Sister         depression     Psychotic Disorder Maternal Uncle         anxiety     Asthma Other      C.A.D. No family hx of      Diabetes No family hx of      Hypertension No family hx of      Cerebrovascular Disease No family hx of      Breast Cancer No family hx of      Cancer - colorectal No family hx of      Prostate Cancer No family hx of      Anesthesia Reaction No family hx of             Allergies:     Allergies   Allergen Reactions     Codeine GI Disturbance     Nausea with use of Tylenol #3     Clindamycin Other (See Comments)     C diff, hospitalized      Penicillins Rash            Medications:     Current Outpatient Medications   Medication     acetaminophen (TYLENOL) 325 MG  tablet     gabapentin (NEURONTIN) 300 MG capsule     HYDROcodone-acetaminophen (NORCO)  MG per tablet     hydrOXYzine (ATARAX) 25 MG tablet     methocarbamol (ROBAXIN) 500 MG tablet     naloxone (NARCAN) 4 MG/0.1ML nasal spray     omeprazole (PRILOSEC OTC) 20 MG EC tablet     polyethylene glycol (MIRALAX) 17 g packet     senna-docusate (SENOKOT-S/PERICOLACE) 8.6-50 MG tablet     traZODone (DESYREL) 50 MG tablet     Current Facility-Administered Medications   Medication     lidocaine (PF) (XYLOCAINE) 1 % injection 5 mL     triamcinolone (KENALOG-40) injection 40 mg             Review of Systems:   A focused musculoskeletal and neurologic ROS was performed with pertinent positives and negatives noted in the HPI.  Additional systems were also reviewed and are documented at the bottom of the note.         Physical Exam:   Vitals: LMP  (LMP Unknown)   Musculoskeletal, Neurologic, and Spine:     I focused my exam on her right shoulder.  She can actively raise to about 80 degrees.  At that point she says there is a hitch in the scapula and she cannot quite get it unstuck but if I gently help her past that point I can see her move the right shoulder blade up and out to disengage it, and then she can get all the way up to about 180.  She can hold the arm up overhead there on her own for about 10 or 15 seconds and then it starts to fatigue and then she can provide resistance all the way down and slowly lower it.  Thus I would graded her motor strength is about 4 out of 5, and she has good range of motion as well, but she is limited by her scapular dyskinesia.  The scapula itself is not moving normally and she holds it rigidly against the rib cage, which is why she gets this hitch in her movement because the scapula is not moving out normally with the humerus.  She has intact sensation throughout and there is less hypersensitivity than at the last visit.         Imaging:     I reviewed her December 27 radiographs which  show well-positioned implants       Assessment and Plan:     42 year old female with improving right C5 nerve irritation.  I asked the nerve and muscles are functioning quite well and she has good range of motion.  She still dealing with really severe scapular dyskinesia and some hypersensitivity in the area.  I think she is splinting the shoulder blade against her body to try to limit painful stimuli.  I told her this is the main thing limiting her at this time.  I think she is making progress with her therapy.  We again discussed range of motion exercises and desensitization therapy and I appreciate her efforts with physical therapy on this.  She should follow-up with me in 6 weeks with repeat AP and lateral cervical radiographs and I provided encouragement and I think she will continue to make improvements as long as she continues to maximize her therapy efforts.    Also, she has been wanting to have a left ACL reconstruction.  I asked her if she feels ready for this or not.  She wants to try to have all the recoveries from surgeries to be at the same time so that she can get back to work and be active.  At this time she feels she could operate a walker if she cannot bear weight on that left leg and in fact she did use a walker a couple of weeks after my surgery with her just because the iliac crest site was hurting her.  Given that she could be safe with a walker I think it is therefore safe from a spine standpoint for her to have the ACL reconstruction, I just counseled her that it may exacerbate her pain, and I certainly want her to keep up with the right shoulder range of motion and therapies to maximize her recovery from the C5 irritation.           Respectfully,  Wilbur Murray MD  Spine Surgery  [facility]

## 2023-01-12 ENCOUNTER — ANESTHESIA EVENT (OUTPATIENT)
Dept: SURGERY | Facility: AMBULATORY SURGERY CENTER | Age: 43
End: 2023-01-12
Payer: COMMERCIAL

## 2023-01-12 ENCOUNTER — TELEPHONE (OUTPATIENT)
Dept: ORTHOPEDICS | Facility: CLINIC | Age: 43
End: 2023-01-12

## 2023-01-12 NOTE — TELEPHONE ENCOUNTER
[facility] Call Center    Phone Message    May a detailed message be left on voicemail: yes     Reason for Call: Other: pt calling in to cancel and reschedule her surgery with Dr. Dueñas on 01/13/2023.  Pt wanting to reschedule to later date  - pt transferred over to Surgery scheduling coordinator - # [phone]     Action Taken: Message routed to:  [facility] ([facility]:  Dr. Peterson    Travel Screening: Not Applicable

## 2023-01-13 ENCOUNTER — ANESTHESIA (OUTPATIENT)
Dept: SURGERY | Facility: AMBULATORY SURGERY CENTER | Age: 43
End: 2023-01-13
Payer: COMMERCIAL

## 2023-01-13 NOTE — TELEPHONE ENCOUNTER
Returned call to patient on 1/12/23 to reschedule surgery with Dr Peterson. Patient now rescheduled from 1/13/23 to 1/27/23. H&P completed on 1/6/23 and will not need to be repeated. Patient will wait for a call 1-3 days prior to surgery for her arrival time.

## 2023-01-18 ENCOUNTER — MYC MEDICAL ADVICE (OUTPATIENT)
Dept: ORTHOPEDICS | Facility: CLINIC | Age: 43
End: 2023-01-18
Payer: COMMERCIAL

## 2023-01-19 ENCOUNTER — DOCUMENTATION ONLY (OUTPATIENT)
Dept: ORTHOPEDICS | Facility: CLINIC | Age: 43
End: 2023-01-19

## 2023-01-19 NOTE — PROGRESS NOTES
Received Completed forms Yes   Faxed Forms Faxed To: [facility]  Fax Number: [phone]   Sent to [facility] (Date) 1/19/23

## 2023-01-24 ENCOUNTER — DOCUMENTATION ONLY (OUTPATIENT)
Dept: ORTHOPEDICS | Facility: CLINIC | Age: 43
End: 2023-01-24
Payer: COMMERCIAL

## 2023-01-24 NOTE — PROGRESS NOTES
Received Completed forms Yes   Faxed Forms Faxed To: [facility]  Fax Number: [phone]   Sent to [facility] (Date) 1/24/23

## 2023-01-26 ASSESSMENT — ENCOUNTER SYMPTOMS
SEIZURES: 0
DYSRHYTHMIAS: 0

## 2023-01-26 ASSESSMENT — LIFESTYLE VARIABLES: TOBACCO_USE: 1

## 2023-01-26 NOTE — ANESTHESIA PREPROCEDURE EVALUATION
Anesthesia Pre-Procedure Evaluation    Patient: Christine Hu   MRN: [id number] : 1980        Procedure : Procedure(s):  left knee examination under anesthesia, knee arthroscopy, anterior cruciate ligament reconstruction hamstring autograft,  meniscus surgery          Past Medical History:   Diagnosis Date     Acute alcoholic intoxication (H) 2011    Seen in emergency room 2020 after 14 months sobriety     Alcohol dependence (H) 2012     Alcohol withdrawal (H) 2011     ASCUS favor benign 2015    Neg HPV     Degeneration of cervical intervertebral disc      Knee effusion, left      Lumbago      Ménière's disease      Overdose 2009    TRAZADONE      Past Surgical History:   Procedure Laterality Date     C/SECTION, LOW TRANSVERSE  2005    , Low Transverse     COLONOSCOPY      5 yrs     DECOMPRESSION, FUSION CERVICAL ANTERIOR TWO LEVELS, COMBINED N/A 2022    Procedure: Cervical 4 to 6 Anterior Cervical Decompression and Fusion with Medtronic Plate and Screws, Interbody Device, Use of Fluoroscopy, Microscope;  Surgeon: Wilbur Murray MD;  [facility]     GRAFT BONE FROM ILIAC CREST Left 2022    Procedure: Left Sided Iliac Crest Autograft;  Surgeon: Wilbur Murray MD;  [facility]     LAMINECTOMY CERIVCAL POSTERIOR THREE+ LEVELS N/A 2022    Procedure: Right cervical 4-5 posterior foraminotomy;  Surgeon: Wilbur Murray MD;  [facility]     MASTOIDECTOMY       TONSILLECTOMY        Allergies   Allergen Reactions     Codeine GI Disturbance     Nausea with use of Tylenol #3     Clindamycin Other (See Comments)     C diff, hospitalized      Penicillins Rash      Social History     Tobacco Use     Smoking status: Every Day     Packs/day: 0.25     Types: Cigarettes     Smokeless tobacco: Never     Tobacco comments:     Reduced to 6 per day started 10/25/22   Substance Use Topics     Alcohol use: Not Currently       Wt Readings from Last 1 Encounters:   01/06/23 76.2 kg (168 lb)        Anesthesia Evaluation   Pt has had prior anesthetic. Type: General and MAC.    No history of anesthetic complications       ROS/MED HX  ENT/Pulmonary: Comment: Trying to quit smoking now 6 cigs daily    COVID +11/9/22-asymptomatic except for fatigue, no fever or respiratory symptoms    (+) tobacco use, Current use, recent URI,     Neurologic: Comment: Cervical radiculopathy   (-) no seizures and no CVA   Cardiovascular: Comment: BP range 130-120s/80s  Patient reports a heart murmur as a child. Echocardiogram in  for athletics reported neg    (+) -----valvular problems/murmurs Previous cardiac testing   Echo: Date: Results:    Stress Test: Date: Results:    ECG Reviewed: Date: Last 2016 Results:  NSR, prolonged QTc 520 ms  Cath: Date: Results:   (-) hypertension, taking anticoagulants/antiplatelets, GARCIA, arrhythmias and murmur   METS/Exercise Tolerance: >4 METS Comment: Able to walk distance. 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    Hematologic:  - neg hematologic  ROS  (-) history of blood clots and history of blood transfusion   Musculoskeletal: Comment: Neck pain   ACL rupture left knee      GI/Hepatic: Comment: Occ heartburn symptoms    (+) GERD, Asymptomatic on medication, hepatitis     Renal/Genitourinary:  - neg Renal ROS     Endo: Comment: Thyrotoxicosis    (+) thyroid problem,  Thyroid disease - Other,     Psychiatric/Substance Use: Comment: ETOH abuse in remission      Infectious Disease:  - neg infectious disease ROS     Malignancy:  - neg malignancy ROS     Other:  - neg other ROS          Physical Exam    Airway        Mallampati: I   TM distance: > 3 FB   Neck ROM: limited   Mouth opening: > 3 cm    Respiratory Devices and Support         Dental       (+) Minor Abnormalities - some fillings, tiny chips    B=Bridge, C=Chipped, L=Loose, M=Missing    Cardiovascular          Rhythm and rate: regular and normal (-) no  murmur    Pulmonary   pulmonary exam normal        breath sounds clear to auscultation           OUTSIDE LABS:  CBC:   Lab Results   Component Value Date    WBC 9.9 11/29/2022    WBC 6.6 10/18/2022    HGB 13.8 01/06/2023    HGB 10.6 (L) 12/02/2022    HCT 35.7 11/29/2022    HCT 37.5 10/18/2022     11/29/2022     10/18/2022     BMP:   Lab Results   Component Value Date     11/29/2022     07/10/2022    POTASSIUM 4.2 11/29/2022    POTASSIUM 4.7 07/10/2022    CHLORIDE 104 11/29/2022    CHLORIDE 109 07/10/2022    CO2 32 11/29/2022    CO2 29 07/10/2022    BUN 20 11/29/2022    BUN 17 07/10/2022    CR 0.52 11/29/2022    CR 0.44 (L) 11/27/2022    GLC 93 12/02/2022    GLC 95 12/01/2022     COAGS: No results found for: PTT, INR, FIBR  POC:   Lab Results   Component Value Date    HCG Negative 10/05/2021    HCGS Negative 01/06/2023     HEPATIC:   Lab Results   Component Value Date    ALBUMIN 3.2 (L) 11/29/2022    PROTTOTAL 6.6 (L) 11/29/2022    ALT 23 11/29/2022    AST 12 11/29/2022    GGT 27 01/18/2018    ALKPHOS 58 11/29/2022    BILITOTAL 0.3 11/29/2022     OTHER:   Lab Results   Component Value Date    LOS 9.1 11/29/2022    MAG 1.8 06/12/2009    TSH 0.99 04/25/2022    T4 0.85 10/01/2007    T3 105 10/02/2007    CRP <2.9 08/11/2022    SED 10 01/18/2018       Anesthesia Plan    ASA Status:  2   NPO Status:  NPO Appropriate    Anesthesia Type: General.     - Airway: LMA   Induction: Intravenous, Propofol.   Maintenance: Balanced.        Consents    Anesthesia Plan(s) and associated risks, benefits, and realistic alternatives discussed. Questions answered and patient/representative(s) expressed understanding.    - Discussed:     - Discussed with:  Patient      - Extended Intubation/Ventilatory Support Discussed: No.      - Patient is DNR/DNI Status: No    Use of blood products discussed: No .     Postoperative Care    Pain management: IV analgesics, Oral pain medications, Multi-modal analgesia, Peripheral  nerve block (Single Shot).   PONV prophylaxis: Ondansetron (or other 5HT-3), Dexamethasone or Solumedrol, Background Propofol Infusion     Comments:    Other Comments: Recent spine surgery complicated by need for repeat surgery and right arm weakness following surgery. Chronic pain issues made worse by recent surgery. Somewhat limited neck extension but otherwise reassuring airway exam. Had dental damage with previous surgery.  Discussed plan for general anesthetic with LMA. Discussed risks of sore throat, post op pain/nausea, oropharyngeal damage, rare major complications.  Due to pain issues will do both adductor canal and popliteal sciatic nerve blocks. Discussed procedures for these as well as risks of bleeding, infection, damage to surrounding structures, persistent numbness/weakness, medication reactions. Patient voiced understanding of plan and had no further questions.        H&P reviewed: Unable to attach H&P to encounter due to EHR limitations. H&P Update: appropriate H&P reviewed, patient examined. No interval changes since H&P (within 30 days).         Francisco Castle MD

## 2023-01-27 ENCOUNTER — HOSPITAL ENCOUNTER (OUTPATIENT)
Facility: AMBULATORY SURGERY CENTER | Age: 43
Discharge: HOME | End: 2023-01-27
Attending: ORTHOPAEDIC SURGERY
Payer: COMMERCIAL

## 2023-01-27 VITALS
DIASTOLIC BLOOD PRESSURE: 70 MMHG | OXYGEN SATURATION: 93 % | BODY MASS INDEX: 27 KG/M2 | HEART RATE: 84 BPM | WEIGHT: 168 LBS | HEIGHT: 66 IN | RESPIRATION RATE: 15 BRPM | SYSTOLIC BLOOD PRESSURE: 124 MMHG | TEMPERATURE: 97.3 F

## 2023-01-27 DIAGNOSIS — S83.512A RUPTURE OF ANTERIOR CRUCIATE LIGAMENT OF LEFT KNEE, INITIAL ENCOUNTER: ICD-10-CM

## 2023-01-27 DIAGNOSIS — Z98.890 STATUS POST ANTERIOR CRUCIATE LIGAMENT SURGERY: Primary | ICD-10-CM

## 2023-01-27 LAB
HCG UR QL: NEGATIVE
INTERNAL QC OK POCT: NORMAL
POCT KIT EXPIRATION DATE: NORMAL
POCT KIT LOT NUMBER: NORMAL

## 2023-01-27 PROCEDURE — C1762 CONN TISS, HUMAN(INC FASCIA): HCPCS

## 2023-01-27 PROCEDURE — 29888 ARTHRS AID ACL RPR/AGMNTJ: CPT | Mod: 79,LT

## 2023-01-27 PROCEDURE — 29888 ARTHRS AID ACL RPR/AGMNTJ: CPT | Mod: 79 | Performed by: ORTHOPAEDIC SURGERY

## 2023-01-27 PROCEDURE — C1713 ANCHOR/SCREW BN/BN,TIS/BN: HCPCS

## 2023-01-27 PROCEDURE — 81025 URINE PREGNANCY TEST: CPT | Performed by: PATHOLOGY

## 2023-01-27 PROCEDURE — C9290 INJ, BUPIVACAINE LIPOSOME: HCPCS

## 2023-01-27 DEVICE — IMP BUTTON ARTHREX ABS TIGHT ROPE 11MM BUTTON AR-1588TB-3: Type: IMPLANTABLE DEVICE | Site: KNEE | Status: FUNCTIONAL

## 2023-01-27 DEVICE — IMP FIXATION TIGHTROPE II ATTACHABLE BUTTON AR-1588TN-20: Type: IMPLANTABLE DEVICE | Site: KNEE | Status: FUNCTIONAL

## 2023-01-27 DEVICE — IMP FIXATION TIGHTROPE II RT FLIP SUTURE AR-1588RT-2J: Type: IMPLANTABLE DEVICE | Site: KNEE | Status: FUNCTIONAL

## 2023-01-27 RX ORDER — NALOXONE HYDROCHLORIDE 0.4 MG/ML
0.4 INJECTION, SOLUTION INTRAMUSCULAR; INTRAVENOUS; SUBCUTANEOUS
Status: DISCONTINUED | OUTPATIENT
Start: 2023-01-27 | End: 2023-01-28 | Stop reason: HOSPADM

## 2023-01-27 RX ORDER — BUPIVACAINE HYDROCHLORIDE AND EPINEPHRINE 2.5; 5 MG/ML; UG/ML
INJECTION, SOLUTION INFILTRATION; PERINEURAL PRN
Status: DISCONTINUED | OUTPATIENT
Start: 2023-01-27 | End: 2023-01-27 | Stop reason: HOSPADM

## 2023-01-27 RX ORDER — ONDANSETRON 4 MG/1
4 TABLET, ORALLY DISINTEGRATING ORAL EVERY 30 MIN PRN
Status: DISCONTINUED | OUTPATIENT
Start: 2023-01-27 | End: 2023-01-28 | Stop reason: HOSPADM

## 2023-01-27 RX ORDER — ONDANSETRON 4 MG/1
4 TABLET, ORALLY DISINTEGRATING ORAL EVERY 8 HOURS PRN
Qty: 4 TABLET | Refills: 0 | Status: SHIPPED | OUTPATIENT
Start: 2023-01-27 | End: 2023-04-10

## 2023-01-27 RX ORDER — FENTANYL CITRATE 50 UG/ML
25 INJECTION, SOLUTION INTRAMUSCULAR; INTRAVENOUS EVERY 5 MIN PRN
Status: DISCONTINUED | OUTPATIENT
Start: 2023-01-27 | End: 2023-01-27 | Stop reason: HOSPADM

## 2023-01-27 RX ORDER — PROPOFOL 10 MG/ML
INJECTION, EMULSION INTRAVENOUS CONTINUOUS PRN
Status: DISCONTINUED | OUTPATIENT
Start: 2023-01-27 | End: 2023-01-27

## 2023-01-27 RX ORDER — PROPOFOL 10 MG/ML
INJECTION, EMULSION INTRAVENOUS PRN
Status: DISCONTINUED | OUTPATIENT
Start: 2023-01-27 | End: 2023-01-27

## 2023-01-27 RX ORDER — MEPERIDINE HYDROCHLORIDE 25 MG/ML
12.5 INJECTION INTRAMUSCULAR; INTRAVENOUS; SUBCUTANEOUS
Status: DISCONTINUED | OUTPATIENT
Start: 2023-01-27 | End: 2023-01-28 | Stop reason: HOSPADM

## 2023-01-27 RX ORDER — HYDROMORPHONE HYDROCHLORIDE 1 MG/ML
0.2 INJECTION, SOLUTION INTRAMUSCULAR; INTRAVENOUS; SUBCUTANEOUS EVERY 5 MIN PRN
Status: DISCONTINUED | OUTPATIENT
Start: 2023-01-27 | End: 2023-01-27 | Stop reason: HOSPADM

## 2023-01-27 RX ORDER — KETAMINE HYDROCHLORIDE 10 MG/ML
INJECTION INTRAMUSCULAR; INTRAVENOUS PRN
Status: DISCONTINUED | OUTPATIENT
Start: 2023-01-27 | End: 2023-01-27

## 2023-01-27 RX ORDER — CEFAZOLIN SODIUM 2 G/50ML
2 SOLUTION INTRAVENOUS
Status: COMPLETED | OUTPATIENT
Start: 2023-01-27 | End: 2023-01-27

## 2023-01-27 RX ORDER — OXYCODONE HYDROCHLORIDE 5 MG/1
5 TABLET ORAL
Status: DISCONTINUED | OUTPATIENT
Start: 2023-01-27 | End: 2023-01-28 | Stop reason: HOSPADM

## 2023-01-27 RX ORDER — ONDANSETRON 2 MG/ML
INJECTION INTRAMUSCULAR; INTRAVENOUS PRN
Status: DISCONTINUED | OUTPATIENT
Start: 2023-01-27 | End: 2023-01-27

## 2023-01-27 RX ORDER — SODIUM CHLORIDE, SODIUM LACTATE, POTASSIUM CHLORIDE, CALCIUM CHLORIDE 600; 310; 30; 20 MG/100ML; MG/100ML; MG/100ML; MG/100ML
INJECTION, SOLUTION INTRAVENOUS CONTINUOUS
Status: DISCONTINUED | OUTPATIENT
Start: 2023-01-27 | End: 2023-01-27 | Stop reason: HOSPADM

## 2023-01-27 RX ORDER — LIDOCAINE 40 MG/G
CREAM TOPICAL
Status: DISCONTINUED | OUTPATIENT
Start: 2023-01-27 | End: 2023-01-27 | Stop reason: HOSPADM

## 2023-01-27 RX ORDER — NALOXONE HYDROCHLORIDE 0.4 MG/ML
0.2 INJECTION, SOLUTION INTRAMUSCULAR; INTRAVENOUS; SUBCUTANEOUS
Status: DISCONTINUED | OUTPATIENT
Start: 2023-01-27 | End: 2023-01-28 | Stop reason: HOSPADM

## 2023-01-27 RX ORDER — HYDROXYZINE HYDROCHLORIDE 25 MG/1
25 TABLET, FILM COATED ORAL 3 TIMES DAILY PRN
Qty: 15 TABLET | Refills: 0 | Status: SHIPPED | OUTPATIENT
Start: 2023-01-27 | End: 2023-01-31

## 2023-01-27 RX ORDER — HYDROXYZINE HYDROCHLORIDE 25 MG/1
25 TABLET, FILM COATED ORAL
Status: DISCONTINUED | OUTPATIENT
Start: 2023-01-27 | End: 2023-01-28 | Stop reason: HOSPADM

## 2023-01-27 RX ORDER — OXYCODONE HYDROCHLORIDE 5 MG/1
5-10 TABLET ORAL EVERY 4 HOURS PRN
Qty: 20 TABLET | Refills: 0 | Status: SHIPPED | OUTPATIENT
Start: 2023-01-27 | End: 2023-01-27

## 2023-01-27 RX ORDER — HYDROCODONE BITARTRATE AND ACETAMINOPHEN 5; 325 MG/1; MG/1
2 TABLET ORAL EVERY 6 HOURS PRN
Status: DISCONTINUED | OUTPATIENT
Start: 2023-01-27 | End: 2023-01-28 | Stop reason: HOSPADM

## 2023-01-27 RX ORDER — ONDANSETRON 4 MG/1
4 TABLET, ORALLY DISINTEGRATING ORAL
Status: DISCONTINUED | OUTPATIENT
Start: 2023-01-27 | End: 2023-01-28 | Stop reason: HOSPADM

## 2023-01-27 RX ORDER — ACETAMINOPHEN 325 MG/1
975 TABLET ORAL ONCE
Status: DISCONTINUED | OUTPATIENT
Start: 2023-01-27 | End: 2023-01-27

## 2023-01-27 RX ORDER — FENTANYL CITRATE 50 UG/ML
25-50 INJECTION, SOLUTION INTRAMUSCULAR; INTRAVENOUS
Status: DISCONTINUED | OUTPATIENT
Start: 2023-01-27 | End: 2023-01-27 | Stop reason: HOSPADM

## 2023-01-27 RX ORDER — CEFAZOLIN SODIUM 2 G/50ML
2 SOLUTION INTRAVENOUS SEE ADMIN INSTRUCTIONS
Status: DISCONTINUED | OUTPATIENT
Start: 2023-01-27 | End: 2023-01-27 | Stop reason: HOSPADM

## 2023-01-27 RX ORDER — ACETAMINOPHEN 325 MG/1
650 TABLET ORAL
Status: DISCONTINUED | OUTPATIENT
Start: 2023-01-27 | End: 2023-01-28 | Stop reason: HOSPADM

## 2023-01-27 RX ORDER — SODIUM CHLORIDE, SODIUM LACTATE, POTASSIUM CHLORIDE, CALCIUM CHLORIDE 600; 310; 30; 20 MG/100ML; MG/100ML; MG/100ML; MG/100ML
INJECTION, SOLUTION INTRAVENOUS CONTINUOUS
Status: DISCONTINUED | OUTPATIENT
Start: 2023-01-27 | End: 2023-01-28 | Stop reason: HOSPADM

## 2023-01-27 RX ORDER — FENTANYL CITRATE 50 UG/ML
50 INJECTION, SOLUTION INTRAMUSCULAR; INTRAVENOUS EVERY 5 MIN PRN
Status: DISCONTINUED | OUTPATIENT
Start: 2023-01-27 | End: 2023-01-27 | Stop reason: HOSPADM

## 2023-01-27 RX ORDER — ACETAMINOPHEN 325 MG/1
650 TABLET ORAL EVERY 4 HOURS PRN
Qty: 50 TABLET | Refills: 0 | Status: SHIPPED | OUTPATIENT
Start: 2023-01-27 | End: 2023-01-27

## 2023-01-27 RX ORDER — SODIUM CHLORIDE, SODIUM LACTATE, POTASSIUM CHLORIDE, CALCIUM CHLORIDE 600; 310; 30; 20 MG/100ML; MG/100ML; MG/100ML; MG/100ML
INJECTION, SOLUTION INTRAVENOUS CONTINUOUS PRN
Status: DISCONTINUED | OUTPATIENT
Start: 2023-01-27 | End: 2023-01-27

## 2023-01-27 RX ORDER — FLUMAZENIL 0.1 MG/ML
0.2 INJECTION, SOLUTION INTRAVENOUS
Status: DISCONTINUED | OUTPATIENT
Start: 2023-01-27 | End: 2023-01-27 | Stop reason: HOSPADM

## 2023-01-27 RX ORDER — ONDANSETRON 2 MG/ML
4 INJECTION INTRAMUSCULAR; INTRAVENOUS EVERY 30 MIN PRN
Status: DISCONTINUED | OUTPATIENT
Start: 2023-01-27 | End: 2023-01-28 | Stop reason: HOSPADM

## 2023-01-27 RX ORDER — FENTANYL CITRATE 50 UG/ML
INJECTION, SOLUTION INTRAMUSCULAR; INTRAVENOUS PRN
Status: DISCONTINUED | OUTPATIENT
Start: 2023-01-27 | End: 2023-01-27

## 2023-01-27 RX ORDER — HYDROXYZINE HYDROCHLORIDE 25 MG/1
50 TABLET, FILM COATED ORAL EVERY 6 HOURS PRN
Status: DISCONTINUED | OUTPATIENT
Start: 2023-01-27 | End: 2023-01-28 | Stop reason: HOSPADM

## 2023-01-27 RX ORDER — OXYCODONE HYDROCHLORIDE 5 MG/1
5 TABLET ORAL EVERY 4 HOURS PRN
Status: DISCONTINUED | OUTPATIENT
Start: 2023-01-27 | End: 2023-01-28 | Stop reason: HOSPADM

## 2023-01-27 RX ORDER — HYDROCODONE BITARTRATE AND ACETAMINOPHEN 10; 325 MG/1; MG/1
1 TABLET ORAL EVERY 4 HOURS PRN
Qty: 25 TABLET | Refills: 0 | Status: SHIPPED | OUTPATIENT
Start: 2023-01-27 | End: 2023-01-31

## 2023-01-27 RX ORDER — BUPIVACAINE HYDROCHLORIDE 2.5 MG/ML
INJECTION, SOLUTION EPIDURAL; INFILTRATION; INTRACAUDAL
Status: COMPLETED | OUTPATIENT
Start: 2023-01-27 | End: 2023-01-27

## 2023-01-27 RX ORDER — OMEGA-3 FATTY ACIDS/FISH OIL 300-1000MG
200 CAPSULE ORAL EVERY 8 HOURS PRN
COMMUNITY
End: 2023-01-27

## 2023-01-27 RX ORDER — OXYCODONE HYDROCHLORIDE 5 MG/1
10 TABLET ORAL EVERY 4 HOURS PRN
Status: DISCONTINUED | OUTPATIENT
Start: 2023-01-27 | End: 2023-01-28 | Stop reason: HOSPADM

## 2023-01-27 RX ORDER — HYDROMORPHONE HYDROCHLORIDE 1 MG/ML
0.4 INJECTION, SOLUTION INTRAMUSCULAR; INTRAVENOUS; SUBCUTANEOUS EVERY 5 MIN PRN
Status: DISCONTINUED | OUTPATIENT
Start: 2023-01-27 | End: 2023-01-27 | Stop reason: HOSPADM

## 2023-01-27 RX ORDER — DEXAMETHASONE SODIUM PHOSPHATE 4 MG/ML
INJECTION, SOLUTION INTRA-ARTICULAR; INTRALESIONAL; INTRAMUSCULAR; INTRAVENOUS; SOFT TISSUE PRN
Status: DISCONTINUED | OUTPATIENT
Start: 2023-01-27 | End: 2023-01-27

## 2023-01-27 RX ORDER — AMOXICILLIN 250 MG
1-2 CAPSULE ORAL 2 TIMES DAILY
Qty: 30 TABLET | Refills: 0 | Status: SHIPPED | OUTPATIENT
Start: 2023-01-27 | End: 2023-04-10

## 2023-01-27 RX ORDER — ACETAMINOPHEN 325 MG/1
975 TABLET ORAL ONCE
Status: COMPLETED | OUTPATIENT
Start: 2023-01-27 | End: 2023-01-27

## 2023-01-27 RX ORDER — LIDOCAINE HYDROCHLORIDE 20 MG/ML
INJECTION, SOLUTION INFILTRATION; PERINEURAL PRN
Status: DISCONTINUED | OUTPATIENT
Start: 2023-01-27 | End: 2023-01-27

## 2023-01-27 RX ORDER — FENTANYL CITRATE 50 UG/ML
25 INJECTION, SOLUTION INTRAMUSCULAR; INTRAVENOUS
Status: DISCONTINUED | OUTPATIENT
Start: 2023-01-27 | End: 2023-01-28 | Stop reason: HOSPADM

## 2023-01-27 RX ADMIN — CEFAZOLIN SODIUM 2 G: 2 SOLUTION INTRAVENOUS at 10:27

## 2023-01-27 RX ADMIN — FENTANYL CITRATE 50 MCG: 50 INJECTION, SOLUTION INTRAMUSCULAR; INTRAVENOUS at 10:37

## 2023-01-27 RX ADMIN — FENTANYL CITRATE 50 MCG: 50 INJECTION, SOLUTION INTRAMUSCULAR; INTRAVENOUS at 11:07

## 2023-01-27 RX ADMIN — HYDROMORPHONE HYDROCHLORIDE 0.4 MG: 1 INJECTION, SOLUTION INTRAMUSCULAR; INTRAVENOUS; SUBCUTANEOUS at 13:19

## 2023-01-27 RX ADMIN — HYDROXYZINE HYDROCHLORIDE 50 MG: 25 TABLET, FILM COATED ORAL at 13:18

## 2023-01-27 RX ADMIN — SODIUM CHLORIDE, SODIUM LACTATE, POTASSIUM CHLORIDE, CALCIUM CHLORIDE: 600; 310; 30; 20 INJECTION, SOLUTION INTRAVENOUS at 09:43

## 2023-01-27 RX ADMIN — ONDANSETRON 4 MG: 2 INJECTION INTRAMUSCULAR; INTRAVENOUS at 10:27

## 2023-01-27 RX ADMIN — KETAMINE HYDROCHLORIDE 10 MG: 10 INJECTION INTRAMUSCULAR; INTRAVENOUS at 12:00

## 2023-01-27 RX ADMIN — PROPOFOL 150 MCG/KG/MIN: 10 INJECTION, EMULSION INTRAVENOUS at 10:38

## 2023-01-27 RX ADMIN — KETAMINE HYDROCHLORIDE 30 MG: 10 INJECTION INTRAMUSCULAR; INTRAVENOUS at 10:38

## 2023-01-27 RX ADMIN — Medication 0.5 MG: at 12:04

## 2023-01-27 RX ADMIN — BUPIVACAINE HYDROCHLORIDE 20 ML: 2.5 INJECTION, SOLUTION EPIDURAL; INFILTRATION; INTRACAUDAL at 09:55

## 2023-01-27 RX ADMIN — BUPIVACAINE HYDROCHLORIDE 10 ML: 2.5 INJECTION, SOLUTION EPIDURAL; INFILTRATION; INTRACAUDAL at 10:05

## 2023-01-27 RX ADMIN — FENTANYL CITRATE 50 MCG: 50 INJECTION, SOLUTION INTRAMUSCULAR; INTRAVENOUS at 10:09

## 2023-01-27 RX ADMIN — FENTANYL CITRATE 50 MCG: 50 INJECTION, SOLUTION INTRAMUSCULAR; INTRAVENOUS at 10:01

## 2023-01-27 RX ADMIN — LIDOCAINE HYDROCHLORIDE 60 MG: 20 INJECTION, SOLUTION INFILTRATION; PERINEURAL at 10:38

## 2023-01-27 RX ADMIN — PROPOFOL 170 MG: 10 INJECTION, EMULSION INTRAVENOUS at 10:38

## 2023-01-27 RX ADMIN — SODIUM CHLORIDE, SODIUM LACTATE, POTASSIUM CHLORIDE, CALCIUM CHLORIDE: 600; 310; 30; 20 INJECTION, SOLUTION INTRAVENOUS at 09:55

## 2023-01-27 RX ADMIN — FENTANYL CITRATE 50 MCG: 50 INJECTION, SOLUTION INTRAMUSCULAR; INTRAVENOUS at 11:50

## 2023-01-27 RX ADMIN — HYDROCODONE BITARTRATE AND ACETAMINOPHEN 2 TABLET: 5; 325 TABLET ORAL at 13:19

## 2023-01-27 RX ADMIN — DEXAMETHASONE SODIUM PHOSPHATE 4 MG: 4 INJECTION, SOLUTION INTRA-ARTICULAR; INTRALESIONAL; INTRAMUSCULAR; INTRAVENOUS; SOFT TISSUE at 10:38

## 2023-01-27 RX ADMIN — FENTANYL CITRATE 50 MCG: 50 INJECTION, SOLUTION INTRAMUSCULAR; INTRAVENOUS at 11:55

## 2023-01-27 RX ADMIN — SODIUM CHLORIDE, SODIUM LACTATE, POTASSIUM CHLORIDE, CALCIUM CHLORIDE: 600; 310; 30; 20 INJECTION, SOLUTION INTRAVENOUS at 12:06

## 2023-01-27 RX ADMIN — Medication 0.5 MG: at 12:15

## 2023-01-27 NOTE — ANESTHESIA POSTPROCEDURE EVALUATION
Patient: Christine Hu    Procedure: Procedure(s):  left knee examination under anesthesia, knee arthroscopy, anterior cruciate ligament reconstruction hamstring autograft       Anesthesia Type:  General    Note:  Disposition: Outpatient   Postop Pain Control: Uneventful            Sign Out: Pain at Preoperative BASELINE   PONV: No   Neuro/Psych: Uneventful            Sign Out: Acceptable/Baseline neuro status   Airway/Respiratory: Uneventful            Sign Out: Acceptable/Baseline resp. status   CV/Hemodynamics: Uneventful            Sign Out: Acceptable CV status; No obvious hypovolemia; No obvious fluid overload   Other NRE: NONE   DID A NON-ROUTINE EVENT OCCUR? No           Last vitals:  Vitals Value Taken Time   /92 01/27/23 1330   Temp 37  C (98.6  F) 01/27/23 1330   Pulse 97 01/27/23 1330   Resp 11 01/27/23 1330   SpO2 96 % 01/27/23 1330       Electronically Signed By: Lizett Ta MD  January 27, 2023  2:21 PM

## 2023-01-27 NOTE — OR NURSING
Patient received left side Adductor canal and popliteal sciatic nerve block  with Exparel.  Fentanyl 100mcg and Versed 2mg given. Tolerated procedure well.

## 2023-01-27 NOTE — OR NURSING
Anesthesia notified of uncontrolled pain.  Plan discussed.  Dilaudid, Norco, and Atarax given with decrease in pain to preop level.

## 2023-01-27 NOTE — DISCHARGE INSTRUCTIONS
"[facility]  Home Care Following Anesthesia  For 24 hours after surgery:  Get plenty of rest.  A responsible adult must stay with you for at least 24 hours after you leave the surgery center.  Do not drive or use heavy equipment.  If you have weakness or tingling, don't drive or use heavy equipment until this feeling goes away.   Do not drink alcohol.   Avoid strenuous or risky activities.  Ask for help when climbing stairs.  You may feel lightheaded.  IF so, sit for a few minutes before standing.  Have someone help you get up.   If you have nausea (feel sick to your stomach): Drink only clear liquids such as apple juice, ginger ale, broth or 7-Up.  Rest may also help.  Be sure to drink enough fluids.  Move to a regular diet as you feel able.   You may have a slight fever.  Call the doctor if your fever is over 100 F (37.7 C) (taken under the tongue) or lasts longer than 24 hours.  You may have a dry mouth, a sore throat, muscle aches or trouble sleeping. These should go away after 24 hours.  Do not make important or legal decisions.   It is recommended to avoid smoking.        Today you received an Exparel block to numb the nerves near your surgery site.  This is a block using local anesthetic or \"numbing\" medication injected around the nerves to anesthetize or \"numb\" the area supplied by those nerves.  This block is injected into the muscle layer near your surgical site.  This medication may numb the location where you had surgery up to 72 hours.  If your surgical site is an arm or leg you should be careful with your affected limb, since it is possible to injure your limb without being aware of it due to the numbing.  Until full feeling returns, you should guard against bumping or hitting your limb, and avoid extreme hot or cold temperatures on the skin.  As the block wears off, the feeling will return as a tingling or prickly sensation near your surgical site.  You will " experince more discomfort from your incision as the feeling returns.  You may want to take a pain pill (a narcotic or Tylenol if this was prescribed by your surgeon) when you start to experience mild pain before the pain beomes more severe.  If your pain medications do not control your pain, you should notify your surgeon.    Tips for taking pain medications  To get the best pain relief possible, remember these points:  Take pain medications as directed, before pain becomes severe.  Pain medication can upset your stomach: taking it with food may help.  Constipation is a common side effect of pain medication. Drink plenty of  fluids.  Eat foods high in fiber. Take a stool softener if recommended by your doctor or pharmacist.  Do not drink alcohol, drive or operate machinery while taking pain medications.  Ask about other ways to control pain, such as with heat, ice or relaxation.    Tylenol/Acetaminophen Consumption  To help encourage the safe use of acetaminophen, the makers of TYLENOL  have lowered the maximum daily dose for single-ingredient Extra Strength TYLENOL  (acetaminophen) products sold in the [place] from 8 pills per day (4,000 mg) to 6 pills per day (3,000 mg). The dosing interval has also changed from 2 pills every 4-6 hours to 2 pills every 6 hours.  If you feel your pain relief is insufficient, you may take Tylenol/Acetaminophen in addition to your narcotic pain medication.   Be careful not to exceed 3,000 mg of Tylenol/Acetaminophen in a 24 hour period from all sources.  If you are taking extra strength Tylenol/acetaminophen (500 mg), the maximum dose is 6 tablets in 24 hours.  If you are taking regular strength acetaminophen (325 mg), the maximum dose is 9 tablets in 24 hours.    Call a doctor for any of the following:  Signs of infection (fever, growing tenderness at the surgery site, a large amount of drainage or bleeding, severe pain, foul-smelling drainage, redness, swelling).  It has been over 8  to 10 hours since surgery and you are still not able to urinate (pass water).  Headache for over 24 hours.  Signs of Covid-19 infection (temperature over 100 degrees, shortness of breath, cough, loss of taste/smell, generalized body aches, persistent headache, chills, sore throat, nausea/vomiting/diarrhea)  Your doctor is:       Dr. Mc Peterson, Orthopaedics: [phone]               Or dial [phone] and ask for the resident on call for:  Orthopaedics  For emergency care, call the:  [facility] Emergency Department: [phone] (TTY for hearing impaired: [phone])

## 2023-01-27 NOTE — ANESTHESIA CARE TRANSFER NOTE
Patient: Christine Hu    Procedure: Procedure(s):  left knee examination under anesthesia, knee arthroscopy, anterior cruciate ligament reconstruction hamstring autograft       Diagnosis: Rupture of anterior cruciate ligament of left knee, initial encounter [S83.512A]  Diagnosis Additional Information: No value filed.    Anesthesia Type:   General     Note:    Oropharynx: oropharynx clear of all foreign objects and spontaneously breathing  Level of Consciousness: awake  Oxygen Supplementation: nasal cannula  Level of Supplemental Oxygen (L/min / FiO2): 2  Independent Airway: airway patency satisfactory and stable  Dentition: dentition unchanged  Vital Signs Stable: post-procedure vital signs reviewed and stable  Report to RN Given: handoff report given  Patient transferred to: PACU  Comments: Uneventful transport   Report to RN - Agnes and Lashonda  Exchanging well; color natl  Pt very uncomfortable; left leg pain lateral and posterior pain; feels like it has blown up; no change in neck or right arm pain from preop; right hand grasp has preop and weaker than left hand; very emotional - talking about sister and her illness and the burden that she and her sister have placed on her parents; pt stated her sister is very ill with pulmonary fibrosis - pt crying  Will inform anesthesiologist of pt's medications given and level of pain  Pt responds appropriately to command  IV patent  Lips/teeth/dentition as preop status  Questions answered    Handoff Report: Identifed the Patient, Identified the Reponsible Provider, Reviewed the pertinent medical history, Discussed the surgical course, Reviewed Intra-OP anesthesia mangement and issues during anesthesia, Set expectations for post-procedure period and Allowed opportunity for questions and acknowledgement of understanding      Vitals:  Vitals Value Taken Time   /97    Temp 97.8    Pulse 107    Resp 16    SpO2 98% O2 2 LPM via NC        Electronically Signed By:  FARSHAD ALFREDO, APRN CRNA  January 27, 2023  12:19 PM

## 2023-01-27 NOTE — OP NOTE
PREOPERATIVE DIAGNOSIS:   1. Left ACL tear  2. Intact medial and lateral menisci    POSTOPERATIVE DIAGNOSIS:  1. Left ACL tear  2. Intact medial and lateral menisci    PROCEDURE:  1. Examination under anesthesia Left Knee  2. Left Knee arthroscopy  3. ACL reconstruction Hamstring Autograft    DATE OF SURGERY: 1/27/2023    SURGEON: Mc Peterson MD    ASSISTANT: Nay Johnson PA-C. The assistance of Ms. Johnson was necessary for positioning, arthroscopic visualization, retraction, graft preparation and graft passage.    RESIDENT OR FELLOW: Charli Duran MD    OPERATIVE INDICATIONS: Christnie Hu is a pleasant 42 year old who I saw through my orthopedic clinic with a history, physical, imaging consistent with left ACL tear.  I reviewed with the patient the risks, benefits, complications, techniques and alternatives to surgery.  We reviewed the expected course of recovery and the potential expected outcomes.  The patient understood both the risks and benefits and desired to proceed despite the risks.    OPERATIVE DETAILS: In the preoperative area the patient's informed consent was reviewed and they desired to proceed.  The left leg was marked and the patient was in agreement.  The patient was taken to the operating room where a timeout was performed and all parties were in agreement.  Preoperative antibiotics were given within 1 hour of the time of incision.  The patient was placed in the supine position and surrendered to LMA anesthesia.  No tourniquet was applied.  Egg crate was placed beneath the well leg and a side post was utilized.  The operative leg was prepped and draped in the usual sterile fashion.     Examination under anesthesia: Range of motion 0-135, 1 quadrant medial and 2 quadrant lateral translation of the patella, stable to varus and valgus stress testing, stable posterior drawer testing, 2+ anterior drawer testing, 2B Lachman, 1+ pivot shift    Graft harvest and preparation: A 4 cm  incision was made over the anterior medial tibia.  It was carried down through the skin and subcutaneous tissues and meticulous hemostasis was insured.  The fascia was opened with an apex anterior-superior-based incision and the sartorius fascia was identified.  A marking suture was placed at this top corner and the fascia was reflected exposing the semi-tendinosis.  A right angle snap was used to free the semi-tendinosis from the overlying fascia and the adhesions were removed sharply.  Once there was adequate excursion of the tendon across the tibial tubercle, and no tenting of the gastroc fascia a small tendon stripper was introduced and the tendon was harvested free.    It was taken to the back table where a graft link technology was employed.  The graft was quadrupled, running locking fiber loop was placed on each tail and the folds of the graft were doubled over the [employer].  Circumferential sutures were placed at 1 and 2 cm.  The final graft dimensions measured 70 mm of length by 9.0 mm.  The graft was tensioned at 20 pounds for 20 minutes to remove any creep.    Anterior medial and anterior lateral arthroscopic portals were created and a diagnostic arthroscopy was performed with the following findings: The medial patella facet, lateral patella facet, central ridge of the patella showed normal cartilage.  The trochlear cartilage was normal.  The medial femoral condyle showed normal cartilage and medial tibial plateau showed normal cartilage. The lateral femoral condyle showed normal cartilage and lateral tibial plateau showed normal. The Medial meniscus stable and intact to probing and Lateral Meniscus stable and intact to probing.  There was a grade 3 rupture of the anterior cruciate ligament with positive empty wall sign.  The PCL was intact.  There was no opening to varus and valgus stress testing in either the lateral or medial compartments, respectively.    A debridement of the nonfunctioning  "ACL was then performed until we could visualize the anatomic insertion sites of the ACL on both the femoral and the tibial origin.  Remnant preservation was pursued in the tibial side and the tibial tunnel was centered midway between the medial and lateral tibial spines in line with the posterior aspect of the anterior horn of the lateral meniscus.    The arthroscope was placed into the medial portal and a 6-9 guide was placed into the lateral portal we selected our position along the lateral femoral wall.  The osseous length measured 40 mm.  A 9.0 mm flip cutter was then introduced through the lateral wall and a 25 mm socket was reamed.  The flip cutter was placed into the straight position and was replaced with a fiber loop suture.  Arthroscopic visualization showed excellent position of the femoral tunnel.  Excess bone from the reamings was then removed arthroscopically.    The arthroscope was then returned to the lateral portal, a tip to tip guide was introduced.  Our osseous length measured 40 mm.  The flip cutter was placed and a 30 mm socket was reamed.  The flip cutter was then returned to the straight position and a fiber loop passing suture was placed.    The medial portal was enlarged.  We confirm that there were no soft tissue bridges.  The graft was brought up onto the field reduced to the medial portal.  The cortical button was deployed over the lateral cortex.  Approximately 20 mm of graft was then reduced into the femoral tunnel.  The remainder of the graft was reduced in the tibia.  We then \"balanced\" the graft within the knee joint with 20 mm of graft in the femoral side, 20 mm of graft in the tibial side.  Tensioning and retensioning was then performed in full extension.  Final knot-tying was performed on the tibia and the femoral side.  Examination showed Lachman of 0, no pivot shift. Final arthroscopic images showed good position of the graft, good tension to probing, clearance along the roof of " the intercondylar notch in terminal extension clearance along the lateral wall and PCL in flexion.    Copious irrigation was performed an a layered closure was initiated, sterile dressings were applied and the patient was transferred to the recovery room in stable condition with stable vital signs.    ESTIMATED BLOOD LOSS: 25 mL.    TOURNIQUET TIME: No tourniquet was placed.    COMPLICATIONS: None apparent.    DRAINS: None.    SPECIMENS: None.     POSTOPERATIVE PLAN:  Weightbearing as tolerated, wean from crutches when able  Knee immobilizer times 1 week then wean when able  Average time to wean from crutches and brace is 2-3 weeks  The goal is to walk into my clinic at 6 weeks with no brace and no crutches  No running until 3 months  No sports until 6 months, return to game competition at 7-10 months  Shower on day 3  Start physical therapy day 3-5   x 4 weeks

## 2023-01-27 NOTE — ANESTHESIA PROCEDURE NOTES
Sciatic Procedure Note    Pre-Procedure   Staff -        Anesthesiologist:  Francisco Castle MD       Performed By: anesthesiologist       Location: pre-op       Procedure Start/Stop Times: 1/27/2023 9:55 AM and 1/27/2023 10:05 AM       Pre-Anesthestic Checklist: patient identified, IV checked, site marked, risks and benefits discussed, informed consent, monitors and equipment checked, pre-op evaluation, at physician/surgeon's request and post-op pain management  Timeout:       Correct Patient: Yes        Correct Procedure: Yes        Correct Site: Yes        Correct Position: Yes        Correct Laterality: Yes        Site Marked: Yes  Procedure Documentation  Procedure: Sciatic       Laterality: left       Patient Position: supine       Patient Prep/Sterile Barriers: sterile gloves, mask       Skin prep: Chloraprep (popliteal approach).       Needle Type: insulated and short bevel       Needle Gauge: 20.        Needle Length (millimeters): 100        Ultrasound guided       1. Ultrasound was used to identify targeted nerve, plexus, vascular marker, or fascial plane and place a needle adjacent to it in real-time.       2. Ultrasound was used to visualize the spread of anesthetic in close proximity to the above referenced structure.       3. A permanent image is entered into the patient's record.       4. The visualized anatomic structures appeared normal.       5. There were no apparent abnormal pathologic findings.    Assessment/Narrative         The placement was negative for: blood aspirated, painful injection and site bleeding       Paresthesias: No.       Bolus given via needle..        Secured via.        Insertion/Infusion Method: Single Shot       Complications: none       Injection made incrementally with aspirations every 2 mL.    Medication(s) Administered   Bupivacaine 0.25% PF (Infiltration) - Infiltration   20 mL - 1/27/2023 9:55:00 AM  Bupivacaine liposome (Exparel) 1.3% LA inj susp (Infiltration) -  "Infiltration   10 mL - 1/27/2023 9:55:00 AM  Medication Administration Time: 1/27/2023 9:55 AM     Comments:  High tolerance to sedation. Significant discomfort even with small movements of needle prior to getting to target which improved with additional sedation. No paresthesias or worse pain during injection. Adequate spread seen surrounding nerves       FOR [facility] ([facility]) ONLY:   Pain Team Contact information: please page the Pain Team Via [employer]. Search \"Pain\". During daytime hours, please page the attending first. At night please page the resident first.    "

## 2023-01-27 NOTE — ANESTHESIA PROCEDURE NOTES
Adductor canal Procedure Note    Pre-Procedure   Staff -        Anesthesiologist:  Francisco Castle MD       Performed By: anesthesiologist       Location: pre-op       Procedure Start/Stop Times: 1/27/2023 10:05 AM and 1/27/2023 10:08 AM       Pre-Anesthestic Checklist: patient identified, IV checked, site marked, risks and benefits discussed, informed consent, monitors and equipment checked, pre-op evaluation, at physician/surgeon's request and post-op pain management  Timeout:       Correct Patient: Yes        Correct Procedure: Yes        Correct Site: Yes        Correct Position: Yes        Correct Laterality: Yes        Site Marked: Yes  Procedure Documentation  Procedure: Adductor canal       Laterality: left       Patient Position: supine       Patient Prep/Sterile Barriers: sterile gloves, mask       Skin prep: Chloraprep       Needle Type: insulated and short bevel       Needle Gauge: 20.        Needle Length (millimeters): 100        Ultrasound guided       1. Ultrasound was used to identify targeted nerve, plexus, vascular marker, or fascial plane and place a needle adjacent to it in real-time.       2. Ultrasound was used to visualize the spread of anesthetic in close proximity to the above referenced structure.       3. A permanent image is entered into the patient's record.       4. The visualized anatomic structures appeared normal.       5. There were no apparent abnormal pathologic findings.    Assessment/Narrative         The placement was negative for: blood aspirated, painful injection and site bleeding       Paresthesias: No.       Bolus given via needle..        Secured via.        Insertion/Infusion Method: Single Shot       Complications: none       Injection made incrementally with aspirations every 2 mL.    Medication(s) Administered   Bupivacaine 0.25% PF (Infiltration) - Infiltration   10 mL - 1/27/2023 10:05:00 AM  Bupivacaine liposome (Exparel) 1.3% LA inj susp (Infiltration) -  "Infiltration   10 mL - 1/27/2023 10:05:00 AM  Medication Administration Time: 1/27/2023 10:05 AM      FOR [facility] ([facility]) ONLY:   Pain Team Contact information: please page the Pain Team Via [employer]. Search \"Pain\". During daytime hours, please page the attending first. At night please page the resident first.    "

## 2023-01-29 ENCOUNTER — TELEPHONE (OUTPATIENT)
Dept: ORTHOPEDICS | Facility: CLINIC | Age: 43
End: 2023-01-29
Payer: COMMERCIAL

## 2023-01-29 DIAGNOSIS — Z98.890 S/P SPINAL SURGERY: Primary | ICD-10-CM

## 2023-01-29 RX ORDER — HYDROMORPHONE HYDROCHLORIDE 2 MG/1
2 TABLET ORAL EVERY 6 HOURS PRN
Qty: 20 TABLET | Refills: 0 | Status: SHIPPED | OUTPATIENT
Start: 2023-01-29 | End: 2023-02-01

## 2023-01-30 ENCOUNTER — NURSE TRIAGE (OUTPATIENT)
Dept: NURSING | Facility: CLINIC | Age: 43
End: 2023-01-30
Payer: COMMERCIAL

## 2023-01-30 DIAGNOSIS — Z98.890 STATUS POST ANTERIOR CRUCIATE LIGAMENT SURGERY: Primary | ICD-10-CM

## 2023-01-30 RX ORDER — OXYCODONE HYDROCHLORIDE 5 MG/1
5-10 TABLET ORAL EVERY 4 HOURS PRN
Qty: 30 TABLET | Refills: 0 | Status: SHIPPED | OUTPATIENT
Start: 2023-01-30 | End: 2023-02-02

## 2023-01-30 NOTE — TELEPHONE ENCOUNTER
The resident reached out to me after the patient called in a few times over the weekend with pain following her ACL reconstruction.  We elected to make the decision to change from hydrocodone which she had used previously to Dilaudid.  I have called in this prescription.  My team will reach out this week to generate a plan going forward

## 2023-01-30 NOTE — TELEPHONE ENCOUNTER
Called and talked with patient, she feels the dilaudid is making her stomach hurt and causing her HA and has no improved her pain. She has stopped taking this and was going back to the Norco as this at least dulls the pain.  We discussed changing to Oxycodone, patient has not tried this before and is will to try. We will send in a small prescription for her to try and see if this will help. We also discussed adding Iburprofen ok for 2 tablets twice daily.  Hydroxyzine 1 tablet every 6 hours.   Tylenol 2 tablets every 6 hours.   She will call us tomorrow and let us know how she is doing.  Dennise Manuel RN

## 2023-01-30 NOTE — TELEPHONE ENCOUNTER
[facility] Call Center    Phone Message    May a detailed message be left on voicemail: yes     Reason for Call: Other: Pt said that HYDROmorphone (DILAUDID) 2 MG tablet is not working for her. She would like the oxy one but she was told that ins will need a special request pt also has questions about her brace may be too big      Action Taken: Other: ortho csc

## 2023-01-31 ENCOUNTER — MYC MEDICAL ADVICE (OUTPATIENT)
Dept: ORTHOPEDICS | Facility: CLINIC | Age: 43
End: 2023-01-31

## 2023-01-31 DIAGNOSIS — S83.512A RUPTURE OF ANTERIOR CRUCIATE LIGAMENT OF LEFT KNEE, INITIAL ENCOUNTER: ICD-10-CM

## 2023-01-31 DIAGNOSIS — Z98.890 STATUS POST ANTERIOR CRUCIATE LIGAMENT SURGERY: ICD-10-CM

## 2023-01-31 RX ORDER — HYDROCODONE BITARTRATE AND ACETAMINOPHEN 10; 325 MG/1; MG/1
1 TABLET ORAL EVERY 4 HOURS PRN
Qty: 42 TABLET | Refills: 0 | Status: SHIPPED | OUTPATIENT
Start: 2023-01-31 | End: 2023-02-06

## 2023-01-31 RX ORDER — HYDROXYZINE HYDROCHLORIDE 25 MG/1
25 TABLET, FILM COATED ORAL 3 TIMES DAILY PRN
Qty: 15 TABLET | Refills: 0 | Status: SHIPPED | OUTPATIENT
Start: 2023-01-31 | End: 2023-04-10

## 2023-01-31 NOTE — TELEPHONE ENCOUNTER
Christine calls and says that she had ACL surgery on 1/27/2023 and was told that the DrMurphy Would order a new pain medication for her today but nothing was ordered. Pt. Says that she wants that new pain medication. RN checked Epic and saw that no pain medicine was ordered today. Dr. Blas Nuñez- Sports and Orthopedic Surgeon-was paged to call this nurse at [phone], via page  at [phone]. Dr. Peterson- Sports and Orthopedics Surgeon-called this nurse back and says that he just spoke to Dr. Nuñez and says that Dr. Peterson is on call. RN told this  About pt's request for the pain medication.  Says that he just got out of surgery and sees a note in Epic in pt's chart about the Oxycodone.  Says that he will sign the pending medication order and pt. Can have the medication. RN called Christine back and told her this information. Christine voiced understanding. COVID 19 Nurse Triage Plan/Patient Instructions    Please be aware that novel coronavirus (COVID-19) may be circulating in the community. If you develop symptoms such as fever, cough, or SOB or if you have concerns about the presence of another infection including coronavirus (COVID-19), please contact your health care provider or visit [email or web address].     Disposition/Instructions    In-Person Visit with provider recommended. Reference Visit Selection Guide.    Thank you for taking steps to prevent the spread of this virus.  o Limit your contact with others.  o Wear a simple mask to cover your cough.  o Wash your hands well and often.    Resources    [facility]: About COVID-19: [email or web address]    CDC: What to Do If You're Sick: www.cdc.gov/coronavirus/2019-ncov/about/steps-when-sick.html    CDC: Ending Home Isolation: www.cdc.gov/coronavirus/2019-ncov/hcp/disposition-in-home-patients.html     CDC: Caring for Someone: www.cdc.gov/coronavirus/2019-ncov/if-you-are-sick/care-for-someone.html     [facility]: Interim  Guidance for Hospital Discharge to Home: www.health.[facility].[place]/diseases/coronavirus/hcp/hospdischarge.pdf    [facility] clinical trials (COVID-19 research studies): clinicalaffairs.[facility]/umn-clinical-trials     Below are the COVID-19 hotlines at the [place] Department of Health ([facility]). Interpreters are available.   o For health questions: Call 170-803-1675 or 1-171.413.8407 (7 a.m. to 7 p.m.)  o For questions about schools and childcare: Call 535-359-3066 or 1-104.721.5670 (7 a.m. to 7 p.m.)                     Reason for Disposition    Prescription request for new medicine (not a refill)    Additional Information    Negative: [1] Intentional drug overdose AND [2] suicidal thoughts or ideas    Negative: Drug overdose and triager unable to answer question    Negative: Caller requesting a renewal or refill of a medicine patient is currently taking    Negative: Caller requesting information unrelated to medicine    Negative: Caller requesting information about COVID-19 Vaccine    Negative: Caller requesting information about Emergency Contraception    Negative: Caller requesting information about Combined Birth Control Pills    Negative: Caller requesting information about Progestin Birth Control Pills    Negative: Caller requesting information about Post-Op pain or medicines    Negative: Caller requesting a prescription antibiotic (such as Penicillin) for Strep throat and has a positive culture result    Negative: Caller requesting a prescription anti-viral med (such as Tamiflu) and has influenza (flu) symptoms    Negative: Immunization reaction suspected    Negative: Rash while taking a medicine or within 3 days of stopping it    Negative: [1] Asthma and [2] having symptoms of asthma (cough, wheezing, etc.)    Negative: [1] Symptom of illness (e.g., headache, abdominal pain, earache, vomiting) AND [2] more than mild    Negative: Breastfeeding questions about mother's medicines and diet    Negative:  MORE THAN A DOUBLE DOSE of a prescription or over-the-counter (OTC) drug    Negative: [1] DOUBLE DOSE (an extra dose or lesser amount) of prescription drug AND [2] any symptoms (e.g., dizziness, nausea, pain, sleepiness)    Negative: [1] DOUBLE DOSE (an extra dose or lesser amount) of over-the-counter (OTC) drug AND [2] any symptoms (e.g., dizziness, nausea, pain, sleepiness)    Negative: Took another person's prescription drug    Negative: [1] DOUBLE DOSE (an extra dose or lesser amount) of prescription drug AND [2] NO symptoms (Exception: a double dose of antibiotics)    Negative: Diabetes drug error or overdose (e.g., took wrong type of insulin or took extra dose)    Negative: [1] Prescription not at pharmacy AND [2] was prescribed by PCP recently (Exception: triager has access to EMR and prescription is recorded there. Go to Home Care and confirm for pharmacy.)    Negative: [1] Pharmacy calling with prescription question AND [2] triager unable to answer question    Negative: [1] Caller has URGENT medicine question about med that PCP or specialist prescribed AND [2] triager unable to answer question    Negative: Medicine patch causing local rash or itching    Negative: [1] Caller has medicine question about med NOT prescribed by PCP AND [2] triager unable to answer question (e.g., compatibility with other med, storage)    Protocols used: MEDICATION QUESTION CALL-A-

## 2023-02-01 NOTE — TELEPHONE ENCOUNTER
Called [facility] and spoke to pharmacy staff.   Hydrocodone was picked up at about 10am today. No other issues.    Prudencio Pitmtan RN

## 2023-02-06 ENCOUNTER — MYC MEDICAL ADVICE (OUTPATIENT)
Dept: ORTHOPEDICS | Facility: CLINIC | Age: 43
End: 2023-02-06
Payer: COMMERCIAL

## 2023-02-06 DIAGNOSIS — S83.512A RUPTURE OF ANTERIOR CRUCIATE LIGAMENT OF LEFT KNEE, INITIAL ENCOUNTER: ICD-10-CM

## 2023-02-06 DIAGNOSIS — Z98.890 STATUS POST ANTERIOR CRUCIATE LIGAMENT SURGERY: Primary | ICD-10-CM

## 2023-02-06 RX ORDER — HYDROCODONE BITARTRATE AND ACETAMINOPHEN 10; 325 MG/1; MG/1
1 TABLET ORAL EVERY 4 HOURS PRN
Qty: 42 TABLET | Refills: 0 | Status: SHIPPED | OUTPATIENT
Start: 2023-02-08 | End: 2023-02-28

## 2023-02-06 NOTE — CONFIDENTIAL NOTE
Checked , last quantity filled was for full prescription.  Called [facility] and verified that patient was given 42 tablets.

## 2023-02-09 ENCOUNTER — ANCILLARY PROCEDURE (OUTPATIENT)
Dept: GENERAL RADIOLOGY | Facility: CLINIC | Age: 43
End: 2023-02-09
Attending: ORTHOPAEDIC SURGERY
Payer: COMMERCIAL

## 2023-02-09 ENCOUNTER — OFFICE VISIT (OUTPATIENT)
Dept: ORTHOPEDICS | Facility: CLINIC | Age: 43
End: 2023-02-09
Payer: COMMERCIAL

## 2023-02-09 DIAGNOSIS — Z98.890 STATUS POST ANTERIOR CRUCIATE LIGAMENT SURGERY: ICD-10-CM

## 2023-02-09 DIAGNOSIS — M25.562 ACUTE PAIN OF LEFT KNEE: ICD-10-CM

## 2023-02-09 DIAGNOSIS — S83.512A RUPTURE OF ANTERIOR CRUCIATE LIGAMENT OF LEFT KNEE, INITIAL ENCOUNTER: Primary | ICD-10-CM

## 2023-02-09 DIAGNOSIS — Z98.890 S/P SPINAL SURGERY: Primary | ICD-10-CM

## 2023-02-09 PROCEDURE — 99024 POSTOP FOLLOW-UP VISIT: CPT | Performed by: ORTHOPAEDIC SURGERY

## 2023-02-09 PROCEDURE — 73560 X-RAY EXAM OF KNEE 1 OR 2: CPT | Mod: LT | Performed by: RADIOLOGY

## 2023-02-09 ASSESSMENT — PAIN SCALES - GENERAL: PAINLEVEL: SEVERE PAIN (6)
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
## 2023-02-09 NOTE — LETTER
2/9/2023         RE: Christine Hu  [address]  [place] [address]        Dear Colleague,    Thank you for referring your patient, Christine Hu, to the [facility]. Please see a copy of my visit note below.    DIAGNOSIS:   1. ACL tear left knee    PROCEDURES:  1. ACL reconstruction, hamstring autograft, DOS 2/9/23    HISTORY:  Doing well, pain controlled, weaning off opioids, starting pt    EXAM:     General: Awake, Alert, and oriented. Articulates and communicates with a normal affect     left Lower Extremity:    Incisions well healed without evidence of infection    Normal post-operative effusion and ecchymosis    Range of motion and stability exam not performed    Neurovascularly intact    IMAGING:  Radiographs of the left knee from today were independently reviewed by me and findings were discussed with the patient today. The imaging demonstrates tunnels and hardware in good position.    ASSESSMENT:  1. 1 week following HS auto ACL    PLAN:     Weightbearing as tolerated, wean from crutches/walker when able    Range of motion as tolerated, wean from brace when able    Sutures removed in clinic    Leave steri-strips in place until they fall off    OK to shower allowing water to run over incision    No soaking, scrubbing, baths, or lake for 1 additional week    Continue PT as scheduled     Pain medications reviewed and no refills required.     Operative report provided and arthroscopic images reviewed    Follow up at 6 weeks from the date of surgery with no new X-Rays needed          Again, thank you for allowing me to participate in the care of your patient.        Sincerely,        Mc Peterson MD

## 2023-02-09 NOTE — PROGRESS NOTES
DIAGNOSIS:   1. ACL tear left knee    PROCEDURES:  1. ACL reconstruction, hamstring autograft, DOS 2/9/23    HISTORY:  Doing well, pain controlled, weaning off opioids, starting pt    EXAM:     General: Awake, Alert, and oriented. Articulates and communicates with a normal affect     left Lower Extremity:    Incisions well healed without evidence of infection    Normal post-operative effusion and ecchymosis    Range of motion and stability exam not performed    Neurovascularly intact    IMAGING:  Radiographs of the left knee from today were independently reviewed by me and findings were discussed with the patient today. The imaging demonstrates tunnels and hardware in good position.    ASSESSMENT:  1. 1 week following HS auto ACL    PLAN:     Weightbearing as tolerated, wean from crutches/walker when able    Range of motion as tolerated, wean from brace when able    Sutures removed in clinic    Leave steri-strips in place until they fall off    OK to shower allowing water to run over incision    No soaking, scrubbing, baths, or lake for 1 additional week    Continue PT as scheduled     Pain medications reviewed and no refills required.     Operative report provided and arthroscopic images reviewed    Follow up at 6 weeks from the date of surgery with no new X-Rays needed

## 2023-02-13 ENCOUNTER — VIRTUAL VISIT (OUTPATIENT)
Dept: FAMILY MEDICINE | Facility: CLINIC | Age: 43
End: 2023-02-13
Payer: COMMERCIAL

## 2023-02-13 ENCOUNTER — MYC MEDICAL ADVICE (OUTPATIENT)
Dept: FAMILY MEDICINE | Facility: CLINIC | Age: 43
End: 2023-02-13

## 2023-02-13 DIAGNOSIS — S83.512A RUPTURE OF ANTERIOR CRUCIATE LIGAMENT OF LEFT KNEE, INITIAL ENCOUNTER: ICD-10-CM

## 2023-02-13 DIAGNOSIS — G89.29 CHRONIC IDIOPATHIC PAIN SYNDROME: Primary | ICD-10-CM

## 2023-02-13 PROCEDURE — 99213 OFFICE O/P EST LOW 20 MIN: CPT | Mod: VID | Performed by: PHYSICIAN ASSISTANT

## 2023-02-13 RX ORDER — HYDROCODONE BITARTRATE AND ACETAMINOPHEN 10; 325 MG/1; MG/1
TABLET ORAL
Qty: 70 TABLET | Refills: 0 | Status: SHIPPED | OUTPATIENT
Start: 2023-02-13 | End: 2023-02-27

## 2023-02-13 NOTE — TELEPHONE ENCOUNTER
[facility] pharmacy calling on  [place] prescription.     States that they are not able to release the prescription until tomorrow but according to pt she should be able to get it today.    Pharmacist states that pt last got a refill on the 8th at  [facility].      Call pharmacy if provider approves early fill today.      Routing to provider.      Chata Harrell RN  [facility]

## 2023-02-13 NOTE — PROGRESS NOTES
Christine is a 42 year old who is being evaluated via a billable video visit.      How would you like to obtain your AVS? MyChart  If the video visit is dropped, the invitation should be resent by: Text to cell phone: [phone]  Will anyone else be joining your video visit? No        Assessment & Plan     Rupture of anterior cruciate ligament of left knee, initial encounter    - HYDROcodone-acetaminophen (NORCO)  MG per tablet; Take 1 tablet by mouth every 4 hours as needed for severe pain (7-10) for 7 days, THEN 0.5 tablets every 4 hours as needed for severe pain (7-10). This is start to tapering down/off. Follow up pain clinic.    Chronic idiopathic pain syndrome    - HYDROcodone-acetaminophen (NORCO)  MG per tablet; Take 1 tablet by mouth every 4 hours as needed for severe pain (7-10) for 7 days, THEN 0.5 tablets every 4 hours as needed for severe pain (7-10). This is start to tapering down/off. Follow up pain clinic.      Tapering down/off is the plan until seen by pain clinic then they will take over pain management until controlled at least      There are no Patient Instructions on file for this visit.      No follow-ups on file.    Angi Marroquin PA-C  [facility]   Christine is a 42 year old accompanied by her Self, presenting for the following health issues:  Recheck Medication      History of Present Illness       Reason for visit:  Ween off medication    She eats 2-3 servings of fruits and vegetables daily.She consumes 0 sweetened beverage(s) daily.She exercises with enough effort to increase her heart rate 10 to 19 minutes per day.  She exercises with enough effort to increase her heart rate 3 or less days per week.   She is taking medications regularly.     Pain management:    Has pain appt on 2-28. Previous appt? No, this was the first one.  [place]co 10 mg filled on 2-8. Also on 2-1. Oxycodone 30 tabs 5  mg and hydromorphone 2 mg 20 tabs on 1-29 these were all through surgeon from recent spine surgery.   dilautid made her sick so they changed it after 3 tabs. She vomits on dilautid. They filled oxycodone but she had asked for hydrocodone so they changed it to that. She has been taking gabapentin, robaxin, atarax, and hydrocodone, no longer taking dilautid or oxycydone hydrocodone seems to work for her. Has not been taking them together.  Has been taking 6 norcos a day per Rx. Was told to f/u with pcp to get meds until pain clinic follows with them.   Has been on narcotics since november.     Patient has complex pain and mental health history please see my previous notes for more detail.       1 tab every 4 hours for a week for 7 days.   Next 3 weeks every 4 hours half tablet. Total 70 tabs.   Will be out tomorrow of medication with insurance coverage would like to try to fill asap. We had difficulty last time with coverage.       We discussed side effects and risks of this medication today including addiction, tolerance, increased sedation, respiratory depression, and possibly overdose if not taken as directed.   They will not mix this medication with alcohol or other sedating medications. They will not drive after taking this medication.  Patient states understanding. They verbally agreed to use their medication responsibly.     Review of Systems   Constitutional, HEENT, cardiovascular, pulmonary, GI, , musculoskeletal, neuro, skin, endocrine and psych systems are negative, except as otherwise noted.      Objective           Vitals:  No vitals were obtained today due to virtual visit.    Physical Exam   GENERAL: Healthy, alert and no distress  EYES: Eyes grossly normal to inspection.  No discharge or erythema, or obvious scleral/conjunctival abnormalities.  RESP: No audible wheeze, cough, or visible cyanosis.  No visible retractions or increased work of breathing.    SKIN: Visible skin clear. No significant rash,  abnormal pigmentation or lesions.  NEURO: Cranial nerves grossly intact.  Mentation and speech appropriate for age.  PSYCH: Mentation appears normal, affect normal/bright, judgement and insight intact, normal speech and appearance well-groomed.          Video-Visit Details    Type of service:  Video Visit     Originating Location (pt. Location): Home  Distant Location (provider location):  On-site  Platform used for Video Visit: Uanbai

## 2023-02-13 NOTE — TELEPHONE ENCOUNTER
Provider: Patient is following up on the request to pick-up her Norco today and not tomorrow.  It is ok for the pharmacy to fill her Norco today?  Thank you. Mary Glez R.N.

## 2023-02-14 ENCOUNTER — OFFICE VISIT (OUTPATIENT)
Dept: ORTHOPEDICS | Facility: CLINIC | Age: 43
End: 2023-02-14
Payer: COMMERCIAL

## 2023-02-14 ENCOUNTER — ANCILLARY PROCEDURE (OUTPATIENT)
Dept: GENERAL RADIOLOGY | Facility: CLINIC | Age: 43
End: 2023-02-14
Attending: ORTHOPAEDIC SURGERY
Payer: COMMERCIAL

## 2023-02-14 DIAGNOSIS — Z98.890 S/P SPINAL SURGERY: Primary | ICD-10-CM

## 2023-02-14 DIAGNOSIS — Z98.1 S/P CERVICAL SPINAL FUSION: ICD-10-CM

## 2023-02-14 DIAGNOSIS — Z98.890 S/P SPINAL SURGERY: ICD-10-CM

## 2023-02-14 PROCEDURE — 72040 X-RAY EXAM NECK SPINE 2-3 VW: CPT | Mod: GC | Performed by: RADIOLOGY

## 2023-02-14 PROCEDURE — 99024 POSTOP FOLLOW-UP VISIT: CPT | Mod: GC | Performed by: ORTHOPAEDIC SURGERY

## 2023-02-14 NOTE — LETTER
[facility] [address]  4TH FLOOR  [facility] [address] [phone]      2/14/2023    Re: Christine Hu      TO WHOM IT MAY CONCERN:    Christine Hu was seen on 2/14/23 to assess her progress from cervical spine surgeries on 11/26/22 and 11/29/22.  Please excuse her from all work and all work related duties until 3/17/23 due to her ongoing recovery from surgeries to her cervical spine.  Christine will be re-assessed via telephone visit on 3/13/23 to update her progress and potentially clear her for return to work on 3/20/23.            Cordially,      Electronically signed by  Wilbur Murray MD

## 2023-02-14 NOTE — LETTER
2023         RE: Christine Hu  [address]  [place] [address]        Dear Colleague,    Thank you for referring your patient, Christine Hu, to the [facility] [place]. Please see a copy of my visit note below.    Spine Surgery Return Clinic Visit      Chief Complaint:   RECHECK (Patient returns to clinic for 4-6w follow-up since last visit.  22 & 22 C4-6 ACDF and C4-5 posterior foraminotomy.  )      Interval HPI:  Symptom Profile Including: location of symptoms, onset, severity, exacerbating/alleviating factors, previous treatments:        Christine Hu is a 42 year old female 12 weeks s/p right posterior cervical 4-5 foraminotomy who presents for follow-up. LAst seen in our clinic on 22 at which time she reported some progress with shoulder strength and range of motion, but persistent pain.             Past Medical History:     Past Medical History:   Diagnosis Date     Acute alcoholic intoxication (H) 2011    Seen in emergency room 2020 after 14 months sobriety     Alcohol dependence (H) 2012     Alcohol withdrawal (H) 2011     ASCUS favor benign 2015    Neg HPV     Degeneration of cervical intervertebral disc      Knee effusion, left      Lumbago      Ménière's disease      Overdose 2009    TRAZADONE            Past Surgical History:     Past Surgical History:   Procedure Laterality Date     ARTHROSCOPIC RECONSTRUCTION ANTERIOR CRUCIATE LIGAMENT HAMSTRING AUTOGRAFT Left 2023    Procedure: left knee examination under anesthesia, knee arthroscopy, anterior cruciate ligament reconstruction hamstring autograft;  Surgeon: Mc Peterson MD;  Location: [facility] OR     C/SECTION, LOW TRANSVERSE  2005    , Low Transverse     COLONOSCOPY      5 yrs     DECOMPRESSION, FUSION CERVICAL ANTERIOR TWO LEVELS, COMBINED N/A 2022    Procedure: Cervical 4 to 6 Anterior Cervical Decompression  and Fusion with Medtronic Plate and Screws, Interbody Device, Use of Fluoroscopy, Microscope;  Surgeon: Wilbur Murray MD;  Location: [facility] OR     GRAFT BONE FROM ILIAC CREST Left 11/26/2022    Procedure: Left Sided Iliac Crest Autograft;  Surgeon: Wilbur Murray MD;  Location: [facility] OR     LAMINECTOMY CERIVCAL POSTERIOR THREE+ LEVELS N/A 11/29/2022    Procedure: Right cervical 4-5 posterior foraminotomy;  Surgeon: Wilbur Murray MD;  Location: [facility] OR     MASTOIDECTOMY       TONSILLECTOMY              Social History:     Social History     Tobacco Use     Smoking status: Every Day     Packs/day: 0.25     Types: Cigarettes     Smokeless tobacco: Never     Tobacco comments:     Reduced to 6 per day started 10/25/22   Substance Use Topics     Alcohol use: Not Currently            Family History:     Family History   Problem Relation Age of Onset     Allergies Mother      Psychotic Disorder Father         depression     Heart Failure Father         heart attack in NOV. 2015?     Allergies Sister      Allergies Sister      Psychotic Disorder Sister         anxiety     Psychotic Disorder Sister         depression     Psychotic Disorder Maternal Uncle         anxiety     Asthma Other      C.A.D. No family hx of      Diabetes No family hx of      Hypertension No family hx of      Cerebrovascular Disease No family hx of      Breast Cancer No family hx of      Cancer - colorectal No family hx of      Prostate Cancer No family hx of      Anesthesia Reaction No family hx of             Allergies:     Allergies   Allergen Reactions     Codeine GI Disturbance     Nausea with use of Tylenol #3     Clindamycin Other (See Comments)     C diff, hospitalized      Penicillins Rash            Medications:     Current Outpatient Medications   Medication     aspirin (ASA) 81 MG EC tablet     gabapentin (NEURONTIN) 300 MG capsule     HYDROcodone-acetaminophen (NORCO)  MG per tablet      HYDROcodone-acetaminophen (NORCO)  MG per tablet     hydrOXYzine (ATARAX) 25 MG tablet     hydrOXYzine (ATARAX) 25 MG tablet     methocarbamol (ROBAXIN) 500 MG tablet     naloxone (NARCAN) 4 MG/0.1ML nasal spray     omeprazole (PRILOSEC OTC) 20 MG EC tablet     polyethylene glycol (MIRALAX) 17 g packet     senna-docusate (SENOKOT-S/PERICOLACE) 8.6-50 MG tablet     senna-docusate (SENOKOT-S/PERICOLACE) 8.6-50 MG tablet     traZODone (DESYREL) 50 MG tablet     ondansetron (ZOFRAN ODT) 4 MG ODT tab     Current Facility-Administered Medications   Medication     lidocaine (PF) (XYLOCAINE) 1 % injection 5 mL     triamcinolone (KENALOG-40) injection 40 mg             Review of Systems:   A focused musculoskeletal and neurologic ROS was performed with pertinent positives and negatives noted in the HPI.  Additional systems were also reviewed and are documented at the bottom of the note.         Physical Exam:   Vitals: There were no vitals taken for this visit.  Musculoskeletal, Neurologic, and Spine:   Cervical spine:    Appearance -no gross step-offs, kyphosis.    Motor -     C5: Deltoids R 5/5 and L 5/5 strength    C6: Biceps R 4/5 and L 5/5 strength     C7: Triceps R 5/5 and L 5/5 strength     C8:  R 5/5 and L 5/5 strength     T1: Dorsal interossei R 4/5 and L 4/5 strength      Sensation: intact to light touch in C5-T1             Imaging:   We ordered and independently reviewed new radiographs at this clinic visit. The results were discussed with the patient. Findings include:       No changes in hardware position. No evidence of hardware loosening.        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      She is making substantial improvements, and I think this will continue significantly improved.  The most important thing for her is to continue with rehab.  She has good strength in the arm actually but feels limited by spasms still and  I think this can be rehabbed as she continues to work on it will get better.  Follow-up in 3 months with repeat cervical radiographs.       Sanket Fink  MS3    Rogerio Boudreaux,   PGY-1  Orthopaedic Surgery        Wilbur Murray MD

## 2023-02-14 NOTE — PROGRESS NOTES
Spine Surgery Return Clinic Visit      Chief Complaint:   RECHECK (Patient returns to clinic for 4-6w follow-up since last visit.  22 & 22 C4-6 ACDF and C4-5 posterior foraminotomy.  )      Interval HPI:  Symptom Profile Including: location of symptoms, onset, severity, exacerbating/alleviating factors, previous treatments:        Christine Hu is a 42 year old female 12 weeks s/p right posterior cervical 4-5 foraminotomy who presents for follow-up. LAst seen in our clinic on 22 at which time she reported some progress with shoulder strength and range of motion, but persistent pain.             Past Medical History:     Past Medical History:   Diagnosis Date     Acute alcoholic intoxication (H) 2011    Seen in emergency room 2020 after 14 months sobriety     Alcohol dependence (H) 2012     Alcohol withdrawal (H) 2011     ASCUS favor benign 2015    Neg HPV     Degeneration of cervical intervertebral disc      Knee effusion, left      Lumbago      Ménière's disease      Overdose 2009    TRAZADONE            Past Surgical History:     Past Surgical History:   Procedure Laterality Date     ARTHROSCOPIC RECONSTRUCTION ANTERIOR CRUCIATE LIGAMENT HAMSTRING AUTOGRAFT Left 2023    Procedure: left knee examination under anesthesia, knee arthroscopy, anterior cruciate ligament reconstruction hamstring autograft;  Surgeon: Mc Peterson MD;  Location: [facility] OR     C/SECTION, LOW TRANSVERSE  2005    , Low Transverse     COLONOSCOPY      5 yrs     DECOMPRESSION, FUSION CERVICAL ANTERIOR TWO LEVELS, COMBINED N/A 2022    Procedure: Cervical 4 to 6 Anterior Cervical Decompression and Fusion with Medtronic Plate and Screws, Interbody Device, Use of Fluoroscopy, Microscope;  Surgeon: Wilbur Murray MD;  [facility]     GRAFT BONE FROM ILIAC CREST Left 2022    Procedure: Left Sided Iliac Crest Autograft;  Surgeon: Trevor  Wilbur Crane MD;  [facility]     LAMINECTOMY CERIVCAL POSTERIOR THREE+ LEVELS N/A 11/29/2022    Procedure: Right cervical 4-5 posterior foraminotomy;  Surgeon: Wilbur Murray MD;  [facility]     MASTOIDECTOMY       TONSILLECTOMY              Social History:     Social History     Tobacco Use     Smoking status: Every Day     Packs/day: 0.25     Types: Cigarettes     Smokeless tobacco: Never     Tobacco comments:     Reduced to 6 per day started 10/25/22   Substance Use Topics     Alcohol use: Not Currently            Family History:     Family History   Problem Relation Age of Onset     Allergies Mother      Psychotic Disorder Father         depression     Heart Failure Father         heart attack in NOV. 2015?     Allergies Sister      Allergies Sister      Psychotic Disorder Sister         anxiety     Psychotic Disorder Sister         depression     Psychotic Disorder Maternal Uncle         anxiety     Asthma Other      C.A.D. No family hx of      Diabetes No family hx of      Hypertension No family hx of      Cerebrovascular Disease No family hx of      Breast Cancer No family hx of      Cancer - colorectal No family hx of      Prostate Cancer No family hx of      Anesthesia Reaction No family hx of             Allergies:     Allergies   Allergen Reactions     Codeine GI Disturbance     Nausea with use of Tylenol #3     Clindamycin Other (See Comments)     C diff, hospitalized      Penicillins Rash            Medications:     Current Outpatient Medications   Medication     aspirin (ASA) 81 MG EC tablet     gabapentin (NEURONTIN) 300 MG capsule     HYDROcodone-acetaminophen (NORCO)  MG per tablet     HYDROcodone-acetaminophen (NORCO)  MG per tablet     hydrOXYzine (ATARAX) 25 MG tablet     hydrOXYzine (ATARAX) 25 MG tablet     methocarbamol (ROBAXIN) 500 MG tablet     naloxone (NARCAN) 4 MG/0.1ML nasal spray     omeprazole (PRILOSEC OTC) 20 MG EC tablet     polyethylene  glycol (MIRALAX) 17 g packet     senna-docusate (SENOKOT-S/PERICOLACE) 8.6-50 MG tablet     senna-docusate (SENOKOT-S/PERICOLACE) 8.6-50 MG tablet     traZODone (DESYREL) 50 MG tablet     ondansetron (ZOFRAN ODT) 4 MG ODT tab     Current Facility-Administered Medications   Medication     lidocaine (PF) (XYLOCAINE) 1 % injection 5 mL     triamcinolone (KENALOG-40) injection 40 mg             Review of Systems:   A focused musculoskeletal and neurologic ROS was performed with pertinent positives and negatives noted in the HPI.  Additional systems were also reviewed and are documented at the bottom of the note.         Physical Exam:   Vitals: There were no vitals taken for this visit.  Musculoskeletal, Neurologic, and Spine:   Cervical spine:    Appearance -no gross step-offs, kyphosis.    Motor -     C5: Deltoids R 5/5 and L 5/5 strength    C6: Biceps R 4/5 and L 5/5 strength     C7: Triceps R 5/5 and L 5/5 strength     C8:  R 5/5 and L 5/5 strength     T1: Dorsal interossei R 4/5 and L 4/5 strength      Sensation: intact to light touch in C5-T1             Imaging:   We ordered and independently reviewed new radiographs at this clinic visit. The results were discussed with the patient. Findings include:       No changes in hardware position. No evidence of hardware loosening.        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      She is making substantial improvements, and I think this will continue significantly improved.  The most important thing for her is to continue with rehab.  She has good strength in the arm actually but feels limited by spasms still and I think this can be rehabbed as she continues to work on it will get better.  Follow-up in 3 months with repeat cervical radiographs.       Sanket Fink  MS3    Rogerio Boudreaux, DO  PGY-1  Orthopaedic Surgery

## 2023-02-14 NOTE — LETTER
[facility] [address]  4TH FLOOR  [facility] [id number] [phone]      2/14/2023    Re: Christine Hu      TO WHOM IT MAY CONCERN:    Christine Hu was seen in my clinic on 2/14/23 to assess her progress from cervical spine surgeries on 11/26/22 and 11/29/22.  Please excuse her from all work and all work related duties until 3/17/23 due to her ongoing recovery from surgeries to her cervical spine.  Christine will be re-assessed via telephone visit on 3/13/23 to update her progress and potentially clear her for return to work on 3/20/23.                  Cordially    Electronically signed by  Wilbur Murray MD

## 2023-02-14 NOTE — NURSING NOTE
Reason For Visit:   Chief Complaint   Patient presents with     RECHECK     Patient returns to clinic for 4-6w follow-up since last visit.  11/26/22 & 11/29/22 C4-6 ACDF and C4-5 posterior foraminotomy.         Primary MD: Angi Marroquin  Ref. MD: Est    Date of surgery: 11/26/22, 11/29/22  Type of surgery: C4-6 ACDF, C4-5 foraminotomy      There were no vitals taken for this visit.    Pain Assessment  Patient Currently in Pain: Yes  0-10 Pain Scale: 2    Oswestry (EVA) Questionnaire    OSWESTRY DISABILITY INDEX 12/7/2022   Count 10   Sum 38   Oswestry Score (%) 76   Some recent data might be hidden            Neck Disability Index (NDI) Questionnaire    No flowsheet data found.                Promis 10 Assessment    No flowsheet data found.             Rigo Curiel, ATC

## 2023-02-16 ENCOUNTER — THERAPY VISIT (OUTPATIENT)
Dept: PHYSICAL THERAPY | Facility: CLINIC | Age: 43
End: 2023-02-16
Payer: COMMERCIAL

## 2023-02-16 DIAGNOSIS — R60.0 LOCALIZED EDEMA: ICD-10-CM

## 2023-02-16 DIAGNOSIS — Z98.890 S/P ACL REPAIR: ICD-10-CM

## 2023-02-16 DIAGNOSIS — R26.81 UNSTEADY GAIT: ICD-10-CM

## 2023-02-16 DIAGNOSIS — M25.562 ACUTE PAIN OF LEFT KNEE: ICD-10-CM

## 2023-02-16 PROCEDURE — 97110 THERAPEUTIC EXERCISES: CPT | Mod: GP | Performed by: PHYSICAL THERAPIST

## 2023-02-16 PROCEDURE — 97161 PT EVAL LOW COMPLEX 20 MIN: CPT | Mod: GP | Performed by: PHYSICAL THERAPIST

## 2023-02-16 ASSESSMENT — ACTIVITIES OF DAILY LIVING (ADL)
WALK: ACTIVITY IS FAIRLY DIFFICULT
KNEE_ACTIVITY_OF_DAILY_LIVING_SUM: 18
STIFFNESS: THE SYMPTOM AFFECTS MY ACTIVITY SEVERELY
RAW_SCORE: 18
GO UP STAIRS: ACTIVITY IS VERY DIFFICULT
SQUAT: I AM UNABLE TO DO THE ACTIVITY
WEAKNESS: THE SYMPTOM AFFECTS MY ACTIVITY SEVERELY
KNEEL ON THE FRONT OF YOUR KNEE: I AM UNABLE TO DO THE ACTIVITY
GIVING WAY, BUCKLING OR SHIFTING OF KNEE: THE SYMPTOM AFFECTS MY ACTIVITY MODERATELY
SWELLING: THE SYMPTOM AFFECTS MY ACTIVITY MODERATELY
SIT WITH YOUR KNEE BENT: I AM UNABLE TO DO THE ACTIVITY
LIMPING: THE SYMPTOM AFFECTS MY ACTIVITY SEVERELY
RISE FROM A CHAIR: ACTIVITY IS SOMEWHAT DIFFICULT
STAND: ACTIVITY IS SOMEWHAT DIFFICULT
PAIN: THE SYMPTOM AFFECTS MY ACTIVITY SEVERELY
KNEE_ACTIVITY_OF_DAILY_LIVING_SCORE: 25.71
GO DOWN STAIRS: ACTIVITY IS VERY DIFFICULT

## 2023-02-16 NOTE — PROGRESS NOTES
Physical Therapy Initial Evaluation  Subjective:  The history is provided by the patient.   Therapist Generated HPI Evaluation  Problem details: DOS 2023.  Initially started about 4 years ago when she was run into while skiing.  Nerve block still present in lower leg. .         Type of problem:  Left knee.    This is a new condition.  Condition occurred with:  Contact with another person.  Where condition occurred: during recreation/sport.  Patient reports pain:  In the joint.  Pain is described as aching and sharp (throbbing) and is constant.  Pain radiates to:  Lower leg, ankle and foot. Pain is the same all the time.  Since onset symptoms are gradually improving.  Associated symptoms:  Loss of motion/stiffness and loss of strength. Symptoms are exacerbated by walking, ascending stairs and descending stairs  and relieved by ice and bracing/immobilizing (MD wnats her out of brace by 6 weeks).    Previous treatment includes surgery.   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      Patient Health History  Christine Hu being seen for L knee repair.     Problem began: 2023 (original injury years ago).   Problem occurred: skiing   Pain is reported as 8/10 on pain scale.  General health as reported by patient is good.  Pertinent medical history includes: numbness/tingling and smoking.   Red flags:  Calf pain-swelling-warmth and pain at rest/night.     Surgeries include:  Orthopedic surgery and other. Other surgery history details: 2 cervical and L ACL;  tonsils;  .    Current medications:  Anti-inflammatory, muscle relaxants, sleep medication and pain medication. Other medications details: gabapentin.    Current occupation is [employer]  center.   Primary job tasks include:  Computer work, lifting/carrying, prolonged standing, pushing/pulling and repetitive tasks.                                     Objective:  Standing Alignment:              Knee deviations alignment: no signs of infection at surgical incision sites.      Gait:  Arrives in extension brace.  Minimal knee flex or ext without brace                                                          Knee Evaluation:  ROM:  Strength:  Not assessed  AROM      Extension:  Left: 4    Right:  0  Flexion: Left: 64    Right: 139        Strength:         Quad Set Left: Poor    Pain:   Quad Set Right: Good    Pain:  Ligament Testing:  Not Assessed                Special Tests: Not Assessed      Palpation:      Right knee tenderness present at:  Medial Joint Line; Lateral Joint Line; Patellar Tendon and Incisional  Edema:  Edema of the knee: ankle R23/ L26 - pt reports a chronic ankle issue which may affect ankle swelling.  Circumference:      Joint Line:  Left:  41   Right:  41            General     ROS    Assessment/Plan:    Patient is a 42 year old female with left side knee complaints.    Patient has the following significant findings with corresponding treatment plan.                Diagnosis 1:  L ACL repair  Pain -  hot/cold therapy, manual therapy, self management, education, directional preference exercise and home program  Decreased ROM/flexibility - manual therapy and therapeutic exercise  Decreased joint mobility - manual therapy and therapeutic exercise  Decreased strength - therapeutic exercise and therapeutic activities  Edema - vasopneumatics and cold therapy  Impaired gait - gait training  Impaired muscle performance - neuro re-education  Decreased function - therapeutic activities    Therapy Evaluation Codes:   1) History comprised of:   Personal factors that impact the plan of care:      None.    Comorbidity factors that impact the plan of care are:      Numbness/tingling and Smoking.     Medications impacting care: Anti-inflammatory, Muscle relaxant, Pain and Sleep.  2) Examination of Body Systems comprised of:   Body structures and functions  that impact the plan of care:      Knee.   Activity limitations that impact the plan of care are:      Stairs and Walking.  3) Clinical presentation characteristics are:   Stable/Uncomplicated.  4) Decision-Making    Low complexity using standardized patient assessment instrument and/or measureable assessment of functional outcome.  Cumulative Therapy Evaluation is: Low complexity.    Previous and current functional limitations:  (See Goal Flow Sheet for this information)    Short term and Long term goals: (See Goal Flow Sheet for this information)     Communication ability:  Patient appears to be able to clearly communicate and understand verbal and written communication and follow directions correctly.  Treatment Explanation - The following has been discussed with the patient:   RX ordered/plan of care  Anticipated outcomes  Possible risks and side effects  This patient would benefit from PT intervention to resume normal activities.   Rehab potential is good.    Frequency:  1 X week, once daily  Duration:  for 12 weeks  Discharge Plan:  Achieve all LTG.  Independent in home treatment program.  Reach maximal therapeutic benefit.    Please refer to the daily flowsheet for treatment today, total treatment time and time spent performing 1:1 timed codes.

## 2023-02-27 ENCOUNTER — THERAPY VISIT (OUTPATIENT)
Dept: PHYSICAL THERAPY | Facility: CLINIC | Age: 43
End: 2023-02-27
Payer: COMMERCIAL

## 2023-02-27 ENCOUNTER — MYC MEDICAL ADVICE (OUTPATIENT)
Dept: ORTHOPEDICS | Facility: CLINIC | Age: 43
End: 2023-02-27

## 2023-02-27 DIAGNOSIS — Z98.890 S/P ACL REPAIR: ICD-10-CM

## 2023-02-27 DIAGNOSIS — R26.81 UNSTEADY GAIT: ICD-10-CM

## 2023-02-27 DIAGNOSIS — M25.562 CHRONIC PAIN OF LEFT KNEE: Primary | ICD-10-CM

## 2023-02-27 DIAGNOSIS — G89.29 CHRONIC PAIN OF LEFT KNEE: Primary | ICD-10-CM

## 2023-02-27 DIAGNOSIS — R60.0 LOCALIZED EDEMA: ICD-10-CM

## 2023-02-27 DIAGNOSIS — M25.562 ACUTE PAIN OF LEFT KNEE: Primary | ICD-10-CM

## 2023-02-27 PROCEDURE — 97110 THERAPEUTIC EXERCISES: CPT | Mod: GP | Performed by: PHYSICAL THERAPIST

## 2023-02-27 RX ORDER — HYDROCODONE BITARTRATE AND ACETAMINOPHEN 10; 325 MG/1; MG/1
1 TABLET ORAL EVERY 4 HOURS PRN
Qty: 10 TABLET | Refills: 0 | Status: SHIPPED | OUTPATIENT
Start: 2023-02-27 | End: 2023-02-28

## 2023-02-28 ENCOUNTER — OFFICE VISIT (OUTPATIENT)
Dept: PALLIATIVE MEDICINE | Facility: CLINIC | Age: 43
End: 2023-02-28
Attending: PHYSICIAN ASSISTANT
Payer: COMMERCIAL

## 2023-02-28 ENCOUNTER — HOSPITAL ENCOUNTER (EMERGENCY)
Facility: CLINIC | Age: 43
Discharge: HOME | End: 2023-02-28
Attending: EMERGENCY MEDICINE | Admitting: EMERGENCY MEDICINE
Payer: COMMERCIAL

## 2023-02-28 ENCOUNTER — APPOINTMENT (OUTPATIENT)
Dept: ULTRASOUND IMAGING | Facility: CLINIC | Age: 43
End: 2023-02-28
Attending: EMERGENCY MEDICINE
Payer: COMMERCIAL

## 2023-02-28 ENCOUNTER — APPOINTMENT (OUTPATIENT)
Dept: GENERAL RADIOLOGY | Facility: CLINIC | Age: 43
End: 2023-02-28
Attending: EMERGENCY MEDICINE
Payer: COMMERCIAL

## 2023-02-28 VITALS — DIASTOLIC BLOOD PRESSURE: 83 MMHG | HEART RATE: 100 BPM | TEMPERATURE: 98.3 F | SYSTOLIC BLOOD PRESSURE: 127 MMHG

## 2023-02-28 VITALS
DIASTOLIC BLOOD PRESSURE: 90 MMHG | OXYGEN SATURATION: 100 % | TEMPERATURE: 98.2 F | HEART RATE: 110 BPM | HEIGHT: 67 IN | WEIGHT: 168 LBS | BODY MASS INDEX: 26.37 KG/M2 | RESPIRATION RATE: 18 BRPM | SYSTOLIC BLOOD PRESSURE: 148 MMHG

## 2023-02-28 DIAGNOSIS — G89.29 CHRONIC IDIOPATHIC PAIN SYNDROME: ICD-10-CM

## 2023-02-28 DIAGNOSIS — G89.29 CHRONIC PAIN OF LEFT KNEE: ICD-10-CM

## 2023-02-28 DIAGNOSIS — Z98.890 S/P LEFT KNEE SURGERY: ICD-10-CM

## 2023-02-28 DIAGNOSIS — M25.562 CHRONIC PAIN OF LEFT KNEE: ICD-10-CM

## 2023-02-28 DIAGNOSIS — S83.512A RUPTURE OF ANTERIOR CRUCIATE LIGAMENT OF LEFT KNEE, INITIAL ENCOUNTER: ICD-10-CM

## 2023-02-28 DIAGNOSIS — L03.116 CELLULITIS OF LEFT LOWER EXTREMITY: ICD-10-CM

## 2023-02-28 DIAGNOSIS — M25.562 ACUTE PAIN OF LEFT KNEE: Primary | ICD-10-CM

## 2023-02-28 LAB
ALBUMIN SERPL BCG-MCNC: 4.8 G/DL (ref 3.5–5.2)
ALP SERPL-CCNC: 73 U/L (ref 35–104)
ALT SERPL W P-5'-P-CCNC: 14 U/L (ref 10–35)
ANION GAP SERPL CALCULATED.3IONS-SCNC: 13 MMOL/L (ref 7–15)
AST SERPL W P-5'-P-CCNC: 21 U/L (ref 10–35)
BASOPHILS # BLD AUTO: 0 10E3/UL (ref 0–0.2)
BASOPHILS NFR BLD AUTO: 0 %
BILIRUB SERPL-MCNC: 0.2 MG/DL
BUN SERPL-MCNC: 12.2 MG/DL (ref 6–20)
CALCIUM SERPL-MCNC: 9.7 MG/DL (ref 8.6–10)
CHLORIDE SERPL-SCNC: 105 MMOL/L (ref 98–107)
CREAT SERPL-MCNC: 0.57 MG/DL (ref 0.51–0.95)
CRP SERPL-MCNC: <3 MG/L
DEPRECATED HCO3 PLAS-SCNC: 22 MMOL/L (ref 22–29)
EOSINOPHIL # BLD AUTO: 0.2 10E3/UL (ref 0–0.7)
EOSINOPHIL NFR BLD AUTO: 2 %
ERYTHROCYTE [DISTWIDTH] IN BLOOD BY AUTOMATED COUNT: 13.2 % (ref 10–15)
ERYTHROCYTE [SEDIMENTATION RATE] IN BLOOD BY WESTERGREN METHOD: 14 MM/HR (ref 0–20)
GFR SERPL CREATININE-BSD FRML MDRD: >90 ML/MIN/1.73M2
GLUCOSE SERPL-MCNC: 91 MG/DL (ref 70–99)
HCT VFR BLD AUTO: 41.2 % (ref 35–47)
HGB BLD-MCNC: 13.3 G/DL (ref 11.7–15.7)
IMM GRANULOCYTES # BLD: 0.1 10E3/UL
IMM GRANULOCYTES NFR BLD: 1 %
LACTATE SERPL-SCNC: 0.7 MMOL/L (ref 0.7–2)
LYMPHOCYTES # BLD AUTO: 2.2 10E3/UL (ref 0.8–5.3)
LYMPHOCYTES NFR BLD AUTO: 21 %
MCH RBC QN AUTO: 31.7 PG (ref 26.5–33)
MCHC RBC AUTO-ENTMCNC: 32.3 G/DL (ref 31.5–36.5)
MCV RBC AUTO: 98 FL (ref 78–100)
MONOCYTES # BLD AUTO: 0.9 10E3/UL (ref 0–1.3)
MONOCYTES NFR BLD AUTO: 9 %
NEUTROPHILS # BLD AUTO: 7.4 10E3/UL (ref 1.6–8.3)
NEUTROPHILS NFR BLD AUTO: 67 %
NRBC # BLD AUTO: 0 10E3/UL
NRBC BLD AUTO-RTO: 0 /100
PLATELET # BLD AUTO: 280 10E3/UL (ref 150–450)
POTASSIUM SERPL-SCNC: 4.2 MMOL/L (ref 3.4–5.3)
PROT SERPL-MCNC: 7.3 G/DL (ref 6.4–8.3)
RBC # BLD AUTO: 4.2 10E6/UL (ref 3.8–5.2)
SODIUM SERPL-SCNC: 140 MMOL/L (ref 136–145)
WBC # BLD AUTO: 10.8 10E3/UL (ref 4–11)

## 2023-02-28 PROCEDURE — 85652 RBC SED RATE AUTOMATED: CPT | Performed by: EMERGENCY MEDICINE

## 2023-02-28 PROCEDURE — 73562 X-RAY EXAM OF KNEE 3: CPT | Mod: 26 | Performed by: RADIOLOGY

## 2023-02-28 PROCEDURE — 87040 BLOOD CULTURE FOR BACTERIA: CPT | Performed by: EMERGENCY MEDICINE

## 2023-02-28 PROCEDURE — 250N000013 HC RX MED GY IP 250 OP 250 PS 637: Performed by: EMERGENCY MEDICINE

## 2023-02-28 PROCEDURE — 99205 OFFICE O/P NEW HI 60 MIN: CPT | Performed by: NURSE PRACTITIONER

## 2023-02-28 PROCEDURE — 85025 COMPLETE CBC W/AUTO DIFF WBC: CPT | Performed by: EMERGENCY MEDICINE

## 2023-02-28 PROCEDURE — 93970 EXTREMITY STUDY: CPT

## 2023-02-28 PROCEDURE — 80053 COMPREHEN METABOLIC PANEL: CPT | Performed by: EMERGENCY MEDICINE

## 2023-02-28 PROCEDURE — 73562 X-RAY EXAM OF KNEE 3: CPT | Mod: LT

## 2023-02-28 PROCEDURE — 83605 ASSAY OF LACTIC ACID: CPT | Performed by: EMERGENCY MEDICINE

## 2023-02-28 PROCEDURE — 86140 C-REACTIVE PROTEIN: CPT | Performed by: EMERGENCY MEDICINE

## 2023-02-28 PROCEDURE — 93970 EXTREMITY STUDY: CPT | Mod: 26 | Performed by: RADIOLOGY

## 2023-02-28 PROCEDURE — 36415 COLL VENOUS BLD VENIPUNCTURE: CPT | Performed by: EMERGENCY MEDICINE

## 2023-02-28 PROCEDURE — 99285 EMERGENCY DEPT VISIT HI MDM: CPT | Mod: 25 | Performed by: EMERGENCY MEDICINE

## 2023-02-28 PROCEDURE — 99284 EMERGENCY DEPT VISIT MOD MDM: CPT | Performed by: EMERGENCY MEDICINE

## 2023-02-28 RX ORDER — HYDROCODONE BITARTRATE AND ACETAMINOPHEN 10; 325 MG/1; MG/1
1 TABLET ORAL EVERY 4 HOURS PRN
Qty: 10 TABLET | Refills: 0 | Status: SHIPPED | OUTPATIENT
Start: 2023-02-28 | End: 2023-04-10

## 2023-02-28 RX ORDER — HYDROCODONE BITARTRATE AND ACETAMINOPHEN 10; 325 MG/1; MG/1
1 TABLET ORAL ONCE
Status: COMPLETED | OUTPATIENT
Start: 2023-02-28 | End: 2023-02-28

## 2023-02-28 RX ORDER — CEPHALEXIN 500 MG/1
500 CAPSULE ORAL ONCE
Status: COMPLETED | OUTPATIENT
Start: 2023-02-28 | End: 2023-02-28

## 2023-02-28 RX ORDER — CEPHALEXIN 500 MG/1
500 CAPSULE ORAL 4 TIMES DAILY
Qty: 20 CAPSULE | Refills: 0 | Status: SHIPPED | OUTPATIENT
Start: 2023-02-28 | End: 2023-03-03

## 2023-02-28 RX ADMIN — CEPHALEXIN 500 MG: 500 CAPSULE ORAL at 21:21

## 2023-02-28 RX ADMIN — HYDROCODONE BITARTRATE AND ACETAMINOPHEN 1 TABLET: 10; 325 TABLET ORAL at 21:37

## 2023-02-28 ASSESSMENT — ACTIVITIES OF DAILY LIVING (ADL)
ADLS_ACUITY_SCORE: 35
ADLS_ACUITY_SCORE: 35

## 2023-02-28 ASSESSMENT — PAIN SCALES - GENERAL: PAINLEVEL: WORST PAIN (10)
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
## 2023-02-28 NOTE — ED TRIAGE NOTES
Pt presents with left leg pain and swelling.  Pt had ACL surgery 01/27/2023.  Pt states she was improving but has been experiencing more discomfort x 10 days.  Pt referred from pain clinic.       Triage Assessment     Row Name 02/28/23 [id number]       Triage Assessment (Adult)    Airway WDL WDL       Respiratory WDL    Respiratory WDL WDL       Skin Circulation/Temperature WDL    Skin Circulation/Temperature WDL WDL       Cardiac WDL    Cardiac WDL WDL       Peripheral/Neurovascular WDL    Peripheral Neurovascular WDL WDL       Cognitive/Neuro/Behavioral WDL    Cognitive/Neuro/Behavioral WDL WDL

## 2023-02-28 NOTE — PATIENT INSTRUCTIONS
----------------------------------------------------------------  [facility] Number:  [phone]   Call with any questions about your care and for scheduling assistance.   Calls are returned Monday through Friday between 8 AM and 4:30 PM. We usually get back to you within 2 business days depending on the issue/request.    If we are prescribing your medications:  For opioid medication refills, call the clinic or send a BiOM message 7 days in advance.  Please include:  Name of requested medication  Name of the pharmacy.  For non-opioid medications, call your pharmacy directly to request a refill. Please allow 3-4 days to be processed.   Per [place] State Law:  All controlled substance prescriptions must be filled within 30 days of being written.    For those controlled substances allowing refills, pickup must occur within 30 days of last fill.      We believe regular attendance is key to your success in our program!    Any time you are unable to keep your appointment we ask that you call us at least 24 hours in advance to cancel.This will allow us to offer the appointment time to another patient.   Multiple missed appointments may lead to dismissal from the clinic.

## 2023-03-01 NOTE — DISCHARGE INSTRUCTIONS
I spoke with orthopedic surgery.  They state they do not need to see you in the emergency department today however they will call their  to set up a follow-up appointment with you this week for reevaluation of your left knee.    Keep the knee clean and dry.  You can use a marker to trace the area of redness around the knee.  If the redness continues to increase past the area of the marker, you should be reevaluated.    Take the antibiotics as prescribed.  Return to the emergency department for any new or worsening symptoms.

## 2023-03-01 NOTE — ED PROVIDER NOTES
ED Provider Note  [facility]      History     Chief Complaint   Patient presents with     Leg Pain     HPI  Christine L Shruti Hu is a 42 year old female with past medical history of depression, anxiety, myalgias, cervical radiculopathy who presents to the emergency department for chief complaint of left leg pain.  She had arthroscopic reconstruction of the ACL hamstring autograft on 1/27/2023 by Dr. Peterson.  She was doing well the first 2 and half to 3 weeks before she began to feel increasing pain around her left knee, swelling and redness.  The symptoms have been worsening since then and she is having more discomfort with walking.  She was seen at the pain clinic for her cervical radiculopathy earlier today and they told her to come to the emergency department for evaluation of infection.    She is able to slightly flex her left knee since the surgery which is unchanged today.  She does not have severe pain with slight flexion.  She denies history of blood clot.  She has not had any fevers.  She does admit to chills.  She denies chest pain or shortness of breath.  She takes gabapentin, Advil, Robaxin and Norco for pain.  Last Norco was 2:15 PM.  Last Advil was 10:30 AM.    Past Medical History  Past Medical History:   Diagnosis Date     Acute alcoholic intoxication (H) 5/28/2011    Seen in emergency room 2020 after 14 months sobriety     Alcohol dependence (H) 11/30/2012     Alcohol withdrawal (H) 06/29/2011     ASCUS favor benign 01/2015    Neg HPV     Degeneration of cervical intervertebral disc      Knee effusion, left      Lumbago      Ménière's disease      Overdose 06/2009    TRAZADONE     Past Surgical History:   Procedure Laterality Date     ARTHROSCOPIC RECONSTRUCTION ANTERIOR CRUCIATE LIGAMENT HAMSTRING AUTOGRAFT Left 1/27/2023    Procedure: left knee examination under anesthesia, knee arthroscopy, anterior cruciate ligament reconstruction hamstring autograft;  Surgeon:  Mc Peterson MD;  Location: [facility] OR     C/SECTION, LOW TRANSVERSE  2005    , Low Transverse     COLONOSCOPY      5 yrs     DECOMPRESSION, FUSION CERVICAL ANTERIOR TWO LEVELS, COMBINED N/A 2022    Procedure: Cervical 4 to 6 Anterior Cervical Decompression and Fusion with Medtronic Plate and Screws, Interbody Device, Use of Fluoroscopy, Microscope;  Surgeon: Wilbur Murray MD;  Location: [facility] OR     GRAFT BONE FROM ILIAC CREST Left 2022    Procedure: Left Sided Iliac Crest Autograft;  Surgeon: Wilbur Murray MD;  Location: [facility] OR     LAMINECTOMY CERIVCAL POSTERIOR THREE+ LEVELS N/A 2022    Procedure: Right cervical 4-5 posterior foraminotomy;  Surgeon: Wilbur Murray MD;  Location: [facility] OR     MASTOIDECTOMY       TONSILLECTOMY       gabapentin (NEURONTIN) 300 MG capsule  HYDROcodone-acetaminophen (NORCO)  MG per tablet  HYDROcodone-acetaminophen (NORCO)  MG per tablet  hydrOXYzine (ATARAX) 25 MG tablet  hydrOXYzine (ATARAX) 25 MG tablet  methocarbamol (ROBAXIN) 500 MG tablet  naloxone (NARCAN) 4 MG/0.1ML nasal spray  omeprazole (PRILOSEC OTC) 20 MG EC tablet  ondansetron (ZOFRAN ODT) 4 MG ODT tab  polyethylene glycol (MIRALAX) 17 g packet  senna-docusate (SENOKOT-S/PERICOLACE) 8.6-50 MG tablet  senna-docusate (SENOKOT-S/PERICOLACE) 8.6-50 MG tablet  traZODone (DESYREL) 50 MG tablet      Allergies   Allergen Reactions     Codeine GI Disturbance     Nausea with use of Tylenol #3     Clindamycin Other (See Comments)     C diff, hospitalized      Penicillins Rash     Family History  Family History   Problem Relation Age of Onset     Allergies Mother      Psychotic Disorder Father         depression     Heart Failure Father         heart attack in 2015?     Allergies Sister      Allergies Sister      Psychotic Disorder Sister         anxiety     Psychotic Disorder Sister         depression     Psychotic Disorder Maternal Uncle   "       anxiety     Asthma Other      C.A.D. No family hx of      Diabetes No family hx of      Hypertension No family hx of      Cerebrovascular Disease No family hx of      Breast Cancer No family hx of      Cancer - colorectal No family hx of      Prostate Cancer No family hx of      Anesthesia Reaction No family hx of      Social History   Social History     Tobacco Use     Smoking status: Every Day     Packs/day: 0.25     Types: Cigarettes     Smokeless tobacco: Never     Tobacco comments:     Reduced to 6 per day started 10/25/22   Vaping Use     Vaping Use: Never used   Substance Use Topics     Alcohol use: Not Currently     Drug use: No         A medically appropriate review of systems was performed with pertinent positives and negatives noted in the HPI, and all other systems negative.    Physical Exam   BP: (!) 148/90  Pulse: 110  Temp: 98.2  F (36.8  C)  Resp: 18  Height: 170.2 cm (5' 7\")  Weight: 76.2 kg (168 lb)  SpO2: 100 %  Physical Exam  General: no acute distress.  HENT: Normocephalic and atraumatic.  No oropharyngeal exudate.   Eyes: EOMI, conjunctivae normal.   Cardiovascular:  Normal rate and regular rhythm.    Pulmonary:  No respiratory distress. Normal breath sounds.   Abdominal: no distension.  Abdomen is soft. There is no mass. There is no abdominal tenderness.   Musculoskeletal:    2+ DP and PT pulses.  No calf tenderness.  Left lower extremity, erythema, warmth and tenderness surrounding the left anterior knee.  Highly tender to inferior medial and superior lateral knee.  Able to actively range the knee 10 degrees without pain.  Incisions appear well-healing without drainage or point tenderness.  Compartments are soft.  No bullae or crepitus.  Limping gait.  Skin: warm and dry  Neurological:  No focal deficit present.    Psychiatric:    normal affect        ED Course, Procedures, & Data      Procedures                      No results found for any visits on 02/28/23.  Medications - No data " to display  Labs Ordered and Resulted from Time of ED Arrival to Time of ED Departure - No data to display  US Lower Extremity Venous Duplex Bilateral    (Results Pending)   XR Knee Left 3 Views    (Results Pending)          Critical care was not performed.     Medical Decision Making  The patient's presentation was of moderate complexity (an acute complicated injury).    The patient's evaluation involved:  review of external note(s) from 3+ sources (see separate area of note for details)  ordering and/or review of 3+ test(s) in this encounter (see separate area of note for details)  review of 3+ test result(s) ordered prior to this encounter (see separate area of note for details)    The patient's management necessitated moderate risk (prescription drug management including medications given in the ED) and high risk (a parenteral controlled substance).      Assessment & Plan    Patient arrives to the emergency department for chief complaint of worsening erythema, warmth, edema, pain to the left knee after ACL surgery performed 1/27/2023 by orthopedic surgery.  On exam, patient is afebrile and in no acute distress.  On exam, she does have warmth, erythema and edema of the left knee and surrounding region compared to the right lower extremity.  She has tenderness to touch over the left medial inferior and lateral superior aspect of the knee without any deformity or incisional wound infection.  There is no bullae or crepitus or weeping, I have low suspicion for necrotizing fasciitis.  Compartments are soft.  I will evaluate for left lower extremity DVT, cellulitis.  She potentially could have a septic joint as well, however is able to bear some weight as well as perform some range of motion of her knee which has been unchanged since her initial operation.    X-ray of the left knee reveals small effusion which is stable/mildly decreased from previous imaging which was obtained at her initial postoperative visit.   Ultrasound revealed subcutaneous edema of the left knee, no evidence of DVT.  Oddly her blood work appears very well.  She has no elevation in inflammatory markers.  There is no leukocytosis or lactic acidosis.  I did discuss with orthopedic surgery fellow on-call, who agrees that there likely is no septic joint, her blood work looks very well which is for cellulitis, however agrees on antibiotics if I have clinical concern.  He will call the orthopedic  to make sure that they see her this week in follow-up.     I did provide the patient with first dose of Keflex in the emergency department as well as prescription for cellulitis.  She also states that she does currently have an orthopedic appointment scheduled for 2 days from now.  She notes that she was only given 2 days worth of [place] from her orthopedist yesterday, which will not last her until her follow-up visit.  I did provide her a short course of 10 additional Norco 10 mg.  She did receive 1 in the emergency department prior to discharge, she did have a ride home.  She understood to return to the emergency department for any fevers, increasing redness, swelling, inability to bear weight, or other concerns.  She was discharged in stable condition.    I have reviewed the nursing notes. I have reviewed the findings, diagnosis, plan and need for follow up with the patient.    New Prescriptions    No medications on file       Final diagnoses:   None       Bailee Duron DO  [facility] EMERGENCY DEPARTMENT  2/28/2023     Bailee Duron MD  02/28/23 [id number]

## 2023-03-01 NOTE — PROGRESS NOTES
Christine is a 42 year old who is being evaluated via a billable video visit.      How would you like to obtain your AVS? MyChart  If the video visit is dropped, the invitation should be resent by: Text to cell phone: [phone]  Will anyone else be joining your video visit? No        Assessment & Plan     Local infection of skin and subcutaneous tissue    - cephALEXin (KEFLEX) 500 MG capsule; Take 1 capsule (500 mg) by mouth 4 times daily    Cervical pain    - HYDROcodone-acetaminophen (NORCO)  MG per tablet; Take 1 tablet by mouth every 6 hours as needed for severe pain (7-10) (Max of 4 tabs per day) Approval for fill 2023. Keep pain doctor appointment. After one week of full tablet,  take half tablet every 6 hours.    Other chronic pain    - HYDROcodone-acetaminophen (NORCO)  MG per tablet; Take 1 tablet by mouth every 6 hours as needed for severe pain (7-10) (Max of 4 tabs per day) Approval for fill 2023. Keep pain doctor appointment. After one week of full tablet,  take half tablet every 6 hours.      Keep pain clinic appointment  We again discussed I am not okay doing long-term narcotics for her, as the pain clinic sent her to the er, this was an extenuating circumstance.  Billin min spent on patient today including chart review, history, exam, and explaining treatment plan and follow-up.     Return in about 4 days (around 3/7/2023) for if not improving.    PRAVIN Macias [facility]   Christine is a 42 year old accompanied by her Self, presenting for the following health issues:  ER F/U      HPI     ED/UC Followup:    Facility:  [facility]  Date of visit: 2023  Reason for visit: Possible infection in the knee and knee pain  Current Status: Per pt still having pain and discomfort. Antibiotic per pt feels like its helping. Still having some stabbing and burning sensation.      appt is on 4-10 no for pain clinic.   Patient went to pain clinic but was told to leave and go to er due to concern for acute knee infection after surgery. This is unfortunate because I have been trying to get complex patient to pain clinic. She has h/o alcohol abuse and I have told her I am not comfortable Rx narcotics long term. However, over the past several months she has had multiple spine surgyers and now knee surgeries and is given some pain meds by surgeon and then told to follow up with me for weaning her off. This has been frustrating for everyone. Patient has complex pain syndrome and needs a pain specialist ONLY to follow her meds.  As she did go to that appointment but was turned away, we discussed I am willing to to give her a taper to get her through to her pain clinic appt.   The er and surgeon have both given her narcotics after my tapering off ending in february.   [place] 5 mg on 3-2-23. Meant to last a day per surgeon.   10 norco on 3-1.  from sa anne   10 norco on 2-27 from je mac.  70 norco from myself on 2-14-23. But that was meant to last a month however due ot her knee pain/unexpected complications from that she had to take more. She did not ask for my approval prior to doing this.    Her overdose risk score is 610. She is aware of all the risks of these medications as she has been off and on narcotics over the past several years and we have had this discussion many times.   We discussed side effects and risks of this medication today including addiction, tolerance, increased sedation, respiratory depression, and possibly overdose if not taken as directed.   They will not mix this medication with alcohol or other sedating medications. They will not drive after taking this medication.  Patient states understanding. They verbally agreed to use their medication responsibly.     Was in er for ? Knee infection, given keflex. Her labs were unremarkable (cbc normal). Pain with flexion. She has been in contact with  surgeon about this and in fact just saw him yesterday.     Was weaning off per patient until her knee infection occurred.    She is here for another pain medication taper as she plan on tapering off barring any new surgical complications.     Review of Systems   Constitutional, HEENT, cardiovascular, pulmonary, GI, , musculoskeletal, neuro, skin, endocrine and psych systems are negative, except as otherwise noted.      Objective             Physical Exam   GENERAL: Healthy, alert and no distress  EYES: Eyes grossly normal to inspection.  No discharge or erythema, or obvious scleral/conjunctival abnormalities.  RESP: No audible wheeze, cough, or visible cyanosis.  No visible retractions or increased work of breathing.    SKIN: Visible skin clear. No significant rash, abnormal pigmentation or lesions.  NEURO: Cranial nerves grossly intact.  Mentation and speech appropriate for age.  PSYCH: Mentation appears normal, affect normal/bright, judgement and insight intact, normal speech and appearance well-groomed.        Video-Visit Details    Type of service:  Video Visit     Originating Location (pt. Location): Home  Distant Location (provider location):  On-site  Platform used for Video Visit: Latanya

## 2023-03-02 ENCOUNTER — OFFICE VISIT (OUTPATIENT)
Dept: ORTHOPEDICS | Facility: CLINIC | Age: 43
End: 2023-03-02
Payer: COMMERCIAL

## 2023-03-02 DIAGNOSIS — G89.29 CHRONIC PAIN OF LEFT KNEE: Primary | ICD-10-CM

## 2023-03-02 DIAGNOSIS — M25.562 CHRONIC PAIN OF LEFT KNEE: Primary | ICD-10-CM

## 2023-03-02 PROCEDURE — 99024 POSTOP FOLLOW-UP VISIT: CPT | Performed by: ORTHOPAEDIC SURGERY

## 2023-03-02 RX ORDER — HYDROCODONE BITARTRATE AND ACETAMINOPHEN 5; 325 MG/1; MG/1
1 TABLET ORAL EVERY 6 HOURS PRN
Qty: 5 TABLET | Refills: 0 | Status: SHIPPED | OUTPATIENT
Start: 2023-03-02 | End: 2023-03-05

## 2023-03-02 ASSESSMENT — PAIN SCALES - GENERAL: PAINLEVEL: SEVERE PAIN (7)

## 2023-03-02 NOTE — LETTER
3/2/2023         RE: Christine Hu  [address]  [place] [address]        Dear Colleague,    Thank you for referring your patient, Christine Hu, to the [facility]. Please see a copy of my visit note below.    DIAGNOSIS:   1. ACL tear left knee    PROCEDURES:  1. ACL reconstruction, hamstring autograft, DOS 2/9/23    HISTORY:  Started PO keflex via ED this week. Now has taken 1.5 days into treatment  Doing PT    EXAM:     General: Awake, Alert, and oriented. Articulates and communicates with a normal affect     left Lower Extremity:    Incisions well healed no drainage now, patient reports some drainage approx 3 days ago, when removed steri strips    Normal post-operative effusion and ecchymosis    Range of motion and stability exam not performed    Neurovascularly intact    IMAGING:  No new imaging    ASSESSMENT:  1. Almost 5 weeks following HS auto ACL    PLAN:   Weightbearing: WBAT  Range of Motion: No range of motion restrictions  Pain Medications: We reviewed post-operative pain medications at today's visit. The patient has stopped all opioid pain medications and no further refills are required  Extension: We reviewed the importance of full knee extension and demonstrated the relevant exercises as appropriate  Crutches/Brace: Patient no longer requires the hinged knee brace or crutches.   Activity Restrictions:  Discussed that this is the dangerous time after ACL reconstruction  Reviewed activity restrictions at today's visit  Goal to progress strength and motion to allow straight line running at three months from the date of surgery  I did give her a few Norco tablets to get to tomorrow.  She understands the going for my clinic will not provide refills    Follow up: 6 weeks with no new radiographs needed          Again, thank you for allowing me to participate in the care of your patient.        Sincerely,        Mc Peterson MD

## 2023-03-02 NOTE — PROGRESS NOTES
DIAGNOSIS:   1. ACL tear left knee    PROCEDURES:  1. ACL reconstruction, hamstring autograft, DOS 2/9/23    HISTORY:  Started PO keflex via ED this week. Now has taken 1.5 days into treatment  Doing PT    EXAM:     General: Awake, Alert, and oriented. Articulates and communicates with a normal affect     left Lower Extremity:    Incisions well healed no drainage now, patient reports some drainage approx 3 days ago, when removed steri strips    Normal post-operative effusion and ecchymosis    Range of motion and stability exam not performed    Neurovascularly intact    IMAGING:  No new imaging    ASSESSMENT:  1. Almost 5 weeks following HS auto ACL    PLAN:   Weightbearing: WBAT  Range of Motion: No range of motion restrictions  Pain Medications: We reviewed post-operative pain medications at today's visit. The patient has stopped all opioid pain medications and no further refills are required  Extension: We reviewed the importance of full knee extension and demonstrated the relevant exercises as appropriate  Crutches/Brace: Patient no longer requires the hinged knee brace or crutches.   Activity Restrictions:  Discussed that this is the dangerous time after ACL reconstruction  Reviewed activity restrictions at today's visit  Goal to progress strength and motion to allow straight line running at three months from the date of surgery  I did give her a few Norco tablets to get to tomorrow.  She understands the going for my clinic will not provide refills    Follow up: 6 weeks with no new radiographs needed
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
## 2023-03-03 ENCOUNTER — VIRTUAL VISIT (OUTPATIENT)
Dept: FAMILY MEDICINE | Facility: CLINIC | Age: 43
End: 2023-03-03
Payer: COMMERCIAL

## 2023-03-03 DIAGNOSIS — G89.29 OTHER CHRONIC PAIN: ICD-10-CM

## 2023-03-03 DIAGNOSIS — M54.2 CERVICAL PAIN: ICD-10-CM

## 2023-03-03 DIAGNOSIS — L08.9 LOCAL INFECTION OF SKIN AND SUBCUTANEOUS TISSUE: Primary | ICD-10-CM

## 2023-03-03 PROCEDURE — 99214 OFFICE O/P EST MOD 30 MIN: CPT | Mod: VID | Performed by: PHYSICIAN ASSISTANT

## 2023-03-03 RX ORDER — CEPHALEXIN 500 MG/1
500 CAPSULE ORAL 4 TIMES DAILY
Qty: 20 CAPSULE | Refills: 0 | Status: SHIPPED | OUTPATIENT
Start: 2023-03-03 | End: 2023-04-10

## 2023-03-03 RX ORDER — HYDROCODONE BITARTRATE AND ACETAMINOPHEN 10; 325 MG/1; MG/1
1 TABLET ORAL EVERY 6 HOURS PRN
Qty: 70 TABLET | Refills: 0 | Status: SHIPPED | OUTPATIENT
Start: 2023-03-03 | End: 2023-04-10

## 2023-03-05 LAB
BACTERIA BLD CULT: NO GROWTH
BACTERIA BLD CULT: NO GROWTH

## 2023-03-10 ENCOUNTER — MYC MEDICAL ADVICE (OUTPATIENT)
Dept: FAMILY MEDICINE | Facility: CLINIC | Age: 43
End: 2023-03-10
Payer: COMMERCIAL

## 2023-03-10 DIAGNOSIS — L03.90 CELLULITIS, UNSPECIFIED CELLULITIS SITE: Primary | ICD-10-CM

## 2023-03-10 RX ORDER — DOXYCYCLINE 100 MG/1
100 CAPSULE ORAL 2 TIMES DAILY
Qty: 20 CAPSULE | Refills: 0 | Status: SHIPPED | OUTPATIENT
Start: 2023-03-10 | End: 2023-03-20

## 2023-03-13 ENCOUNTER — TELEPHONE (OUTPATIENT)
Dept: ORTHOPEDICS | Facility: CLINIC | Age: 43
End: 2023-03-13

## 2023-03-13 NOTE — TELEPHONE ENCOUNTER
ATC called patient in regards to missed telephone visit with Dr. Murray today.  ATC expressed to patient to call us back and provided clinic # for rescheduling.    Rigo Curiel, ATC

## 2023-03-21 ENCOUNTER — TRANSFERRED RECORDS (OUTPATIENT)
Dept: HEALTH INFORMATION MANAGEMENT | Facility: CLINIC | Age: 43
End: 2023-03-21

## 2023-03-31 ENCOUNTER — MEDICAL CORRESPONDENCE (OUTPATIENT)
Dept: HEALTH INFORMATION MANAGEMENT | Facility: CLINIC | Age: 43
End: 2023-03-31

## 2023-04-03 ENCOUNTER — TELEPHONE (OUTPATIENT)
Dept: ORTHOPEDICS | Facility: CLINIC | Age: 43
End: 2023-04-03
Payer: COMMERCIAL

## 2023-04-03 NOTE — TELEPHONE ENCOUNTER
ATC called patient and left VM requesting c/b to clinic to schedule follow up visit.  ATC explained we have two phone visit slots for this afternoon, and also in person/telephone next Monday and Tuesday.      Rigo Curiel, ATC    
I performed the initial face to face bedside interview with this patient regarding history of present illness, review of symptoms and past medical, social and family history.  I completed an independent physical examination.  I was the initial provider who evaluated this patient.  The history, review of symptoms and examination was documented by the PA in my presence and I attest to the accuracy of the documentation.  I have signed out the follow up of any pending tests (i.e. labs, radiological studies) to the PA.  I have discussed the patient’s plan of care and disposition with the PA.

## 2023-04-05 ENCOUNTER — TELEPHONE (OUTPATIENT)
Dept: ORTHOPEDICS | Facility: CLINIC | Age: 43
End: 2023-04-05
Payer: COMMERCIAL

## 2023-04-07 DIAGNOSIS — Z98.1 S/P CERVICAL SPINAL FUSION: Primary | ICD-10-CM

## 2023-04-10 ENCOUNTER — OFFICE VISIT (OUTPATIENT)
Dept: PALLIATIVE MEDICINE | Facility: CLINIC | Age: 43
End: 2023-04-10
Payer: COMMERCIAL

## 2023-04-10 ENCOUNTER — OFFICE VISIT (OUTPATIENT)
Dept: ORTHOPEDICS | Facility: CLINIC | Age: 43
End: 2023-04-10
Payer: COMMERCIAL

## 2023-04-10 ENCOUNTER — LAB (OUTPATIENT)
Dept: LAB | Facility: CLINIC | Age: 43
End: 2023-04-10
Payer: COMMERCIAL

## 2023-04-10 VITALS — SYSTOLIC BLOOD PRESSURE: 130 MMHG | HEART RATE: 91 BPM | DIASTOLIC BLOOD PRESSURE: 87 MMHG

## 2023-04-10 DIAGNOSIS — G89.29 CHRONIC PAIN OF LEFT KNEE: ICD-10-CM

## 2023-04-10 DIAGNOSIS — M75.111 INCOMPLETE TEAR OF RIGHT ROTATOR CUFF, UNSPECIFIED WHETHER TRAUMATIC: ICD-10-CM

## 2023-04-10 DIAGNOSIS — Z98.1 S/P CERVICAL SPINAL FUSION: Primary | ICD-10-CM

## 2023-04-10 DIAGNOSIS — F11.90 CHRONIC, CONTINUOUS USE OF OPIOIDS: ICD-10-CM

## 2023-04-10 DIAGNOSIS — M79.18 MYOFASCIAL PAIN: ICD-10-CM

## 2023-04-10 DIAGNOSIS — M25.562 CHRONIC PAIN OF LEFT KNEE: ICD-10-CM

## 2023-04-10 DIAGNOSIS — Z98.1 S/P CERVICAL SPINAL FUSION: ICD-10-CM

## 2023-04-10 DIAGNOSIS — Z79.891 ENCOUNTER FOR LONG-TERM USE OF OPIATE ANALGESIC: ICD-10-CM

## 2023-04-10 DIAGNOSIS — M54.2 CHRONIC NECK PAIN: ICD-10-CM

## 2023-04-10 DIAGNOSIS — M25.511 CHRONIC RIGHT SHOULDER PAIN: Primary | ICD-10-CM

## 2023-04-10 DIAGNOSIS — Z98.890 S/P REPAIR OF ANTERIOR CRUCIATE LIGAMENT: ICD-10-CM

## 2023-04-10 DIAGNOSIS — G89.29 CHRONIC NECK PAIN: ICD-10-CM

## 2023-04-10 DIAGNOSIS — G89.29 CHRONIC RIGHT SHOULDER PAIN: Primary | ICD-10-CM

## 2023-04-10 DIAGNOSIS — M62.838 MUSCLE SPASM: ICD-10-CM

## 2023-04-10 LAB — ETHANOL UR QL SCN: NORMAL

## 2023-04-10 PROCEDURE — 99215 OFFICE O/P EST HI 40 MIN: CPT | Performed by: NURSE PRACTITIONER

## 2023-04-10 PROCEDURE — 99213 OFFICE O/P EST LOW 20 MIN: CPT | Mod: 24 | Performed by: ORTHOPAEDIC SURGERY

## 2023-04-10 PROCEDURE — 2894A URINE DRUG CONFIRMATION PANEL: CPT

## 2023-04-10 PROCEDURE — 99417 PROLNG OP E/M EACH 15 MIN: CPT | Performed by: NURSE PRACTITIONER

## 2023-04-10 RX ORDER — IBUPROFEN 200 MG
200 TABLET ORAL EVERY 4 HOURS PRN
COMMUNITY

## 2023-04-10 RX ORDER — CYCLOBENZAPRINE HCL 10 MG
10 TABLET ORAL 3 TIMES DAILY PRN
Qty: 30 TABLET | Refills: 0 | Status: SHIPPED | OUTPATIENT
Start: 2023-04-10 | End: 2023-04-26

## 2023-04-10 RX ORDER — HYDROCODONE BITARTRATE AND ACETAMINOPHEN 7.5; 325 MG/1; MG/1
1 TABLET ORAL EVERY 6 HOURS PRN
Qty: 45 TABLET | Refills: 0 | Status: SHIPPED | OUTPATIENT
Start: 2023-04-10 | End: 2023-04-26

## 2023-04-10 ASSESSMENT — PAIN SCALES - GENERAL: PAINLEVEL: EXTREME PAIN (8)

## 2023-04-10 NOTE — LETTER
April 10, 2023    RE:  Christine Hu                              [address]  [place] [address]            To whom it may concern:    Christine Hu is under my professional care for cervical spine fusion surgery (date of surgery: 11/26/22). She may return to work on April 19th, 2023 with the following:     When the patient returns to work, the following restrictions apply until further notice:  A) Bend: Frequently (4-8 hours)  B) Squat: Frequently (4-8 hours)  C) Walk/Stand: Frequently (4-8 hours)  D) Reach Above Shoulders: Occasionally (1-3 hours)  E) Lift, carry, push, and pull no more than:  30lbs: must use both hands when lifting.    Christine is cleared to work up to 5 hours/day.      Sincerely,      Electronically signed by  Wilbur Murray MD

## 2023-04-10 NOTE — LETTER
4/10/2023         RE: Christine Hu  [address]  [place] [address]        Dear Colleague,    Thank you for referring your patient, Christine Hu, to the [facility] [place]. Please see a copy of my visit note below.    Spine Surgery Return Clinic Visit      Chief Complaint:   RECHECK (Patient returns for follow-up on cervical spine.  C/C is shoulder weakness and shoulder pain.  Would like to discuss return to work.)      Interval HPI:  Symptom Profile Including: location of symptoms, onset, severity, exacerbating/alleviating factors, previous treatments:        Christine Hu is a 42 year old female who returns today status post C4-5 C5-6 ACDF.  Her postoperative course was complicated by right C5 nerve palsy and she did have a right posterior foraminotomies.  Her surgery was about 5 months ago.  She is made a substantial recovery.  Right after surgery the palsy was almost complete.  Now she can lift the arm about 170 degrees overhead.  She can hold it elevated with 4+ out of 5 strength.  This sensation has returned throughout.  She states that it still feels somewhat weak to her and she cannot extend the arm per tickly if she is holding something she cannot reach out and hold it at arms length because it does not feel strong enough for that type of activity.  It stronger when it is closer to her side.            Past Medical History:     Past Medical History:   Diagnosis Date    Acute alcoholic intoxication (H) 5/28/2011    Seen in emergency room 2020 after 14 months sobriety    Alcohol dependence (H) 11/30/2012    Alcohol withdrawal (H) 06/29/2011    ASCUS favor benign 01/2015    Neg HPV    Degeneration of cervical intervertebral disc     Knee effusion, left     Lumbago     Ménière's disease     Overdose 06/2009    TRAZADONE            Past Surgical History:     Past Surgical History:   Procedure Laterality Date    ARTHROSCOPIC RECONSTRUCTION ANTERIOR  CRUCIATE LIGAMENT HAMSTRING AUTOGRAFT Left 2023    Procedure: left knee examination under anesthesia, knee arthroscopy, anterior cruciate ligament reconstruction hamstring autograft;  Surgeon: Mc Peterson MD;  Location: [facility] OR    C/SECTION, LOW TRANSVERSE  2005    , Low Transverse    COLONOSCOPY      5 yrs    DECOMPRESSION, FUSION CERVICAL ANTERIOR TWO LEVELS, COMBINED N/A 2022    Procedure: Cervical 4 to 6 Anterior Cervical Decompression and Fusion with Medtronic Plate and Screws, Interbody Device, Use of Fluoroscopy, Microscope;  Surgeon: Wilbur Murray MD;  Location: [facility] OR    GRAFT BONE FROM ILIAC CREST Left 2022    Procedure: Left Sided Iliac Crest Autograft;  Surgeon: Wilbur Murray MD;  Location: [facility] OR    LAMINECTOMY CERIVCAL POSTERIOR THREE+ LEVELS N/A 2022    Procedure: Right cervical 4-5 posterior foraminotomy;  Surgeon: Wilbur Murray MD;  Location: [facility] OR    MASTOIDECTOMY      TONSILLECTOMY              Social History:     Social History     Tobacco Use    Smoking status: Every Day     Packs/day: 0.25     Types: Cigarettes    Smokeless tobacco: Never    Tobacco comments:     Reduced to 6 per day started 10/25/22   Vaping Use    Vaping status: Never Used   Substance Use Topics    Alcohol use: Not Currently            Family History:     Family History   Problem Relation Age of Onset    Allergies Mother     Psychotic Disorder Father         depression    Heart Failure Father         heart attack in 2015?    Allergies Sister     Allergies Sister     Psychotic Disorder Sister         anxiety    Psychotic Disorder Sister         depression    Psychotic Disorder Maternal Uncle         anxiety    Asthma Other     C.A.D. No family hx of     Diabetes No family hx of     Hypertension No family hx of     Cerebrovascular Disease No family hx of     Breast Cancer No family hx of     Cancer - colorectal No family hx of      Prostate Cancer No family hx of     Anesthesia Reaction No family hx of             Allergies:     Allergies   Allergen Reactions    Codeine GI Disturbance     Nausea with use of Tylenol #3    Clindamycin Other (See Comments)     C diff, hospitalized     Penicillins Rash            Medications:     Current Outpatient Medications   Medication    cyclobenzaprine (FLEXERIL) 10 MG tablet    HYDROcodone-acetaminophen (NORCO) 7.5-325 MG per tablet    ibuprofen (ADVIL/MOTRIN) 200 MG tablet    naloxone (NARCAN) 4 MG/0.1ML nasal spray    omeprazole (PRILOSEC OTC) 20 MG EC tablet     Current Facility-Administered Medications   Medication    lidocaine (PF) (XYLOCAINE) 1 % injection 5 mL    triamcinolone (KENALOG-40) injection 40 mg             Review of Systems:   A focused musculoskeletal and neurologic ROS was performed with pertinent positives and negatives noted in the HPI.  Additional systems were also reviewed and are documented at the bottom of the note.         Physical Exam:   Vitals: LMP 02/09/2023 (Exact Date)   Musculoskeletal, Neurologic, and Spine:     Cervical spine:    Appearance -no gross step-offs, kyphosis.    Motor -     C5: Deltoids R 4+/5 and L 5/5 strength    C6: Biceps R 5/5 and L 5/5 strength     C7: Triceps R 5/5 and L 5/5 strength     C8:  R 5/5 and L 5/5 strength     T1: Dorsal interossei R 4/5 and L 4/5 strength        Sensation: intact to light touch in C5-T1      Alignment:  Patient stands with a neutral standing sagittal balance.           Imaging:       No new x-rays today.  I reviewed her x-rays from the last visit which show well-positioned implants.       Assessment and Plan:     42 year old female with improved neck pain and preoperative radicular arm complaints, but still difficulty with right shoulder dysfunction in the setting of a likely C5 palsy after ACDF.      I am very pleased with her progress and I provided encouragement.  I Told her it can take 1 to 2 years for maximum nerve  recovery.  She is already regained about 90% of her strength and range of motion.  I think there is certainly room for further improvement.  I want her to continue with the shoulder rehab.  Since she is about 5 months out I think we should get a CT scan in 4 to 5 months to assess the fusion status.  I encouraged her to keep up with the activities.  We talked about return to work and I think using the arm more at work will be of benefit to her.  I agreed to provide a work letter.  She will let me know if this needs to be modified and I told her we would try and support her with the language that she needs.           Respectfully,  Wilbur Murray MD  Spine Surgery  [facility]

## 2023-04-10 NOTE — PATIENT INSTRUCTIONS
Physical Therapy: continue PHYSICAL THERAPY for the left knee  Clinical Health Psychologist to address issues of relaxation, behavioral change, coping style, and other factors important to improvement: none  Diagnostic Studies: none  Medication Management:   START cyclobenzaprine 10mg up to 3 times daily for muscle spasms, caution sedation  START Norco 7.5/325mg may take 1 tab every 6 hours as needed for pain, max of 3/day. #45 for 15 day trial  TRIAL Voltaren gel (diclofenac sodium 1%) this is found over-the-counter and you may use 2 grams to the right shoulder and 4 grams to left knee up to 4 times daily.   We can consider transition to buprenorphine in the form of Butrans patch (skin patch) or Belbuca (buccal mouth film twice daily) in the future. Less opiate tolerance, less respiratory depression. Excellent safety profile. Norco would be stopped 24 hours prior to starting this med.   We can consider addition of ramelteon for improved sleep if needed  Further procedures recommended: none at present  Urine toxicology screen today: today, no opiates for over a week.   Signed CSA today   Recommendations/follow-up for PCP:  See above  Release of information: none  Follow up: 6 weeks in-person    ----------------------------------------------------------------  Clinic Number:  [phone]   Call with any questions about your care and for scheduling assistance.   Calls are returned Monday through Friday between 8 AM and 4:30 PM. We usually get back to you within 2 business days depending on the issue/request.    If we are prescribing your medications:  For opioid medication refills, call the clinic or send a iGo message 7 days in advance.  Please include:  Name of requested medication  Name of the pharmacy.  For non-opioid medications, call your pharmacy directly to request a refill. Please allow 3-4 days to be processed.   Per [place] State Law:  All controlled substance prescriptions must be filled within 30 days of  being written.    For those controlled substances allowing refills, pickup must occur within 30 days of last fill.      We believe regular attendance is key to your success in our program!    Any time you are unable to keep your appointment we ask that you call us at least 24 hours in advance to cancel.This will allow us to offer the appointment time to another patient.   Multiple missed appointments may lead to dismissal from the clinic.

## 2023-04-10 NOTE — PROGRESS NOTES
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 unremarkable. The visualized lung is clear.                                                                      Impression:  1. No acute osseous abnormality.  2. No substantial degenerative change.     BRIDGETTE FRANCO MD (Joe)       Other testing (labs, diagnostics):  2/28/2023  Cr. 0.57  Est GFR >90      Screening tools:     DIRE Score for ongoing opioid management is calculated as follows:    Diagnosis = 2    Intractability = 2    Risk: Psych = 2  Chem Hlth = 1-2  Reliability = 2  Social = 2    Efficacy = 2    Total DIRE Score = 13-14 (14 or higher predicts good candidate for ongoing opioid management; 13 or lower predicts poor candidate for opioid management)         Assessment:  1. Chronic right shoulder pain  2. Incomplete tear of right rotator cuff, unspecified whether traumatic  3. Chronic neck pain  4. S/p cervical spinal fusion  5. Chronic pain of left knee  6. S/p repair of anterior cruciate ligament  7. Muscle spasm  8. Myofascial pain  9. Chronic continuous use of opioids  10. Previous alcohol dependence in 2011 during her  divorce  11. Encounter for long term use of opiates   12. PMHx includes: overdose with Trazodone (2009 at age 28), acute alcoholic intoxication (2011, seen in ER), alcohol withdrawal (2011), alcohol dependence (), ASCUS favor benign (), degeneration of cervical disc, lumbago, Meniere's disease, left knee effusion  13. PSHx includes: low transverse  (2005), Cervical 4 to 6 Anterior Cervical Decompression and Fusion with Medtronic Plate and Screws, Interbody Device, Use of Fluoroscopy, Microscope (2022 by Wilbur Murray MD), graft bone from left iliac crest (2022), Right cervical 4-5 posterior foraminotomy (2022 by Wilbur Murray MD), arthroscopic reconstruction ACL Left knee with hamstring autograft (2023, by Mc Peterson MD), tonsillectomy, mastoidectomy, colonoscopy        Plan:  Diagnosis reviewed, treatment option addressed, and risk/benefits discussed.  Self-care instructions given.  I am recommending a multidisciplinary treatment plan to help this patient better manage her pain.      1. Physical Therapy: continue PHYSICAL THERAPY for the left knee  2. Clinical Health Psychologist to address issues of relaxation, behavioral change, coping style, and other factors important to improvement: none  3. Diagnostic Studies: none  4. Medication Management:   1. START cyclobenzaprine 10mg up to 3 times daily for muscle spasms, caution sedation  2. START Norco 7.5/325mg may take 1 tab every 6 hours as needed for pain, max of 3/day. #45 for 15 day trial  3. TRIAL Voltaren gel (diclofenac sodium 1%) this is found over-the-counter and you may use 2 grams to the right shoulder and 4 grams to left knee up to 4 times daily.   4. We can consider transition to buprenorphine in the form of Butrans patch (skin patch) or Belbuca (buccal mouth film twice daily) in the future. Less opiate tolerance, less respiratory depression. Excellent safety profile. Norco would be stopped  24 hours prior to starting this med.   5. We can consider addition of ramelteon for improved sleep if needed  5. Further procedures recommended: none at present  6. Urine toxicology screen today: today, no opiates for over a week.   7. Signed CSA today   8. Recommendations/follow-up for PCP:  See above  9. Release of information: none  10. Follow up: 6 weeks in-person      ASSESSMENT AND PLAN:  (M25.511,  G89.29) Chronic right shoulder pain  (primary encounter diagnosis)  Plan: HYDROcodone-acetaminophen (NORCO) 7.5-325 MG         per tablet, Adult Pain Clinic Follow-Up Order         (Blank)     Trial Voltaren gel    (M75.111) Incomplete tear of right rotator cuff, unspecified whether traumatic  Plan: HYDROcodone-acetaminophen (NORCO) 7.5-325 MG         per tablet, Adult Pain Clinic Follow-Up Order         (Blank)        Trial Voltaren gel    (M54.2,  G89.29) Chronic neck pain  Plan: HYDROcodone-acetaminophen (NORCO) 7.5-325 MG         per tablet, cyclobenzaprine (FLEXERIL) 10 MG         tablet, Adult Pain Clinic Follow-Up Order         (Blank)            (Z98.1) S/P cervical spinal fusion  Plan: HYDROcodone-acetaminophen (NORCO) 7.5-325 MG         per tablet, cyclobenzaprine (FLEXERIL) 10 MG         tablet, Adult Pain Clinic Follow-Up Order         (Blank)            (M25.562,  G89.29) Chronic pain of left knee  Plan: HYDROcodone-acetaminophen (NORCO) 7.5-325 MG         per tablet, Adult Pain Clinic Follow-Up Order         (Blank)            (Z98.890) S/P repair of anterior cruciate ligament  Plan: HYDROcodone-acetaminophen (NORCO) 7.5-325 MG         per tablet, Adult Pain Clinic Follow-Up Order         (Blank)            (M62.838) Muscle spasm  Plan: cyclobenzaprine (FLEXERIL) 10 MG tablet, Adult         Pain Clinic Follow-Up Order (Blank)            (M79.18) Myofascial pain  Plan: cyclobenzaprine (FLEXERIL) 10 MG tablet, Adult         Pain Clinic Follow-Up Order (Blank)            (F11.90) Chronic, continuous use  of opioids  Plan: HYDROcodone-acetaminophen (NORCO) 7.5-325 MG         per tablet, Drug Confirmation Panel Urine with         Creat, Ethanol urine, Adult Pain Clinic         Follow-Up Order (Blank)            (Z79.681) Encounter for long-term use of opiate analgesic  Plan: HYDROcodone-acetaminophen (NORCO) 7.5-325 MG         per tablet, Drug Confirmation Panel Urine with         Creat, Ethanol urine, Adult Pain Clinic         Follow-Up Order (Blank)          Significant discussion re: opiates today. Patient knows that oxycodone and Flexeril work well for her and she is going on a 2 week vacation in the near future. We discussed the use of buprenorphine either in Butrans (transdermal  Patch form) or Belbuca (buccal film form). Patient wishes to do some research on her own and we will revisit this in the future. I feel that buprenorphine use would be a better choice for her chronic constant around-the-clock pain.       Face to face time: 83 minutes      Sarah LIEBERMAN RN CNP, FNP  [facility]  [facility] [place]

## 2023-04-10 NOTE — LETTER
Opioid / Opioid Plus Controlled Substance Agreement    This is an agreement between you and your provider about the safe and appropriate use of controlled substance/opioids prescribed by your care team. Controlled substances are medicines that can cause physical and mental dependence (abuse).    There are strict laws about having and using these medicines. We here at [facility] are committing to working with you in your efforts to get better. To support you in this work, we ll help you schedule regular office appointments for medicine refills. If we must cancel or change your appointment for any reason, we ll make sure you have enough medicine to last until your next appointment.     As a Provider, I will:  Listen carefully to your concerns and treat you with respect.   Recommend a treatment plan that I believe is in your best interest. This plan may involve therapies other than opioid pain medication.   Talk with you often about the possible benefits, and the risk of harm of any medicine that we prescribe for you.   Provide a plan on how to taper (discontinue or go off) using this medicine if the decision is made to stop its use.    As a Patient, I understand that opioid(s):   Are a controlled substance prescribed by my care team to help me function or work and manage my condition(s).   Are strong medicines and can cause serious side effects such as:  Drowsiness, which can seriously affect my driving ability  A lower breathing rate, enough to cause death  Harm to my thinking ability   Depression   Abuse of and addiction to this medicine  Need to be taken exactly as prescribed. Combining opioids with certain medicines or chemicals (such as illegal drugs, sedatives, sleeping pills, and benzodiazepines) can be dangerous or even fatal. If I stop opioids suddenly, I may have severe withdrawal symptoms.  Do not work for all types of pain nor for all patients. If they re not helpful, I may be asked to stop  them.        The risks, benefits and side effects of these medicine(s) were explained to me. I agree that:  I will take part in other treatments as advised by my care team. This may be psychiatry or counseling, physical therapy, behavioral therapy, group treatment or a referral to a specialist.     I will keep all my appointments. I understand that this is part of the monitoring of opioids. My care team may require an office visit for EVERY opioid/controlled substance refill. If I miss appointments or don t follow instructions, my care team may stop my medicine.    I will take my medicines as prescribed. I will not change the dose or schedule unless my care team tells me to. There will be no refills if I run out early.     I may be asked to come to the clinic and complete a urine drug test or complete a pill count at any time. If I don t give a urine sample or participate in a pill count, the care team may stop my medicine.    I will only receive prescriptions from this clinic for chronic pain. If I am treated by another provider for acute pain issues, I will tell them that I am taking opioid pain medication for chronic pain and that I have a treatment agreement with this provider. I will inform my [facility] care team within one business day if I am given a prescription for any pain medication by another healthcare provider. My [facility] care team can contact other providers and pharmacists about my use of any medicines.    It is up to me to make sure that I don t run out of my medicines on weekends or holidays. If my care team is willing to refill my opioid prescription without a visit, I must request refills only during office hours. Refills may take up to 3 business days to process. I will use one pharmacy to fill all my opioid and other controlled substance prescriptions. I will notify the clinic about any changes to my insurance or medication availability.    I am responsible for my  prescriptions. If the medicine/prescription is lost, stolen or destroyed, it will not be replaced. I also agree not to share controlled substance medicines with anyone.    I am aware I should not use any illegal or recreational drugs. I agree not to drink alcohol unless my care team says I can.       If I enroll in the [place] [facility] Cannabis program, I will tell my care team prior to my next refill.     I will tell my care team right away if I become pregnant, have a new medical problem treated outside of my regular clinic, or have a change in my medications.    I understand that this medicine can affect my thinking, judgment and reaction time. Alcohol and drugs affect the brain and body, which can affect the safety of my driving. Being under the influence of alcohol or drugs can affect my decision-making, behaviors, personal safety, and the safety of others. Driving while impaired (DWI) can occur if a person is driving, operating, or in physical control of a car, motorcycle, boat, snowmobile, ATV, motorbike, off-road vehicle, or any other motor vehicle ([place] Statute 169A.20). I understand the risk if I choose to drive or operate any vehicle or machinery.    I understand that if I do not follow any of the conditions above, my prescriptions or treatment may be stopped or changed.          Opioids  What You Need to Know    What are opioids?   Opioids are pain medicines that must be prescribed by a doctor. They are also known as narcotics.     Examples are:   morphine (MS Contin, Leona)  oxycodone (Oxycontin)  oxycodone and acetaminophen (Percocet)  hydrocodone and acetaminophen (Vicodin, Norco)   fentanyl patch (Duragesic)   hydromorphone (Dilaudid)   methadone  codeine (Tylenol #3)     What do opioids do well?   Opioids are best for severe short-term pain such as after a surgery or injury. They may work well for cancer pain. They may help some people with long-lasting (chronic) pain.     What do opioids NOT do  well?   Opioids never get rid of pain entirely, and they don t work well for most patients with chronic pain. Opioids don t reduce swelling, one of the causes of pain.                                    Other ways to manage chronic pain and improve function include:     Treat the health problem that may be causing pain  Anti-inflammation medicines, which reduce swelling and tenderness, such as ibuprofen (Advil, Motrin) or naproxen (Aleve)  Acetaminophen (Tylenol)  Antidepressants and anti-seizure medicines, especially for nerve pain  Topical treatments such as patches or creams  Injections or nerve blocks  Chiropractic or osteopathic treatment  Acupuncture, massage, deep breathing, meditation, visual imagery, aromatherapy  Use heat or ice at the pain site  Physical therapy   Exercise  Stop smoking  Take part in therapy       Risks and side effects     Talk to your doctor before you start or decide to keep taking opioids. Possible side effects include:    Lowering your breathing rate enough to cause death  Overdose, including death, especially if taking higher than prescribed doses  Worse depression symptoms; less pleasure in things you usually enjoy  Feeling tired or sluggish  Slower thoughts or cloudy thinking  Being more sensitive to pain over time; pain is harder to control  Trouble sleeping or restless sleep  Changes in hormone levels (for example, less testosterone)  Changes in sex drive or ability to have sex  Constipation  Unsafe driving  Itching and sweating  Dizziness  Nausea, throwing up and dry mouth    What else should I know about opioids?    Opioids may lead to dependence, tolerance, or addiction.    Dependence means that if you stop or reduce the medicine too quickly, you will have withdrawal symptoms. These include loose poop (diarrhea), jitters, flu-like symptoms, nervousness and tremors. Dependence is not the same as addiction.                     Tolerance means needing higher doses over time to  get the same effect. This may increase the chance of serious side effects.    Addiction is when people improperly use a substance that harms their body, their mind or their relations with others. Use of opiates can cause a relapse of addiction if you have a history of drug or alcohol abuse.    People who have used opioids for a long time may have a lower quality of life, worse depression, higher levels of pain and more visits to doctors.    You can overdose on opioids. Take these steps to lower your risk of overdose:    Recognize the signs:  Signs of overdose include decrease or loss of consciousness (blackout), slowed breathing, trouble waking up and blue lips. If someone is worried about overdose, they should call 911.    Talk to your doctor about Narcan (naloxone).   If you are at risk for overdose, you may be given a prescription for Narcan. This medicine very quickly reverses the effects of opioids.   If you overdose, a friend or family member can give you Narcan while waiting for the ambulance. They need to know the signs of overdose and how to give Narcan.     Don't use alcohol or street drugs.   Taking them with opioids can cause death.    Do not take any of these medicines unless your doctor says it s OK. Taking these with opioids can cause death:  Benzodiazepines, such as lorazepam (Ativan), alprazolam (Xanax) or diazepam (Valium)  Muscle relaxers, such as cyclobenzaprine (Flexeril)  Sleeping pills like zolpidem (Ambien)   Other opioids      How to keep you and other people safe while taking opioids:    Never share your opioids with others.  Opioid medicines are regulated by the Drug Enforcement Agency ([place]). Selling or sharing medications is a criminal act.    2. Be sure to store opioids in a secure place, locked up if possible. Young children can easily swallow them and overdose.    3. When you are traveling with your medicines, keep them in the original bottles. If you use a pill box, be sure you also  carry a copy of your medicine list from your clinic or pharmacy.    4. Safe disposal of opioids    Most pharmacies have places to get rid of medicine, called disposal kiosks. Medicine disposal options are also available in every [facility]. Search your county and  medication disposal  to find more options. You can find more details at:  [email or web address]     I agree that my provider, clinic care team, and pharmacy may work with any city, state or federal law enforcement agency that investigates the misuse, sale, or other diversion of my controlled medicine. I will allow my provider to discuss my care with, or share a copy of, this agreement with any other treating provider, pharmacy or emergency room where I receive care.    I have read this agreement and have asked questions about anything I did not understand.    _______________________________________________________  Patient Signature - Christine Hu _____________________                   Date     _______________________________________________________  Provider Signature - MIO Mendez CNP   _____________________                   Date     _______________________________________________________  Witness Signature (required if provider not present while patient signing)   _____________________                   Date

## 2023-04-11 LAB — CREAT UR-MCNC: 26 MG/DL

## 2023-04-11 NOTE — PROGRESS NOTES
Spine Surgery Return Clinic Visit      Chief Complaint:   RECHECK (Patient returns for follow-up on cervical spine.  C/C is shoulder weakness and shoulder pain.  Would like to discuss return to work.)      Interval HPI:  Symptom Profile Including: location of symptoms, onset, severity, exacerbating/alleviating factors, previous treatments:        Christine Hu is a 42 year old female who returns today status post C4-5 C5-6 ACDF.  Her postoperative course was complicated by right C5 nerve palsy and she did have a right posterior foraminotomies.  Her surgery was about 5 months ago.  She is made a substantial recovery.  Right after surgery the palsy was almost complete.  Now she can lift the arm about 170 degrees overhead.  She can hold it elevated with 4+ out of 5 strength.  This sensation has returned throughout.  She states that it still feels somewhat weak to her and she cannot extend the arm per tickly if she is holding something she cannot reach out and hold it at arms length because it does not feel strong enough for that type of activity.  It stronger when it is closer to her side.            Past Medical History:     Past Medical History:   Diagnosis Date     Acute alcoholic intoxication (H) 5/28/2011    Seen in emergency room 2020 after 14 months sobriety     Alcohol dependence (H) 11/30/2012     Alcohol withdrawal (H) 06/29/2011     ASCUS favor benign 01/2015    Neg HPV     Degeneration of cervical intervertebral disc      Knee effusion, left      Lumbago      Ménière's disease      Overdose 06/2009    TRAZADONE            Past Surgical History:     Past Surgical History:   Procedure Laterality Date     ARTHROSCOPIC RECONSTRUCTION ANTERIOR CRUCIATE LIGAMENT HAMSTRING AUTOGRAFT Left 1/27/2023    Procedure: left knee examination under anesthesia, knee arthroscopy, anterior cruciate ligament reconstruction hamstring autograft;  Surgeon: Mc Peterson MD;  Location: [facility] OR     C/SECTION,  LOW TRANSVERSE  2005    , Low Transverse     COLONOSCOPY      5 yrs     DECOMPRESSION, FUSION CERVICAL ANTERIOR TWO LEVELS, COMBINED N/A 2022    Procedure: Cervical 4 to 6 Anterior Cervical Decompression and Fusion with Medtronic Plate and Screws, Interbody Device, Use of Fluoroscopy, Microscope;  Surgeon: Wilbur Murray MD;  Location: [facility] OR     GRAFT BONE FROM ILIAC CREST Left 2022    Procedure: Left Sided Iliac Crest Autograft;  Surgeon: Wilbur Murray MD;  Location: [facility] OR     LAMINECTOMY CERIVCAL POSTERIOR THREE+ LEVELS N/A 2022    Procedure: Right cervical 4-5 posterior foraminotomy;  Surgeon: Wilbur Murray MD;  Location: [facility] OR     MASTOIDECTOMY       TONSILLECTOMY              Social History:     Social History     Tobacco Use     Smoking status: Every Day     Packs/day: 0.25     Types: Cigarettes     Smokeless tobacco: Never     Tobacco comments:     Reduced to 6 per day started 10/25/22   Vaping Use     Vaping status: Never Used   Substance Use Topics     Alcohol use: Not Currently            Family History:     Family History   Problem Relation Age of Onset     Allergies Mother      Psychotic Disorder Father         depression     Heart Failure Father         heart attack in 2015?     Allergies Sister      Allergies Sister      Psychotic Disorder Sister         anxiety     Psychotic Disorder Sister         depression     Psychotic Disorder Maternal Uncle         anxiety     Asthma Other      C.A.D. No family hx of      Diabetes No family hx of      Hypertension No family hx of      Cerebrovascular Disease No family hx of      Breast Cancer No family hx of      Cancer - colorectal No family hx of      Prostate Cancer No family hx of      Anesthesia Reaction No family hx of             Allergies:     Allergies   Allergen Reactions     Codeine GI Disturbance     Nausea with use of Tylenol #3     Clindamycin Other (See Comments)     C  diff, hospitalized      Penicillins Rash            Medications:     Current Outpatient Medications   Medication     cyclobenzaprine (FLEXERIL) 10 MG tablet     HYDROcodone-acetaminophen (NORCO) 7.5-325 MG per tablet     ibuprofen (ADVIL/MOTRIN) 200 MG tablet     naloxone (NARCAN) 4 MG/0.1ML nasal spray     omeprazole (PRILOSEC OTC) 20 MG EC tablet     Current Facility-Administered Medications   Medication     lidocaine (PF) (XYLOCAINE) 1 % injection 5 mL     triamcinolone (KENALOG-40) injection 40 mg             Review of Systems:   A focused musculoskeletal and neurologic ROS was performed with pertinent positives and negatives noted in the HPI.  Additional systems were also reviewed and are documented at the bottom of the note.         Physical Exam:   Vitals: LMP 02/09/2023 (Exact Date)   Musculoskeletal, Neurologic, and Spine:     Cervical spine:    Appearance -no gross step-offs, kyphosis.    Motor -     C5: Deltoids R 4+/5 and L 5/5 strength    C6: Biceps R 5/5 and L 5/5 strength     C7: Triceps R 5/5 and L 5/5 strength     C8:  R 5/5 and L 5/5 strength     T1: Dorsal interossei R 4/5 and L 4/5 strength        Sensation: intact to light touch in C5-T1      Alignment:  Patient stands with a neutral standing sagittal balance.           Imaging:       No new x-rays today.  I reviewed her x-rays from the last visit which show well-positioned implants.       Assessment and Plan:     42 year old female with improved neck pain and preoperative radicular arm complaints, but still difficulty with right shoulder dysfunction in the setting of a likely C5 palsy after ACDF.      I am very pleased with her progress and I provided encouragement.  I Told her it can take 1 to 2 years for maximum nerve recovery.  She is already regained about 90% of her strength and range of motion.  I think there is certainly room for further improvement.  I want her to continue with the shoulder rehab.  Since she is about 5 months out I  think we should get a CT scan in 4 to 5 months to assess the fusion status.  I encouraged her to keep up with the activities.  We talked about return to work and I think using the arm more at work will be of benefit to her.  I agreed to provide a work letter.  She will let me know if this needs to be modified and I told her we would try and support her with the language that she needs.           Respectfully,  Wilbur Murray MD  Spine Surgery  [facility]

## 2023-04-17 NOTE — RESULT ENCOUNTER NOTE
Urine drug testing as expected. No illicit medication or alcohol noted. Continue standard monitoring while on opiates. Patient reported she had not used any opiates for about a week. These are baseline results for our clinic.   Sarah LIEBERMAN RN CNP, FNP  [facility] [place]

## 2023-04-21 NOTE — PROGRESS NOTES
Patient Quality Outreach    Patient is due for the following:   There are no preventive care reminders to display for this patient.    Next Steps:   Patient has upcoming appointment, these items will be addressed at that time.    Type of outreach:    Chart review performed, no outreach needed.    Next Steps:  Reach out within 90 days via none.    Max number of attempts reached: No. Will try again in 90 days if patient still on fail list.    Questions for provider review:    None     Amisha Lion MA

## 2023-04-24 ENCOUNTER — TELEPHONE (OUTPATIENT)
Dept: PALLIATIVE MEDICINE | Facility: CLINIC | Age: 43
End: 2023-04-24
Payer: COMMERCIAL

## 2023-04-24 ENCOUNTER — MYC MEDICAL ADVICE (OUTPATIENT)
Dept: PALLIATIVE MEDICINE | Facility: CLINIC | Age: 43
End: 2023-04-24
Payer: COMMERCIAL

## 2023-04-24 DIAGNOSIS — G89.29 CHRONIC PAIN OF LEFT KNEE: ICD-10-CM

## 2023-04-24 DIAGNOSIS — G89.29 CHRONIC NECK PAIN: ICD-10-CM

## 2023-04-24 DIAGNOSIS — M79.18 MYOFASCIAL PAIN: ICD-10-CM

## 2023-04-24 DIAGNOSIS — Z98.1 S/P CERVICAL SPINAL FUSION: ICD-10-CM

## 2023-04-24 DIAGNOSIS — G89.29 CHRONIC RIGHT SHOULDER PAIN: Primary | ICD-10-CM

## 2023-04-24 DIAGNOSIS — M25.511 CHRONIC RIGHT SHOULDER PAIN: ICD-10-CM

## 2023-04-24 DIAGNOSIS — M25.511 CHRONIC RIGHT SHOULDER PAIN: Primary | ICD-10-CM

## 2023-04-24 DIAGNOSIS — M25.562 CHRONIC PAIN OF LEFT KNEE: ICD-10-CM

## 2023-04-24 DIAGNOSIS — F11.90 CHRONIC, CONTINUOUS USE OF OPIOIDS: ICD-10-CM

## 2023-04-24 DIAGNOSIS — M62.838 MUSCLE SPASM: ICD-10-CM

## 2023-04-24 DIAGNOSIS — Z98.890 S/P REPAIR OF ANTERIOR CRUCIATE LIGAMENT: ICD-10-CM

## 2023-04-24 DIAGNOSIS — M54.2 CHRONIC NECK PAIN: ICD-10-CM

## 2023-04-24 DIAGNOSIS — Z79.891 ENCOUNTER FOR LONG-TERM USE OF OPIATE ANALGESIC: ICD-10-CM

## 2023-04-24 DIAGNOSIS — G89.29 CHRONIC RIGHT SHOULDER PAIN: ICD-10-CM

## 2023-04-24 DIAGNOSIS — M75.111 INCOMPLETE TEAR OF RIGHT ROTATOR CUFF, UNSPECIFIED WHETHER TRAUMATIC: ICD-10-CM

## 2023-04-24 NOTE — TELEPHONE ENCOUNTER
Spoke with patient and she is requesting a referral for a surgical consult for the right shoulder. The pain is worsening and she would like to better understand what her surgical options are.    DANIEL AllenN, RN  Care Coordinator  [facility] [place]

## 2023-04-24 NOTE — TELEPHONE ENCOUNTER
[facility] Call Center    Phone Message    May a detailed message be left on voicemail: yes     Reason for Call: Medication Refill Request      Has the patient contacted the pharmacy for the refill? Yes     Name of medication being requested:     HYDROcodone-acetaminophen (NORCO) 7.5-325 MG per tablet    Provider who prescribed the medication: Sarah Rogers    [facility]acy[facility] [place] [address]    Date medication is needed: ASAP    Pt said that the 7.5 is working just fine

## 2023-04-24 NOTE — TELEPHONE ENCOUNTER
[facility] Call Center    Phone Message    May a detailed message be left on voicemail: yes     Reason for Call: Pt is wondering if Sarah can refer her to a shoulder surgeon.  She is requesting a call back to discuss.  Thanks

## 2023-04-25 NOTE — TELEPHONE ENCOUNTER
Received call from patient requesting refill(s) of HYDROcodone-acetaminophen (NORCO) 7.5-325 MG per tablet     Last dispensed from pharmacy on 04/10/23    Patient's last office/virtual visit by prescribing provider on 04/10/23  Next office/virtual appointment scheduled for 05/22/23    Last urine drug screen date 04/10/23  Current opioid agreement on file (completed within the last year) Yes Date of opioid agreement: 04/10/23    E-prescribe to     [id number] - [place] - [address]  [address]  [place] [address]  Phone: [phone] Fax: [phone]    Will route to nursing [place] for review and preparation of prescription(s).       Alisha Gomez MA  [facility] [place]

## 2023-04-25 NOTE — TELEPHONE ENCOUNTER
[facility] Call Center    Phone Message    May a detailed message be left on voicemail: yes     Reason for Call: Other: Patient is calling stating that she has been waiting for a phone call back from the nurse. Please call patient back to discuss further.     Action Taken: Message routed to:  Other: [facility]    Travel Screening: Not Applicable

## 2023-04-25 NOTE — TELEPHONE ENCOUNTER
I reviewed her right shoulder MRI myself and with Dr. Adrian Mora.   We don't think that this is a surgical shoulder issue at the present time.     I would recommend that she see Sports Medicine first and do some physical therapy. Often, surgeons will require at least 1 if not 3 different shoulder injections prior to consideration of surgery for the issues she currently has.     Is she open to seeing Sports Med (non-surgical ortho) first? If so, I will place a referral. I can refer her to a surgeon if she is absolutely set on that, but I don't think they will move on anything without some other therapies attempted first.     Thanks.   Sarah LIEBERMAN, RN CNP, FNP  [facility]  [facility]

## 2023-04-26 RX ORDER — CYCLOBENZAPRINE HCL 10 MG
10 TABLET ORAL 3 TIMES DAILY PRN
Qty: 75 TABLET | Refills: 0 | Status: SHIPPED | OUTPATIENT
Start: 2023-04-26 | End: 2023-05-22

## 2023-04-26 RX ORDER — HYDROCODONE BITARTRATE AND ACETAMINOPHEN 7.5; 325 MG/1; MG/1
1 TABLET ORAL EVERY 6 HOURS PRN
Qty: 90 TABLET | Refills: 0 | Status: SHIPPED | OUTPATIENT
Start: 2023-04-26 | End: 2023-05-22

## 2023-04-26 NOTE — TELEPHONE ENCOUNTER
This was managed in a separate encounter.    DANIEL AllenN, RN  Care Coordinator  [facility] [place]    
admission

## 2023-04-26 NOTE — TELEPHONE ENCOUNTER
Signed order.     Sarah LIEBERMAN, RN CNP, FNP  [facility]

## 2023-04-26 NOTE — TELEPHONE ENCOUNTER
Signed Prescriptions:                        Disp   Refills    HYDROcodone-acetaminophen (NORCO) 7.5-325 *90 tab*0        Sig: Take 1 tablet by mouth every 6 hours as needed for           moderate to severe pain Max of 3 tabs per day.           Fill and begin 04/26/23. 30 day supply for           chronic pain  Authorizing Provider: SARAH CASTILLO    cyclobenzaprine (FLEXERIL) 10 MG tablet    75 tab*0        Sig: Take 1 tablet (10 mg) by mouth 3 times daily as           needed for muscle spasms  Authorizing Provider: SARAH CASTILLO        Reviewed [place]  April 26, 2023- no concerning fills.    Sarah LIEBERMAN, RN CNP, FNP  [facility]  [facility]

## 2023-04-26 NOTE — TELEPHONE ENCOUNTER
Medication refill information reviewed. Spoke with patient and the Norco is helpful. She would also like a 30 day supply of cyclobenzaprine 10 MG tablets. She uses between 2 and three tablets daily and feels 75 tablets would be a 30 day supply. Pended for review.     Due date for  HYDROcodone-acetaminophen (NORCO) 7.5-325 MG per tablet  is 04/26/23     Prescriptions prepped for review.     Will route to provider.

## 2023-04-26 NOTE — TELEPHONE ENCOUNTER
Spoke with patient and she would like the referral to sports medicine for her shoulder. Pended for review.     DANIEL AllenN, RN  Care Coordinator  [facility] [place]

## 2023-05-11 PROBLEM — R60.0 LOCALIZED EDEMA: Status: RESOLVED | Noted: 2023-02-16 | Resolved: 2023-05-11

## 2023-05-11 PROBLEM — Z98.890 S/P ACL REPAIR: Status: RESOLVED | Noted: 2023-02-16 | Resolved: 2023-05-11

## 2023-05-11 PROBLEM — R26.81 UNSTEADY GAIT: Status: RESOLVED | Noted: 2023-02-16 | Resolved: 2023-05-11

## 2023-05-11 PROBLEM — M25.562 ACUTE PAIN OF LEFT KNEE: Status: RESOLVED | Noted: 2023-02-16 | Resolved: 2023-05-11

## 2023-05-22 ENCOUNTER — OFFICE VISIT (OUTPATIENT)
Dept: PALLIATIVE MEDICINE | Facility: CLINIC | Age: 43
End: 2023-05-22
Attending: NURSE PRACTITIONER
Payer: MEDICAID

## 2023-05-22 VITALS — SYSTOLIC BLOOD PRESSURE: 167 MMHG | HEART RATE: 106 BPM | DIASTOLIC BLOOD PRESSURE: 113 MMHG

## 2023-05-22 DIAGNOSIS — Z79.891 ENCOUNTER FOR LONG-TERM USE OF OPIATE ANALGESIC: ICD-10-CM

## 2023-05-22 DIAGNOSIS — Z98.1 S/P CERVICAL SPINAL FUSION: ICD-10-CM

## 2023-05-22 DIAGNOSIS — G89.29 CHRONIC PAIN OF LEFT KNEE: ICD-10-CM

## 2023-05-22 DIAGNOSIS — Z98.890 S/P REPAIR OF ANTERIOR CRUCIATE LIGAMENT: ICD-10-CM

## 2023-05-22 DIAGNOSIS — M75.111 INCOMPLETE TEAR OF RIGHT ROTATOR CUFF, UNSPECIFIED WHETHER TRAUMATIC: ICD-10-CM

## 2023-05-22 DIAGNOSIS — M25.511 CHRONIC RIGHT SHOULDER PAIN: Primary | ICD-10-CM

## 2023-05-22 DIAGNOSIS — G89.29 CHRONIC RIGHT SHOULDER PAIN: Primary | ICD-10-CM

## 2023-05-22 DIAGNOSIS — I10 PRIMARY HYPERTENSION: ICD-10-CM

## 2023-05-22 DIAGNOSIS — G89.29 CHRONIC NECK PAIN: ICD-10-CM

## 2023-05-22 DIAGNOSIS — M54.2 CHRONIC NECK PAIN: ICD-10-CM

## 2023-05-22 DIAGNOSIS — M25.562 CHRONIC PAIN OF LEFT KNEE: ICD-10-CM

## 2023-05-22 DIAGNOSIS — M62.838 MUSCLE SPASM: ICD-10-CM

## 2023-05-22 DIAGNOSIS — F11.90 CHRONIC, CONTINUOUS USE OF OPIOIDS: ICD-10-CM

## 2023-05-22 DIAGNOSIS — M79.18 MYOFASCIAL PAIN: ICD-10-CM

## 2023-05-22 PROCEDURE — 99214 OFFICE O/P EST MOD 30 MIN: CPT | Performed by: NURSE PRACTITIONER

## 2023-05-22 RX ORDER — CYCLOBENZAPRINE HCL 10 MG
10 TABLET ORAL 3 TIMES DAILY PRN
Qty: 75 TABLET | Refills: 0 | Status: SHIPPED | OUTPATIENT
Start: 2023-05-22 | End: 2023-06-22

## 2023-05-22 RX ORDER — HYDROCODONE BITARTRATE AND ACETAMINOPHEN 7.5; 325 MG/1; MG/1
1 TABLET ORAL EVERY 6 HOURS PRN
Qty: 90 TABLET | Refills: 0 | Status: SHIPPED | OUTPATIENT
Start: 2023-05-22 | End: 2023-05-26

## 2023-05-22 RX ORDER — DULOXETIN HYDROCHLORIDE 20 MG/1
CAPSULE, DELAYED RELEASE ORAL
Qty: 60 CAPSULE | Refills: 0 | Status: SHIPPED | OUTPATIENT
Start: 2023-05-22 | End: 2023-06-22

## 2023-05-22 ASSESSMENT — PAIN SCALES - GENERAL: PAINLEVEL: SEVERE PAIN (6)

## 2023-05-22 NOTE — PROGRESS NOTES
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  substantial degenerative change of the glenohumeral joint.     Soft tissue is unremarkable. The visualized lung is clear.                                                                      Impression:  1. No acute osseous abnormality.  2. No substantial degenerative change.     BRIDGETTE FRANCO MD (Joe)       Other testing (labs, diagnostics):  2/28/2023  Cr. 0.57  Est GFR >90      Screening tools:     DIRE Score for ongoing opioid management is calculated as follows:    Diagnosis = 2    Intractability = 2    Risk: Psych = 2  Chem Hlth = 1-2  Reliability = 2  Social = 2    Efficacy = 2    Total DIRE Score = 13-14 (14 or higher predicts good candidate for ongoing opioid management; 13 or lower predicts poor candidate for opioid management)         Assessment:  1. Chronic right shoulder pain  2. Incomplete tear of right rotator cuff, unspecified whether traumatic  3. Chronic neck pain  4. S/p cervical spinal fusion  5. Chronic pain of left knee  6. S/p repair of anterior cruciate ligament  7. Muscle spasm  8. Myofascial pain  9. Primary  hypertension  10. Chronic continuous use of opioids  11. Encounter for long term use of opiate analgesic  12. UDT 4/10/2023  13. CSA 4/10/2023    14. Previous alcohol dependence in  during her divorce  15. PMHx includes: overdose with Trazodone (2009 at age 28), acute alcoholic intoxication (2011, seen in ER), alcohol withdrawal (2011), alcohol dependence (), ASCUS favor benign (), degeneration of cervical disc, lumbago, Meniere's disease, left knee effusion  16. PSHx includes: low transverse  (2005), Cervical 4 to 6 Anterior Cervical Decompression and Fusion with Medtronic Plate and Screws, Interbody Device, Use of Fluoroscopy, Microscope (2022 by Wilbur Murray MD), graft bone from left iliac crest (2022), Right cervical 4-5 posterior foraminotomy (2022 by Wilbur Murray MD), arthroscopic reconstruction ACL Left knee with hamstring autograft (2023, by Mc Peterson MD), tonsillectomy, mastoidectomy, colonoscopy        Plan:   1. Physical Therapy: continue PHYSICAL THERAPY for the left knee  2. Clinical Health Psychologist to address issues of relaxation, behavioral change, coping style, and other factors important to improvement: none  3. Diagnostic Studies: none  4. Medication Management:   1. continue cyclobenzaprine 10mg up to 3 times daily for muscle spasms, caution sedation  2. continue Norco 7.5/325mg may take 1 tab every 6 hours as needed for pain, max of 3/day. #90/30 days. Fill  and start   3. Continue Voltaren gel (diclofenac sodium 1%) this is found over-the-counter and you may use 2 grams to the right shoulder and 4 grams to left knee up to 4 times daily.   4. START duloxetine (Cymbalta) 20mg in the AM for 2 weeks, then increase to 40mg every morning. If sleepy, take at night. Stop if mood worsens, ER if suicidal  5. We can consider transition to buprenorphine in the form of Butrans patch (skin patch) or Belbuca (buccal mouth film  twice daily) in the future. Less opiate tolerance, less respiratory depression. Excellent safety profile. Norco would be stopped 24 hours prior to starting this med.   5. Further procedures recommended: trigger point injections. Our office will contact you  6. Recommendations/follow-up for PCP:  See above  7. Release of information: none  8. Follow up: 8 weeks in-person  9. Contact your Primary Care Provider re: high blood pressure               ASSESSMENT AND PLAN:  (M25.511,  G89.29) Chronic right shoulder pain  (primary encounter diagnosis)  Plan: DULoxetine (CYMBALTA) 20 MG capsule,         cyclobenzaprine (FLEXERIL) 10 MG tablet, Adult         Pain Clinic Follow-Up Order, DISCONTINUED:         HYDROcodone-acetaminophen (NORCO) 7.5-325 MG         per tablet            (M75.111) Incomplete tear of right rotator cuff, unspecified whether traumatic  Plan: DULoxetine (CYMBALTA) 20 MG capsule, Adult Pain        Clinic Follow-Up Order, DISCONTINUED:         HYDROcodone-acetaminophen (NORCO) 7.5-325 MG         per tablet            (M54.2,  G89.29) Chronic neck pain  Plan: DULoxetine (CYMBALTA) 20 MG capsule,         cyclobenzaprine (FLEXERIL) 10 MG tablet, Adult         Pain Clinic Follow-Up Order, DISCONTINUED:         HYDROcodone-acetaminophen (NORCO) 7.5-325 MG         per tablet            (Z98.1) S/P cervical spinal fusion  Plan: DULoxetine (CYMBALTA) 20 MG capsule,         cyclobenzaprine (FLEXERIL) 10 MG tablet, Adult         Pain Clinic Follow-Up Order, DISCONTINUED:         HYDROcodone-acetaminophen (NORCO) 7.5-325 MG         per tablet            (M25.562,  G89.29) Chronic pain of left knee  Plan: DULoxetine (CYMBALTA) 20 MG capsule, Adult Pain        Clinic Follow-Up Order, DISCONTINUED:         HYDROcodone-acetaminophen (NORCO) 7.5-325 MG         per tablet            (Z98.890) S/P repair of anterior cruciate ligament  Plan: DULoxetine (CYMBALTA) 20 MG capsule, Adult Pain        Clinic Follow-Up Order,  DISCONTINUED:         HYDROcodone-acetaminophen (NORCO) 7.5-325 MG         per tablet            (M62.838) Muscle spasm  Plan: cyclobenzaprine (FLEXERIL) 10 MG tablet, PAIN         INJECTION EVAL/TREAT/FOLLOW UP, Adult Pain         Clinic Follow-Up Order            (M79.18) Myofascial pain  Plan: DULoxetine (CYMBALTA) 20 MG capsule,         cyclobenzaprine (FLEXERIL) 10 MG tablet, PAIN         INJECTION EVAL/TREAT/FOLLOW UP, Adult Pain         Clinic Follow-Up Order            (I10) Primary hypertension  Plan: Home Blood Pressure Monitor Order for DME -         ONLY FOR DME, Adult Pain Clinic Follow-Up Order            (F11.90) Chronic, continuous use of opioids  Plan: Adult Pain Clinic Follow-Up Order,         DISCONTINUED: HYDROcodone-acetaminophen (NORCO)        7.5-325 MG per tablet            (Z79.891) Encounter for long-term use of opiate analgesic  Plan: Adult Pain Clinic Follow-Up Order,         DISCONTINUED: HYDROcodone-acetaminophen (NORCO)        7.5-325 MG per tablet                Face to face time: 31 minutes               Sarah LIEBERMAN RN CNP, FNP  [facility]  [facility]

## 2023-05-22 NOTE — PATIENT INSTRUCTIONS
Plan:   Physical Therapy: continue PHYSICAL THERAPY for the left knee  Clinical Health Psychologist to address issues of relaxation, behavioral change, coping style, and other factors important to improvement: none  Diagnostic Studies: none  Medication Management:   continue cyclobenzaprine 10mg up to 3 times daily for muscle spasms, caution sedation  continue Norco 7.5/325mg may take 1 tab every 6 hours as needed for pain, max of 3/day. #90/30 days. Fill 5/24 and start 5/26  Continue Voltaren gel (diclofenac sodium 1%) this is found over-the-counter and you may use 2 grams to the right shoulder and 4 grams to left knee up to 4 times daily.   START duloxetine (Cymbalta) 20mg in the AM for 2 weeks, then increase to 40mg every morning. If sleepy, take at night. Stop if mood worsens, ER if suicidal  We can consider transition to buprenorphine in the form of Butrans patch (skin patch) or Belbuca (buccal mouth film twice daily) in the future. Less opiate tolerance, less respiratory depression. Excellent safety profile. Norco would be stopped 24 hours prior to starting this med.   Further procedures recommended: trigger point injections. Our office will contact you  Recommendations/follow-up for PCP:  See above  Release of information: none  Follow up: 8 weeks in-person  Contact your Primary Care Provider re: high blood pressure    ----------------------------------------------------------------  Clinic Number:  [phone]   Call with any questions about your care and for scheduling assistance.   Calls are returned Monday through Friday between 8 AM and 4:30 PM. We usually get back to you within 2 business days depending on the issue/request.    If we are prescribing your medications:  For opioid medication refills, call the clinic or send a [employer] message 7 days in advance.  Please include:  Name of requested medication  Name of the pharmacy.  For non-opioid medications, call your pharmacy directly to request a  refill. Please allow 3-4 days to be processed.   Per [place] State Law:  All controlled substance prescriptions must be filled within 30 days of being written.    For those controlled substances allowing refills, pickup must occur within 30 days of last fill.      We believe regular attendance is key to your success in our program!    Any time you are unable to keep your appointment we ask that you call us at least 24 hours in advance to cancel.This will allow us to offer the appointment time to another patient.   Multiple missed appointments may lead to dismissal from the clinic.

## 2023-05-24 ENCOUNTER — MYC MEDICAL ADVICE (OUTPATIENT)
Dept: PALLIATIVE MEDICINE | Facility: CLINIC | Age: 43
End: 2023-05-24
Payer: MEDICAID

## 2023-05-24 ENCOUNTER — TELEPHONE (OUTPATIENT)
Dept: PALLIATIVE MEDICINE | Facility: CLINIC | Age: 43
End: 2023-05-24
Payer: MEDICAID

## 2023-05-24 DIAGNOSIS — M25.562 CHRONIC PAIN OF LEFT KNEE: ICD-10-CM

## 2023-05-24 DIAGNOSIS — Z98.1 S/P CERVICAL SPINAL FUSION: ICD-10-CM

## 2023-05-24 DIAGNOSIS — Z79.891 ENCOUNTER FOR LONG-TERM USE OF OPIATE ANALGESIC: ICD-10-CM

## 2023-05-24 DIAGNOSIS — G89.29 CHRONIC RIGHT SHOULDER PAIN: ICD-10-CM

## 2023-05-24 DIAGNOSIS — M25.511 CHRONIC RIGHT SHOULDER PAIN: ICD-10-CM

## 2023-05-24 DIAGNOSIS — M75.111 INCOMPLETE TEAR OF RIGHT ROTATOR CUFF, UNSPECIFIED WHETHER TRAUMATIC: ICD-10-CM

## 2023-05-24 DIAGNOSIS — F11.90 CHRONIC, CONTINUOUS USE OF OPIOIDS: ICD-10-CM

## 2023-05-24 DIAGNOSIS — Z98.890 S/P REPAIR OF ANTERIOR CRUCIATE LIGAMENT: ICD-10-CM

## 2023-05-24 DIAGNOSIS — G89.29 CHRONIC NECK PAIN: ICD-10-CM

## 2023-05-24 DIAGNOSIS — G89.29 CHRONIC PAIN OF LEFT KNEE: ICD-10-CM

## 2023-05-24 DIAGNOSIS — M54.2 CHRONIC NECK PAIN: ICD-10-CM

## 2023-05-24 NOTE — TELEPHONE ENCOUNTER
Prior Authorization Retail Medication Request    Medication/Dose: HYDROcodone-acetaminophen (NORCO) 7.5-325 MG per tablet  ICD code (if different than what is on RX):    Chronic right shoulder pain [M25.511, G89.29]       Incomplete tear of right rotator cuff, unspecified whether traumatic [M75.111]       Chronic neck pain [M54.2, G89.29]       S/P cervical spinal fusion [Z98.1]       Chronic pain of left knee [M25.562, G89.29]       S/P repair of anterior cruciate ligament [Z98.890]       Chronic, continuous use of opioids [F11.90]       Encounter for long-term use of opiate analgesic [Z79.891]           Previously Tried and Failed:    Rationale:      Insurance Name:    Coverage information:     Subscriber: [id number] ENZO SU     Rel to sub: 01 - Self     Member ID: [id number]     Payor: 16-[employer]ICA[employer] [place] Ph: [phone]     Benefit plan: 1419-[employer]ICAID [place] Ph: [phone]     Group number: Not given     Member effective dates: from 05/22/23        Insurance ID:        Pharmacy Information (if different than what is on RX)  Name:  [id number] - [place] - [address] AT [address]  Phone:

## 2023-05-25 NOTE — TELEPHONE ENCOUNTER
[facility] Call Center    Phone Message    May a detailed message be left on voicemail: yes     Reason for Call: Other: Christine calling to request a call back to discuss the status of her norco refill. Christine is leaving tomorrow morning to [facility] for a few weeks and is needing this medication prior to leaving. Please call Christine at your earliest convenience to discuss.     Action Taken: Message routed to:  Other: [facility]    Travel Screening: Not Applicable

## 2023-05-25 NOTE — TELEPHONE ENCOUNTER
Christine FLOOD Pain Nurse (supporting Ken, Sarah Bui, APRN CNP) 2 hours ago (11:02 AM)     Yes, it is frustrating, it doesn't make sense to me when the script already states the quantity and how many day supply it is for, but anyways.  They only filled the flexeril (Cyclobenzaprine) 10mg for a 25 day supply instead of a 30 day supply 75 tabs and should have been 90 tabs.  and the Hydrocodone 7.5mg/325 they only gave a 7 day supply and should be a 30 day supply, distributing only 21 tabs instead of 90 tabs.  The pharmacy did also fax in the authorization request to Sarah Rogers.  Sorry this is such a pain, but I do need them approved today.  As I mentioned to Sarah, my family is alternating taking care of my terminal sister who has pulmonary fibrosis and I leave tomorrow  morning to [place], [place] where she lives to help care for her.  Also, although it has only been 2 days of taking the Cymbalta, you can let Sarah know I have not yet experienced any side effects.  Cyclobenzaprine 10 mg tab only ordered for 75 tablets.  Pharmacy state that fax was sent to initiate prior authorization.    Routing to provider to review Cyclobenzaprine.    Lupis Duque RN  [facility] [place]  [phone]

## 2023-05-26 NOTE — TELEPHONE ENCOUNTER
Please initiate prior authorization for:     Hydrocodone 7.5mg/325 30 day supply      Lupis Duque RN  [facility]  [phone]

## 2023-05-26 NOTE — TELEPHONE ENCOUNTER
Prior auth has been submitted.  Awaiting response.     Lupis Duque RN  [facility]  [phone]

## 2023-05-26 NOTE — TELEPHONE ENCOUNTER
[facility] Call Center    Phone Message    May a detailed message be left on voicemail: yes     Reason for Call: Other: Patient called and stated the pharmacy is faxing over a form that needs a signature stating she can pay out of pocket for a partial refill. Please call back when available.       Action Taken: Other: Pain    Travel Screening: Not Applicable

## 2023-05-26 NOTE — TELEPHONE ENCOUNTER
Responding to pt. In MyChart Encounter.      Lupis Duque RN  [facility] [place]  [phone]

## 2023-05-28 RX ORDER — HYDROCODONE BITARTRATE AND ACETAMINOPHEN 7.5; 325 MG/1; MG/1
1 TABLET ORAL EVERY 6 HOURS PRN
Qty: 90 TABLET | Refills: 0 | Status: SHIPPED | OUTPATIENT
Start: 2023-05-28 | End: 2023-06-22

## 2023-05-29 NOTE — TELEPHONE ENCOUNTER
Signed Prescriptions:                        Disp   Refills    HYDROcodone-acetaminophen (NORCO) 7.5-325 *90 tab*0        Sig: Take 1 tablet by mouth every 6 hours as needed for           moderate to severe pain Max of 3 tabs per day.           Fill 5/26/2023 and begin 05/29/23. 30 day supply           for chronic pain  Authorizing Provider: SARAH CASTILLO        Reviewed [place]  May 28, 2023- no concerning fills.    Sarah Castillo APRN, RN CNP, FNP  [facility]  [facility]

## 2023-06-07 NOTE — TELEPHONE ENCOUNTER
Prior Authorization Not Needed per Insurance    Medication: HYDROCODONE-ACETAMINOPHEN 7.5-325 MG PO TABS  Insurance Company: [place] Medicaid ([facility]) - Phone [phone] Fax [phone]  Expected CoPay:      Pharmacy Filling the Rx: [id number] - [place], [place] - [address]  Pharmacy Notified:    Patient Notified:          Thank you,    Alessandra Ruff  Oncology Pharmacy Liaison II  [email or web address]  Phone: [phone]  Fax: [phone]

## 2023-06-07 NOTE — TELEPHONE ENCOUNTER
PA Initiation    Medication: HYDROCODONE-ACETAMINOPHEN 7.5-325 MG PO TABS  Insurance Company: [place] Medicaid ([facility]) - Phone [phone] Fax [phone]  Pharmacy Filling the Rx: [facility] #[id number] - [place], [place] - [address]  Filling Pharmacy Phone: [phone]  Filling Pharmacy Fax: [phone]  Start Date: 6/7/2023    PA faxed to [phone]          Thank you,    Rehan Ruff  Oncology Pharmacy Liaison II  [email or web address]  Phone: [phone]  Fax: [phone]

## 2023-06-22 ENCOUNTER — MYC REFILL (OUTPATIENT)
Dept: PALLIATIVE MEDICINE | Facility: CLINIC | Age: 43
End: 2023-06-22
Payer: MEDICAID

## 2023-06-22 DIAGNOSIS — G89.29 CHRONIC NECK PAIN: ICD-10-CM

## 2023-06-22 DIAGNOSIS — Z98.890 S/P REPAIR OF ANTERIOR CRUCIATE LIGAMENT: ICD-10-CM

## 2023-06-22 DIAGNOSIS — F11.90 CHRONIC, CONTINUOUS USE OF OPIOIDS: ICD-10-CM

## 2023-06-22 DIAGNOSIS — M62.838 MUSCLE SPASM: ICD-10-CM

## 2023-06-22 DIAGNOSIS — M75.111 INCOMPLETE TEAR OF RIGHT ROTATOR CUFF, UNSPECIFIED WHETHER TRAUMATIC: ICD-10-CM

## 2023-06-22 DIAGNOSIS — G89.29 CHRONIC RIGHT SHOULDER PAIN: ICD-10-CM

## 2023-06-22 DIAGNOSIS — Z79.891 ENCOUNTER FOR LONG-TERM USE OF OPIATE ANALGESIC: ICD-10-CM

## 2023-06-22 DIAGNOSIS — M54.2 CHRONIC NECK PAIN: ICD-10-CM

## 2023-06-22 DIAGNOSIS — G89.29 CHRONIC PAIN OF LEFT KNEE: ICD-10-CM

## 2023-06-22 DIAGNOSIS — M25.562 CHRONIC PAIN OF LEFT KNEE: ICD-10-CM

## 2023-06-22 DIAGNOSIS — M25.511 CHRONIC RIGHT SHOULDER PAIN: ICD-10-CM

## 2023-06-22 DIAGNOSIS — Z98.1 S/P CERVICAL SPINAL FUSION: ICD-10-CM

## 2023-06-22 DIAGNOSIS — M79.18 MYOFASCIAL PAIN: ICD-10-CM

## 2023-06-23 NOTE — TELEPHONE ENCOUNTER
Medication refill information reviewed.     Due date for HYDROcodone-acetaminophen (NORCO) 7.5-325 MG per tablet  is 6/28/2023     Prescriptions prepped for review.     Will route to provider.       Lupis Duque RN  [facility]  [phone]

## 2023-06-23 NOTE — TELEPHONE ENCOUNTER
Received refill request for:    DULoxetine (CYMBALTA) 20 MG capsule   cyclobenzaprine (FLEXERIL) 10 MG tablet     Last dispensed from pharmacy on 5/24/2023.     HYDROcodone-acetaminophen (NORCO) 7.5-325 MG per tablet       Last dispensed from pharmacy on 5/30/2023.    Patient's last office/virtual visit by prescribing provider on 5/22/2023    Next office/virtual appointment scheduled for 6/26/2023.    Last urine drug screen date 4/10/2023.    Current opioid agreement on file? Yes Date of opioid agreement: 4/10/2023.    E-prescribe to:    [id number] - [place] - [address] [address] AT [address]    Will route to nursing McClure for review and preparation of prescription(s).

## 2023-06-26 ENCOUNTER — MYC MEDICAL ADVICE (OUTPATIENT)
Dept: OBGYN | Facility: CLINIC | Age: 43
End: 2023-06-26

## 2023-06-26 ENCOUNTER — OFFICE VISIT (OUTPATIENT)
Dept: PALLIATIVE MEDICINE | Facility: CLINIC | Age: 43
End: 2023-06-26
Attending: NURSE PRACTITIONER
Payer: MEDICAID

## 2023-06-26 VITALS — SYSTOLIC BLOOD PRESSURE: 137 MMHG | DIASTOLIC BLOOD PRESSURE: 32 MMHG | HEART RATE: 87 BPM

## 2023-06-26 DIAGNOSIS — M79.18 MYOFASCIAL PAIN: ICD-10-CM

## 2023-06-26 DIAGNOSIS — D25.1 INTRAMURAL LEIOMYOMA OF UTERUS: Primary | ICD-10-CM

## 2023-06-26 DIAGNOSIS — M62.838 MUSCLE SPASM: ICD-10-CM

## 2023-06-26 PROCEDURE — 20553 NJX 1/MLT TRIGGER POINTS 3/>: CPT | Performed by: PAIN MEDICINE

## 2023-06-26 RX ORDER — HYDROCODONE BITARTRATE AND ACETAMINOPHEN 7.5; 325 MG/1; MG/1
1 TABLET ORAL EVERY 6 HOURS PRN
Qty: 90 TABLET | Refills: 0 | Status: SHIPPED | OUTPATIENT
Start: 2023-06-26 | End: 2023-07-18

## 2023-06-26 RX ORDER — DULOXETIN HYDROCHLORIDE 20 MG/1
CAPSULE, DELAYED RELEASE ORAL
Qty: 60 CAPSULE | Refills: 0 | Status: SHIPPED | OUTPATIENT
Start: 2023-06-26 | End: 2023-07-18

## 2023-06-26 RX ORDER — BUPIVACAINE HYDROCHLORIDE 2.5 MG/ML
10 INJECTION, SOLUTION EPIDURAL; INFILTRATION; INTRACAUDAL ONCE
Status: COMPLETED | OUTPATIENT
Start: 2023-06-26 | End: 2023-06-26

## 2023-06-26 RX ORDER — TRIAMCINOLONE ACETONIDE 40 MG/ML
40 INJECTION, SUSPENSION INTRA-ARTICULAR; INTRAMUSCULAR ONCE
Status: COMPLETED | OUTPATIENT
Start: 2023-06-26 | End: 2023-06-26

## 2023-06-26 RX ORDER — CYCLOBENZAPRINE HCL 10 MG
10 TABLET ORAL 3 TIMES DAILY PRN
Qty: 75 TABLET | Refills: 0 | Status: SHIPPED | OUTPATIENT
Start: 2023-06-26 | End: 2023-08-28

## 2023-06-26 RX ADMIN — BUPIVACAINE HYDROCHLORIDE 25 MG: 2.5 INJECTION, SOLUTION EPIDURAL; INFILTRATION; INTRACAUDAL at 08:29

## 2023-06-26 RX ADMIN — TRIAMCINOLONE ACETONIDE 40 MG: 40 INJECTION, SUSPENSION INTRA-ARTICULAR; INTRAMUSCULAR at 08:29

## 2023-06-26 ASSESSMENT — PAIN SCALES - GENERAL: PAINLEVEL: MODERATE PAIN (4)

## 2023-06-26 NOTE — TELEPHONE ENCOUNTER
I have not seen patient in almost 2 years. Would need to see first and then can order if appropriate-we can discuss it further at her appointment later this week. Renetta LIEBERMAN CNP

## 2023-06-26 NOTE — PROGRESS NOTES
Christine was seen today for pain.    Diagnoses and all orders for this visit:    Muscle spasm  -     PAIN INJECTION EVAL/TREAT/FOLLOW UP    Myofascial pain  -     PAIN INJECTION EVAL/TREAT/FOLLOW UP  -     NO CHARGE LOS  -     triamcinolone (KENALOG-40) injection 40 mg  -     bupivacaine (MARCAINE) 0.25% preservative free injection        Trigger points were identified by patient, and marked when appropriate.  The area was prepped with Chloroprep.    Using clean technique, injections were completed using a 25G, 3.5 inch needle.  After negative aspiration, injection was completed.  A total of 5 locations were injected.  When possible, tissue was retracted from the chest wall to avoid lung injury.    Muscle groups injected:  Right trapezius, deltoid, levator scapulae     Injection solution contained:  9ml of 0.25% bupivacaine and 40mg of Kenalog        Alexx Leslie MD  [facility]

## 2023-06-26 NOTE — PATIENT INSTRUCTIONS
[facility]  Post Procedure Instructions    Today you had:  trigger point injections   occipital nerve block   bursa injection  Joint Injection    Medications used:  lidocaine   bupivacaine   kenalog   dexamethasone        Go to the emergency room if you develop any shortness of breath  Monitor the injection sites for signs and symptoms of infection-fever, chills, redness, swelling, warmth, or drainage to areas.  You may have soreness at injection sites for up to 24 hours.  It may take up to 14 days for the steroid medication to start working although you may feel the effect as early as a few days after the procedure.     You may apply ice to the painful areas to help minimize the discomfort of the needle pokes.  Do not apply heat to sites for at least 12 hours.  You may use anti-inflammatory medications or Tylenol for pain control if necessary    Pain Clinic phone number during work hours (Monday through Friday 8 am-4:30 pm) at [phone] or the Provider Line after hours at [phone]:

## 2023-06-26 NOTE — NURSING NOTE
Are you currently taking antibiotics or have an active infection?  NO        Are you currently taking oral steroids? NO      Are you pregnant or breastfeeding?  NO        Notify provider and RNs if systolic BP >170, diastolic BP >100, P >100 or O2 sats < 90%    Wen Lees MA  [facility] [place]

## 2023-06-26 NOTE — TELEPHONE ENCOUNTER
Routed request to provider.   Patient has annual exam scheduled with Renetta on 6/29. Patient is requesting order for a pelvic ultra sound d/t history of fibroids.    Last pelvic ultra sound done 10/25/2021

## 2023-06-27 ENCOUNTER — TELEPHONE (OUTPATIENT)
Dept: FAMILY MEDICINE | Facility: CLINIC | Age: 43
End: 2023-06-27
Payer: MEDICAID

## 2023-06-27 NOTE — TELEPHONE ENCOUNTER
Patient Quality Outreach    Patient is due for the following:   Cervical Cancer Screening - PAP Needed  Physical Preventive Adult Physical      Topic Date Due     Hepatitis B Vaccine (1 of 3 - 3-dose series) Never done     COVID-19 Vaccine (1) Never done     Pneumococcal Vaccine (1 - PCV) Never done     Diptheria Tetanus Pertussis (DTAP/TDAP/TD) Vaccine (2 - Td or Tdap) 10/23/2022       Type of outreach:    Chart review performed, no outreach needed. and pt has appt 6/29/23      Questions for provider review:    None           Flavia Green MA  Chart routed to Care Team.

## 2023-06-29 ENCOUNTER — OFFICE VISIT (OUTPATIENT)
Dept: OBGYN | Facility: CLINIC | Age: 43
End: 2023-06-29
Payer: MEDICAID

## 2023-06-29 VITALS
OXYGEN SATURATION: 94 % | BODY MASS INDEX: 24.83 KG/M2 | DIASTOLIC BLOOD PRESSURE: 92 MMHG | SYSTOLIC BLOOD PRESSURE: 156 MMHG | HEIGHT: 67 IN | WEIGHT: 158.2 LBS | HEART RATE: 81 BPM

## 2023-06-29 DIAGNOSIS — D25.1 INTRAMURAL LEIOMYOMA OF UTERUS: ICD-10-CM

## 2023-06-29 DIAGNOSIS — R03.0 ELEVATED BLOOD PRESSURE READING WITHOUT DIAGNOSIS OF HYPERTENSION: ICD-10-CM

## 2023-06-29 DIAGNOSIS — Z12.31 BREAST CANCER SCREENING BY MAMMOGRAM: ICD-10-CM

## 2023-06-29 DIAGNOSIS — N92.6 IRREGULAR MENSES: ICD-10-CM

## 2023-06-29 DIAGNOSIS — Z01.419 ENCOUNTER FOR GYNECOLOGICAL EXAMINATION WITHOUT ABNORMAL FINDING: Primary | ICD-10-CM

## 2023-06-29 PROCEDURE — 99213 OFFICE O/P EST LOW 20 MIN: CPT | Mod: 25 | Performed by: NURSE PRACTITIONER

## 2023-06-29 PROCEDURE — 87624 HPV HI-RISK TYP POOLED RSLT: CPT | Performed by: NURSE PRACTITIONER

## 2023-06-29 PROCEDURE — 99396 PREV VISIT EST AGE 40-64: CPT | Performed by: NURSE PRACTITIONER

## 2023-06-29 PROCEDURE — G0145 SCR C/V CYTO,THINLAYER,RESCR: HCPCS | Performed by: NURSE PRACTITIONER

## 2023-06-29 ASSESSMENT — ENCOUNTER SYMPTOMS
DIZZINESS: 0
COUGH: 0
MYALGIAS: 1
PALPITATIONS: 0
NAUSEA: 0
HEMATURIA: 0
FREQUENCY: 0
HEMATOCHEZIA: 0
DYSURIA: 0
NERVOUS/ANXIOUS: 1
SORE THROAT: 0
WEAKNESS: 0
CONSTIPATION: 0
BREAST MASS: 0
JOINT SWELLING: 0
FEVER: 0
SHORTNESS OF BREATH: 0
PARESTHESIAS: 0
HEARTBURN: 0
DIARRHEA: 0
HEADACHES: 0
CHILLS: 0
ARTHRALGIAS: 0
EYE PAIN: 0
ABDOMINAL PAIN: 0

## 2023-06-29 NOTE — PROGRESS NOTES
SUBJECTIVE:   CC: Christine is an 42 year old who presents for preventive health visit.     Healthy Habits:     Getting at least 3 servings of Calcium per day:  Yes    Bi-annual eye exam:  Yes    Dental care twice a year:  Yes    Sleep apnea or symptoms of sleep apnea:  None    Diet:  Regular (no restrictions)    Frequency of exercise:  2-3 days/week    Duration of exercise:  30-45 minutes    Taking medications regularly:  Yes    Medication side effects:  None    PHQ-2 Total Score: 0    Additional concerns today:  No    In addition to preventive care, would like to discuss her uterine fibroids and fertility. Patient was evaluated last year and 2021 for irregular menstrual cycles after discontinuing oral contraceptive pills to attempt pregnancy. Initial labs done showed an elevated FSH. Repeat several months later on cycle day 3 and had become lower.   Patient was going to see RE to discuss ability to become pregnant. Patient states the last 7 months, had regular monthly cycles and was doing home ovulation tests and was getting positive results. Now no cycle in June.   Saw a RE in [place] ([place]?) and per patient was told she needed to look at donor eggs. Patient was not happy with this and now plans to follow up for another opinion at [facility]-no appointment scheduled.  Patient has a history of an intramural uterine fibroid of 6 cm, last imaged 10/2021 and was slightly larger than prior imaging. Requests repeat imaging as RE would want this updated.  Cycles are not heavy when she has them, last several days.       Today's PHQ-2 Score:       6/29/2023     8:58 AM   PHQ-2 ( 1999 [employer])   Q1: Little interest or pleasure in doing things 0   Q2: Feeling down, depressed or hopeless 0   PHQ-2 Score 0   Q1: Little interest or pleasure in doing things Not at all   Q2: Feeling down, depressed or hopeless Not at all   PHQ-2 Score 0           Social History     Tobacco Use     Smoking status: Every Day     Packs/day: 0.25      Types: Cigarettes     Smokeless tobacco: Never     Tobacco comments:     Reduced to 6 per day started 10/25/22   Substance Use Topics     Alcohol use: Not Currently             2023     8:57 AM   Alcohol Use   Prescreen: >3 drinks/day or >7 drinks/week? Not Applicable     Reviewed orders with patient.  Reviewed health maintenance and updated orders accordingly - Yes  Patient Active Problem List   Diagnosis     Degeneration of cervical intervertebral disc     Contraceptive management     History of abnormal Pap smear     Depressive disorder, not elsewhere classified     Thyrotoxicosis     Low back pain     Generalized anxiety disorder     Mastoiditis     Overdose     Neck pain     Tobacco use     Primary insomnia     History of Clostridium difficile     Heart murmur     Acute gastritis     Anxiety state     Bladder retention of urine     Hepatitis     Hypomagnesemia     Palpitations     Sprain of ankle     Myalgia     Rupture of anterior cruciate ligament of left knee, initial encounter     S/P spinal surgery     Cervical radiculopathy     History of fusion of cervical spine     Past Surgical History:   Procedure Laterality Date     ARTHROSCOPIC RECONSTRUCTION ANTERIOR CRUCIATE LIGAMENT HAMSTRING AUTOGRAFT Left 2023    Procedure: left knee examination under anesthesia, knee arthroscopy, anterior cruciate ligament reconstruction hamstring autograft;  Surgeon: Mc Peterson MD;  Location: [facility] OR     C/SECTION, LOW TRANSVERSE  2005    , Low Transverse     COLONOSCOPY      5 yrs     DECOMPRESSION, FUSION CERVICAL ANTERIOR TWO LEVELS, COMBINED N/A 2022    Procedure: Cervical 4 to 6 Anterior Cervical Decompression and Fusion with [employer] Plate and Screws, Interbody Device, Use of Fluoroscopy, Microscope;  Surgeon: Wilbur Murray MD;  [facility]     GRAFT BONE FROM ILIAC CREST Left 2022    Procedure: Left Sided Iliac Crest Autograft;  Surgeon: Trevor  Wilbur Crane MD;  [facility]     LAMINECTOMY CERIVCAL POSTERIOR THREE+ LEVELS N/A 11/29/2022    Procedure: Right cervical 4-5 posterior foraminotomy;  Surgeon: Wilbur Murray MD;  [facility]     MASTOIDECTOMY       TONSILLECTOMY         Social History     Tobacco Use     Smoking status: Every Day     Packs/day: 0.25     Types: Cigarettes     Smokeless tobacco: Never     Tobacco comments:     Reduced to 6 per day started 10/25/22   Substance Use Topics     Alcohol use: Not Currently     Family History   Problem Relation Age of Onset     Allergies Mother      Psychotic Disorder Father         depression     Heart Failure Father         heart attack in NOV. 2015?     Allergies Sister      Allergies Sister      Psychotic Disorder Sister         anxiety     Psychotic Disorder Sister         depression     Psychotic Disorder Maternal Uncle         anxiety     Asthma Other      C.A.D. No family hx of      Diabetes No family hx of      Hypertension No family hx of      Cerebrovascular Disease No family hx of      Breast Cancer No family hx of      Cancer - colorectal No family hx of      Prostate Cancer No family hx of      Anesthesia Reaction No family hx of            Breast Cancer Screening:        10/18/2022    10:08 AM   Breast CA Risk Assessment (FHS-7)   Do you have a family history of breast, colon, or ovarian cancer? No / Unknown       Mammogram Screening - Offered annual screening and updated Health Maintenance for mutual plan based on risk factor consideration    Pertinent mammograms are reviewed under the imaging tab.    History of abnormal Pap smear: YES - updated in Problem List and Health Maintenance accordingly      Latest Ref Rng & Units 6/1/2018     8:58 AM 5/31/2018     3:10 PM 1/26/2015    12:00 AM   PAP / HPV   PAP (Historical)  NIL   ASC-US    HPV 16 DNA NEG^Negative  Negative     HPV 18 DNA NEG^Negative  Negative     Other HR HPV NEG^Negative  Negative       Reviewed and  updated as needed this visit by clinical staff   Tobacco  Allergies  Meds  Problems  Med Hx  Surg Hx  Fam Hx  Soc   Hx        Reviewed and updated as needed this visit by Provider    Allergies   Problems            Past Medical History:   Diagnosis Date     Acute alcoholic intoxication (H) 2011    Seen in emergency room 2020 after 14 months sobriety     Alcohol dependence (H) 2012     Alcohol withdrawal (H) 2011     ASCUS favor benign 2015    Neg HPV     Degeneration of cervical intervertebral disc      Knee effusion, left      Lumbago      Ménière's disease      Overdose 2009    TRAZADONE      Past Surgical History:   Procedure Laterality Date     ARTHROSCOPIC RECONSTRUCTION ANTERIOR CRUCIATE LIGAMENT HAMSTRING AUTOGRAFT Left 2023    Procedure: left knee examination under anesthesia, knee arthroscopy, anterior cruciate ligament reconstruction hamstring autograft;  Surgeon: Mc Peterson MD;  Location: [facility] OR     C/SECTION, LOW TRANSVERSE  2005    , Low Transverse     COLONOSCOPY      5 yrs     DECOMPRESSION, FUSION CERVICAL ANTERIOR TWO LEVELS, COMBINED N/A 2022    Procedure: Cervical 4 to 6 Anterior Cervical Decompression and Fusion with [employer] Plate and Screws, Interbody Device, Use of Fluoroscopy, Microscope;  Surgeon: Wilbur Murray MD;  [facility]     GRAFT BONE FROM ILIAC CREST Left 2022    Procedure: Left Sided Iliac Crest Autograft;  Surgeon: Wilbur Murray MD;  [facility]     LAMINECTOMY CERIVCAL POSTERIOR THREE+ LEVELS N/A 2022    Procedure: Right cervical 4-5 posterior foraminotomy;  Surgeon: Wilbur Murray MD;  [facility]     MASTOIDECTOMY       TONSILLECTOMY         Review of Systems  CONSTITUTIONAL: NEGATIVE for fever, chills, change in weight  INTEGUMENTARU/SKIN: NEGATIVE for worrisome rashes, moles or lesions  EYES: NEGATIVE for vision changes or irritation  ENT:  "NEGATIVE for ear, mouth and throat problems  RESP: NEGATIVE for significant cough or SOB  BREAST: NEGATIVE for masses, tenderness or discharge  CV: NEGATIVE for chest pain, palpitations or peripheral edema  GI: NEGATIVE for nausea, abdominal pain, heartburn, or change in bowel habits  : NEGATIVE for unusual urinary or vaginal symptoms. Periods: see above  MUSCULOSKELETAL: NEGATIVE for significant arthralgias or myalgia  NEURO: NEGATIVE for weakness, dizziness or paresthesias  PSYCHIATRIC: NEGATIVE for changes in mood or affect     OBJECTIVE:   BP (!) 156/92 (BP Location: Right arm, Patient Position: Sitting, Cuff Size: Adult Regular)   Pulse 81   Ht 1.702 m (5' 7\")   Wt 71.8 kg (158 lb 3.2 oz)   LMP 05/04/2023 (Exact Date)   SpO2 94%   BMI 24.78 kg/m    Physical Exam  GENERAL: healthy, alert and no distress  NECK: no adenopathy, no asymmetry, masses, or scars and thyroid normal to palpation  RESP: lungs clear to auscultation - no rales, rhonchi or wheezes  BREAST: normal without masses, tenderness or nipple discharge and no palpable axillary masses or adenopathy  CV: regular rate and rhythm, normal S1 S2, no S3 or S4, no murmur, click or rub, no peripheral edema and peripheral pulses strong  ABDOMEN: soft, nontender, no hepatosplenomegaly, no masses and bowel sounds normal   (female): normal female external genitalia, normal urethral meatus, vaginal mucosa pink, moist, well rugated, and normal cervix/adnexa/uterus without masses or discharge  MS: no gross musculoskeletal defects noted, no edema  SKIN: no suspicious lesions or rashes  NEURO: Normal strength and tone, mentation intact and speech normal  PSYCH: mentation appears normal, affect normal/bright    ASSESSMENT/PLAN:   (Z01.419) Encounter for gynecological examination without abnormal finding  (primary encounter diagnosis)  Plan: Pap Screen with HPV - recommended age 30 - 65         years         (Z12.31) Breast cancer screening by mammogram  Plan: " "MA Screening Digital Bilateral         (D25.1) Intramural leiomyoma of uterus  Comment: Will order repeat ultrasound for assessment. We discussed uterine fibroids again, reviewed monitoring, possible symptoms they can cause, bleeding changes.   Plan: US Pelvic Transabdominal and Transvaginal          (R03.0) Elevated blood pressure reading without diagnosis of hypertension  Comment: Per patient, her pain clinic provider did give her a prescription for a home blood pressure cuff. Has not picked it up yet. Encouraged her to get this and start monitoring blood pressures. Will record and reviewed parameters for which she would need follow up with primary care.    (N92.6) Irregular menses  Comment: We discussed her history. Patient will follow up as she plans with RE as she desires a second opinion. We discussed her skipped cycle this month. If no cycle in the next 1-2 months and if she still has not been able to get an appointment with RE, will follow up.     Patient has been advised of split billing requirements and indicates understanding: Yes      COUNSELING:  Reviewed preventive health counseling, as reflected in patient instructions  Special attention given to:        Regular exercise       Healthy diet/nutrition       Family planning      BMI:   Estimated body mass index is 24.78 kg/m  as calculated from the following:    Height as of this encounter: 1.702 m (5' 7\").    Weight as of this encounter: 71.8 kg (158 lb 3.2 oz).           [facility]  "

## 2023-06-29 NOTE — PATIENT INSTRUCTIONS
If you have any questions regarding your visit, Please contact your care team.     [employer]: [phone]  To Schedule an Appointment 24/7  Call: [phone] HOU[facility] TELEPHONE NUMBER     Renetta Contreras- APRN CNP      Ronnie Cruz-Surgery Scheduler  Shraddha-Surgery Scheduler         Monday 7:30am-2:00pm    Tuesday 7:30am-4:00pm    Wednesday 7:30am-2:00pm    Thursday 7:30am-11:00am    Friday 7:30am-2:00pm [place] [address] [place] [address] [phone] ask for [facility]  [phone] Fax    Imaging Scheduling all locations  [phone]    [facility]  [address] [place] [address] Dr.  [place], [place] [id number] [phone]         Urgent Care locations:  [facility]   Monday-Friday  10 am - 8 pm  Saturday and Sunday   9 am - 5 pm     [phone]   If you need a medication refill, please contact your pharmacy. Please allow 3 business days for your refill to be completed.  As always, Thank you for trusting us with your healthcare needs!      see additional instructions from your care team below

## 2023-07-05 LAB
BKR LAB AP GYN ADEQUACY: NORMAL
BKR LAB AP GYN INTERPRETATION: NORMAL
BKR LAB AP HPV REFLEX: NORMAL
BKR LAB AP PREVIOUS ABNORMAL: NORMAL
PATH REPORT.COMMENTS IMP SPEC: NORMAL
PATH REPORT.COMMENTS IMP SPEC: NORMAL
PATH REPORT.RELEVANT HX SPEC: NORMAL

## 2023-07-05 NOTE — TELEPHONE ENCOUNTER
Patient Quality Outreach      Type of outreach:    Pt completed visit 6/29/23 and pap done

## 2023-07-06 LAB
HUMAN PAPILLOMA VIRUS 16 DNA: NEGATIVE
HUMAN PAPILLOMA VIRUS 18 DNA: NEGATIVE
HUMAN PAPILLOMA VIRUS FINAL DIAGNOSIS: NORMAL
HUMAN PAPILLOMA VIRUS OTHER HR: NEGATIVE

## 2023-07-18 ENCOUNTER — MYC REFILL (OUTPATIENT)
Dept: PALLIATIVE MEDICINE | Facility: CLINIC | Age: 43
End: 2023-07-18
Payer: COMMERCIAL

## 2023-07-18 DIAGNOSIS — G89.29 CHRONIC PAIN OF LEFT KNEE: ICD-10-CM

## 2023-07-18 DIAGNOSIS — M25.511 CHRONIC RIGHT SHOULDER PAIN: ICD-10-CM

## 2023-07-18 DIAGNOSIS — G89.29 CHRONIC RIGHT SHOULDER PAIN: ICD-10-CM

## 2023-07-18 DIAGNOSIS — G89.29 CHRONIC NECK PAIN: ICD-10-CM

## 2023-07-18 DIAGNOSIS — Z79.891 ENCOUNTER FOR LONG-TERM USE OF OPIATE ANALGESIC: ICD-10-CM

## 2023-07-18 DIAGNOSIS — M79.18 MYOFASCIAL PAIN: ICD-10-CM

## 2023-07-18 DIAGNOSIS — F11.90 CHRONIC, CONTINUOUS USE OF OPIOIDS: ICD-10-CM

## 2023-07-18 DIAGNOSIS — M54.2 CHRONIC NECK PAIN: ICD-10-CM

## 2023-07-18 DIAGNOSIS — M75.111 INCOMPLETE TEAR OF RIGHT ROTATOR CUFF, UNSPECIFIED WHETHER TRAUMATIC: ICD-10-CM

## 2023-07-18 DIAGNOSIS — M25.562 CHRONIC PAIN OF LEFT KNEE: ICD-10-CM

## 2023-07-18 DIAGNOSIS — Z98.890 S/P REPAIR OF ANTERIOR CRUCIATE LIGAMENT: ICD-10-CM

## 2023-07-18 DIAGNOSIS — Z98.1 S/P CERVICAL SPINAL FUSION: ICD-10-CM

## 2023-07-18 RX ORDER — DULOXETIN HYDROCHLORIDE 20 MG/1
CAPSULE, DELAYED RELEASE ORAL
Qty: 60 CAPSULE | Refills: 0 | Status: SHIPPED | OUTPATIENT
Start: 2023-07-18 | End: 2023-08-28

## 2023-07-18 RX ORDER — HYDROCODONE BITARTRATE AND ACETAMINOPHEN 7.5; 325 MG/1; MG/1
1 TABLET ORAL EVERY 6 HOURS PRN
Qty: 90 TABLET | Refills: 0 | Status: SHIPPED | OUTPATIENT
Start: 2023-07-18 | End: 2023-08-28

## 2023-07-18 NOTE — TELEPHONE ENCOUNTER
Medication refill information reviewed.     Due date for HYDROcodone-acetaminophen (NORCO) 7.5-325 MG per tablet  is 07/28/23     Prescriptions prepped for review.     Will route to provider.

## 2023-07-18 NOTE — TELEPHONE ENCOUNTER
Patient Comment: Need before family vacation- leaving 7-24    Patient requesting refill(s) of HYDROcodone-acetaminophen (NORCO) 7.5-325 MG per tablet   Last dispensed from pharmacy on 6/27/23    Patient's last office/virtual visit by prescribing provider on 6/26/23  Next office/virtual appointment scheduled for 8/30/23    Last urine drug screen date 4/10/23  Current opioid agreement on file (completed within the last year) Yes Date of opioid agreement: 4/11/23    E-prescribe to [id number] - [place]     Will route to nursing [place] for review and preparation of prescription(s).

## 2023-07-18 NOTE — TELEPHONE ENCOUNTER
Signed Prescriptions:                        Disp   Refills    DULoxetine (CYMBALTA) 20 MG capsule        60 cap*0        Sig: take 40mg (2 capsules) each morning. If causes           drowsiness, then take in the evening. If mood           worsens or any suicidal ideation, stop medication           and go to ER if suicidal  Authorizing Provider: SARAH CASTILLO    HYDROcodone-acetaminophen (NORCO) 7.5-325 *90 tab*0        Sig: Take 1 tablet by mouth every 6 hours as needed for           moderate to severe pain Max of 3 tabs per day.           Fill 07/26/2023 and begin 07/28/23. 30 day supply           for chronic pain  Authorizing Provider: SARAH CASTILLO        Reviewed [place]  July 18, 2023- no concerning fills.    Sarah LIEBERMAN, RN CNP, FNP  [facility]  [facility]

## 2023-07-19 ENCOUNTER — ANCILLARY PROCEDURE (OUTPATIENT)
Dept: ULTRASOUND IMAGING | Facility: CLINIC | Age: 43
End: 2023-07-19
Attending: NURSE PRACTITIONER
Payer: COMMERCIAL

## 2023-07-19 DIAGNOSIS — D25.1 INTRAMURAL LEIOMYOMA OF UTERUS: ICD-10-CM

## 2023-07-19 PROCEDURE — 76830 TRANSVAGINAL US NON-OB: CPT | Mod: TC | Performed by: RADIOLOGY

## 2023-07-19 PROCEDURE — 76856 US EXAM PELVIC COMPLETE: CPT | Mod: TC | Performed by: RADIOLOGY

## 2023-07-24 ENCOUNTER — MYC MEDICAL ADVICE (OUTPATIENT)
Dept: PALLIATIVE MEDICINE | Facility: CLINIC | Age: 43
End: 2023-07-24
Payer: COMMERCIAL

## 2023-07-24 NOTE — TELEPHONE ENCOUNTER
Completed paperwork faxed back to [facility]. RightFax confirmed. Sent to scanning.

## 2023-07-24 NOTE — TELEPHONE ENCOUNTER
Reviewed, signed and placed in MA touchdown box.     Sarah LIEBERMAN RN CNP, FNP  [facility]  [facility]

## 2023-07-25 ENCOUNTER — TELEPHONE (OUTPATIENT)
Dept: PALLIATIVE MEDICINE | Facility: CLINIC | Age: 43
End: 2023-07-25

## 2023-07-25 DIAGNOSIS — M54.2 CHRONIC NECK PAIN: ICD-10-CM

## 2023-07-25 DIAGNOSIS — Z98.890 S/P REPAIR OF ANTERIOR CRUCIATE LIGAMENT: ICD-10-CM

## 2023-07-25 DIAGNOSIS — M75.111 INCOMPLETE TEAR OF RIGHT ROTATOR CUFF, UNSPECIFIED WHETHER TRAUMATIC: ICD-10-CM

## 2023-07-25 DIAGNOSIS — G89.29 CHRONIC RIGHT SHOULDER PAIN: ICD-10-CM

## 2023-07-25 DIAGNOSIS — F11.90 CHRONIC, CONTINUOUS USE OF OPIOIDS: ICD-10-CM

## 2023-07-25 DIAGNOSIS — G89.29 CHRONIC PAIN OF LEFT KNEE: ICD-10-CM

## 2023-07-25 DIAGNOSIS — Z98.1 S/P CERVICAL SPINAL FUSION: ICD-10-CM

## 2023-07-25 DIAGNOSIS — Z79.891 ENCOUNTER FOR LONG-TERM USE OF OPIATE ANALGESIC: ICD-10-CM

## 2023-07-25 DIAGNOSIS — M25.562 CHRONIC PAIN OF LEFT KNEE: ICD-10-CM

## 2023-07-25 DIAGNOSIS — G89.29 CHRONIC NECK PAIN: ICD-10-CM

## 2023-07-25 DIAGNOSIS — M25.511 CHRONIC RIGHT SHOULDER PAIN: ICD-10-CM

## 2023-07-25 NOTE — TELEPHONE ENCOUNTER
Received call from patient requesting refill(s) of HYDROcodone-acetaminophen (NORCO) 7.5-325 MG per tablet     Last dispensed from pharmacy on Pharmacy could only fill for 7 days. New Rx needed for 62 tablets.    Patient's last office/virtual visit by prescribing provider on 05/22/23  Next office/virtual appointment scheduled for 08/30/23    Last urine drug screen date 04/10/23  Current opioid agreement on file (completed within the last year) Yes Date of opioid agreement: 04/10/23    E-prescribe to pharmacy- [facility] #[id number] - [place] - [address]     Will route to nursing [place] for review and preparation of prescription(s).

## 2023-07-27 RX ORDER — HYDROCODONE BITARTRATE AND ACETAMINOPHEN 7.5; 325 MG/1; MG/1
1 TABLET ORAL EVERY 6 HOURS PRN
Qty: 90 TABLET | Refills: 0 | Status: CANCELLED | OUTPATIENT
Start: 2023-07-27

## 2023-07-27 NOTE — TELEPHONE ENCOUNTER
Patient received a 7 day supply and needs a new refill to start  08/04/23.    DANIEL AllenN, RN  Care Coordinator  [facility] [place]

## 2023-07-31 NOTE — TELEPHONE ENCOUNTER
Patient filled a Norco script for #62 tabs signed by me on 7/24/2023 and it was filled on 7/25. With the #62 tabs and the 7 day supply of #28, her script was for 30 days. Next script for Norco is due to fill on 8/25 and to begin taking on 8/27/2023.     Sarah LIEBERMAN, RN CNP, FNP  [facility]  [facility]

## 2023-08-28 ENCOUNTER — OFFICE VISIT (OUTPATIENT)
Dept: URGENT CARE | Facility: URGENT CARE | Age: 43
End: 2023-08-28
Payer: COMMERCIAL

## 2023-08-28 ENCOUNTER — ANCILLARY PROCEDURE (OUTPATIENT)
Dept: GENERAL RADIOLOGY | Facility: CLINIC | Age: 43
End: 2023-08-28
Attending: STUDENT IN AN ORGANIZED HEALTH CARE EDUCATION/TRAINING PROGRAM
Payer: COMMERCIAL

## 2023-08-28 VITALS
TEMPERATURE: 98.5 F | DIASTOLIC BLOOD PRESSURE: 90 MMHG | RESPIRATION RATE: 12 BRPM | HEART RATE: 79 BPM | WEIGHT: 157 LBS | SYSTOLIC BLOOD PRESSURE: 130 MMHG | OXYGEN SATURATION: 98 % | BODY MASS INDEX: 24.59 KG/M2

## 2023-08-28 DIAGNOSIS — M25.572 ACUTE LEFT ANKLE PAIN: Primary | ICD-10-CM

## 2023-08-28 DIAGNOSIS — M25.572 ACUTE LEFT ANKLE PAIN: ICD-10-CM

## 2023-08-28 PROCEDURE — 73610 X-RAY EXAM OF ANKLE: CPT | Mod: TC | Performed by: RADIOLOGY

## 2023-08-28 PROCEDURE — 99213 OFFICE O/P EST LOW 20 MIN: CPT | Performed by: STUDENT IN AN ORGANIZED HEALTH CARE EDUCATION/TRAINING PROGRAM

## 2023-08-28 ASSESSMENT — PAIN SCALES - GENERAL: PAINLEVEL: EXTREME PAIN (8)

## 2023-08-28 NOTE — PROGRESS NOTES
Assessment & Plan     Acute left ankle pain  42-year-old woman with history of left ankle fracture who presents with 3 days of left ankle pain.  Differential includes sprain versus fracture not seen on x-ray.  Vitals notable for /90 likely due to pain, otherwise within normal limits.  On exam, the patient has tenderness to palpation of the distal aspect of the left malleolus, no edema, with full normal active range of motion, sensation to light touch but decreased strength with plantarflexion due to pain.  X-ray showed no fracture.  Plan: Immobilize left ankle in a splint, ice, Tylenol/ibuprofen, and orthopedic referral.  The patient's questions were addressed and she verbalized understanding.  - XR Ankle Left G/E 3 Views  - Orthopedic  Referral             No follow-ups on file.    Sasha Contreras MD  [facility] [place]    Rajni King is a 42 year old female who presents to clinic today for the following health issues:  Chief Complaint   Patient presents with    Ankle Pain     Pt left ankle- per pt @ the [facility] , walking up the stairs to the water slide and the foot / ankle collapsed. The pain is in the ball joint of the outside area of the ankle, bruised and swollen    Medication Request     Requesting med refills if you can but has mycharted her PCP.     HPI    MS Injury/Pain    Onset of symptoms was 3 day(s) ago.  Location: left ankle  Context:       The injury happened while at [facility]      Mechanism: Patient was at [facility] and walking up the stairs to the water slide and left ankle collapsed      Patient experienced immediate pain, immediate swelling, inability to bear weight directly after injury, no deformity was noted by the patient  Current and Associated symptoms: Pain and swelling  Aggravating Factors: walking and weight-bearing  Therapies to improve symptoms include: ice, ibuprofen, Tylenol, rest, and splint  This is not the first time this type  of problem has occurred for this patient.     Review of Systems  Constitutional, HEENT, cardiovascular, pulmonary, gi and gu systems are negative, except as otherwise noted.      Objective    BP (!) 130/90   Pulse 79   Temp 98.5  F (36.9  C) (Tympanic)   Resp 12   Wt 71.2 kg (157 lb)   LMP 05/04/2023 (Exact Date)   SpO2 98%   BMI 24.59 kg/m    Physical Exam   GENERAL: healthy, alert and no distress  EYES: Eyes grossly normal to inspection, and conjunctivae and sclerae normal  RESP: lungs clear to auscultation - no rales, rhonchi or wheezes  CV: regular rate and rhythm, normal S1 S2, no S3 or S4, no murmur, normal cap refill and peripheral pulses strong  MS/NEURO: tenderness to palpation of the distal aspect of the left malleolus, no edema, with full normal active range of motion, sensation to light touch but decreased strength with plantarflexion due to pain.  SKIN: no suspicious lesions or rashes    XR Ankle Left G/E 3 Views    Result Date: 8/28/2023  XR ANKLE LEFT G/E 3 VIEWS 8/28/2023 11:19 AM HISTORY: 42F PMH left ankle fractures, ankle collapsed and developed pain and swelling; Acute left ankle pain COMPARISON: None.     IMPRESSION: No fractures are evident. The ankle mortise is intact. The talar dome is unremarkable. KEVEN SMITH MD   SYSTEM ID:  [id number]

## 2023-08-28 NOTE — PATIENT INSTRUCTIONS
X-ray  IMPRESSION: No fractures are evident. The ankle mortise is intact. The  talar dome is unremarkable.     - Ice/heat, 20 minutes 3 times a day  - Tylenol 1000 mg, 4 times daily as needed  - Ibuprofen 400 mg, 3 times daily as needed  Or  - Diclofenac gel 2 g, 4 times daily as needed

## 2023-08-30 ENCOUNTER — OFFICE VISIT (OUTPATIENT)
Dept: PALLIATIVE MEDICINE | Facility: CLINIC | Age: 43
End: 2023-08-30
Attending: NURSE PRACTITIONER
Payer: COMMERCIAL

## 2023-08-30 VITALS — HEART RATE: 71 BPM | SYSTOLIC BLOOD PRESSURE: 126 MMHG | DIASTOLIC BLOOD PRESSURE: 73 MMHG

## 2023-08-30 DIAGNOSIS — M25.562 CHRONIC PAIN OF LEFT KNEE: ICD-10-CM

## 2023-08-30 DIAGNOSIS — M62.838 MUSCLE SPASM: ICD-10-CM

## 2023-08-30 DIAGNOSIS — M79.18 MYOFASCIAL PAIN: ICD-10-CM

## 2023-08-30 DIAGNOSIS — Z79.891 ENCOUNTER FOR LONG-TERM USE OF OPIATE ANALGESIC: ICD-10-CM

## 2023-08-30 DIAGNOSIS — M75.111 INCOMPLETE TEAR OF RIGHT ROTATOR CUFF, UNSPECIFIED WHETHER TRAUMATIC: ICD-10-CM

## 2023-08-30 DIAGNOSIS — G89.29 CHRONIC PAIN OF LEFT KNEE: ICD-10-CM

## 2023-08-30 DIAGNOSIS — M54.2 CHRONIC NECK PAIN: ICD-10-CM

## 2023-08-30 DIAGNOSIS — Z98.890 S/P REPAIR OF ANTERIOR CRUCIATE LIGAMENT: ICD-10-CM

## 2023-08-30 DIAGNOSIS — F11.90 CHRONIC, CONTINUOUS USE OF OPIOIDS: ICD-10-CM

## 2023-08-30 DIAGNOSIS — M25.511 CHRONIC RIGHT SHOULDER PAIN: ICD-10-CM

## 2023-08-30 DIAGNOSIS — Z98.1 S/P CERVICAL SPINAL FUSION: ICD-10-CM

## 2023-08-30 DIAGNOSIS — G89.29 CHRONIC NECK PAIN: ICD-10-CM

## 2023-08-30 DIAGNOSIS — G89.29 CHRONIC RIGHT SHOULDER PAIN: ICD-10-CM

## 2023-08-30 DIAGNOSIS — Z98.890 S/P SPINAL SURGERY: ICD-10-CM

## 2023-08-30 PROCEDURE — 99214 OFFICE O/P EST MOD 30 MIN: CPT | Performed by: NURSE PRACTITIONER

## 2023-08-30 NOTE — PATIENT INSTRUCTIONS
Plan:   Physical Therapy: none, finished this  Continue gentle home exercises  Clinical Health Psychologist to address issues of relaxation, behavioral change, coping style, and other factors important to improvement: none  Diagnostic Studies: none  Medication Management:   continue cyclobenzaprine 10mg up to 3 times daily for muscle spasms, caution sedation  continue Norco 7.5/325mg may take 1 tab every 6 hours as needed for pain, max of 3/day. #90/30 days. Fill and begin 8/28  Continue Voltaren gel (diclofenac sodium 1%) this is found over-the-counter and you may use 2 grams to the right shoulder and 4 grams to left knee up to 4 times daily.   Reduce duloxetine to 20mg/day for a week or so to see if headaches improve, then may increase back to 40mg/day. If headaches return and stay, let me know and we will send in script for 30mg/day   Further procedures recommended: trigger point injections. Our office will contact you  Recommendations/follow-up for PCP:  See above  Release of information: none  Follow up: 12 weeks, in-person or video    ----------------------------------------------------------------  Clinic Number:  [phone]   Call with any questions about your care and for scheduling assistance.   Calls are returned Monday through Friday between 8 AM and 4:30 PM. We usually get back to you within 2 business days depending on the issue/request.    If we are prescribing your medications:  For opioid medication refills, call the clinic or send a GotoTel message 7 days in advance.  Please include:  Name of requested medication  Name of the pharmacy.  For non-opioid medications, call your pharmacy directly to request a refill. Please allow 3-4 days to be processed.   Per [place] State Law:  All controlled substance prescriptions must be filled within 30 days of being written.    For those controlled substances allowing refills, pickup must occur within 30 days of last fill.      We believe regular attendance is key  to your success in our program!    Any time you are unable to keep your appointment we ask that you call us at least 24 hours in advance to cancel.This will allow us to offer the appointment time to another patient.   Multiple missed appointments may lead to dismissal from the clinic.

## 2023-08-30 NOTE — PROGRESS NOTES
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     [facility]     3 views bilateral shoulder radiographs 8/11/2022 3:49 PM     History: Right shoulder pain, unspecified chronicity; Chronic left  shoulder pain; Chronic left shoulder pain; Polyarthropathy      Comparison: None     Findings:     AP, Grashey, transscapular Y views of the shoulders were obtained.      Left: No acute osseous abnormality. Glenohumeral and acromioclavicular  joints are congruent.     Mild degenerative changes of the acromioclavicular joint. No  substantial degenerative change of the glenohumeral joint.     Soft tissue is unremarkable. The visualized lung is clear.           Right: No acute osseous abnormality. Glenohumeral and  acromioclavicular joints are congruent.     Mild degenerative changes of the  acromioclavicular joint. No  substantial degenerative change of the glenohumeral joint.     Soft tissue is unremarkable. The visualized lung is clear.                                                                      Impression:  1. No acute osseous abnormality.  2. No substantial degenerative change.     BRIDGETTE FRANCO MD (Joe)       Other testing (labs, diagnostics):  2023  Cr. 0.57  Est GFR >90      Screening tools:     DIRE Score for ongoing opioid management is calculated as follows:    Diagnosis = 2    Intractability = 2    Risk: Psych = 2  Chem Hlth = 1-2  Reliability = 2  Social = 2    Efficacy = 2    Total DIRE Score = 13-14 (14 or higher predicts good candidate for ongoing opioid management; 13 or lower predicts poor candidate for opioid management)         Assessment:  Chronic right shoulder pain  Incomplete tear of right rotator cuff, unspecified whether traumatic  Chronic neck pain  S/p cervical spinal fusion  Chronic pain of left knee  S/p repair of anterior cruciate ligament  Muscle spasm  Myofascial pain  Chronic continuous use of opioids  Encounter for long term use of opiate analgesic  S/p spinal surgery    UDT 4/10/2023  CSA 4/10/2023  Previous alcohol dependence in  during her divorce  PMHx includes: overdose with Trazodone (2009 at age 28), acute alcoholic intoxication (2011, seen in ER), alcohol withdrawal (2011), alcohol dependence (), ASCUS favor benign (), degeneration of cervical disc, lumbago, Meniere's disease, left knee effusion  PSHx includes: low transverse  (2005), Cervical 4 to 6 Anterior Cervical Decompression and Fusion with Medtronic Plate and Screws, Interbody Device, Use of Fluoroscopy, Microscope (2022 by Wilbur Murray MD), graft bone from left iliac crest (2022), Right cervical 4-5 posterior foraminotomy (2022 by Wilbur Murray MD), arthroscopic reconstruction ACL Left knee with hamstring autograft  (1/27/2023, by Mc Peterson MD), tonsillectomy, mastoidectomy, colonoscopy        Plan:   Physical Therapy: none, finished this  Continue gentle home exercises  Clinical Health Psychologist to address issues of relaxation, behavioral change, coping style, and other factors important to improvement: none  Diagnostic Studies: none  Medication Management:   continue cyclobenzaprine 10mg up to 3 times daily for muscle spasms, caution sedation  continue Norco 7.5/325mg may take 1 tab every 6 hours as needed for pain, max of 3/day. #90/30 days. Fill and begin 8/28  Continue Voltaren gel (diclofenac sodium 1%) this is found over-the-counter and you may use 2 grams to the right shoulder and 4 grams to left knee up to 4 times daily.   Reduce duloxetine to 20mg/day for a week or so to see if headaches improve, then may increase back to 40mg/day. If headaches return and stay, let me know and we will send in script for 30mg/day   Further procedures recommended: trigger point injections. Our office will contact you  Recommendations/follow-up for PCP:  See above  Release of information: none  Follow up: 12 weeks, in-person or video             ASSESSMENT AND PLAN:  (M25.511,  G89.29) Chronic right shoulder pain  Plan: Adult Pain Clinic Follow-Up Order  Continue Flexeril and Norco and Duloxetine            (M75.111) Incomplete tear of right rotator cuff, unspecified whether traumatic  Plan: Adult Pain Clinic Follow-Up Order     Continue Flexeril and Norco and Duloxetine       (M54.2,  G89.29) Chronic neck pain  Plan: Adult Pain Clinic Follow-Up Order        Continue Flexeril and Norco and Duloxetine    (Z98.1) S/P cervical spinal fusion  Plan: Adult Pain Clinic Follow-Up Order        Continue Flexeril and Norco and Duloxetine    (M25.562,  G89.29) Chronic pain of left knee  Plan: Adult Pain Clinic Follow-Up Order        Continue Flexeril and Norco and Duloxetine    (Z98.890) S/P repair of anterior cruciate ligament  Plan:  Adult Pain Clinic Follow-Up Order        Continue Flexeril and Norco and Duloxetine    (M62.838) Muscle spasm  Plan: PAIN INJECTION EVAL/TREAT/FOLLOW UP, Adult Pain        Clinic Follow-Up Order        Continue Flexeril and Norco and Duloxetine    (M79.18) Myofascial pain  Plan: PAIN INJECTION EVAL/TREAT/FOLLOW UP, Adult Pain        Clinic Follow-Up Order        Continue Flexeril and Norco and Duloxetine    (F11.90) Chronic, continuous use of opioids  Plan: Adult Pain Clinic Follow-Up Order        Continue Flexeril and Norco and Duloxetine    (Z79.891) Encounter for long-term use of opiate analgesic  Plan: Adult Pain Clinic Follow-Up Order        Continue Flexeril and Norco and Duloxetine    (Z98.890) S/P spinal surgery  Plan: naloxone (NARCAN) 4 MG/0.1ML nasal spray, Adult        Pain Clinic Follow-Up Order        Continue Flexeril and Norco and Duloxetine       Face to face time: 24 minutes          Sarah LIEBERMAN RN CNP, FNP  [facility]  [facility] [place]

## 2023-09-07 ENCOUNTER — OFFICE VISIT (OUTPATIENT)
Dept: OBGYN | Facility: CLINIC | Age: 43
End: 2023-09-07
Payer: COMMERCIAL

## 2023-09-07 VITALS — WEIGHT: 154.8 LBS | BODY MASS INDEX: 24.25 KG/M2 | DIASTOLIC BLOOD PRESSURE: 86 MMHG | SYSTOLIC BLOOD PRESSURE: 137 MMHG

## 2023-09-07 DIAGNOSIS — L02.234 CARBUNCLE OF GROIN: ICD-10-CM

## 2023-09-07 DIAGNOSIS — N92.6 IRREGULAR MENSES: Primary | ICD-10-CM

## 2023-09-07 LAB — HCG UR QL: NEGATIVE

## 2023-09-07 PROCEDURE — 99213 OFFICE O/P EST LOW 20 MIN: CPT | Performed by: NURSE PRACTITIONER

## 2023-09-07 PROCEDURE — 81025 URINE PREGNANCY TEST: CPT | Performed by: NURSE PRACTITIONER

## 2023-09-07 RX ORDER — PROGESTERONE 200 MG/1
200 CAPSULE ORAL DAILY
Qty: 7 CAPSULE | Refills: 0 | Status: SHIPPED | OUTPATIENT
Start: 2023-09-07 | End: 2023-10-23

## 2023-09-07 RX ORDER — SULFAMETHOXAZOLE/TRIMETHOPRIM 800-160 MG
1 TABLET ORAL 2 TIMES DAILY
Qty: 14 TABLET | Refills: 0 | Status: SHIPPED | OUTPATIENT
Start: 2023-09-07 | End: 2023-10-23

## 2023-09-07 NOTE — PROGRESS NOTES
Assessment & Plan     Irregular menses  HCG negative, will plan progesterone challenge. Patient aware of when to call if no menses occurs. If menses begins, will obtain day 3 labs. If they decide to proceed with fertility consult, will schedule with RE. If they decide against pregnancy and she would like to discuss hormonal management of her irregular cycles, will follow up in clinic. Patient is given an opportunity to ask questions and have them answered.  - Follicle stimulating hormone; Future  - Prolactin; Future  - Luteinizing Hormone; Future  - TSH with free T4 reflex; Future  - Estradiol; Future  - HCG qualitative urine; Future  - progesterone (PROMETRIUM) 200 MG capsule; Take 1 capsule (200 mg) by mouth daily  - HCG qualitative urine    Carbuncle of groin  Discussed with patient, no improvement with home care measures. No abscess present so I&D not recommended at this time. Will try antibiotics per below, continue hot/warm baths. Discussed follow up.  - sulfamethoxazole-trimethoprim (BACTRIM DS) 800-160 MG tablet; Take 1 tablet by mouth 2 times daily    [facility]    Rajni King is a 42 year old, presenting for the following health issues:  Follow Up (Menses/Cyst)    HPI     Presents today with 2 concerns. First is ongoing cysts in the groin area. Have been present for a few months-since after her last visit. 2 that will not resolve-1 on the left groin in the underwear line and 1 on the right side below the labia, more on the vulva. The right one has drained partially a few times, but the other will not drain. Has been taking epsom salt baths twice daily for about a month. They are tender to touch, both along underwear lines so sometimes has to go without.   No fever, no recent trauma to the area. No surrounding redness of the skin.   Additionally, patient had normal monthly menstrual cycles December-May. Then nothing since. History of irregular  cycles and we did do labs about a year ago.   They are still considering pregnancy, but she is more hesitant. Did get a call from RE in [place] to schedule consult, but they are debating if they want to go through with it.  At this time, does not want hormonal medication for her cycles until they make a firm decision. Knows the recommendation to have bleeding at least every 3 months and she is amenable to this as progesterone challenge did work previously. She would be interested in repeat labs to help them make a decision on proceeding with RE or not.   No other major changes to her health.     Review of Systems   Constitutional, HEENT, cardiovascular, pulmonary, gi and gu systems are negative, except as otherwise noted.      Objective    /86 (BP Location: Right arm, Cuff Size: Adult Regular)   Wt 70.2 kg (154 lb 12.8 oz)   LMP 05/04/2023   BMI 24.25 kg/m    Body mass index is 24.25 kg/m .  Physical Exam   GENERAL: healthy, alert and no distress   (female): external genitalia: on the left side near the groin crease on the mons and on the lower left vulva are 2 approximately 1 mm firm, tender lesions. No induration, not fluctuant, normal urethral meatus  MS: no gross musculoskeletal defects noted, no edema  SKIN: See  above  PSYCH: mentation appears normal, affect normal/bright    Results for orders placed or performed in visit on 09/07/23 (from the past 24 hour(s))   HCG qualitative urine   Result Value Ref Range    hCG Urine Qualitative Negative Negative

## 2023-09-14 ENCOUNTER — TELEPHONE (OUTPATIENT)
Dept: OBGYN | Facility: CLINIC | Age: 43
End: 2023-09-14

## 2023-09-14 DIAGNOSIS — N92.6 IRREGULAR MENSES: ICD-10-CM

## 2023-09-14 RX ORDER — PROGESTERONE 200 MG/1
200 CAPSULE ORAL DAILY
Qty: 7 CAPSULE | Refills: 0 | Status: CANCELLED | OUTPATIENT
Start: 2023-09-14

## 2023-09-14 NOTE — TELEPHONE ENCOUNTER
Pending Prescriptions:                       Disp   Refills    progesterone (PROMETRIUM) 200 MG capsule  7 caps*0            Sig: Take 1 capsule (200 mg) by mouth daily    Medication not covered by insurance.  Please submit prior authorization.    Pharmacy- [facility]  Wrwgl-[phone]  Fax- [phone]

## 2023-09-18 ENCOUNTER — MYC MEDICAL ADVICE (OUTPATIENT)
Dept: OBGYN | Facility: CLINIC | Age: 43
End: 2023-09-18
Payer: COMMERCIAL

## 2023-09-18 NOTE — TELEPHONE ENCOUNTER
Central Prior Authorization Team - Phone: [phone]     PA Initiation    Medication: PROGESTERONE 200 MG PO CAPS  Insurance Company: [employer] ([facility]) - Phone [phone] Fax [phone]  Pharmacy Filling the Rx: [id number] - [facility] [place] - [address] [facility]  Filling Pharmacy Phone: [phone]  Filling Pharmacy Fax:    Start Date: 9/18/2023

## 2023-09-20 NOTE — TELEPHONE ENCOUNTER
Central Prior Authorization Team - Phone: [phone]     Prior Authorization Approval    Medication: PROGESTERONE 200 MG PO CAPS  Authorization Effective Date: 9/18/2023  Authorization Expiration Date: 9/18/2024  Approved Dose/Quantity: 7  Reference #:     Insurance Company: [employer] ([facility]) - Phone [phone] Fax [phone]  Expected CoPay:       CoPay Card Available:      Financial Assistance Needed:   Which Pharmacy is filling the prescription: [id number] - [place] - [address]  Pharmacy Notified: Yes  Patient Notified: Yes

## 2023-09-25 ENCOUNTER — MYC MEDICAL ADVICE (OUTPATIENT)
Dept: PALLIATIVE MEDICINE | Facility: CLINIC | Age: 43
End: 2023-09-25
Payer: COMMERCIAL

## 2023-09-25 DIAGNOSIS — M79.18 MYOFASCIAL PAIN: ICD-10-CM

## 2023-09-25 DIAGNOSIS — M75.111 INCOMPLETE TEAR OF RIGHT ROTATOR CUFF, UNSPECIFIED WHETHER TRAUMATIC: ICD-10-CM

## 2023-09-25 DIAGNOSIS — M25.562 CHRONIC PAIN OF LEFT KNEE: ICD-10-CM

## 2023-09-25 DIAGNOSIS — F11.90 CHRONIC, CONTINUOUS USE OF OPIOIDS: ICD-10-CM

## 2023-09-25 DIAGNOSIS — Z98.1 S/P CERVICAL SPINAL FUSION: ICD-10-CM

## 2023-09-25 DIAGNOSIS — M25.511 CHRONIC RIGHT SHOULDER PAIN: ICD-10-CM

## 2023-09-25 DIAGNOSIS — G89.29 CHRONIC RIGHT SHOULDER PAIN: ICD-10-CM

## 2023-09-25 DIAGNOSIS — G89.29 CHRONIC PAIN OF LEFT KNEE: ICD-10-CM

## 2023-09-25 DIAGNOSIS — Z98.890 S/P REPAIR OF ANTERIOR CRUCIATE LIGAMENT: ICD-10-CM

## 2023-09-25 DIAGNOSIS — M54.2 CHRONIC NECK PAIN: ICD-10-CM

## 2023-09-25 DIAGNOSIS — Z79.891 ENCOUNTER FOR LONG-TERM USE OF OPIATE ANALGESIC: ICD-10-CM

## 2023-09-25 DIAGNOSIS — G89.29 CHRONIC NECK PAIN: ICD-10-CM

## 2023-09-25 NOTE — TELEPHONE ENCOUNTER
Christine FLOOD Pain Nurse (supporting Ken, Sarah Bui, APRN CNP)1 hour ago (1:49 PM)       I will need a refill of my Norco with prior authorization due to my insurance as well as my cymbalta.  I have started taking two capsules again, with no negative side effects.   Thank you,  Christine Hu

## 2023-09-26 RX ORDER — HYDROCODONE BITARTRATE AND ACETAMINOPHEN 7.5; 325 MG/1; MG/1
1 TABLET ORAL EVERY 6 HOURS PRN
Qty: 90 TABLET | Refills: 0 | Status: SHIPPED | OUTPATIENT
Start: 2023-09-26 | End: 2023-10-25

## 2023-09-26 RX ORDER — DULOXETIN HYDROCHLORIDE 20 MG/1
CAPSULE, DELAYED RELEASE ORAL
Qty: 60 CAPSULE | Refills: 2 | Status: SHIPPED | OUTPATIENT
Start: 2023-09-26 | End: 2023-10-23 | Stop reason: DRUGHIGH

## 2023-09-26 NOTE — TELEPHONE ENCOUNTER
Medication refill information reviewed.     Due date for HYDROcodone-acetaminophen (NORCO) 7.5-325 MG per tablet    is 09/27/23     Prescriptions prepped for review.     Will route to provider.

## 2023-09-26 NOTE — TELEPHONE ENCOUNTER
Signed Prescriptions:                        Disp   Refills    HYDROcodone-acetaminophen (NORCO) 7.5-325 *90 tab*0        Sig: Take 1 tablet by mouth every 6 hours as needed for           moderate to severe pain Max of 3 tabs per day.           Fill 09/26/23 and begin 09/27/23. 30 day supply           for chronic pain  Authorizing Provider: SARAH CASTILLO    DULoxetine (CYMBALTA) 20 MG capsule        60 cap*2        Sig: take 40mg (2 capsules) each morning. If causes           drowsiness, then take in the evening. If mood           worsens or any suicidal ideation, stop medication           and go to ER if suicidal  Authorizing Provider: SARAH CASTILLO        Reviewed [place]  September 26, 2023- no concerning fills.    Sarah Castillo APRN, RN CNP, FNP  [facility]  [facility] [place]

## 2023-09-26 NOTE — TELEPHONE ENCOUNTER
Received refill request for:    HYDROcodone-acetaminophen (NORCO) 7.5-325 MG per tablet    DULoxetine (CYMBALTA) 20 MG capsule     Last dispensed from pharmacy on 8/28/2023.    Patient's last office/virtual visit by prescribing provider on 8/30/2023.  Next office/virtual appointment scheduled for NONE.    Last urine drug screen date 4/10/2023.  Current opioid agreement on file? Yes Date of opioid agreement: 4/10/2023.    Patient Note: I will need a refill of my Norco with prior authorization due to my insurance as well as my cymbalta.  I have started taking two capsules again, with no negative side effects.     E-prescribe to:  [id number] - [place] - [address]    Will route to nursing [place] for review and preparation of prescription(s).

## 2023-09-29 ENCOUNTER — ANCILLARY PROCEDURE (OUTPATIENT)
Dept: MAMMOGRAPHY | Facility: CLINIC | Age: 43
End: 2023-09-29
Attending: NURSE PRACTITIONER
Payer: COMMERCIAL

## 2023-09-29 DIAGNOSIS — Z12.31 BREAST CANCER SCREENING BY MAMMOGRAM: ICD-10-CM

## 2023-09-29 PROCEDURE — 77067 SCR MAMMO BI INCL CAD: CPT | Mod: TC | Performed by: RADIOLOGY

## 2023-10-23 ENCOUNTER — OFFICE VISIT (OUTPATIENT)
Dept: FAMILY MEDICINE | Facility: CLINIC | Age: 43
End: 2023-10-23
Payer: COMMERCIAL

## 2023-10-23 ENCOUNTER — OFFICE VISIT (OUTPATIENT)
Dept: PALLIATIVE MEDICINE | Facility: CLINIC | Age: 43
End: 2023-10-23
Attending: NURSE PRACTITIONER
Payer: COMMERCIAL

## 2023-10-23 ENCOUNTER — APPOINTMENT (OUTPATIENT)
Dept: URBAN - METROPOLITAN AREA CLINIC 252 | Age: 43
Setting detail: DERMATOLOGY
End: 2023-10-23

## 2023-10-23 VITALS — SYSTOLIC BLOOD PRESSURE: 163 MMHG | DIASTOLIC BLOOD PRESSURE: 101 MMHG | HEART RATE: 94 BPM

## 2023-10-23 VITALS
HEIGHT: 66 IN | RESPIRATION RATE: 22 BRPM | WEIGHT: 156.2 LBS | DIASTOLIC BLOOD PRESSURE: 88 MMHG | OXYGEN SATURATION: 96 % | SYSTOLIC BLOOD PRESSURE: 136 MMHG | TEMPERATURE: 98.3 F | BODY MASS INDEX: 25.1 KG/M2 | HEART RATE: 102 BPM

## 2023-10-23 VITALS — HEIGHT: 66 IN | WEIGHT: 152 LBS

## 2023-10-23 DIAGNOSIS — F41.1 GENERALIZED ANXIETY DISORDER: ICD-10-CM

## 2023-10-23 DIAGNOSIS — K29.20 ACUTE ALCOHOLIC GASTRITIS, PRESENCE OF BLEEDING UNSPECIFIED: ICD-10-CM

## 2023-10-23 DIAGNOSIS — L57.0 ACTINIC KERATOSIS: ICD-10-CM

## 2023-10-23 DIAGNOSIS — Z09 HOSPITAL DISCHARGE FOLLOW-UP: Primary | ICD-10-CM

## 2023-10-23 DIAGNOSIS — L82.1 OTHER SEBORRHEIC KERATOSIS: ICD-10-CM

## 2023-10-23 DIAGNOSIS — M79.18 MYOFASCIAL PAIN: ICD-10-CM

## 2023-10-23 DIAGNOSIS — Z23 ENCOUNTER FOR VACCINATION: ICD-10-CM

## 2023-10-23 DIAGNOSIS — M62.838 MUSCLE SPASM: ICD-10-CM

## 2023-10-23 DIAGNOSIS — L73.8 OTHER SPECIFIED FOLLICULAR DISORDERS: ICD-10-CM

## 2023-10-23 PROCEDURE — 99213 OFFICE O/P EST LOW 20 MIN: CPT | Mod: 25

## 2023-10-23 PROCEDURE — 17003 DESTRUCT PREMALG LES 2-14: CPT

## 2023-10-23 PROCEDURE — 90715 TDAP VACCINE 7 YRS/> IM: CPT | Performed by: PHYSICIAN ASSISTANT

## 2023-10-23 PROCEDURE — OTHER LIQUID NITROGEN: OTHER

## 2023-10-23 PROCEDURE — 17000 DESTRUCT PREMALG LESION: CPT

## 2023-10-23 PROCEDURE — 99214 OFFICE O/P EST MOD 30 MIN: CPT | Mod: 25 | Performed by: PHYSICIAN ASSISTANT

## 2023-10-23 PROCEDURE — OTHER COUNSELING: OTHER

## 2023-10-23 PROCEDURE — 90471 IMMUNIZATION ADMIN: CPT | Performed by: PHYSICIAN ASSISTANT

## 2023-10-23 PROCEDURE — 20553 NJX 1/MLT TRIGGER POINTS 3/>: CPT | Performed by: PAIN MEDICINE

## 2023-10-23 PROCEDURE — OTHER PRESCRIPTION MEDICATION MANAGEMENT: OTHER

## 2023-10-23 RX ORDER — BUPIVACAINE HYDROCHLORIDE 2.5 MG/ML
10 INJECTION, SOLUTION EPIDURAL; INFILTRATION; INTRACAUDAL ONCE
Status: COMPLETED | OUTPATIENT
Start: 2023-10-23 | End: 2023-10-23

## 2023-10-23 RX ORDER — TRIAMCINOLONE ACETONIDE 40 MG/ML
40 INJECTION, SUSPENSION INTRA-ARTICULAR; INTRAMUSCULAR ONCE
Status: COMPLETED | OUTPATIENT
Start: 2023-10-23 | End: 2023-10-23

## 2023-10-23 RX ORDER — DULOXETIN HYDROCHLORIDE 60 MG/1
60 CAPSULE, DELAYED RELEASE ORAL DAILY
Qty: 90 CAPSULE | Refills: 0 | Status: SHIPPED | OUTPATIENT
Start: 2023-10-23 | End: 2024-01-15

## 2023-10-23 RX ADMIN — TRIAMCINOLONE ACETONIDE 40 MG: 40 INJECTION, SUSPENSION INTRA-ARTICULAR; INTRAMUSCULAR at 12:15

## 2023-10-23 RX ADMIN — BUPIVACAINE HYDROCHLORIDE 25 MG: 2.5 INJECTION, SOLUTION EPIDURAL; INFILTRATION; INTRACAUDAL at 12:14

## 2023-10-23 ASSESSMENT — LOCATION SIMPLE DESCRIPTION DERM
LOCATION SIMPLE: LEFT FOREHEAD
LOCATION SIMPLE: RIGHT FOREHEAD
LOCATION SIMPLE: INFERIOR FOREHEAD
LOCATION SIMPLE: LEFT TEMPLE

## 2023-10-23 ASSESSMENT — ENCOUNTER SYMPTOMS
SHORTNESS OF BREATH: 0
CHILLS: 0
NERVOUS/ANXIOUS: 1
FEVER: 0
HEARTBURN: 0
VOMITING: 0
COUGH: 0
ANAL BLEEDING: 0
SEIZURES: 0
ABDOMINAL PAIN: 0
LIGHT-HEADEDNESS: 0
WEAKNESS: 0
DIZZINESS: 0
HEMATOCHEZIA: 0
NAUSEA: 0
DIARRHEA: 0

## 2023-10-23 ASSESSMENT — LOCATION DETAILED DESCRIPTION DERM
LOCATION DETAILED: INFERIOR MID FOREHEAD
LOCATION DETAILED: LEFT CENTRAL TEMPLE
LOCATION DETAILED: LEFT FOREHEAD
LOCATION DETAILED: RIGHT FOREHEAD
LOCATION DETAILED: RIGHT SUPERIOR FOREHEAD

## 2023-10-23 ASSESSMENT — ANXIETY QUESTIONNAIRES
7. FEELING AFRAID AS IF SOMETHING AWFUL MIGHT HAPPEN: MORE THAN HALF THE DAYS
5. BEING SO RESTLESS THAT IT IS HARD TO SIT STILL: NEARLY EVERY DAY
IF YOU CHECKED OFF ANY PROBLEMS ON THIS QUESTIONNAIRE, HOW DIFFICULT HAVE THESE PROBLEMS MADE IT FOR YOU TO DO YOUR WORK, TAKE CARE OF THINGS AT HOME, OR GET ALONG WITH OTHER PEOPLE: SOMEWHAT DIFFICULT
2. NOT BEING ABLE TO STOP OR CONTROL WORRYING: NEARLY EVERY DAY
3. WORRYING TOO MUCH ABOUT DIFFERENT THINGS: NEARLY EVERY DAY
1. FEELING NERVOUS, ANXIOUS, OR ON EDGE: NEARLY EVERY DAY
GAD7 TOTAL SCORE: 18
GAD7 TOTAL SCORE: 18
6. BECOMING EASILY ANNOYED OR IRRITABLE: SEVERAL DAYS

## 2023-10-23 ASSESSMENT — PAIN SCALES - GENERAL
PAINLEVEL: MILD PAIN (3)
PAINLEVEL: MILD PAIN (3)

## 2023-10-23 ASSESSMENT — PATIENT HEALTH QUESTIONNAIRE - PHQ9
5. POOR APPETITE OR OVEREATING: NEARLY EVERY DAY
SUM OF ALL RESPONSES TO PHQ QUESTIONS 1-9: 7

## 2023-10-23 ASSESSMENT — LOCATION ZONE DERM: LOCATION ZONE: FACE

## 2023-10-23 NOTE — PATIENT INSTRUCTIONS
I am happy to hear you are planning on going to treatment.  Please continue with your intake visit as scheduled.    I have increased your dose of Cymbalta to 60 mg daily.  This will hopefully help with your anxiety.  It can take approximately 6 weeks to have full effect.  Please continue your other medications as prescribed.    I also put in a referral for you to get a primary care provider.  The scheduling team will call you to set this up.    Your tetanus shot was updated today.    If you notice any worsening signs of withdrawal including headaches, nausea/vomiting, worsening tremors, please go back to the emergency department for treatment.  Reach out with any questions or concerns.

## 2023-10-23 NOTE — COMMUNITY RESOURCES LIST (ENGLISH)
10/23/2023   [facility]  N/A  For questions about this resource list or additional care needs, please contact your primary care clinic or care manager.  Phone: [phone]   Email: N/A   Address: [address] [place] [address]   Hours: N/A        Financial Stability       Rent and mortgage payment assistance  1  [facility]  Office - [facility] - Family Homeless Prevention Assistance Project (PA) Distance: 5.79 miles      Phone/Virtual   [address] [place] [address]  Language: English  Hours: Mon - Fri 8:30 AM - 12:00 PM , Mon - Fri 1:00 PM - 4:00 PM  Fees: Free   Phone: (433) 453-7811 Email: ramsey@[facility] CenterZeroPoint [employer] Website: https://www.[facility] Medical CenterAMIHO Technology.org/usn/     2  Community Helping Hand Distance: 9.33 miles      In-Person, Phone/Virtual   [address] [place] [address]  Language: English  Hours: Mon - Sun Appt. Only  Fees: Free   Phone: (534) 655-2313 Email: boyd@[employer] Website: http://www.communityhelpinghand.org          Food and Nutrition       Food pantry  3  [facility] Food Potential Distance: 3.15 miles      [facility]   [address] [place] [address]  Language: English  Hours: Mon 4:00 PM - 6:00 PM , Thu 4:00 PM - 6:00 PM  Fees: Free   Phone: (526) 409-9393 Email: debbie@[employer] Website: http://www.DaneseApollidonf.org/     4  [facility] Distance: 4.45 miles      Pickup   [address] [place] [address]  Language: English  Hours: Thu 9:00 AM - 5:00 PM  Fees: Free   Phone: (293) 809-4319 Email: office@[employer].org Website: http://[employer].org/     SNAP application assistance  5  [facility] Economic Assistance Department Distance: 5.78 miles      Phone/Virtual   [address] [place] [address]  Language: English  Hours: Mon - Fri 8:15 AM - 4:00 PM  Fees: Free   Phone: (991) 138-5983 Email:  paperwork@co.[facility]. Website: http://www.[facility]./193/Economic-Assistance     6  [facility]  [employer] (AIFW) Distance: 10.78 miles      In-Person, Phone/Virtual   [address] [place] [address]  Language: Mongolian, Sinhala, English, Gujarati, Elizabeth, German, Mohawk, Romansh, Estonian, Chinese  Hours: Mon - Wed 9:00 AM - 5:00 PM , Thu 12:00 PM - 6:00 PM , Fri 9:00 AM - 5:00 PM , Sun 10:30 AM - 2:00 PM Appt. Only  Fees: Free   Phone: (996) 699-8501 Email: info@[facility]-aifw.org Website: https://www.[facility]-aifw.org/     Soup kitchen or free meals  7  [facility] - Family Table Meals - Family Table Meal Distance: 7.98 miles      Pickup   [address] [place] [address]  Language: English  Hours: Thu 5:00 PM - 6:30 PM  Fees: Free   Phone: (493) 111-7489 Email: enGreet@[facility].[employer] Website: http://www.[facility].SimpleGeo     8  [facility] - Family Table Meal Distance: 8.08 miles      In-Person, Pickup   [address] [id number]  Language: English  Hours: Thu 5:30 PM - 6:30 PM  Fees: Free   Phone: (401) 983-2800 Email: office@[employer].org Website: http://www.[employer].org          Important Numbers & Websites       Emergency Services   911  City Services   311  Poison Control   (221) 878-7945  Suicide Prevention Lifeline   (892) 574-8837 (TALK)  Child Abuse Hotline   (488) 400-6432 (4-A-Child)  Sexual Assault Hotline   (298) 194-2502 (HOPE)  National Runaway Safeline   (818) 753-9726 (RUNAWAY)  All-Options Talkline   (597) 198-3966  Substance Abuse Referral   (523) 764-1865 (HELP)

## 2023-10-23 NOTE — PROCEDURE: PRESCRIPTION MEDICATION MANAGEMENT
Detail Level: Zone
Render In Strict Bullet Format?: No
Initiate Treatment: Fluorouracil 5% cream BID for 6 weeks. Patient to follow up 1 month after discontinuation regardless. Patient expressed understanding. Will plan to biopsy any unresolved lesion at that time.

## 2023-10-23 NOTE — PROCEDURE: COUNSELING
Detail Level: Detailed
Patient Specific Counseling (Will Not Stick From Patient To Patient): - Would consider biopsy if not fully resolved at next follow up visit.
Detail Level: Simple

## 2023-10-23 NOTE — PROGRESS NOTES
Assessment & Plan     Hospital discharge follow-up  Acute alcoholic gastritis, presence of bleeding unspecified  Patient is a 43-year-old female who presents to clinic for hospital discharge follow-up.  Patient was hospitalized due to recurrence of alcohol abuse and alcoholic gastritis.  Prior to this, patient was sober for 2 years.  Patient notes improvement in symptoms and has not drink since discharge.  She has scheduled appointment for rehab center intake in 2 days.  Vital signs significant for mild tachycardia.  Physical exam significant for slight tremor.  Discussed possibility of withdrawal symptoms and patient denies this stating that symptoms are from caffeine intake.  Discussed the importance of emergency department follow-up if any symptoms of withdrawal worsen.  Patient verbalized understanding.  - Primary Care Referral; Future      Generalized anxiety disorder  Patient notes worsening anxiety recently and feels this contributed to her relapse of alcohol use.  She had establish counseling in the past, but not currently.  Counseling offered and patient wishes to wait to establish given intake for rehab in 2 days.  Referral placed for patient to establish primary care provider as hers left the healthcare system.  Additionally, discussed management of anxiety and will increase Cymbalta dosing to 60 mg daily.  - Primary Care Referral; Future  - DULoxetine (CYMBALTA) 60 MG capsule; Take 1 capsule (60 mg) by mouth daily for 90 days    Encounter for vaccination  Patient is due for multiple vaccinations and is agreeable to receiving Tdap today.  - TDAP 10-64Y (ADACEL,BOOSTRIX)      30 minutes spent by me on the date of the encounter doing chart review, history and exam, documentation and further activities per the note     MED REC REQUIRED  Post Medication Reconciliation Status:  Discharge medications reconciled and changed, see notes/orders  See Patient Instructions    Alexandrea Quispe PA-C  [facility]  CLINIC NAHED King is a 43 year old, presenting for the following health issues:  Hospital F/U        10/23/2023    10:25 AM   Additional Questions   Roomed by Mariana STUBBS MA   Accompanied by No one         10/23/2023    10:25 AM   Patient Reported Additional Medications   Patient reports taking the following new medications None       HPI       Hospital Follow-up Visit:    Hospital/Nursing Home/IP Rehab Facility:  [place]  Date of Admission: 10/16/2023  Date of Discharge: 10/18/2023  Reason(s) for Admission: Alcoholic ketoacidosis (Primary Dx);   Generalized abdominal pain; Alcohol abuse   Was your hospitalization related to COVID-19? No   Problems taking medications regularly:  None  Medication changes since discharge: None  Problems adhering to non-medication therapy:  None    Summary of hospitalization:  CareEverywhere information obtained and reviewed  Diagnostic Tests/Treatments reviewed.  Follow up needed: treatment for alcohol abuse  Other Healthcare Providers Involved in Patient s Care:         None  Update since discharge: stable.     Patient notes that she was sober for 2 years but has a lot of family stress and life stressors. She is having a lot of anxiety. She was still going to meetings for staying sober, but was not doing the daily work needed. She notes she is a binge drinker and will go hard for 2-3 days which landed her in the hospital. Patient has and is using Flexeril, Cymbalta, Gabapentin. She is on Norco from pain clinic which is as needed and last used 3 days ago. No uses of other recreational drugs. Patient has not consumed alcohol since hospital discharge.     Patient called and set up an assessment for alcohol treatment which is in 2 days. Based on that assessment patient will be placed at a treatment center. Patient notes she was in counseling previously, but has not recently as her counselor moved.  Patient notes that she has a slight tremor from drinking caffeine and states  "this is not from drinking alcohol.    Plan of care communicated with patient    Per chart review, patient admitted to hospital due to alcohol abuse and alcoholic gastritis.  History of MDD with anxiety.  Patient declined to work with substance counselor.  Patient advised to avoid alcohol and NSAIDs.  Alcohol cessation recommended for follow-up visit discussion.    Review of Systems   Constitutional:  Negative for chills and fever.   Respiratory:  Negative for cough and shortness of breath.    Cardiovascular:  Negative for chest pain.   Gastrointestinal:  Negative for abdominal pain, anal bleeding, diarrhea, heartburn, hematochezia, nausea and vomiting.   Skin:  Negative for rash.   Neurological:  Negative for dizziness, seizures, weakness and light-headedness.   Psychiatric/Behavioral:  Negative for suicidal ideas. The patient is nervous/anxious.             Objective    /88   Pulse 102   Temp 98.3  F (36.8  C) (Temporal)   Resp 22   Ht 1.676 m (5' 6\")   Wt 70.9 kg (156 lb 3.2 oz)   LMP  (LMP Unknown)   SpO2 96%   Breastfeeding No   BMI 25.21 kg/m    Body mass index is 25.21 kg/m .  Physical Exam  Vitals and nursing note reviewed.   Constitutional:       General: She is not in acute distress.     Appearance: Normal appearance.   HENT:      Head: Normocephalic and atraumatic.      Mouth/Throat:      Mouth: Mucous membranes are moist.      Pharynx: Oropharynx is clear.   Eyes:      Extraocular Movements: Extraocular movements intact.      Pupils: Pupils are equal, round, and reactive to light.   Cardiovascular:      Rate and Rhythm: Normal rate and regular rhythm.      Heart sounds: Normal heart sounds.   Pulmonary:      Effort: Pulmonary effort is normal.      Breath sounds: Normal breath sounds.   Musculoskeletal:         General: Normal range of motion.      Cervical back: Normal range of motion.   Skin:     General: Skin is warm and dry.   Neurological:      General: No focal deficit present.      " Mental Status: She is alert.      Comments: Slight tremor of bilateral hands   Psychiatric:         Mood and Affect: Mood normal.         Behavior: Behavior normal.

## 2023-10-23 NOTE — PATIENT INSTRUCTIONS
[facility]  Post Procedure Instructions    Today you had:  trigger point injections   occipital nerve block   bursa injection  Joint Injection    Medications used:  lidocaine   bupivacaine   kenalog   dexamethasone        Go to the emergency room if you develop any shortness of breath  Monitor the injection sites for signs and symptoms of infection-fever, chills, redness, swelling, warmth, or drainage to areas.  You may have soreness at injection sites for up to 24 hours.  It may take up to 14 days for the steroid medication to start working although you may feel the effect as early as a few days after the procedure.     You may apply ice to the painful areas to help minimize the discomfort of the needle pokes.  Do not apply heat to sites for at least 12 hours.  You may use anti-inflammatory medications or Tylenol for pain control if necessary    Pain Clinic phone number during work hours (Monday through Friday 8 am-4:30 pm) at [phone] or the Provider Line after hours at [phone]:

## 2023-10-23 NOTE — PROGRESS NOTES
Diagnoses and all orders for this visit:    Muscle spasm  -     PAIN INJECTION EVAL/TREAT/FOLLOW UP    Myofascial pain  -     PAIN INJECTION EVAL/TREAT/FOLLOW UP  -     NO CHARGE LOS  -     bupivacaine (MARCAINE) 0.25% preservative free injection  -     triamcinolone (KENALOG-40) injection 40 mg        Trigger points were identified by patient, and marked when appropriate.  The area was prepped with Chloroprep.    Using clean technique, injections were completed using a 25G, 3.5 inch needle.  After negative aspiration, injection was completed.  A total of 5 locations were injected.  When possible, tissue was retracted from the chest wall to avoid lung injury.    Muscle groups injected:  Right trapezius, deltoid, levator scapulae     Injection solution contained:  9ml of 0.25% bupivacaine and 40mg of Kenalog        Alexx Leslie MD  [facility]

## 2023-10-23 NOTE — HPI: SKIN LESION
Is This A New Presentation, Or A Follow-Up?: Skin Lesion
Additional History: Used fluorouracil qd for 6 weeks and got minimal reaction. Biopsied in 2019 showing actinic keratosis.

## 2023-10-23 NOTE — COMMUNITY RESOURCES LIST (ENGLISH)
10/23/2023   [facility]  [facility]A  For questions about this resource list or additional care needs, please contact your primary care clinic or care manager.  Phone: [phone]   Email: N/A   Address: [address] [place] [address]   Hours: N/A        Financial Stability       Rent and mortgage payment assistance  1  [facility]  Office - [facility] - Family Homeless Prevention Assistance Project (PA) Distance: 5.79 miles      Phone/Virtual   [address] [place] [address]  Language: English  Hours: Mon - Fri 8:30 AM - 12:00 PM , Mon - Fri 1:00 PM - 4:00 PM  Fees: Free   Phone: (719) 252-7621 Email: ramsey@[facility] CenterObjectLabs.[employer] Website: https://www.[facility] CenterAddressHealth.org/usn/     2  [employer] Distance: 9.33 miles      In-Person, Phone/Virtual   [address] [place] [address]  Language: English  Hours: Mon - Sun Appt. Only  Fees: Free   Phone: (658) 355-8452 Email: boyd@[employer]Wed [employer]VHX Website: http://www.communityhelpinghand.org          Food and Nutrition       Food pantry  3  [facility] Food [employer] Distance: 3.15 miles      [facility]   [address] [place] [address]  Language: English  Hours: Mon 4:00 PM - 6:00 PM , Thu 4:00 PM - 6:00 PM  Fees: Free   Phone: (216) 718-4777 Email: debbie@TheraCell.teextee Website: http://www.DundeeTails.comf.org/     4  [facility] Distance: 4.45 miles      Pickup   [address] [place] [address]  Language: English  Hours: Thu 9:00 AM - 5:00 PM  Fees: Free   Phone: (138) 239-8940 Email: office@eMindful.org Website: http://eMindful.org/     SNAP application assistance  5  [facility] Economic Assistance Department Distance: 5.78 miles      Phone/Virtual   [address] [place] [address]  Language: English  Hours: Mon - Fri 8:15 AM - 4:00 PM  Fees: Free   Phone: (221) 731-5369 Email:  paperwork@co.[facility]. Website: http://www.[facility]./193/Economic-Assistance     6  [facility]  [employer] [facility] (AIFW) Distance: 10.78 miles      In-Person, Phone/Virtual   [address] [place] [address]  Language: Uzbek, Turkish, English, Gujarati, Elizabeth, Syriac, Lithuanian, Czech, Czech, Italian  Hours: Mon - Wed 9:00 AM - 5:00 PM , Thu 12:00 PM - 6:00 PM , Fri 9:00 AM - 5:00 PM , Sun 10:30 AM - 2:00 PM Appt. Only  Fees: Free   Phone: (328) 320-3736 Email: info@[facility]-aifw.org Website: https://www.[facility]-aifw.org/     Soup kitchen or free meals  7  [facility] - Family Table Meals - Family Table Meal Distance: 7.98 miles      Pickup   [address] [address]  Language: English  Hours: Thu 5:00 PM - 6:30 PM  Fees: Free   Phone: (804) 448-5565 Email: ProfStream@[facility].Knowmia Website: http://www.[facility].Knowmia     8  [facility] - Family Table Meal Distance: 8.08 miles      In-Person, Pickup   [address] [id number]  Language: English  Hours: Thu 5:30 PM - 6:30 PM  Fees: Free   Phone: (395) 909-3768 Email: office@CubaAsthmatracker.org Website: http://www.opvizorr.org          Important Numbers & Websites       Emergency Services   911  City Services   311  Poison Control   (994) 889-9006  Suicide Prevention Lifeline   (226) 447-9471 (TALK)  Child Abuse Hotline   (559) 340-8587 (4-A-Child)  Sexual Assault Hotline   (788) 426-5413 (HOPE)  National Runaway Safeline   (132) 806-2130 (RUNAWAY)  All-Options Talkline   (871) 357-2011  Substance Abuse Referral   (559) 373-5719 (HELP)

## 2023-10-23 NOTE — COMMUNITY RESOURCES LIST (ENGLISH)
10/23/2023   [facility]  [facility]A  For questions about this resource list or additional care needs, please contact your primary care clinic or care manager.  Phone: [phone]   Email: N/A   Address: [address] [place] [address]   Hours: N/A        Financial Stability       Rent and mortgage payment assistance  1  [facility]  Office - [facility] - Family Homeless Prevention Assistance Project (PA) Distance: 5.79 miles      Phone/Virtual   [address] [place] [address]  Language: English  Hours: Mon - Fri 8:30 AM - 12:00 PM , Mon - Fri 1:00 PM - 4:00 PM  Fees: Free   Phone: (467) 163-9033 Email: ramsey@[facility] HospitalShareSquare.[employer] Website: https://www.[facility]'s HospitalFirefly Energy.org/usn/     2  [employer] Distance: 9.33 miles      In-Person, Phone/Virtual   [address] [place] [address]  Language: English  Hours: Mon - Sun Appt. Only  Fees: Free   Phone: (997) 383-4415 Email: boyd@[employer]."[employer] Website: http://www.communityhelpinghand.org          Food and Nutrition       Food pantry  3  [facility] Food [employer] Distance: 3.15 miles      [facility]   [address] [place] [address]  Language: English  Hours: Mon 4:00 PM - 6:00 PM , Thu 4:00 PM - 6:00 PM  Fees: Free   Phone: (703) 182-6949 Email: debbie@travayl.Arcaris Website: http://www.Society HillthrdPlacef.org/     4  [facility] Distance: 4.45 miles      Pickup   [address] [place] [address]  Language: English  Hours: Thu 9:00 AM - 5:00 PM  Fees: Free   Phone: (157) 132-8075 Email: office@Piccsy.org Website: http://Piccsy.org/     SNAP application assistance  5  [facility] Economic Assistance Department Distance: 5.78 miles      Phone/Virtual   [address] [place] [address]  Language: English  Hours: Mon - Fri 8:15 AM - 4:00 PM  Fees: Free   Phone: (813) 954-3975 Email:  paperwork@co.[facility]. Website: http://www.[facility]./193/Economic-Assistance     6  [facility]  [employer] (AIFW) Distance: 10.78 miles      In-Person, Phone/Virtual   [address] [place] [address]  Language: Estonian, Icelandic, English, Gujarati, Elizabeth, Uzbek, Frisian, Maori, Lithuanian, Yoruba  Hours: Mon - Wed 9:00 AM - 5:00 PM , Thu 12:00 PM - 6:00 PM , Fri 9:00 AM - 5:00 PM , Sun 10:30 AM - 2:00 PM Appt. Only  Fees: Free   Phone: (247) 597-1036 Email: info@[facility]-aifw.org Website: https://www.[facility]-aifw.org/     Soup kitchen or free meals  7  [facility] - Family Table Meals - Family Table Meal Distance: 7.98 miles      Pickup   [address] [address]  Language: English  Hours: Thu 5:00 PM - 6:30 PM  Fees: Free   Phone: (697) 579-7855 Email: Vico Software@[facility].[employer] Website: http://www.[facility] ButteDujour AppV[facility].MarketRiders     8  [facility] - Family Table Meal Distance: 8.08 miles      In-Person, Pickup   [address] [id number]  Language: English  Hours: Thu 5:30 PM - 6:30 PM  Fees: Free   Phone: (149) 247-7024 Email: office@WavelandAcumen.org Website: http://www.Jetbayr.org          Important Numbers & Websites       Emergency Services   911  City Services   311  Poison Control   (266) 387-5520  Suicide Prevention Lifeline   (270) 614-6324 (TALK)  Child Abuse Hotline   (491) 610-3530 (4-A-Child)  Sexual Assault Hotline   (792) 347-4595 (HOPE)  National Runaway Safeline   (533) 918-7674 (RUNAWAY)  All-Options Talkline   (863) 734-8287  Substance Abuse Referral   (539) 250-1657 (HELP)

## 2023-10-23 NOTE — PROCEDURE: LIQUID NITROGEN
Duration Of Freeze Thaw-Cycle (Seconds): 0
Application Tool (Optional): Liquid Nitrogen Sprayer
Show Aperture Variable?: No
Detail Level: Detailed
Show Applicator Variable?: Yes
Post-Care Instructions: - Patient was instructed to avoid picking at any of the treated lesions.
Consent: - Discussed that these are a result of cumulative sun exposure.\\n- Consent was obtained and risks were reviewed prior to procedure today. All questions were answered prior to procedure today.\\n- Risks discussed include but are not limited to pain, crusting, scabbing, blistering, scarring, temporary or permanent darker or lighter pigmentary change, recurrence, incomplete resolution, and infection.

## 2023-10-24 NOTE — PROGRESS NOTES
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 intensity at the area of superior labral tearing,  finding most likely representing developing intraosseous ganglion  cyst.     LONG BICIPITAL TENDON  The long head of the biceps tendon is normally situated within the  bicipital groove. No complete or partial biceps tendon tear is  present.     GLENOHUMERAL JOINT  Joint fluid: Physiologic amount of joint fluid is  present.     Cartilage and subarticular bone:  No focal hyaline cartilage defects  are noted. No Hill-Sachs, reverse Hill-Sachs, or bony Bankart lesions  are seen. Posterior subluxation alignment of the humeral head relative  to glenoid.     Labrum: Limited assessment on this study with relative lack of joint  distention shows tearing of the superior and posterosuperior labrum.     ANCILLARY FINDINGS:                                                                      Impression:  1. Low-grade articular sided tear of the supraspinatus middle  fibers  at the footprint with approximately 10 mm medial retraction of torn  fibers.   2. SLAP tear.  3. 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     [facility]     3 views bilateral shoulder radiographs 8/11/2022 3:49 PM     History: Right shoulder pain, unspecified chronicity; Chronic left  shoulder pain; Chronic left shoulder pain; Polyarthropathy      Comparison: None     Findings:     AP, Grashey, transscapular Y views of the shoulders were obtained.      Left: No acute osseous abnormality. Glenohumeral and acromioclavicular  joints are congruent.     Mild degenerative changes of the acromioclavicular joint. No  substantial degenerative change of the glenohumeral joint.     Soft tissue is unremarkable. The visualized lung is clear.           Right: No acute osseous abnormality. Glenohumeral and  acromioclavicular joints are congruent.     Mild degenerative changes of the acromioclavicular joint. No  substantial degenerative change of the glenohumeral joint.     Soft tissue is unremarkable. The visualized lung is clear.                                                                      Impression:  1. No acute osseous abnormality.  2. No substantial degenerative change.     BRIDGETTE (Naldo FRANCO MD       Other testing (labs, diagnostics):  2/28/2023  Cr. 0.57  Est GFR >90      Screening tools:     DIRE Score for ongoing opioid management is calculated as follows:    Diagnosis = 2    Intractability = 2    Risk: Psych = 2  Chem Hlth = 1-2  Reliability = 2  Social = 2    Efficacy = 2    Total DIRE Score = 13-14 (14 or higher predicts good candidate for ongoing opioid management; 13 or lower predicts poor candidate for opioid management)         Assessment:  Muscle  spasm  Myofascial pain  Status post cervical spinal fusion  Chronic neck pain  Chronic right shoulder pain  Incomplete tear of right rotator cuff, unspecified whether traumatic  Status postrepair of anterior cruciate ligament  Chronic pain of the left knee  Pain in joint involving ankle and foot left  Status post spinal surgery  Anxiety  Persistent insomnia  Chronic intractable pain  Chronic continuous use of opioids  Encounter for long-term use of opiate analgesic    UDT 4/10/2023  CSA 4/10/2023  Previous alcohol dependence in  during her divorce  PMHx includes: overdose with Trazodone (2009 at age 28), acute alcoholic intoxication (2011, seen in ER), alcohol withdrawal (2011), alcohol dependence (), ASCUS favor benign (), degeneration of cervical disc, lumbago, Meniere's disease, left knee effusion  PSHx includes: low transverse  (2005), Cervical 4 to 6 Anterior Cervical Decompression and Fusion with Medtronic Plate and Screws, Interbody Device, Use of Fluoroscopy, Microscope (2022 by Wilbur Murray MD), graft bone from left iliac crest (2022), Right cervical 4-5 posterior foraminotomy (2022 by Wilbur Murray MD), arthroscopic reconstruction ACL Left knee with hamstring autograft (2023, by Mc Peterson MD), tonsillectomy, mastoidectomy, colonoscopy        Plan:   Physical Therapy: none, has finished PHYSICAL THERAPY   You could check out Gustavo Charltonustin, he can be found on [employer], Jeremy Dumont FB reels. He also has his own website   Continue gentle home exercises  Clinical Health Psychologist to address issues of relaxation, behavioral change, coping style, and other factors important to improvement: none  Diagnostic Studies: none  Medication Management:   continue cyclobenzaprine 10mg up to 3 times daily for muscle spasms, caution sedation  continue Norco 7.5/325mg may take 1 tab every 6 hours as needed for pain, max of 3/day. #90/30 days. Fill  10/25 and start 10/27  Continue Voltaren gel (diclofenac sodium 1%) this is found over-the-counter and you may use 2 grams to the right shoulder and 4 grams to left knee up to 4 times daily.   continue duloxetine to 60mg/day Further procedures recommended: trigger point injections. Our office will contact you  Hydroxyzine 25-50mg every 6 hours as needed for anxiety or insomnia  Our plan is to transition you (if needed) from Norco to Buprenorphine (likely using Butrans patch or Belbuca buccal film) when we meet the next time in December. This will offer consistent pain relief in a very safe manner.   Recommendations/follow-up for PCP:  See above  Release of information: none  Follow up: in December, 1 hour in-person appt.         ASSESSMENT AND PLAN:  (M62.838) Muscle spasm  (primary encounter diagnosis)  Plan: cyclobenzaprine (FLEXERIL) 10 MG tablet,         hydrOXYzine (ATARAX) 25 MG tablet, Adult Pain         Clinic Follow-Up Order            (M79.18) Myofascial pain  Plan: cyclobenzaprine (FLEXERIL) 10 MG tablet,         hydrOXYzine (ATARAX) 25 MG tablet, Adult Pain         Clinic Follow-Up Order            (Z98.1) S/P cervical spinal fusion  Plan: gabapentin (NEURONTIN) 300 MG capsule,         cyclobenzaprine (FLEXERIL) 10 MG tablet,         HYDROcodone-acetaminophen (NORCO) 7.5-325 MG         per tablet, Adult Pain Clinic Follow-Up Order            (M54.2,  G89.29) Chronic neck pain  Plan: gabapentin (NEURONTIN) 300 MG capsule,         cyclobenzaprine (FLEXERIL) 10 MG tablet,         HYDROcodone-acetaminophen (NORCO) 7.5-325 MG         per tablet, Adult Pain Clinic Follow-Up Order            (M25.511,  G89.29) Chronic right shoulder pain  Plan: gabapentin (NEURONTIN) 300 MG capsule,         cyclobenzaprine (FLEXERIL) 10 MG tablet,         HYDROcodone-acetaminophen (NORCO) 7.5-325 MG         per tablet, diclofenac (VOLTAREN) 1 % topical         gel, Adult Pain Clinic Follow-Up Order            (M75.111)  Incomplete tear of right rotator cuff, unspecified whether traumatic  Plan: gabapentin (NEURONTIN) 300 MG capsule,         cyclobenzaprine (FLEXERIL) 10 MG tablet,         HYDROcodone-acetaminophen (NORCO) 7.5-325 MG         per tablet, Adult Pain Clinic Follow-Up Order            (Z98.890) S/P repair of anterior cruciate ligament  Plan: gabapentin (NEURONTIN) 300 MG capsule,         cyclobenzaprine (FLEXERIL) 10 MG tablet,         HYDROcodone-acetaminophen (NORCO) 7.5-325 MG         per tablet, diclofenac (VOLTAREN) 1 % topical         gel, Adult Pain Clinic Follow-Up Order            (M25.562,  G89.29) Chronic pain of left knee  Plan: gabapentin (NEURONTIN) 300 MG capsule,         cyclobenzaprine (FLEXERIL) 10 MG tablet,         HYDROcodone-acetaminophen (NORCO) 7.5-325 MG         per tablet, diclofenac (VOLTAREN) 1 % topical         gel, Adult Pain Clinic Follow-Up Order            (Z98.890) S/P spinal surgery  Plan: gabapentin (NEURONTIN) 300 MG capsule,         cyclobenzaprine (FLEXERIL) 10 MG tablet,         HYDROcodone-acetaminophen (NORCO) 7.5-325 MG         per tablet, Adult Pain Clinic Follow-Up Order            (M25.572) Pain in joint involving ankle and foot, left  Plan: diclofenac (VOLTAREN) 1 % topical gel, Adult         Pain Clinic Follow-Up Order            (F41.9) Anxiety  Plan: gabapentin (NEURONTIN) 300 MG capsule,         hydrOXYzine (ATARAX) 25 MG tablet, Adult Pain         Clinic Follow-Up Order            (G47.00) Persistent insomnia  Plan: hydrOXYzine (ATARAX) 25 MG tablet, Adult Pain         Clinic Follow-Up Order            (G89.29) Chronic intractable pain  Plan: gabapentin (NEURONTIN) 300 MG capsule, Adult         Pain Clinic Follow-Up Order            (F11.90) Chronic, continuous use of opioids  Plan: gabapentin (NEURONTIN) 300 MG capsule,         HYDROcodone-acetaminophen (NORCO) 7.5-325 MG         per tablet, naloxone (NARCAN) 4 MG/0.1ML nasal         spray, Adult Pain Clinic Follow-Up  Order            (Z79.891) Encounter for long-term use of opiate analgesic  Plan: gabapentin (NEURONTIN) 300 MG capsule,         HYDROcodone-acetaminophen (NORCO) 7.5-325 MG         per tablet, naloxone (NARCAN) 4 MG/0.1ML nasal         spray, Adult Pain Clinic Follow-Up Order              Face to face time: 59 minutes         Sarah LIEBERMAN RN CNP, FNP  [facility]  [facility] [place]    '

## 2023-10-25 ENCOUNTER — OFFICE VISIT (OUTPATIENT)
Dept: PALLIATIVE MEDICINE | Facility: CLINIC | Age: 43
End: 2023-10-25
Payer: COMMERCIAL

## 2023-10-25 VITALS — DIASTOLIC BLOOD PRESSURE: 93 MMHG | SYSTOLIC BLOOD PRESSURE: 134 MMHG | HEART RATE: 92 BPM

## 2023-10-25 DIAGNOSIS — Z98.890 S/P SPINAL SURGERY: ICD-10-CM

## 2023-10-25 DIAGNOSIS — M25.562 CHRONIC PAIN OF LEFT KNEE: ICD-10-CM

## 2023-10-25 DIAGNOSIS — G47.00 PERSISTENT INSOMNIA: ICD-10-CM

## 2023-10-25 DIAGNOSIS — G89.29 CHRONIC INTRACTABLE PAIN: ICD-10-CM

## 2023-10-25 DIAGNOSIS — F41.9 ANXIETY: ICD-10-CM

## 2023-10-25 DIAGNOSIS — M79.18 MYOFASCIAL PAIN: ICD-10-CM

## 2023-10-25 DIAGNOSIS — M54.2 CHRONIC NECK PAIN: ICD-10-CM

## 2023-10-25 DIAGNOSIS — M25.572 PAIN IN JOINT INVOLVING ANKLE AND FOOT, LEFT: ICD-10-CM

## 2023-10-25 DIAGNOSIS — Z98.890 S/P REPAIR OF ANTERIOR CRUCIATE LIGAMENT: ICD-10-CM

## 2023-10-25 DIAGNOSIS — M62.838 MUSCLE SPASM: Primary | ICD-10-CM

## 2023-10-25 DIAGNOSIS — G89.29 CHRONIC RIGHT SHOULDER PAIN: ICD-10-CM

## 2023-10-25 DIAGNOSIS — Z79.891 ENCOUNTER FOR LONG-TERM USE OF OPIATE ANALGESIC: ICD-10-CM

## 2023-10-25 DIAGNOSIS — Z98.1 S/P CERVICAL SPINAL FUSION: ICD-10-CM

## 2023-10-25 DIAGNOSIS — G89.29 CHRONIC NECK PAIN: ICD-10-CM

## 2023-10-25 DIAGNOSIS — F11.90 CHRONIC, CONTINUOUS USE OF OPIOIDS: ICD-10-CM

## 2023-10-25 DIAGNOSIS — G89.29 CHRONIC PAIN OF LEFT KNEE: ICD-10-CM

## 2023-10-25 DIAGNOSIS — M75.111 INCOMPLETE TEAR OF RIGHT ROTATOR CUFF, UNSPECIFIED WHETHER TRAUMATIC: ICD-10-CM

## 2023-10-25 DIAGNOSIS — M25.511 CHRONIC RIGHT SHOULDER PAIN: ICD-10-CM

## 2023-10-25 PROCEDURE — 99215 OFFICE O/P EST HI 40 MIN: CPT | Performed by: NURSE PRACTITIONER

## 2023-10-25 RX ORDER — CYCLOBENZAPRINE HCL 10 MG
10 TABLET ORAL
Qty: 30 TABLET | Refills: 1 | Status: SHIPPED | OUTPATIENT
Start: 2023-10-25 | End: 2024-06-18

## 2023-10-25 RX ORDER — HYDROXYZINE HYDROCHLORIDE 25 MG/1
25-50 TABLET, FILM COATED ORAL EVERY 6 HOURS PRN
Qty: 180 TABLET | Refills: 0 | Status: SHIPPED | OUTPATIENT
Start: 2023-10-25

## 2023-10-25 RX ORDER — HYDROCODONE BITARTRATE AND ACETAMINOPHEN 7.5; 325 MG/1; MG/1
1 TABLET ORAL EVERY 6 HOURS PRN
Qty: 90 TABLET | Refills: 0 | Status: SHIPPED | OUTPATIENT
Start: 2023-10-25 | End: 2023-12-04

## 2023-10-25 RX ORDER — GABAPENTIN 300 MG/1
CAPSULE ORAL
Qty: 180 CAPSULE | Refills: 0 | Status: SHIPPED | OUTPATIENT
Start: 2023-10-25 | End: 2024-03-04

## 2023-10-25 ASSESSMENT — PAIN SCALES - GENERAL: PAINLEVEL: MODERATE PAIN (5)

## 2023-10-25 NOTE — PATIENT INSTRUCTIONS
Plan:   Physical Therapy: none, has finished PHYSICAL THERAPY   You could check out Gustavo Kuhn, he can be found on [employer], Jeremy Dumont FB reels. He also has his own website   Continue gentle home exercises  Clinical Health Psychologist to address issues of relaxation, behavioral change, coping style, and other factors important to improvement: none  Diagnostic Studies: none  Medication Management:   continue cyclobenzaprine 10mg up to 3 times daily for muscle spasms, caution sedation  continue Norco 7.5/325mg may take 1 tab every 6 hours as needed for pain, max of 3/day. #90/30 days. Fill 10/25 and start 10/27  Continue Voltaren gel (diclofenac sodium 1%) this is found over-the-counter and you may use 2 grams to the right shoulder and 4 grams to left knee up to 4 times daily.   continue duloxetine to 60mg/day Further procedures recommended: trigger point injections. Our office will contact you  Hydroxyzine 25-50mg every 6 hours as needed for anxiety or insomnia  Our plan is to transition you (if needed) from Norco to Buprenorphine (likely using Butrans patch or Belbuca buccal film) when we meet the next time in December. This will offer consistent pain relief in a very safe manner.   Recommendations/follow-up for PCP:  See above  Release of information: none  Follow up: in December, 1 hour in-person appt.    ----------------------------------------------------------------  Clinic Number:  [phone]   Call with any questions about your care and for scheduling assistance.   Calls are returned Monday through Friday between 8 AM and 4:30 PM. We usually get back to you within 2 business days depending on the issue/request.    If we are prescribing your medications:  For opioid medication refills, call the clinic or send a [employer] message 7 days in advance.  Please include:  Name of requested medication  Name of the pharmacy.  For non-opioid medications, call your pharmacy directly to request a refill.  Please allow 3-4 days to be processed.   Per [place] State Law:  All controlled substance prescriptions must be filled within 30 days of being written.    For those controlled substances allowing refills, pickup must occur within 30 days of last fill.      We believe regular attendance is key to your success in our program!    Any time you are unable to keep your appointment we ask that you call us at least 24 hours in advance to cancel.This will allow us to offer the appointment time to another patient.   Multiple missed appointments may lead to dismissal from the clinic.

## 2023-11-28 ENCOUNTER — MYC MEDICAL ADVICE (OUTPATIENT)
Dept: PALLIATIVE MEDICINE | Facility: CLINIC | Age: 43
End: 2023-11-28
Payer: COMMERCIAL

## 2023-11-28 DIAGNOSIS — G89.29 CHRONIC PAIN OF LEFT KNEE: ICD-10-CM

## 2023-11-28 DIAGNOSIS — Z98.890 S/P SPINAL SURGERY: ICD-10-CM

## 2023-11-28 DIAGNOSIS — M54.2 CHRONIC NECK PAIN: ICD-10-CM

## 2023-11-28 DIAGNOSIS — M25.562 CHRONIC PAIN OF LEFT KNEE: ICD-10-CM

## 2023-11-28 DIAGNOSIS — Z79.891 ENCOUNTER FOR LONG-TERM USE OF OPIATE ANALGESIC: ICD-10-CM

## 2023-11-28 DIAGNOSIS — M75.111 INCOMPLETE TEAR OF RIGHT ROTATOR CUFF, UNSPECIFIED WHETHER TRAUMATIC: ICD-10-CM

## 2023-11-28 DIAGNOSIS — G89.29 CHRONIC NECK PAIN: ICD-10-CM

## 2023-11-28 DIAGNOSIS — G89.29 CHRONIC RIGHT SHOULDER PAIN: ICD-10-CM

## 2023-11-28 DIAGNOSIS — Z98.890 S/P REPAIR OF ANTERIOR CRUCIATE LIGAMENT: ICD-10-CM

## 2023-11-28 DIAGNOSIS — F11.90 CHRONIC, CONTINUOUS USE OF OPIOIDS: ICD-10-CM

## 2023-11-28 DIAGNOSIS — M25.511 CHRONIC RIGHT SHOULDER PAIN: ICD-10-CM

## 2023-11-28 DIAGNOSIS — Z98.1 S/P CERVICAL SPINAL FUSION: ICD-10-CM

## 2023-11-29 NOTE — TELEPHONE ENCOUNTER
Patient was hospitalized for 3 days withdrawing from Alcohol and has been and still is in an in patient treatment program.     She was seen in clinic on 10/25/23 and provided a prescription for Norco 7.5 MG tid prn #90. Plan from that visit is below and mentions switching to buprenorphine for safety.     Plan from 10/25/23: Medication Management:   continue cyclobenzaprine 10mg up to 3 times daily for muscle spasms, caution sedation  continue Norco 7.5/325mg may take 1 tab every 6 hours as needed for pain, max of 3/day. #90/30 days. Fill 10/25 and start 10/27  Continue Voltaren gel (diclofenac sodium 1%) this is found over-the-counter and you may use 2 grams to the right shoulder and 4 grams to left knee up to 4 times daily.   continue duloxetine to 60mg/day   Hydroxyzine 25-50mg every 6 hours as needed for anxiety or insomnia  Our plan is to transition you (if needed) from Norco to Buprenorphine (likely using Butrans patch or Belbuca buccal film) when we meet the next time in December. This will offer consistent pain relief in a very safe manner.     Follow up: in December, 1 hour in-person appt.        See Mychart below. She is requesting a refill of 90 tablets of Norco 7.5 MG tablets to be used to wean. She reports she has been taking this daily in treatment but it is unclear to what extent. Her reasoning for increased pain and continued opioid use is stress, anxiety and uncomfortable bed at inpatient treatment program. Scheduled 12/19/23.    Routing to provider to review.     DANIEL AllenN, RN  Care Coordinator  [facility]

## 2023-12-01 NOTE — TELEPHONE ENCOUNTER
Health Call Center    Phone Message    May a detailed message be left on voicemail: yes     Reason for Call: Other: Patient calling back regarding her medication (Norco) that needs to be filled by Monday.  She needs to take them starting Monday Morning and is asking for a refill to be sent over today.  Please call her back.     Action Taken: Message routed to:  Other: [facility]    Travel Screening: Not Applicable

## 2023-12-04 ENCOUNTER — VIRTUAL VISIT (OUTPATIENT)
Dept: PALLIATIVE MEDICINE | Facility: CLINIC | Age: 43
End: 2023-12-04
Payer: COMMERCIAL

## 2023-12-04 DIAGNOSIS — G89.29 CHRONIC PAIN OF LEFT KNEE: ICD-10-CM

## 2023-12-04 DIAGNOSIS — Z79.891 ENCOUNTER FOR LONG-TERM USE OF OPIATE ANALGESIC: ICD-10-CM

## 2023-12-04 DIAGNOSIS — G89.29 CHRONIC NECK PAIN: ICD-10-CM

## 2023-12-04 DIAGNOSIS — G89.29 CHRONIC RIGHT SHOULDER PAIN: ICD-10-CM

## 2023-12-04 DIAGNOSIS — M25.562 CHRONIC PAIN OF LEFT KNEE: ICD-10-CM

## 2023-12-04 DIAGNOSIS — M54.2 CHRONIC NECK PAIN: ICD-10-CM

## 2023-12-04 DIAGNOSIS — F11.90 CHRONIC, CONTINUOUS USE OF OPIOIDS: ICD-10-CM

## 2023-12-04 DIAGNOSIS — M75.111 INCOMPLETE TEAR OF RIGHT ROTATOR CUFF, UNSPECIFIED WHETHER TRAUMATIC: ICD-10-CM

## 2023-12-04 DIAGNOSIS — M25.511 CHRONIC RIGHT SHOULDER PAIN: ICD-10-CM

## 2023-12-04 DIAGNOSIS — Z98.890 S/P REPAIR OF ANTERIOR CRUCIATE LIGAMENT: ICD-10-CM

## 2023-12-04 DIAGNOSIS — Z98.1 S/P CERVICAL SPINAL FUSION: ICD-10-CM

## 2023-12-04 DIAGNOSIS — Z98.890 S/P SPINAL SURGERY: ICD-10-CM

## 2023-12-04 PROCEDURE — 99443 PR PHYSICIAN TELEPHONE EVALUATION 21-30 MIN: CPT | Performed by: NURSE PRACTITIONER

## 2023-12-04 RX ORDER — HYDROCODONE BITARTRATE AND ACETAMINOPHEN 7.5; 325 MG/1; MG/1
1 TABLET ORAL EVERY 6 HOURS PRN
Qty: 45 TABLET | Refills: 0 | Status: SHIPPED | OUTPATIENT
Start: 2023-12-04 | End: 2024-02-20 | Stop reason: ALTCHOICE

## 2023-12-04 RX ORDER — HYDROCODONE BITARTRATE AND ACETAMINOPHEN 7.5; 325 MG/1; MG/1
TABLET ORAL
Qty: 6 TABLET | Refills: 0 | Status: SHIPPED | OUTPATIENT
Start: 2023-12-04 | End: 2024-01-01

## 2023-12-04 RX ORDER — BUPRENORPHINE 10 UG/H
1 PATCH TRANSDERMAL
Qty: 4 PATCH | Refills: 0 | Status: SHIPPED | OUTPATIENT
Start: 2023-12-04 | End: 2023-12-12

## 2023-12-04 NOTE — TELEPHONE ENCOUNTER
Patient seen with a telephone visit today 12/4/2023. See that encounter for more information.     Sarah LIEBERMAN, RN CNP, FNP  [facility]  [facility] [place]

## 2023-12-04 NOTE — PATIENT INSTRUCTIONS
Plan:   Physical Therapy: none, has finished PHYSICAL THERAPY   You could check out Gustavo Kuhn, he can be found on [employer], Jeremy Dumont FB reels. He also has his own website   Continue gentle home exercises  Clinical Health Psychologist to address issues of relaxation, behavioral change, coping style, and other factors important to improvement: none  Diagnostic Studies: none  Medication Management:   continue cyclobenzaprine 10mg up to 3 times daily for muscle spasms, caution sedation  continue Norco 7.5/325mg may take 1 tab twice daily as needed for first 2 days wearing Butrans, then reduce to max of 1 tab per day as needed.  Fill and begin 12/4/2023. Do not take first dose until you are wearing the Butrans patch. CALL CLINIC IF DELAY IN BUTRANS PATCH, I WOULD ALLOW YOU TO USE MAX OF 3/DAY UNTIL BURANS FILLED. MUST HOLD ALL NORCO FOR 12-14 HOURS PRIOR TO APPLYING THE VERY FIRST BUTRANS PATCH.   START Butrans (buprenorphine) patch 10mcg/hr. Apply to clean dry skin on upper chest, upper arm or upper back. Change every 7 days and apply to new area, don't reuse an area for 3 weeks. If skin irritation occurs, use Benadryl spray on the skin, let dry and then apply the patch.   Continue Voltaren gel (diclofenac sodium 1%) this is found over-the-counter and you may use 2 grams to the right shoulder and 4 grams to left knee up to 4 times daily.   continue duloxetine to 60mg/day Further procedures recommended: trigger point injections. Our office will contact you  Hydroxyzine 25-50mg every 6 hours as needed for anxiety or insomnia  Recommendations/follow-up for PCP:  See above  Release of information: none  Follow up: as scheduled on December 19th    Administration of Butrans    Butrans is for transdermal use (on intact skin) only.   Each Butrans patch is intended to be worn for 7 days  Do not to use Butrans if the pouch seal is broken or the patch is cut, damaged, or changed in any way  Do not to cut the Butrans  patch  Apply immediately after removal from the individually sealed pouch  Apply Butrans to the upper outer arm, upper chest, upper back or the side of the chest. These 4 sites (each present on both sides of the body) provide 8 possible application sites. Rotate Butrans among the 8 described skin sites. After Butrans removal, wait a minimum of 21 days before reapplying to the same skin site          For the use of 2 patches, remove your current patch, and apply the 2 new patches adjacent to one another at a different application site  Apply Butrans to a hairless or nearly hairless skin site.   If none are available, the hair at the site should be clipped, not shaven.   Do not apply Butrans to irritated skin.   If the application site must be cleaned, clean the site with water only. Do not use soaps, alcohol, oils, lotions, or abrasive devices.   Allow the skin to dry before applying Butrans  It is OK to bathe or shower with the patch on. Lightly pat it dry.   If problems with adhesion of Butrans occur, the edges may be taped with first aid tape.   If problems with lack of adhesion continue, the patch may be covered with waterproof or semipermeable adhesive dressings (Tegaderm) suitable for 7 days of wear.   If Butrans falls off during the 7-day dosing interval, dispose of the transdermal system properly and place a new Butrans patch on at a different skin site.   Dispose of used patches by folding the adhesive side of the patch to itself, then flushing the patch down the toilet immediately upon removal.   Unused patches should be removed from their pouches, the protective liners removed, the patches folded so that the adhesive side of the patch adheres to itself, and immediately flushed down the toilet  Dispose of any patches remaining from a prescription as soon as they are no longer needed    Butrans is supplied in cartons containing 4 individually packaged systems and   a pouch containing 4 Patch-Disposal Units            ----------------------------------------------------------------  [facility] Number:  [phone]   Call with any questions about your care and for scheduling assistance.   Calls are returned Monday through Friday between 8 AM and 4:30 PM. We usually get back to you within 2 business days depending on the issue/request.    If we are prescribing your medications:  For opioid medication refills, call the clinic or send a [employer] message 7 days in advance.  Please include:  Name of requested medication  Name of the pharmacy.  For non-opioid medications, call your pharmacy directly to request a refill. Please allow 3-4 days to be processed.   Per [place] State Law:  All controlled substance prescriptions must be filled within 30 days of being written.    For those controlled substances allowing refills, pickup must occur within 30 days of last fill.      We believe regular attendance is key to your success in our program!    Any time you are unable to keep your appointment we ask that you call us at least 24 hours in advance to cancel.This will allow us to offer the appointment time to another patient.   Multiple missed appointments may lead to dismissal from the clinic.

## 2023-12-04 NOTE — PROGRESS NOTES
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 imaging; Right shoulder pain,  unspecified chronicity; Polyarthropathy     Comparison: 8/11/2022     Findings:     ROTATOR CUFF and ASSOCIATED STRUCTURES  Rotator cuff:      On a background of tendinosis, low-grade articular sided tear of the  supraspinatus middle fibers at the footprint with approximately 10 mm  medial retraction of torn fibers. Infraspinatus and subscapularis  tendinosis. Teres minor tendon is intact.     Bursa: No subacromial or subdeltoid bursal fluid.     Musculature: Muscle bulk of rotator cuff is preserved.  Deltoid muscle  bulk is also preserved.  No muscle edema.     Acromioclavicular joint  There are mild degenerative changes of the acromioclavicular joint.  Acromion is type 2 in sagittal morphology.  Coracoacromial ligament is  not visualized.     OSSEOUS STRUCTURES  No fracture, marrow contusion.      Nonspecific focal T2 hyperintense intramedullary lesion involving the  coracoid process, which is contiguous with subchondral edema  like  marrow signal intensity at the area of superior labral tearing,  finding most likely representing developing intraosseous ganglion  cyst.     LONG BICIPITAL TENDON  The long head of the biceps tendon is normally situated within the  bicipital groove. No complete or partial biceps tendon tear is  present.     GLENOHUMERAL JOINT  Joint fluid: Physiologic amount of joint fluid is  present.     Cartilage and subarticular bone:  No focal hyaline cartilage defects  are noted. No Hill-Sachs, reverse Hill-Sachs, or bony Bankart lesions  are seen. Posterior subluxation alignment of the humeral head relative  to glenoid.     Labrum: Limited assessment on this study with relative lack of joint  distention shows tearing of the superior and posterosuperior labrum.     ANCILLARY FINDINGS:                                                                      Impression:  1. Low-grade articular sided tear of the supraspinatus middle fibers  at the footprint with approximately 10 mm medial retraction of torn  fibers.   2. SLAP tear.  3. 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     [facility]ALLACE     3 views bilateral shoulder radiographs 8/11/2022 3:49 PM     History: Right shoulder pain, unspecified chronicity; Chronic left  shoulder pain; Chronic left shoulder pain; Polyarthropathy      Comparison: None     Findings:     AP, Grashey, transscapular Y views of the shoulders were obtained.      Left: No acute osseous abnormality. Glenohumeral and acromioclavicular  joints are congruent.     Mild degenerative changes of the acromioclavicular joint. No  substantial degenerative change of the glenohumeral joint.     Soft tissue is unremarkable. The visualized lung is clear.           Right: No  acute osseous abnormality. Glenohumeral and  acromioclavicular joints are congruent.     Mild degenerative changes of the acromioclavicular joint. No  substantial degenerative change of the glenohumeral joint.     Soft tissue is unremarkable. The visualized lung is clear.                                                                      Impression:  1. No acute osseous abnormality.  2. No substantial degenerative change.     BRIDGETTE FRANCO MD (Joe)       Other testing (labs, diagnostics):  2023  Cr. 0.57  Est GFR >90      Screening tools:     DIRE Score for ongoing opioid management is calculated as follows:    Diagnosis = 2    Intractability = 2    Risk: Psych = 2  Chem Hlth = 1  Reliability = 2  Social = 2    Efficacy = 2    Total DIRE Score = 13 (14 or higher predicts good candidate for ongoing opioid management; 13 or lower predicts poor candidate for opioid management)         Assessment:  Muscle spasm  Myofascial pain  Status post cervical spinal fusion  Chronic neck pain  Chronic right shoulder pain  Incomplete tear of right rotator cuff, unspecified whether traumatic  Status postrepair of anterior cruciate ligament  Chronic pain of the left knee  Pain in joint involving ankle and foot left  Status post spinal surgery  Anxiety  Persistent insomnia  Chronic intractable pain  Chronic continuous use of opioids  Encounter for long-term use of opiate analgesic    UDT 4/10/2023  CSA 4/10/2023  Previous alcohol dependence in  during her divorce  PMHx includes: overdose with Trazodone (2009 at age 28), acute alcoholic intoxication (2011, seen in ER), alcohol withdrawal (2011), alcohol dependence (), ASCUS favor benign (), degeneration of cervical disc, lumbago, Meniere's disease, left knee effusion  PSHx includes: low transverse  (2005), Cervical 4 to 6 Anterior Cervical Decompression and Fusion with Medtronic Plate and Screws, Interbody Device, Use of Fluoroscopy,  Microscope (11/26/2022 by Wilbur Murray MD), graft bone from left iliac crest (11/26/2022), Right cervical 4-5 posterior foraminotomy (11/29/2022 by Wilbur Murray MD), arthroscopic reconstruction ACL Left knee with hamstring autograft (1/27/2023, by Mc Peterson MD), tonsillectomy, mastoidectomy, colonoscopy        Plan:   Physical Therapy: none, has finished PHYSICAL THERAPY   You could check out Gustavo Kuhn, he can be found on [employer], Jeremy Dumont FB reels. He also has his own website   Continue gentle home exercises  Clinical Health Psychologist to address issues of relaxation, behavioral change, coping style, and other factors important to improvement: none  Diagnostic Studies: none  Medication Management:   continue cyclobenzaprine 10mg up to 3 times daily for muscle spasms, caution sedation  continue Norco 7.5/325mg may take 1 tab twice daily as needed for first 2 days wearing Butrans, then reduce to max of 1 tab per day as needed.  Fill and begin 12/4/2023. Do not take first dose until you are wearing the Butrans patch. CALL CLINIC IF DELAY IN BUTRANS PATCH, I WOULD ALLOW YOU TO USE MAX OF 3/DAY UNTIL BURANS FILLED. MUST HOLD ALL NORCO FOR 12-14 HOURS PRIOR TO APPLYING THE VERY FIRST BUTRANS PATCH.   START Butrans (buprenorphine) patch 10mcg/hr. Apply to clean dry skin on upper chest, upper arm or upper back. Change every 7 days and apply to new area, don't reuse an area for 3 weeks. If skin irritation occurs, use Benadryl spray on the skin, let dry and then apply the patch.   Continue Voltaren gel (diclofenac sodium 1%) this is found over-the-counter and you may use 2 grams to the right shoulder and 4 grams to left knee up to 4 times daily.   continue duloxetine to 60mg/day Further procedures recommended: trigger point injections. Our office will contact you  Hydroxyzine 25-50mg every 6 hours as needed for anxiety or insomnia  Recommendations/follow-up for PCP:  See above  Release of  information: none  Follow up: as scheduled on December 19th        ASSESSMENT AND PLAN:  (Z98.1) S/P cervical spinal fusion  Plan: buprenorphine (BUTRANS) 10 MCG/HR WK patch,         HYDROcodone-acetaminophen (NORCO) 7.5-325 MG         per tablet            (M54.2,  G89.29) Chronic neck pain  Plan: buprenorphine (BUTRANS) 10 MCG/HR WK patch,         HYDROcodone-acetaminophen (NORCO) 7.5-325 MG         per tablet            (M25.511,  G89.29) Chronic right shoulder pain  Plan: buprenorphine (BUTRANS) 10 MCG/HR WK patch,         HYDROcodone-acetaminophen (NORCO) 7.5-325 MG         per tablet            (Z98.890) S/P repair of anterior cruciate ligament  Plan: buprenorphine (BUTRANS) 10 MCG/HR WK patch,         HYDROcodone-acetaminophen (NORCO) 7.5-325 MG         per tablet            (M25.562,  G89.29) Chronic pain of left knee  Plan: buprenorphine (BUTRANS) 10 MCG/HR WK patch,         HYDROcodone-acetaminophen (NORCO) 7.5-325 MG         per tablet            (M75.111) Incomplete tear of right rotator cuff, unspecified whether traumatic  Plan: buprenorphine (BUTRANS) 10 MCG/HR WK patch,         HYDROcodone-acetaminophen (NORCO) 7.5-325 MG         per tablet            (Z79.891) Encounter for long-term use of opiate analgesic  Plan: buprenorphine (BUTRANS) 10 MCG/HR WK patch,         HYDROcodone-acetaminophen (NORCO) 7.5-325 MG         per tablet            (F11.90) Chronic, continuous use of opioids  Plan: buprenorphine (BUTRANS) 10 MCG/HR WK patch,         HYDROcodone-acetaminophen (NORCO) 7.5-325 MG         per tablet            (Z98.890) S/P spinal surgery  Plan: buprenorphine (BUTRANS) 10 MCG/HR WK patch,         HYDROcodone-acetaminophen (NORCO) 7.5-325 MG         per tablet              BILLING TIME DOCUMENTATION:   TOTAL TIME includes:   Time spent preparing to see the patient: 2 minutes (reviewing records and tests)  Time spend face to face with the patient: 25 minutes  Time spent ordering tests, medications,  procedures and referrals: 0 minutes  Time spent Referring and communicating with other healthcare professionals: 0 minutes  Documenting clinical information in Epic: 7 minutes    The total TIME spent on this patient on the day of the appointment was 34 minutes.                 Sarah LIEBERMAN RN CNP, FNP  [facility]  [facility]

## 2023-12-05 NOTE — TELEPHONE ENCOUNTER
Signed Prescriptions:                        Disp   Refills    HYDROcodone-acetaminophen (NORCO) 7.5-325 *45 tab*0        Sig: Take 1 tablet by mouth every 6 hours as needed for           moderate to severe pain Max of 3 tabs per day.           Fill 12/5/23 and begin 12/06/23. IF Butrans is           filled, then patient to reduce to 2/day for 2           days, then reduce to 1 tab per day and continue           as needed. 15 day supply for chronic pain  Authorizing Provider: SARAH CASTILLO        Reviewed [place]  December 4, 2023- no concerning fills.    Sarah LIEBERMAN RN CNP, P  [facility]      Also see the myChart response I sent to the patient.     Thanks.   Sarah LIEBERMAN RN CNP, P  [facility]

## 2023-12-06 NOTE — PROGRESS NOTES
"Orthopedic Surgery Progress Note:     Subjective:   No acute events overnight. Pain well-controlled. Tolerating a diet.  Voiding spontaneously, not yet moving bowels, +flatus. Right shoulder weakness unchanged. Slightly improved swallowing.     Objective:   /73 (BP Location: Left arm)   Pulse 70   Temp 97.9  F (36.6  C) (Oral)   Resp 18   Ht 1.676 m (5' 6\")   Wt 73.3 kg (161 lb 9.6 oz)   LMP  (LMP Unknown)   SpO2 97%   BMI 26.08 kg/m    No intake/output data recorded.  General: NAD. Resting comfortably in bed.  Respiratory: Breathing comfortably on RA.  Musculoskeletal:     Motor Strength Right Left   Deltoids: C5 2/5 5/5   Biceps: C5 4/5 5/5   Brachialis: C6 4/5 5/5   Wrist extension: C6 5/5 5/5   Triceps: C7  5/5 5/5   Wrist flexion: C7 5/5 5/5    strength: C8 5/5 5/5   Hand intrinsics: T1 5/5 5/5     Sensation from C4-T1 is preserved.    Motor Strength Right Left   Hip flexion: L1, L2, L3 5/5 5/5   Hip adduction: L2, L3 5/5 5/5   Knee flexion: S1 5/5 5/5   Knee extension: L3, L4 5/5 5/5   Ankle dosiflexion: L4, L5 5/5 5/5   EHL: L5 5/5 5/5   Ankle plantarflexion: S1 5/5 5/5     Sensation from L1-S2 is preserved.    Output by Drain (mL) 11/26/22 0700 - 11/26/22 1459 11/26/22 1500 - 11/26/22 2259 11/26/22 2300 - 11/27/22 0659 11/27/22 0700 - 11/27/22 1459 11/27/22 1500 - 11/27/22 2259 11/27/22 2300 - 11/28/22 0626   Closed/Suction Drain 1 Anterior Neck Accordion 10 Indonesian 5 30 10  0      Laboratory Data:  Lab Results   Component Value Date    WBC 6.6 10/18/2022    HGB 12.3 11/27/2022     10/18/2022     Assessment & Plan:   Christine Silversalohkat Hu is a 42 year old female now s/p C4-6 ACDF on 11/26 with Dr. Murray. Postoperative course complicated by right C5 nerve palsy.    11/28/22   -MRI completed  -Remove drain  -Continue Decadron  -Regular diet   -Monitor exam    Ortho Primary  - Activity: Up with assist until independent. No excessive bending or twisting. No lifting >10 lbs x 6 weeks. No " Physical Therapy Visit    Visit Type: Daily Treatment Note  Visit: 3  Referring Provider: Trae Ryan MD  Medical Diagnosis (from order): Z96.641 - Presence of right artificial hip joint     SUBJECTIVE                                                                                                               Patient reports 3/10 of pain coming into the session.    Pain / Symptoms  - Pain rating (out of 10): Current: 3       OBJECTIVE                                                                                                                             Standing Balance  (JIMENA = base of support)  Tandem / Sharpened Rhomberg     - Left in Front, Eyes Open (sec): 60 (Therapist Stopped)     - Left in Front, Eyes Open details: stand by assist      Transfers  Sit to Stands:  No Upper Extremity Support, no gross deviations            Treatment     Therapeutic Exercise  Upright Bike Position 7 Level 4 8 Minutes  Seated B Ankle Dorsiflexion 3x10 hold for 3 Seconds   Seated R Knee Extension 3x10  Standing R Knee Flexion 3x10  Sit to Stands in Chair without Upper Extremity Support x10    Neuromuscular Re-Education  Tilt Board Taps Anterior/ Posterior 3x10 each  Tilt Board Taps Lateral 3x10 each   Tandem Stance Right Lower Extremity Back Horizontal Head Turns 3x10 each  Tandem Stance Right Lower Extremity Back Vertical Head Turns 3x10 each   4 inch L Forward Foot Taps 2x10    Skilled input: verbal instruction/cues and demonstration    Writer verbally educated and received verbal consent for hand placement, positioning of patient, and techniques to be performed today from patient for clothing adjustments for techniques, hand placement and palpation for techniques and therapist position for techniques as described above and how they are pertinent to the patient's plan of care.  Home Exercise Program  Access Code: [email or web address]  URL: [email or web address]  Date: 12/06/2023  Prepared by: Ranjit  Cockream    Program Notes  Self massage of right quad and hip abductors with rolling pin 3-5 minutes. Side stepping at counter 10' x 4    Exercises  - Seated Long Arc Quad  - 1-2 x daily - 7 x weekly - 3 sets - 10 reps - 1-3 hold  - Sit to Stand with Hands on Knees  - 1-2 x daily - 7 x weekly - 3 sets - 10 reps  - Side Stepping with Counter Support  - 1-2 x daily - 7 x weekly - 4 sets - 10 reps  - Heel Raises with Counter Support  - 1-2 x daily - 7 x weekly - 3 sets - 10 reps  - Standing March with Counter Support  - 1-2 x daily - 7 x weekly - 3 sets - 10 reps - 1 hold  - Standing Knee Flexion with Counter Support  - 1-2 x daily - 7 x weekly - 3 sets - 10 reps - 1-3 hold      ASSESSMENT                                                                                                            Patient demonstrates good stability in standing without multitasking, but demonstrates difficulty when performing with multitasking he demonstrated Loss of Balance without Falls where he would catch himself with the counter.  Patient continues to benefit from skilled Physical Therapy in order to address current impairments and the Home Exercise Program was updated to address those impairments.  Pain/symptoms after session (out of 10): 2  Education:   - Results of above outlined education: Verbalizes understanding and Demonstrates understanding    PLAN                                                                                                                           Suggestions for next session as indicated: Progress per plan of care       Therapy procedure time and total treatment time can be found documented on the Time Entry flowsheet     Davin lift for transfers.   - Weightbearing Status: WBAT.  - Pain Management: Transition from IV to PO as tolerated. No NSAIDs.   - Antibiotics: Ancef 1 g Q8H while drains remain.  - Diet: Begin with clear fluids and progress diet as tolerated.   - DVT Prophylaxis: SCDs only. No chemical DVT prophylaxis needed.  - Imaging: XR Upright C-spine PTDC; ordered.  CT C-spine ordered  - Labs: Labs PRN.  - Bracing/Splinting: None.  - Dressings: Keep dressing c/d/i until POD 5-7.  - Drains: Document output per shift; will be discontinued at orthopedic surgery discretion.  - Ogden catheter: Removed  - Physical Therapy/Occupational Therapy: Evaluation and treatment.  - Consults: Hospitalist, appreciate recs .  - Follow-up: Clinic with Dr. Murray in 6 weeks with repeat radiographs.    - Disposition: Pending progress with therapies, pain control on orals, and medical stability; anticipate discharge to home on POD3-5.    Orthopedic surgery staff for this patient is Dr. Murray.    Eric Oquendo MD  Orthopaedic Surgery PGY-4    FOLLOWUP:    Future Appointments   Date Time Provider Department Center   11/27/2022  8:15 AM Lawanda Maldonado, PT [facility] [place]   11/28/2022  7:00 PM UR OT WAITLIST [facility] [place]   1/5/2023 10:20 AM Mc Peterson MD [facility] [place]   1/10/2023 11:00 AM Wilbur Murray MD [facility]   2/9/2023 10:20 AM Mc Peterson MD [facility] [place]        normal... Well appearing, well nourished, awake, alert, oriented to person, place, time/situation and in no apparent distress. Anxious

## 2023-12-07 ENCOUNTER — TELEPHONE (OUTPATIENT)
Dept: PALLIATIVE MEDICINE | Facility: CLINIC | Age: 43
End: 2023-12-07
Payer: COMMERCIAL

## 2023-12-07 DIAGNOSIS — M54.2 CHRONIC NECK PAIN: ICD-10-CM

## 2023-12-07 DIAGNOSIS — Z98.890 S/P REPAIR OF ANTERIOR CRUCIATE LIGAMENT: ICD-10-CM

## 2023-12-07 DIAGNOSIS — G89.29 CHRONIC PAIN OF LEFT KNEE: ICD-10-CM

## 2023-12-07 DIAGNOSIS — F11.90 CHRONIC, CONTINUOUS USE OF OPIOIDS: ICD-10-CM

## 2023-12-07 DIAGNOSIS — G89.29 CHRONIC RIGHT SHOULDER PAIN: ICD-10-CM

## 2023-12-07 DIAGNOSIS — Z98.890 S/P SPINAL SURGERY: ICD-10-CM

## 2023-12-07 DIAGNOSIS — Z79.891 ENCOUNTER FOR LONG-TERM USE OF OPIATE ANALGESIC: ICD-10-CM

## 2023-12-07 DIAGNOSIS — M75.111 INCOMPLETE TEAR OF RIGHT ROTATOR CUFF, UNSPECIFIED WHETHER TRAUMATIC: ICD-10-CM

## 2023-12-07 DIAGNOSIS — M25.511 CHRONIC RIGHT SHOULDER PAIN: ICD-10-CM

## 2023-12-07 DIAGNOSIS — G89.29 CHRONIC NECK PAIN: ICD-10-CM

## 2023-12-07 DIAGNOSIS — Z98.1 S/P CERVICAL SPINAL FUSION: ICD-10-CM

## 2023-12-07 DIAGNOSIS — M25.562 CHRONIC PAIN OF LEFT KNEE: ICD-10-CM

## 2023-12-07 NOTE — TELEPHONE ENCOUNTER
The patch is requiring a PA. Starting in a separate encounter.     DANIEL AllenN, RN  Care Coordinator  [facility] [place]

## 2023-12-07 NOTE — TELEPHONE ENCOUNTER
Prior Authorization Retail Medication Request    Medication/Dose: buprenorphine (BUTRANS) 10 MCG/HR WK patch    [facility]  Subscriber:Christine Hu  Subscriber ID:[id number]  Relationship:Self  Member:Christine Hu  Member ID:[id number]  LOB:None  Plan year:1/1/2023 - [place]  Effective dates:7/1/2023 - [place]  Group number:[id number] - [place] - [address]  [address]  [place] [address]  Phone: [phone] Fax: [phone]

## 2023-12-07 NOTE — TELEPHONE ENCOUNTER
PA Initiation    Medication: BUPRENORPHINE 10 MCG/HR TD PTWK  Insurance Company: [employer] ([facility]) - Phone [phone] Fax [phone]  Pharmacy Filling the Rx: [facility][id number] - [place] - [address]  Filling Pharmacy Phone: [phone]  Filling Pharmacy Fax: [phone]  Start Date: 12/7/2023

## 2023-12-08 NOTE — TELEPHONE ENCOUNTER
LVM for patient requesting response to MyChart from provider or call back to clinic.     Lupis Duque RN  [facility]  [phone]

## 2023-12-08 NOTE — TELEPHONE ENCOUNTER
PRIOR AUTHORIZATION DENIED    Medication: BUPRENORPHINE 10 MCG/HR TD PTWK    Insurance Company: [employer] ([facility]) - Phone [phone] Fax [phone]    Denial Date: 12/7/2023    Denial Reason(s): Patient needs to try and fail Belbuca.     Appeal Information:

## 2023-12-08 NOTE — TELEPHONE ENCOUNTER
See myChart sent to patient.     I would use Belbuca 75mcg Q 12 hours if this is okay with Christine.     Need to know preferred pharmacy.     Sarah LIEBERMAN, RN CNP, FNP  [facility]

## 2023-12-12 NOTE — TELEPHONE ENCOUNTER
I mis-spoke, I would use Belbuca 150mcg twice daily to equal 300mcg per day (the Butrans 10mcg/hr patch gives 240mcg/hr).    Please call patient to instruct on how to use Belbuca thanks.     Signed Prescriptions:                        Disp   Refills    Buprenorphine HCl (BELBUCA) 150 MCG FILM b*30 each0        Sig: Place 1 Film (150 mcg) inside cheek every 12 hours           Yellow side against the inside of cheek for 20-30           minutes every 12 hours for pain. Spit or wipe out           the remaining part of the film. Use regularly. 15           day trial for chronic pain. Fill/start 12/12/23  Authorizing Provider: KARLENE CASTILLO, RN CNP, FNP  [facility]  [facility] [place] [facility]

## 2023-12-12 NOTE — TELEPHONE ENCOUNTER
Outreach X3. Left a  requesting call back. Provided call back number. She has not viewed the [employer] message yet.     I did cancel the Butrans patch. Pended Belbuca 75 mcg bid 15 day trial to [facility] in [place] for review. Nursing to check coverage as this may require a PA. If approved will review use with patient.      Olivia Hardwick, DANIELN, RN  Care Coordinator  [facility] [place]

## 2023-12-14 NOTE — TELEPHONE ENCOUNTER
Outreach X4. Left a  requesting call back. Provided call back number.    DANIEL AllenN, RN  Care Coordinator  [facility] [place]

## 2023-12-18 ENCOUNTER — TELEPHONE (OUTPATIENT)
Dept: PALLIATIVE MEDICINE | Facility: CLINIC | Age: 43
End: 2023-12-18
Payer: COMMERCIAL

## 2023-12-18 NOTE — TELEPHONE ENCOUNTER
Central Prior Authorization Team  Phone: [phone]    PA Initiation    Medication: BELBUCA 150 MCG BU FILM  Insurance Company: Opt[employer]RX ([facility]) - Phone [phone] Fax [phone]  Pharmacy Filling the Rx: [facility][id number] - [facility] [place] - [address] [facility] [address]  Filling Pharmacy Phone: [phone]  Filling Pharmacy Fax:    Start Date: 12/18/2023

## 2023-12-18 NOTE — TELEPHONE ENCOUNTER
Prior Authorization Retail Medication Request    Medication/Dose: Belbuca 150 mcg film    [facility] [place]  Subscriber: Christine Hu  Subscriber ID:[id number]  Relationship:Self  Member:Christine Hu  Member ID:[id number]  LOB:None  Plan year: 1/1/2023 - [place]  Effective dates: 7/1/2023 - [place]  Group number: [facility] #[id number] - [place] - [address]  [address]   [place] [address]  Phone: [phone] Fax: [phone]

## 2023-12-18 NOTE — TELEPHONE ENCOUNTER
The Belbuca requires a PA. Will process in a separate encounter.     DANIEL AllenN, RN  Care Coordinator  [facility] [place]

## 2023-12-19 NOTE — TELEPHONE ENCOUNTER
Central Prior Authorization Team  Phone: [phone]    Prior Authorization Approval    Medication: BELBUCA 150 MCG BU FILM  Authorization Effective Date: 12/18/2023  Authorization Expiration Date: 12/18/2024  Approved Dose/Quantity:   Reference #:     Insurance Company: [employer] ([facility]) - Phone [phone] Fax [phone]  Expected CoPay: $    CoPay Card Available:      Financial Assistance Needed:   Which Pharmacy is filling the prescription: [facility] #[id number] - [place] - [address]  Pharmacy Notified: yes  Patient Notified: PHARMACY WILL NOTIFY PT WHEN READY

## 2024-01-31 ENCOUNTER — APPOINTMENT (OUTPATIENT)
Dept: URBAN - METROPOLITAN AREA CLINIC 252 | Age: 44
Setting detail: DERMATOLOGY
End: 2024-02-01

## 2024-01-31 DIAGNOSIS — L82.1 OTHER SEBORRHEIC KERATOSIS: ICD-10-CM

## 2024-01-31 DIAGNOSIS — L81.0 POSTINFLAMMATORY HYPERPIGMENTATION: ICD-10-CM

## 2024-01-31 PROCEDURE — OTHER LIQUID NITROGEN: OTHER

## 2024-01-31 PROCEDURE — OTHER COUNSELING: OTHER

## 2024-01-31 PROCEDURE — 99212 OFFICE O/P EST SF 10 MIN: CPT | Mod: 25

## 2024-01-31 PROCEDURE — 17110 DESTRUCT B9 LESION 1-14: CPT

## 2024-01-31 ASSESSMENT — LOCATION DETAILED DESCRIPTION DERM
LOCATION DETAILED: LEFT LATERAL FOREHEAD
LOCATION DETAILED: RIGHT SUPERIOR MEDIAL FOREHEAD
LOCATION DETAILED: RIGHT FOREHEAD

## 2024-01-31 ASSESSMENT — LOCATION ZONE DERM
LOCATION ZONE: FACE
LOCATION ZONE: FACE

## 2024-01-31 ASSESSMENT — LOCATION SIMPLE DESCRIPTION DERM
LOCATION SIMPLE: RIGHT FOREHEAD
LOCATION SIMPLE: RIGHT FOREHEAD
LOCATION SIMPLE: LEFT FOREHEAD

## 2024-01-31 NOTE — HPI: SKIN LESION (ACTINIC KERATOSES)
Is This A New Presentation, Or A Follow-Up?: Follow Up Actinic Keratoses
Additional History: Follow up

## 2024-01-31 NOTE — PROCEDURE: COUNSELING
Detail Level: Detailed
Patient Specific Counseling (Will Not Stick From Patient To Patient): Appears actinic resolved

## 2024-02-07 ENCOUNTER — OFFICE VISIT (OUTPATIENT)
Dept: PALLIATIVE MEDICINE | Facility: CLINIC | Age: 44
End: 2024-02-07
Payer: COMMERCIAL

## 2024-02-07 VITALS — HEART RATE: 90 BPM | DIASTOLIC BLOOD PRESSURE: 79 MMHG | SYSTOLIC BLOOD PRESSURE: 122 MMHG

## 2024-02-07 DIAGNOSIS — M54.2 NECK PAIN: ICD-10-CM

## 2024-02-07 DIAGNOSIS — M25.562 CHRONIC PAIN OF BOTH KNEES: ICD-10-CM

## 2024-02-07 DIAGNOSIS — M25.572 CHRONIC PAIN OF BOTH ANKLES: ICD-10-CM

## 2024-02-07 DIAGNOSIS — M62.838 MUSCLE SPASM: ICD-10-CM

## 2024-02-07 DIAGNOSIS — G89.29 CHRONIC PAIN OF BOTH ANKLES: ICD-10-CM

## 2024-02-07 DIAGNOSIS — M25.561 CHRONIC PAIN OF BOTH KNEES: ICD-10-CM

## 2024-02-07 DIAGNOSIS — G89.29 CHRONIC INTRACTABLE PAIN: ICD-10-CM

## 2024-02-07 DIAGNOSIS — R11.0 NAUSEA: ICD-10-CM

## 2024-02-07 DIAGNOSIS — M25.571 CHRONIC PAIN OF BOTH ANKLES: ICD-10-CM

## 2024-02-07 DIAGNOSIS — M25.50 MULTIPLE JOINT PAIN: Primary | ICD-10-CM

## 2024-02-07 DIAGNOSIS — M79.18 MYOFASCIAL PAIN: ICD-10-CM

## 2024-02-07 DIAGNOSIS — G89.29 CHRONIC PAIN OF BOTH KNEES: ICD-10-CM

## 2024-02-07 PROCEDURE — 99215 OFFICE O/P EST HI 40 MIN: CPT | Performed by: NURSE PRACTITIONER

## 2024-02-07 PROCEDURE — G2212 PROLONG OUTPT/OFFICE VIS: HCPCS | Performed by: NURSE PRACTITIONER

## 2024-02-07 RX ORDER — GUANFACINE 1 MG/1
1 TABLET, EXTENDED RELEASE ORAL
COMMUNITY
Start: 2024-01-31

## 2024-02-07 RX ORDER — ONDANSETRON 8 MG/1
8 TABLET, ORALLY DISINTEGRATING ORAL EVERY 8 HOURS PRN
Qty: 45 TABLET | Refills: 1 | Status: SHIPPED | OUTPATIENT
Start: 2024-02-07 | End: 2024-02-20

## 2024-02-07 RX ORDER — CLONIDINE HYDROCHLORIDE 0.1 MG/1
0.1 TABLET ORAL
COMMUNITY
Start: 2024-01-31

## 2024-02-07 RX ORDER — TOPIRAMATE 25 MG/1
25 TABLET, FILM COATED ORAL
COMMUNITY
Start: 2024-01-31

## 2024-02-07 ASSESSMENT — PAIN SCALES - GENERAL: PAINLEVEL: SEVERE PAIN (7)

## 2024-02-07 NOTE — PROGRESS NOTES
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 tendon and posterolateral corner complex ligaments are intact.     EXTENSOR MECHANISM:   -Quadriceps tendon: Intact.  -Patellar tendon: Intact.  -Patellofemoral ligaments and retinacula: Intact.     JOINT:   -Tiny knee joint effusion.     BONES:  -No acute knee fracture. No stress fracture. No concerning bone lesion. No osteonecrosis.     SOFT TISSUES:   -Lobulated and septated ganglion cyst is seen along the medial aspect of Hoffa's fat pad along the anterior aspect of the medial tibial plateau and medial compartment. No acute muscle injury. Neurovascular structures are unremarkable.                                                                      IMPRESSION:  1.  No discrete meniscus tear.  2.  Intact cruciate and collateral ligaments. Chronic proximal MCL sprain.  3.  Mild-moderate patellofemoral compartment left knee chondromalacia.  4.  Tiny knee joint effusion. Lobulated  and septated ganglion cyst along the anterior aspect of the medial compartment knee extending inferiorly in the medial aspect of Hoffa's fat pad.  5.  No acute knee fracture or stress fracture.             EXAM: MR right shoulder without  contrast 9/7/2022 4:26 PM     TECHNIQUE: Multiplanar, multisequence imaging of the right shoulder  were obtained without administration of intravenous or intra-articular  gadolinium contrast using routine protocol.     History: Shoulder pain; Rotator cuff injury; No known/automatically  detected potential contraindications to imaging; Right shoulder pain,  unspecified chronicity; Polyarthropathy     Comparison: 8/11/2022     Findings:     ROTATOR CUFF and ASSOCIATED STRUCTURES  Rotator cuff:      On a background of tendinosis, low-grade articular sided tear of the  supraspinatus middle fibers at the footprint with approximately 10 mm  medial retraction of torn fibers. Infraspinatus and subscapularis  tendinosis. Teres minor tendon is intact.     Bursa: No subacromial or subdeltoid bursal fluid.     Musculature: Muscle bulk of rotator cuff is preserved.  Deltoid muscle  bulk is also preserved.  No muscle edema.     Acromioclavicular joint  There are mild degenerative changes of the acromioclavicular joint.  Acromion is type 2 in sagittal morphology.  Coracoacromial ligament is  not visualized.     OSSEOUS STRUCTURES  No fracture, marrow contusion.      Nonspecific focal T2 hyperintense intramedullary lesion involving the  coracoid process, which is contiguous with subchondral edema like  marrow signal intensity at the area of superior labral tearing,  finding most likely representing developing intraosseous ganglion  cyst.     LONG BICIPITAL TENDON  The long head of the biceps tendon is normally situated within the  bicipital groove. No complete or partial biceps tendon tear is  present.     GLENOHUMERAL JOINT  Joint fluid: Physiologic amount of joint fluid is  present.      Cartilage and subarticular bone:  No focal hyaline cartilage defects  are noted. No Hill-Sachs, reverse Hill-Sachs, or bony Bankart lesions  are seen. Posterior subluxation alignment of the humeral head relative  to glenoid.     Labrum: Limited assessment on this study with relative lack of joint  distention shows tearing of the superior and posterosuperior labrum.     ANCILLARY FINDINGS:                                                                      Impression:  1. Low-grade articular sided tear of the supraspinatus middle fibers  at the footprint with approximately 10 mm medial retraction of torn  fibers.   2. SLAP tear.  3. 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     [facility]     3 views bilateral shoulder radiographs 8/11/2022 3:49 PM     History: Right shoulder pain, unspecified chronicity; Chronic left  shoulder pain; Chronic left shoulder pain; Polyarthropathy      Comparison: None     Findings:     AP, Grashey, transscapular Y views of the shoulders were obtained.      Left: No acute osseous abnormality. Glenohumeral and acromioclavicular  joints are congruent.     Mild degenerative changes of the acromioclavicular joint. No  substantial degenerative change of the glenohumeral joint.     Soft tissue is unremarkable. The visualized lung is clear.           Right: No acute osseous abnormality. Glenohumeral and  acromioclavicular joints are congruent.     Mild degenerative changes of the acromioclavicular joint. No  substantial degenerative change of the glenohumeral joint.     Soft tissue is unremarkable. The visualized lung is clear.                                                                      Impression:  1. No acute osseous abnormality.  2. No  substantial degenerative change.     BRIDGETTE FRANCO MD (Joe)       Other testing (labs, diagnostics):  2023  Cr. 0.57  Est GFR >90      Screening tools:     DIRE Score for ongoing opioid management is calculated as follows:    Diagnosis = 2    Intractability = 2    Risk: Psych = 2  Chem Hlth = 1  Reliability = 2  Social = 2    Efficacy = 2    Total DIRE Score = 13 (14 or higher predicts good candidate for ongoing opioid management; 13 or lower predicts poor candidate for opioid management)       [place]  [place]  2024- no concerning fills.  Sarah LIEBERMAN, RN CNP, FNP  [facility]  [place] location      Assessment:  Multiple joint pain  Chronic pain of both knees  Chronic pain of both ankles  Muscle spasm  Myofascial pain  Neck pain  Chronic intractable pain  Nausea     chronic right shoulder pain  Incomplete tear of right rotator cuff, unspecified whether traumatic  Status postrepair of anterior cruciate ligament  Chronic pain of the left knee  Pain in joint involving ankle and foot left  Status post spinal surgery  Anxiety  Persistent insomnia  Chronic continuous use of opioids  Encounter for long-term use of opiate analgesic  UDT 4/10/2023  CSA 4/10/2023  Previous alcohol dependence in  during her divorce  PMHx includes: overdose with Trazodone (2009 at age 28), acute alcoholic intoxication (2011, seen in ER), alcohol withdrawal (2011), alcohol dependence (), ASCUS favor benign (), degeneration of cervical disc, lumbago, Meniere's disease, left knee effusion  PSHx includes: low transverse  (2005), Cervical 4 to 6 Anterior Cervical Decompression and Fusion with Medtronic Plate and Screws, Interbody Device, Use of Fluoroscopy, Microscope (2022 by Wilbur Murray MD), graft bone from left iliac crest (2022), Right cervical 4-5 posterior foraminotomy (2022 by Wilbur Murray MD), arthroscopic  reconstruction ACL Left knee with hamstring autograft (1/27/2023, by Mc Peterson MD), tonsillectomy, mastoidectomy, colonoscopy        Plan:   Physical Therapy: none, has finished PHYSICAL THERAPY   You could check out Gustavo Kuhn, he can be found on YouTube, Jeremy Dumont FB reels. He also has his own website   Continue gentle home exercises  Clinical Health Psychologist to address issues of relaxation, behavioral change, coping style, and other factors important to improvement: none  Diagnostic Studies: none  Referral to Sports Med re: knee and ankle pain  Procedures: schedule trigger point injections. Our office will contact you.   Medication Management:   START Belbuca (buprenorphine) 150mcg buccal film every 12 hours. We will have you start use of a quarter-film every 6 hours, if you tolerate this, then trial one-half film twice daily about every 12 hours, if you tolerate this then try one-half film once per day and one full film at night for 2 days, then may use a max of 1 full film twice daily.  Remember that the yellow side needs to be against the wet bumpy part of the inside of cheek for 20-30  minutes, then wipe or spit out.   START ondansetron 8mg oral disinegrating tabs, may use 1 tab every 8 hours.   continue cyclobenzaprine 10mg up to 3 times daily for muscle spasms, caution sedation   Continue Voltaren gel (diclofenac sodium 1%) this is found over-the-counter and you may use 2 grams to the right shoulder and 4 grams to left knee up to 4 times daily.   continue duloxetine to 60mg/day Further procedures recommended: trigger point injections. Our office will contact you  Hydroxyzine 25-50mg every 6 hours as needed for anxiety or insomnia  Recommendations/follow-up for PCP:  See above  Release of information: none  Follow up: 1 month in-person or video        ASSESSMENT AND PLAN:  (M25.50) Multiple joint pain  (primary encounter diagnosis)  Comment:   Plan: Orthopedic  Referral, CRP          inflammation, Erythrocyte sedimentation rate         auto, Rheumatoid factor, Anti Nuclear Nichole IgG         by IFA with Reflex, Adult Pain Clinic Follow-Up        Order, Buprenorphine HCl (BELBUCA) 150 MCG FILM        buccal film            (M25.561,  M25.562,  G89.29) Chronic pain of both knees  Comment:   Plan: Orthopedic  Referral, CRP         inflammation, Erythrocyte sedimentation rate         auto, Rheumatoid factor, Anti Nuclear Nichole IgG         by IFA with Reflex, Adult Pain Clinic Follow-Up        Order, Buprenorphine HCl (BELBUCA) 150 MCG FILM        buccal film            (M25.571,  G89.29,  M25.572) Chronic pain of both ankles  Comment:   Plan: Orthopedic  Referral, CRP         inflammation, Erythrocyte sedimentation rate         auto, Rheumatoid factor, Anti Nuclear Nichole IgG         by IFA with Reflex, Adult Pain Clinic Follow-Up        Order, Buprenorphine HCl (BELBUCA) 150 MCG FILM        buccal film            (M62.838) Muscle spasm  Comment:   Plan: PAIN INJECTION EVAL/TREAT/FOLLOW UP, Adult Pain        Clinic Follow-Up Order, Buprenorphine HCl         (BELBUCA) 150 MCG FILM buccal film            (M79.18) Myofascial pain  Comment:   Plan: PAIN INJECTION EVAL/TREAT/FOLLOW UP, Adult Pain        Clinic Follow-Up Order, Buprenorphine HCl         (BELBUCA) 150 MCG FILM buccal film            (M54.2) Neck pain  Comment:   Plan: PAIN INJECTION EVAL/TREAT/FOLLOW UP, Adult Pain        Clinic Follow-Up Order, Buprenorphine HCl         (BELBUCA) 150 MCG FILM buccal film            (G89.29) Chronic intractable pain  Comment:   Plan: Adult Pain Clinic Follow-Up Order,         Buprenorphine HCl (BELBUCA) 150 MCG FILM buccal        film            (R11.0) Nausea  Comment:   Plan: Adult Pain Clinic Follow-Up Order,          ondansetron (ZOFRAN ODT) 8 MG ODT        tab                     Face to face time: 70 minutes            Sarah LIEBERMAN, RN CNP, FNP  [facility]  Management [facility]  [place] location    '

## 2024-02-07 NOTE — LETTER
2024      Christine Hu  [address]  [place] [address]        To Whom It May Concern,     I am writing in regard to my patient, Christine Hu  1980 and her trip to [employer] and to [facility] on -2024.     Please be advised that Christine has chronic pain that makes it difficult for her to stand for long periods of time. Please allow her to utilize the handicapped lines that are available on the [employer] as well as at [facility].           Sincerely,        MIO Mendez CNP

## 2024-02-07 NOTE — LETTER
2024      Christine Hu  [address]  [place] [address]        To Whom It May Concern,     I am writing in regard to Christine Hu  1980. Please allow Christine to wear comfortable tennis shoes to help support her comfort for her feet, ankles, knees and back. Wearing dress shoes or shoes with a heel are very difficult on our bodies and can negatively impact Christine's productivity at work if she is in pain.           Sincerely,        MIO Mendez CNP

## 2024-02-07 NOTE — PATIENT INSTRUCTIONS
Plan:   Physical Therapy: none, has finished PHYSICAL THERAPY   You could check out Gustavo Kuhn, he can be found on [employer], Jeremy Dumont FB reels. He also has his own website   Continue gentle home exercises  Clinical Health Psychologist to address issues of relaxation, behavioral change, coping style, and other factors important to improvement: none  Diagnostic Studies: none  Referral to Sports Med re: knee and ankle pain  Procedures: schedule trigger point injections. Our office will contact you.   Medication Management:   START Belbuca (buprenorphine) 150mcg buccal film every 12 hours. We will have you start use of a quarter-film every 6 hours, if you tolerate this, then trial one-half film twice daily about every 12 hours, if you tolerate this then try one-half film once per day and one full film at night for 2 days, then may use a max of 1 full film twice daily.  Remember that the yellow side needs to be against the wet bumpy part of the inside of cheek for 20-30  minutes, then wipe or spit out.   START ondansetron 8mg oral disinegrating tabs, may use 1 tab every 8 hours.   continue cyclobenzaprine 10mg up to 3 times daily for muscle spasms, caution sedation   Continue Voltaren gel (diclofenac sodium 1%) this is found over-the-counter and you may use 2 grams to the right shoulder and 4 grams to left knee up to 4 times daily.   continue duloxetine to 60mg/day Further procedures recommended: trigger point injections. Our office will contact you  Hydroxyzine 25-50mg every 6 hours as needed for anxiety or insomnia  Recommendations/follow-up for PCP:  See above  Release of information: none  Follow up: 1 month in-person or video    ----------------------------------------------------------------  Clinic Number:  [phone]   Call with any questions about your care and for scheduling assistance.   Calls are returned Monday through Friday between 8 AM and 4:30 PM. We usually get back to you within 2  business days depending on the issue/request.    If we are prescribing your medications:  For opioid medication refills, call the clinic or send a MobileWebsitest message 7 days in advance.  Please include:  Name of requested medication  Name of the pharmacy.  For non-opioid medications, call your pharmacy directly to request a refill. Please allow 3-4 days to be processed.   Per [place] State Law:  All controlled substance prescriptions must be filled within 30 days of being written.    For those controlled substances allowing refills, pickup must occur within 30 days of last fill.      We believe regular attendance is key to your success in our program!    Any time you are unable to keep your appointment we ask that you call us at least 24 hours in advance to cancel.This will allow us to offer the appointment time to another patient.   Multiple missed appointments may lead to dismissal from the clinic.

## 2024-02-12 ENCOUNTER — TELEPHONE (OUTPATIENT)
Dept: SURGERY | Facility: CLINIC | Age: 44
End: 2024-02-12
Payer: COMMERCIAL

## 2024-02-12 NOTE — TELEPHONE ENCOUNTER
Hello,    I'm with ortho con.  Pt is scheduled with Dr. Grant.  H/o ACL repair with Dr. Peterson but is choosing a different option now.  Pt does not have a recent MRI.  Are you ok to see this pt?  She is off work on Wed.

## 2024-02-13 NOTE — PROGRESS NOTES
"CHIEF COMPLAINT:   Chief Complaint   Patient presents with    Left Knee - Pain     Hx of ACL reconstruction with HS autograft. DOS 2/9/23 with Dr. Peterson. Slow recovery due to infection. Last 3 weeks she got a new job and she is on her feet 8-9 hours. Her knee has been swelling, pain, and redness. She has been icing. It has been problematic. Pain is anterior. It will physically balloon up. She does not want to see Dr. Peterson because he wasn't taking the infection seriously.     Right Knee - Pain     She is putting more pressure on her right knee to relieve pressure on her left knee. She notes she has had tears in her right knee. Pain in right knee is anterior, inferior, medial. It is more localized to the inferior knee. She will have some pain on the lateral knee if she pushes on the lateral knee. No tx.        HISTORY OF PRESENT ILLNESS    Christine Hu is a 43 year old female seen for evaluation of ongoing left knee pain with no known injury.   Pain has been present for 3 weeks. Started a new job 3 weeks ago, on her feet 8-9 hours a day. About 4 days into her new job, started to notice soreness, swelling, stiffness. Would have to ice it afterwards. She locates pain throughout the knee.     Left knee anterior cruciate ligament reconstruction with hamstring autograft 1/27/2023 with Dr Peterson.     Also right knee pain is starting to act up the past weeks as well. Reports history of meniscus tear right knee. Pain around the bottom of the kneecap.    Also starting to have ankle pain, swelling. Ankles \"give out\" on her. History of left ankle fracture a couple times treated without surgery.    Chronic neck and low back pain as well. Follows with pain management. On Belbuca recently without relief. Had been on Hydrocodone 7.5 but has been taken off. This had worked for her in the past.    Has numbness and tingling in bilateral upper extremity, hands.      Present symptoms: pain diffuse, pain dull/achy , " moderate pain, mild swelling.    Pain severity: 4/10  Frequency of symptoms: are constant  Exacerbating Factors: prolonged standing,  walking.  Relieving Factors: rest, sitting, ice.  Night Pain: Yes  Pain while at rest: Yes   Numbness or tingling: No   Patient has tried:     NSAIDS: Yes      Physical Therapy: No      Activity modification: No      Bracing:  knee sleeve       Injections: No      Ice: Yes      Assistive device:  No     Other: tylenol without relief. Diclofenac topical.      Other PMH:  has a past medical history of Acute alcoholic intoxication (H24) (05/28/2011), Alcohol dependence (H) (11/30/2012), Alcohol withdrawal (H) (06/29/2011), Alcohol withdrawal (H) (10/19/2023), ASCUS favor benign (01/2015), Degeneration of cervical intervertebral disc, Knee effusion, left, Lumbago, Ménière's disease, and Overdose (06/2009).    She has no past medical history of Motion sickness.  Patient Active Problem List   Diagnosis    Degeneration of cervical intervertebral disc    Contraceptive management    History of abnormal Pap smear    Depressive disorder, not elsewhere classified    Thyrotoxicosis    Low back pain    Generalized anxiety disorder    Mastoiditis    Overdose    Neck pain    Tobacco use    Primary insomnia    History of Clostridium difficile    Heart murmur    Acute gastritis    Anxiety state    Bladder retention of urine    Hepatitis    Hypomagnesemia    Palpitations    Sprain of ankle    Myalgia    Rupture of anterior cruciate ligament of left knee, initial encounter    S/P spinal surgery    Cervical radiculopathy    History of fusion of cervical spine       Surgical Hx:  has a past surgical history that includes c/section, low transverse (9/2005); tonsillectomy; Mastoidectomy; colonoscopy; Decompression, fusion cervical anterior two levels, combined (N/A, 11/26/2022); Graft bone from iliac crest (Left, 11/26/2022); Laminectomy cerivcal posterior three+ levels (N/A, 11/29/2022); and Arthroscopic  Reconstruction Anterior Cruciate Ligament Hamstring Autograft (Left, 1/27/2023).    Medications:   Current Outpatient Medications:     Buprenorphine HCl (BELBUCA) 150 MCG FILM buccal film, Place 1 Film (150 mcg) inside cheek every 12 hours Fill and begin 2/7/2023. 15 day trial script for chronic pain, Disp: 30 each, Rfl: 0    diclofenac (VOLTAREN) 1 % topical gel, May use 2 grams to right shoulder and 4 grams to left knee and left ankle up to 4 times daily as needed., Disp: 200 g, Rfl: 2    DULoxetine (CYMBALTA) 60 MG capsule, Take 1 capsule (60 mg) by mouth daily, Disp: 90 capsule, Rfl: 0    gabapentin (NEURONTIN) 300 MG capsule, Take 300mg twice daily for 7 days, then 300mg three times daily for 7 days, then 300mg in the AM and afternoon and 600mg at bedtime for 7 days, then 600mg in the AM, 300mg in the afternoon and 600mg at bedtime for 7 days, then 600mg three times daily. If side effects, then reduce to last tolerable dosage., Disp: 180 capsule, Rfl: 0    guanFACINE (INTUNIV) 1 MG TB24 24 hr tablet, Take 1 tablet by mouth daily at 2 pm, Disp: , Rfl:     ondansetron (ZOFRAN ODT) 8 MG ODT tab, Take 1 tablet (8 mg) by mouth every 8 hours as needed for nausea, Disp: 45 tablet, Rfl: 1    topiramate (TOPAMAX) 25 MG tablet, Take 25 mg by mouth daily at 2 pm, Disp: , Rfl:     Buprenorphine HCl (BELBUCA) 150 MCG FILM buccal film, Place 1 Film (150 mcg) inside cheek every 12 hours Yellow side against the inside of cheek for 20-30 minutes every 12 hours for pain. Spit or wipe out the remaining part of the film. Use regularly. 15 day trial for chronic pain. Fill/start 12/12/23 (Patient not taking: Reported on 2/14/2024), Disp: 30 each, Rfl: 0    cloNIDine (CATAPRES) 0.1 MG tablet, Take 0.1 mg by mouth 2 times daily (Patient not taking: Reported on 2/7/2024), Disp: , Rfl:     cyclobenzaprine (FLEXERIL) 10 MG tablet, Take 1 tablet (10 mg) by mouth nightly as needed for muscle spasms (Patient not taking: Reported on  2/14/2024), Disp: 30 tablet, Rfl: 1    HYDROcodone-acetaminophen (NORCO) 7.5-325 MG per tablet, Take 1 tablet by mouth every 6 hours as needed for moderate to severe pain Max of 3 tabs per day. Fill 12/5/23 and begin 12/06/23. IF Butrans is filled, then patient to reduce to 2/day for 2 days, then reduce to 1 tab per day and continue as needed. 15 day supply for chronic pain (Patient not taking: Reported on 2/7/2024), Disp: 45 tablet, Rfl: 0    hydrOXYzine (ATARAX) 25 MG tablet, Take 1-2 tablets (25-50 mg) by mouth every 6 hours as needed for anxiety (insomnia) (Patient not taking: Reported on 2/7/2024), Disp: 180 tablet, Rfl: 0    ibuprofen (ADVIL/MOTRIN) 200 MG tablet, Take 200 mg by mouth every 4 hours as needed for pain (Patient not taking: Reported on 2/14/2024), Disp: , Rfl:     naloxone (NARCAN) 4 MG/0.1ML nasal spray, Spray 1 spray (4 mg) into one nostril alternating nostrils as needed for opioid reversal every 2-3 minutes until assistance arrives (Patient not taking: Reported on 2/14/2024), Disp: 0.2 mL, Rfl: 1    omeprazole (PRILOSEC OTC) 20 MG EC tablet, Take 20 mg by mouth daily (Patient not taking: Reported on 2/14/2024), Disp: , Rfl:     Allergies:   Allergies   Allergen Reactions    Morphine And Related GI Disturbance     Nausea with use of Tylenol #3- not morphine    Clindamycin Other (See Comments)     C diff, hospitalized     Penicillins Rash       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   reports that she has been smoking cigarettes. She has been smoking an average of 0.3 packs per day. She has been exposed to tobacco smoke. She has never used smokeless tobacco. She reports that she does not currently use alcohol. She reports that she does not use drugs.    Family Hx: family history includes Allergies in her mother, sister, and sister; Asthma in an other family member; Heart Failure in her father; Psychotic Disorder in her father, maternal uncle, sister, and sister.    REVIEW OF SYSTEMS: 10  "point ROS neg other than the symptoms noted above in the HPI and PMH. Notables include  CONSTITUTIONAL:NEGATIVE for fever, chills, change in weight  INTEGUMENTARY/SKIN: NEGATIVE for worrisome rashes, moles or lesions  MUSCULOSKELETAL:See HPI above  NEURO: NEGATIVE for weakness, dizziness or paresthesias    PHYSICAL EXAM:  /69   Pulse 87   Ht 1.676 m (5' 6\")   Wt 69.9 kg (154 lb 3.2 oz)   BMI 24.89 kg/m     GENERAL APPEARANCE: healthy, alert, no distress  SKIN: no suspicious lesions or rashes  NEURO: Normal strength and tone, mentation intact and speech normal  PSYCH:  mentation appears normal and affect normal, not anxious  RESPIRATORY: No increased work of breathing.  HANDS: no clubbing, nail pitting  LYMPH: no palpable popliteal lymphadenopathy.    BILATERAL LOWER EXTREMITIES:  Gait: normal  Alignment: neutral  No gross deformities or masses.  No Quad atrophy, strength normal.  Intact sensation deep peroneal nerve, superficial peroneal nerve, med/lat tibial nerve, sural nerve, saphenous nerve  Intact EHL, EDL, TA, FHL, GS, quadriceps hamstrings and hip flexors  Bilateral calf soft and nttp or squeeze.  DTRs: achilles 2+, patella 2+.  Edema: trace    LEFT KNEE EXAM:    Skin: intact, no ecchymosis or erythema  Surgical scars noted.  Squat: 100 %, not limited by pain.     ROM: full extension to 140 flexion, anterior discomfort  Tight hamstrings on straight leg raise.  Effusion: small  Tender:   med/lat joint line, anterior and posterior knee, pes  McMurrays: negative    MCL: stable, and non-painful at both 0 and 30 degrees knee flexion  Varus stress: stable, and non-painful at both 0 and 30 degrees knee flexion  Lachmans: grade 2 laxity  Posterior Drawer stable  Patellofemoral joint:                Apprehension: negative              Crepitations: mild   Grind: positive.    RIGHT KNEE EXAM:    Skin: intact, no ecchymosis or erythema  Squat: 100 %, not limited by pain.     ROM: full extension to 140 " flexion  Tight hamstrings on straight leg raise.  Effusion: none  Tender:   med/lat joint line, anterior and posterior knee  McMurrays: negative    MCL: stable, and non-painful at both 0 and 30 degrees knee flexion  Varus stress: stable, and non-painful at both 0 and 30 degrees knee flexion  Lachmans: neg, firm endpoint  Posterior Drawer stable  Patellofemoral joint:                Apprehension: negative              Crepitations: minimal   Grind: positive.        X-RAY:  3 views bilateral knee from 2/14/2024 were reviewed in clinic today. On my review, no obvious fractures or dislocations. Postoperative changes ligamentous reconstruction left knee. No significant compartment narrowing. Tiny left knee joint effusion.          ASSESSMENT/PLAN: Christine Hu is a 43 year old female with acute on chronic bilateral knee pain, left knee effusion, history of left knee anterior cruciate ligament reconstruction.     * reviewed imaging studies with patient, no significant arthritis.  Suspect pain, swelling related to increased weight bearing activities with new job  I expect this will gradually resolve on its own with time, as you get used to the new job and your body adjusts accordingly.    Treatment:    * rest, sitting  * Activity modification - avoid impact activities or activities that aggravate symptoms. Avoid repetitive activities.  * NSAIDS (non-steroidal anti-inflammatory medications; e.g. Aleve, advil, motrin, ibuprofen) - regular use for inflammation ( twice daily or three times daily), with food, as long as no contra-indications Please discuss with primary care doctor if needed  * ice, 15-20 minutes at a time several times a day or as needed.  * Strengthening of quadriceps muscles  * Physical Therapy for strengthening, stretching and range of motion exercises of legs  * Tylenol as needed for pain, consider Tylenol arthritis or similar  * Weight loss: Weight loss:  Body mass index is 24.89 kg/m .. weight  loss benefits, not only for the current pain symptoms, but also overall health. Recommend a good diet plan that works for the patient, with the assistance of a dietician or primary care doctor as needed. Also, a good, low-impact exercise program for at least 20 minutes per day, 3 times per week, such as exercise bike, elliptical , or pool.  * Exercise: low impact such as stationary bike, elliptical, pool.  * Injections: cortisone; risks and perceived benefits discussed today. Patient elects  to proceed.  * Bracing: bracing the knee may offer some relief of symptoms when worn and provide some stability.  * over the counter supplements such as glucosamine and chondroitin sulfate may help with joint pain.  * topical ointments may help as well    * return to clinic as needed. Consider MRI in the future as needed.         Iker Grant M.D., M.S.  Dept. of Orthopaedic Surgery  [facility]     Large Joint Injection/Arthocentesis: L knee joint    Date/Time: 2/14/2024 11:19 AM    Performed by: Josh Carrillo PA  Authorized by: Iker Grant MD    Indications:  Joint swelling, osteoarthritis and pain  Needle Size:  18 G  Guidance: landmark guided    Approach:  Superolateral  Location:  Knee      Medications:  80 mg methylPREDNISolone 80 MG/ML; 4 mL BUPivacaine 0.25 %  Aspirate amount (mL):  12  Aspirate:  Yellow  Outcome:  Tolerated well, no immediate complications  Procedure discussed: discussed risks, benefits, and alternatives    Consent Given by:  Patient  Prep: patient was prepped and draped in usual sterile fashion

## 2024-02-14 ENCOUNTER — OFFICE VISIT (OUTPATIENT)
Dept: ORTHOPEDICS | Facility: CLINIC | Age: 44
End: 2024-02-14
Payer: COMMERCIAL

## 2024-02-14 ENCOUNTER — ANCILLARY PROCEDURE (OUTPATIENT)
Dept: GENERAL RADIOLOGY | Facility: CLINIC | Age: 44
End: 2024-02-14
Attending: ORTHOPAEDIC SURGERY
Payer: COMMERCIAL

## 2024-02-14 VITALS
HEIGHT: 66 IN | BODY MASS INDEX: 24.78 KG/M2 | HEART RATE: 87 BPM | DIASTOLIC BLOOD PRESSURE: 69 MMHG | WEIGHT: 154.2 LBS | SYSTOLIC BLOOD PRESSURE: 121 MMHG

## 2024-02-14 DIAGNOSIS — G89.29 CHRONIC PAIN OF LEFT KNEE: Primary | ICD-10-CM

## 2024-02-14 DIAGNOSIS — M25.561 PAIN IN BOTH KNEES, UNSPECIFIED CHRONICITY: ICD-10-CM

## 2024-02-14 DIAGNOSIS — M25.562 CHRONIC PAIN OF LEFT KNEE: Primary | ICD-10-CM

## 2024-02-14 DIAGNOSIS — M25.562 PAIN IN BOTH KNEES, UNSPECIFIED CHRONICITY: ICD-10-CM

## 2024-02-14 DIAGNOSIS — Z98.890 HISTORY OF REPAIR OF ANTERIOR CRUCIATE LIGAMENT OF LEFT KNEE: ICD-10-CM

## 2024-02-14 DIAGNOSIS — M25.462 EFFUSION OF LEFT KNEE: ICD-10-CM

## 2024-02-14 PROCEDURE — 99213 OFFICE O/P EST LOW 20 MIN: CPT | Mod: 25 | Performed by: ORTHOPAEDIC SURGERY

## 2024-02-14 PROCEDURE — 73562 X-RAY EXAM OF KNEE 3: CPT | Mod: TC | Performed by: RADIOLOGY

## 2024-02-14 PROCEDURE — 20610 DRAIN/INJ JOINT/BURSA W/O US: CPT | Mod: LT | Performed by: ORTHOPAEDIC SURGERY

## 2024-02-14 RX ORDER — METHYLPREDNISOLONE ACETATE 80 MG/ML
80 INJECTION, SUSPENSION INTRA-ARTICULAR; INTRALESIONAL; INTRAMUSCULAR; SOFT TISSUE
Status: SHIPPED | OUTPATIENT
Start: 2024-02-14

## 2024-02-14 RX ORDER — BUPIVACAINE HYDROCHLORIDE 2.5 MG/ML
4 INJECTION, SOLUTION INFILTRATION; PERINEURAL
Status: SHIPPED | OUTPATIENT
Start: 2024-02-14

## 2024-02-14 RX ADMIN — BUPIVACAINE HYDROCHLORIDE 4 ML: 2.5 INJECTION, SOLUTION INFILTRATION; PERINEURAL at 11:19

## 2024-02-14 RX ADMIN — METHYLPREDNISOLONE ACETATE 80 MG: 80 INJECTION, SUSPENSION INTRA-ARTICULAR; INTRALESIONAL; INTRAMUSCULAR; SOFT TISSUE at 11:19

## 2024-02-14 ASSESSMENT — PAIN SCALES - GENERAL: PAINLEVEL: MODERATE PAIN (5)

## 2024-02-14 NOTE — LETTER
"    2/14/2024         RE: Christine Hu  [address]  [place] [address]        Dear Colleague,    Thank you for referring your patient, Christine Hu, to the [facility]. Please see a copy of my visit note below.    CHIEF COMPLAINT:   Chief Complaint   Patient presents with     Left Knee - Pain     Hx of ACL reconstruction with HS autograft. DOS 2/9/23 with Dr. Peterson. Slow recovery due to infection. Last 3 weeks she got a new job and she is on her feet 8-9 hours. Her knee has been swelling, pain, and redness. She has been icing. It has been problematic. Pain is anterior. It will physically balloon up. She does not want to see Dr. Peterson because he wasn't taking the infection seriously.      Right Knee - Pain     She is putting more pressure on her right knee to relieve pressure on her left knee. She notes she has had tears in her right knee. Pain in right knee is anterior, inferior, medial. It is more localized to the inferior knee. She will have some pain on the lateral knee if she pushes on the lateral knee. No tx.        HISTORY OF PRESENT ILLNESS    Christine Hu is a 43 year old female seen for evaluation of ongoing left knee pain with no known injury.   Pain has been present for 3 weeks. Started a new job 3 weeks ago, on her feet 8-9 hours a day. About 4 days into her new job, started to notice soreness, swelling, stiffness. Would have to ice it afterwards. She locates pain throughout the knee.     Left knee anterior cruciate ligament reconstruction with hamstring autograft 1/27/2023 with Dr Peterson.     Also right knee pain is starting to act up the past weeks as well. Reports history of meniscus tear right knee. Pain around the bottom of the kneecap.    Also starting to have ankle pain, swelling. Ankles \"give out\" on her. History of left ankle fracture a couple times treated without surgery.    Chronic neck and low back pain as well. Follows " with pain management. On Belbuca recently without relief. Had been on Hydrocodone 7.5 but has been taken off. This had worked for her in the past.    Has numbness and tingling in bilateral upper extremity, hands.      Present symptoms: pain diffuse, pain dull/achy , moderate pain, mild swelling.    Pain severity: 4/10  Frequency of symptoms: are constant  Exacerbating Factors: prolonged standing,  walking.  Relieving Factors: rest, sitting, ice.  Night Pain: Yes  Pain while at rest: Yes   Numbness or tingling: No   Patient has tried:     NSAIDS: Yes      Physical Therapy: No      Activity modification: No      Bracing:  knee sleeve       Injections: No      Ice: Yes      Assistive device:  No     Other: tylenol without relief. Diclofenac topical.      Other PMH:  has a past medical history of Acute alcoholic intoxication (H24) (05/28/2011), Alcohol dependence (H) (11/30/2012), Alcohol withdrawal (H) (06/29/2011), Alcohol withdrawal (H) (10/19/2023), ASCUS favor benign (01/2015), Degeneration of cervical intervertebral disc, Knee effusion, left, Lumbago, Ménière's disease, and Overdose (06/2009).    She has no past medical history of Motion sickness.  Patient Active Problem List   Diagnosis     Degeneration of cervical intervertebral disc     Contraceptive management     History of abnormal Pap smear     Depressive disorder, not elsewhere classified     Thyrotoxicosis     Low back pain     Generalized anxiety disorder     Mastoiditis     Overdose     Neck pain     Tobacco use     Primary insomnia     History of Clostridium difficile     Heart murmur     Acute gastritis     Anxiety state     Bladder retention of urine     Hepatitis     Hypomagnesemia     Palpitations     Sprain of ankle     Myalgia     Rupture of anterior cruciate ligament of left knee, initial encounter     S/P spinal surgery     Cervical radiculopathy     History of fusion of cervical spine       Surgical Hx:  has a past surgical history that  includes c/section, low transverse (9/2005); tonsillectomy; Mastoidectomy; colonoscopy; Decompression, fusion cervical anterior two levels, combined (N/A, 11/26/2022); Graft bone from iliac crest (Left, 11/26/2022); Laminectomy cerivcal posterior three+ levels (N/A, 11/29/2022); and Arthroscopic Reconstruction Anterior Cruciate Ligament Hamstring Autograft (Left, 1/27/2023).    Medications:   Current Outpatient Medications:      Buprenorphine HCl (BELBUCA) 150 MCG FILM buccal film, Place 1 Film (150 mcg) inside cheek every 12 hours Fill and begin 2/7/2023. 15 day trial script for chronic pain, Disp: 30 each, Rfl: 0     diclofenac (VOLTAREN) 1 % topical gel, May use 2 grams to right shoulder and 4 grams to left knee and left ankle up to 4 times daily as needed., Disp: 200 g, Rfl: 2     DULoxetine (CYMBALTA) 60 MG capsule, Take 1 capsule (60 mg) by mouth daily, Disp: 90 capsule, Rfl: 0     gabapentin (NEURONTIN) 300 MG capsule, Take 300mg twice daily for 7 days, then 300mg three times daily for 7 days, then 300mg in the AM and afternoon and 600mg at bedtime for 7 days, then 600mg in the AM, 300mg in the afternoon and 600mg at bedtime for 7 days, then 600mg three times daily. If side effects, then reduce to last tolerable dosage., Disp: 180 capsule, Rfl: 0     guanFACINE (INTUNIV) 1 MG TB24 24 hr tablet, Take 1 tablet by mouth daily at 2 pm, Disp: , Rfl:      ondansetron (ZOFRAN ODT) 8 MG ODT tab, Take 1 tablet (8 mg) by mouth every 8 hours as needed for nausea, Disp: 45 tablet, Rfl: 1     topiramate (TOPAMAX) 25 MG tablet, Take 25 mg by mouth daily at 2 pm, Disp: , Rfl:      Buprenorphine HCl (BELBUCA) 150 MCG FILM buccal film, Place 1 Film (150 mcg) inside cheek every 12 hours Yellow side against the inside of cheek for 20-30 minutes every 12 hours for pain. Spit or wipe out the remaining part of the film. Use regularly. 15 day trial for chronic pain. Fill/start 12/12/23 (Patient not taking: Reported on 2/14/2024),  Disp: 30 each, Rfl: 0     cloNIDine (CATAPRES) 0.1 MG tablet, Take 0.1 mg by mouth 2 times daily (Patient not taking: Reported on 2/7/2024), Disp: , Rfl:      cyclobenzaprine (FLEXERIL) 10 MG tablet, Take 1 tablet (10 mg) by mouth nightly as needed for muscle spasms (Patient not taking: Reported on 2/14/2024), Disp: 30 tablet, Rfl: 1     HYDROcodone-acetaminophen (NORCO) 7.5-325 MG per tablet, Take 1 tablet by mouth every 6 hours as needed for moderate to severe pain Max of 3 tabs per day. Fill 12/5/23 and begin 12/06/23. IF Butrans is filled, then patient to reduce to 2/day for 2 days, then reduce to 1 tab per day and continue as needed. 15 day supply for chronic pain (Patient not taking: Reported on 2/7/2024), Disp: 45 tablet, Rfl: 0     hydrOXYzine (ATARAX) 25 MG tablet, Take 1-2 tablets (25-50 mg) by mouth every 6 hours as needed for anxiety (insomnia) (Patient not taking: Reported on 2/7/2024), Disp: 180 tablet, Rfl: 0     ibuprofen (ADVIL/MOTRIN) 200 MG tablet, Take 200 mg by mouth every 4 hours as needed for pain (Patient not taking: Reported on 2/14/2024), Disp: , Rfl:      naloxone (NARCAN) 4 MG/0.1ML nasal spray, Spray 1 spray (4 mg) into one nostril alternating nostrils as needed for opioid reversal every 2-3 minutes until assistance arrives (Patient not taking: Reported on 2/14/2024), Disp: 0.2 mL, Rfl: 1     omeprazole (PRILOSEC OTC) 20 MG EC tablet, Take 20 mg by mouth daily (Patient not taking: Reported on 2/14/2024), Disp: , Rfl:     Allergies:   Allergies   Allergen Reactions     Morphine And Related GI Disturbance     Nausea with use of Tylenol #3- not morphine     Clindamycin Other (See Comments)     C diff, hospitalized      Penicillins Rash       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   reports that she has been smoking cigarettes. She has been smoking an average of 0.3 packs per day. She has been exposed to tobacco smoke. She has never used smokeless tobacco. She reports that she does  "not currently use alcohol. She reports that she does not use drugs.    Family Hx: family history includes Allergies in her mother, sister, and sister; Asthma in an other family member; Heart Failure in her father; Psychotic Disorder in her father, maternal uncle, sister, and sister.    REVIEW OF SYSTEMS: 10 point ROS neg other than the symptoms noted above in the HPI and PMH. Notables include  CONSTITUTIONAL:NEGATIVE for fever, chills, change in weight  INTEGUMENTARY/SKIN: NEGATIVE for worrisome rashes, moles or lesions  MUSCULOSKELETAL:See HPI above  NEURO: NEGATIVE for weakness, dizziness or paresthesias    PHYSICAL EXAM:  /69   Pulse 87   Ht 1.676 m (5' 6\")   Wt 69.9 kg (154 lb 3.2 oz)   BMI 24.89 kg/m     GENERAL APPEARANCE: healthy, alert, no distress  SKIN: no suspicious lesions or rashes  NEURO: Normal strength and tone, mentation intact and speech normal  PSYCH:  mentation appears normal and affect normal, not anxious  RESPIRATORY: No increased work of breathing.  HANDS: no clubbing, nail pitting  LYMPH: no palpable popliteal lymphadenopathy.    BILATERAL LOWER EXTREMITIES:  Gait: normal  Alignment: neutral  No gross deformities or masses.  No Quad atrophy, strength normal.  Intact sensation deep peroneal nerve, superficial peroneal nerve, med/lat tibial nerve, sural nerve, saphenous nerve  Intact EHL, EDL, TA, FHL, GS, quadriceps hamstrings and hip flexors  Bilateral calf soft and nttp or squeeze.  DTRs: achilles 2+, patella 2+.  Edema: trace    LEFT KNEE EXAM:    Skin: intact, no ecchymosis or erythema  Surgical scars noted.  Squat: 100 %, not limited by pain.     ROM: full extension to 140 flexion, anterior discomfort  Tight hamstrings on straight leg raise.  Effusion: small  Tender:   med/lat joint line, anterior and posterior knee, pes  McMurrays: negative    MCL: stable, and non-painful at both 0 and 30 degrees knee flexion  Varus stress: stable, and non-painful at both 0 and 30 degrees knee " flexion  Lachmans: grade 2 laxity  Posterior Drawer stable  Patellofemoral joint:                Apprehension: negative              Crepitations: mild   Grind: positive.    RIGHT KNEE EXAM:    Skin: intact, no ecchymosis or erythema  Squat: 100 %, not limited by pain.     ROM: full extension to 140 flexion  Tight hamstrings on straight leg raise.  Effusion: none  Tender:   med/lat joint line, anterior and posterior knee  McMurrays: negative    MCL: stable, and non-painful at both 0 and 30 degrees knee flexion  Varus stress: stable, and non-painful at both 0 and 30 degrees knee flexion  Lachmans: neg, firm endpoint  Posterior Drawer stable  Patellofemoral joint:                Apprehension: negative              Crepitations: minimal   Grind: positive.        X-RAY:  3 views bilateral knee from 2/14/2024 were reviewed in clinic today. On my review, no obvious fractures or dislocations. Postoperative changes ligamentous reconstruction left knee. No significant compartment narrowing. Tiny left knee joint effusion.          ASSESSMENT/PLAN: Christine Hu is a 43 year old female with acute on chronic bilateral knee pain, left knee effusion, history of left knee anterior cruciate ligament reconstruction.     * reviewed imaging studies with patient, no significant arthritis.  Suspect pain, swelling related to increased weight bearing activities with new job  I expect this will gradually resolve on its own with time, as you get used to the new job and your body adjusts accordingly.    Treatment:    * rest, sitting  * Activity modification - avoid impact activities or activities that aggravate symptoms. Avoid repetitive activities.  * NSAIDS (non-steroidal anti-inflammatory medications; e.g. Aleve, advil, motrin, ibuprofen) - regular use for inflammation ( twice daily or three times daily), with food, as long as no contra-indications Please discuss with primary care doctor if needed  * ice, 15-20 minutes at a time  several times a day or as needed.  * Strengthening of quadriceps muscles  * Physical Therapy for strengthening, stretching and range of motion exercises of legs  * Tylenol as needed for pain, consider Tylenol arthritis or similar  * Weight loss: Weight loss:  Body mass index is 24.89 kg/m .. weight loss benefits, not only for the current pain symptoms, but also overall health. Recommend a good diet plan that works for the patient, with the assistance of a dietician or primary care doctor as needed. Also, a good, low-impact exercise program for at least 20 minutes per day, 3 times per week, such as exercise bike, elliptical , or pool.  * Exercise: low impact such as stationary bike, elliptical, pool.  * Injections: cortisone; risks and perceived benefits discussed today. Patient elects  to proceed.  * Bracing: bracing the knee may offer some relief of symptoms when worn and provide some stability.  * over the counter supplements such as glucosamine and chondroitin sulfate may help with joint pain.  * topical ointments may help as well    * return to clinic as needed. Consider MRI in the future as needed.         Iker Grant M.D., M.S.  Dept. of Orthopaedic Surgery  [facility]     Large Joint Injection/Arthocentesis: L knee joint    Date/Time: 2/14/2024 11:19 AM    Performed by: Josh Carrillo PA  Authorized by: Iker Grant MD    Indications:  Joint swelling, osteoarthritis and pain  Needle Size:  18 G  Guidance: landmark guided    Approach:  Superolateral  Location:  Knee      Medications:  80 mg methylPREDNISolone 80 MG/ML; 4 mL BUPivacaine 0.25 %  Aspirate amount (mL):  12  Aspirate:  Yellow  Outcome:  Tolerated well, no immediate complications  Procedure discussed: discussed risks, benefits, and alternatives    Consent Given by:  Patient  Prep: patient was prepped and draped in usual sterile fashion          Again, thank you for allowing me to participate in the care of your  patient.        Sincerely,        Iker Grant MD

## 2024-02-22 ENCOUNTER — OFFICE VISIT (OUTPATIENT)
Dept: PODIATRY | Facility: CLINIC | Age: 44
End: 2024-02-22
Payer: COMMERCIAL

## 2024-02-22 VITALS
WEIGHT: 154 LBS | SYSTOLIC BLOOD PRESSURE: 152 MMHG | DIASTOLIC BLOOD PRESSURE: 82 MMHG | HEART RATE: 98 BPM | BODY MASS INDEX: 24.86 KG/M2

## 2024-02-22 DIAGNOSIS — R29.898 LEFT LEG WEAKNESS: Primary | ICD-10-CM

## 2024-02-22 DIAGNOSIS — M79.672 LEFT FOOT PAIN: ICD-10-CM

## 2024-02-22 PROBLEM — R10.13 EPIGASTRIC PAIN: Status: ACTIVE | Noted: 2023-10-16

## 2024-02-22 PROCEDURE — 99204 OFFICE O/P NEW MOD 45 MIN: CPT | Performed by: PODIATRIST

## 2024-02-22 NOTE — PROGRESS NOTES
Subjective:    Pt is seen today in consult from Sarah Rogers for chronic left foot pain.  Said this for at least a year.  Pain aggravated by activity and relieved by rest.  She points to central lateral foot.  Denies ecchymosis erythema blistering or drainage.  History of chronic use of opioids and being seen in pain clinic.  History of cervical fusion.  Also has left knee pain.  Patient also complains she has low back pain.  History of alcohol abuse.  Father has history of heart attack.    ROS:  A 10-point review of systems was performed and is positive for that noted in the HPI and as seen above.  All other areas are negative.        Allergies   Allergen Reactions    Morphine And Related GI Disturbance     Nausea with use of Tylenol #3- not morphine    Clindamycin Other (See Comments)     C diff, hospitalized     Penicillins Rash       Current Outpatient Medications   Medication Sig Dispense Refill    Buprenorphine HCl (BELBUCA) 300 MCG FILM buccal film Place 1 Film (300 mcg) inside cheek every 12 hours Fill 2/20/2024 and start on 2/23/2024. 15 day script for chronic pain, may adjust dosing in future. 30 each 0    cloNIDine (CATAPRES) 0.1 MG tablet Take 0.1 mg by mouth 2 times daily (Patient not taking: Reported on 2/7/2024)      cyclobenzaprine (FLEXERIL) 10 MG tablet Take 1 tablet (10 mg) by mouth nightly as needed for muscle spasms (Patient not taking: Reported on 2/14/2024) 30 tablet 1    diclofenac (VOLTAREN) 1 % topical gel May use 2 grams to right shoulder and 4 grams to left knee and left ankle up to 4 times daily as needed. 200 g 2    DULoxetine (CYMBALTA) 60 MG capsule Take 1 capsule (60 mg) by mouth daily 90 capsule 0    gabapentin (NEURONTIN) 300 MG capsule Take 300mg twice daily for 7 days, then 300mg three times daily for 7 days, then 300mg in the AM and afternoon and 600mg at bedtime for 7 days, then 600mg in the AM, 300mg in the afternoon and 600mg at bedtime for 7 days, then 600mg three times  daily. If side effects, then reduce to last tolerable dosage. 180 capsule 0    guanFACINE (INTUNIV) 1 MG TB24 24 hr tablet Take 1 tablet by mouth daily at 2 pm      hydrOXYzine (ATARAX) 25 MG tablet Take 1-2 tablets (25-50 mg) by mouth every 6 hours as needed for anxiety (insomnia) (Patient not taking: Reported on 2024) 180 tablet 0    ibuprofen (ADVIL/MOTRIN) 200 MG tablet Take 200 mg by mouth every 4 hours as needed for pain (Patient not taking: Reported on 2024)      naloxone (NARCAN) 4 MG/0.1ML nasal spray Spray 1 spray (4 mg) into one nostril alternating nostrils as needed for opioid reversal every 2-3 minutes until assistance arrives (Patient not taking: Reported on 2024) 0.2 mL 1    omeprazole (PRILOSEC OTC) 20 MG EC tablet Take 20 mg by mouth daily (Patient not taking: Reported on 2024)      ondansetron (ZOFRAN ODT) 8 MG ODT tab Take 1 tablet (8 mg) by mouth every 8 hours as needed for nausea 45 tablet 1    topiramate (TOPAMAX) 25 MG tablet Take 25 mg by mouth daily at 2 pm         Patient Active Problem List   Diagnosis    Degeneration of cervical intervertebral disc    Contraceptive management    History of abnormal Pap smear    Depressive disorder, not elsewhere classified    Thyrotoxicosis    Low back pain    Generalized anxiety disorder    Mastoiditis    Overdose    Neck pain    Tobacco use    Primary insomnia    History of Clostridium difficile    Heart murmur    Acute gastritis    Anxiety state    Bladder retention of urine    Hepatitis    Hypomagnesemia    Palpitations    Sprain of ankle    Myalgia    Rupture of anterior cruciate ligament of left knee, initial encounter    S/P spinal surgery    Cervical radiculopathy    History of fusion of cervical spine    Generalized abdominal pain    Epigastric pain    Colitis     delivery delivered       Past Medical History:   Diagnosis Date    Acute alcoholic intoxication (H24) 2011    Seen in emergency room  after 14  months sobriety    Alcohol dependence (H) 2012    Alcohol withdrawal (H) 2011    Alcohol withdrawal (H) 10/19/2023    hospitalized 3 days at [facility]    ASCUS favor benign 2015    Neg HPV    Degeneration of cervical intervertebral disc     Knee effusion, left     Lumbago     Ménière's disease     Overdose 2009    TRAZADONE       Past Surgical History:   Procedure Laterality Date    ARTHROSCOPIC RECONSTRUCTION ANTERIOR CRUCIATE LIGAMENT HAMSTRING AUTOGRAFT Left 2023    Procedure: left knee examination under anesthesia, knee arthroscopy, anterior cruciate ligament reconstruction hamstring autograft;  Surgeon: Mc Peterson MD;  Location: [facility] OR    C/SECTION, LOW TRANSVERSE  2005    , Low Transverse    COLONOSCOPY      5 yrs    DECOMPRESSION, FUSION CERVICAL ANTERIOR TWO LEVELS, COMBINED N/A 2022    Procedure: Cervical 4 to 6 Anterior Cervical Decompression and Fusion with [employer] Plate and Screws, Interbody Device, Use of Fluoroscopy, Microscope;  Surgeon: Wilbur Murray MD;  Location: [facility] OR    GRAFT BONE FROM ILIAC CREST Left 2022    Procedure: Left Sided Iliac Crest Autograft;  Surgeon: Wilbur Murray MD;  Location: [facility] OR    LAMINECTOMY CERIVCAL POSTERIOR THREE+ LEVELS N/A 2022    Procedure: Right cervical 4-5 posterior foraminotomy;  Surgeon: Wilbur Murray MD;  Location: [facility] OR    MASTOIDECTOMY      TONSILLECTOMY         Family History   Problem Relation Age of Onset    Allergies Mother     Psychotic Disorder Father         depression    Heart Failure Father         heart attack in 2015?    Allergies Sister     Allergies Sister     Psychotic Disorder Sister         anxiety    Psychotic Disorder Sister         depression    Psychotic Disorder Maternal Uncle         anxiety    Asthma Other     C.A.D. No family hx of     Diabetes No family hx of     Hypertension No family hx of      Cerebrovascular Disease No family hx of     Breast Cancer No family hx of     Cancer - colorectal No family hx of     Prostate Cancer No family hx of     Anesthesia Reaction No family hx of        Social History     Tobacco Use    Smoking status: Every Day     Packs/day: .25     Types: Cigarettes     Passive exposure: Current    Smokeless tobacco: Never    Tobacco comments:     Reduced to 6 per day started 10/25/22   Substance Use Topics    Alcohol use: Not Currently         Exam:    Vitals: BP (!) 152/82   Pulse 98   Wt 69.9 kg (154 lb)   BMI 24.86 kg/m    BMI: Body mass index is 24.86 kg/m .  Height: Data Unavailable    Constitutional/ general:  Pt is in no apparent distress, appears well-nourished.  Cooperative with history and physical exam.     Psych:  The patient answered questions appropriately.  Normal affect.  Seems to have reasonable expectations, in terms of treatment.     Eyes:  Visual scanning/ tracking without deficit.     Ears:  Response to auditory stimuli is normal.  negative hearing aid devices.  Auricles in proper alignment.     Lymphatic:  Popliteal lymph nodes not enlarged.     Lungs:  Non labored breathing, non labored speech. No cough.  No audible wheezing. Even, quiet breathing.       Vascular:  positive pedal pulses bilaterally for both the DP and PT arteries.  CFT < 3 sec.  negative ankle edema.  positive pedal hair growth.    Neuro:  Alert and oriented x 3. Light touch sensation is intact     Derm: Normal texture and turgor.  No erythema, ecchymosis, or cyanosis.      Musculoskeletal:     Patient is ambulatory without an assistive device or brace.  No gross deformities.   Cavus foot with weightbearing on the left and right foot has normal arch.  Muscle strength 5/5 in all compartments on the right.  Muscle strength 5/5 on medial compartment on the left, posterior anterior lateral muscle compartment strength 4/5.  No pain with stressing peroneal tendons.  Pain in the area of lateral  tarsometatarsal joint and calcaneocuboid joint.  No pain over fibula.  I see no edema erythema ecchymosis or masses.    Radiographic Exam:  X-Ray Findings:  I personally reviewed the films.    Narrative & Impression   XR ANKLE LEFT G/E 3 VIEWS 8/28/2023 11:19 AM      HISTORY: 42F PMH left ankle fractures, ankle collapsed and developed  pain and swelling; Acute left ankle pain     COMPARISON: None.                                                                      IMPRESSION: No fractures are evident. The ankle mortise is intact. The  talar dome is unremarkable.                Component  Ref Range & Units 12 mo ago  (2/28/23) 6 yr ago  (1/18/18) 16 yr ago  (7/9/07) 16 yr ago  (6/20/07)    Erythrocyte Sedimentation Rate  0 - 20 mm/hr 14 10 R 8 R 12 R               Component  Ref Range & Units 12 mo ago    CRP Inflammation  <5.00 mg/L <3.00          Component  Ref Range & Units 1 yr ago    Uric Acid  2.6 - 6.0 mg/dL 3.8             Component  Ref Range & Units 1 yr ago 6 yr ago    RONAL interpretation  Negative Borderline Positive Abnormal  Negative R, CM                Component  Ref Range & Units 1 yr ago 16 yr ago    Rheumatoid Factor  <12 IU/mL 9 <20 R                Component  Ref Range & Units 1 yr ago 4 yr ago    Lyme Disease Antibodies Total  <0.90 0.04 0.05 R, CM        A:    Left lower extremity weakness  Left lateral foot pain    Plan:  Personally reviewed past x-rays.  Reviewed various labs over the last few years as noted above.  Reviewed recent pain medicine note.  Explained to patient she has left lower extremity weakness.  Discussed how this will overload her lateral column causing pain.  Discussed weakness could be from lumbar radiculopathy and possible other causes.  Will refer to sports medicine for further evaluation of this.  Patient would like MRI to confirm that there is nothing mechanical causing weakness.  We placed order for MRI.  Will call her with the results.  Discussed treatment options  for lateral overuse in the lower extremity weakness.  Discussed orthotics with good shoes.  Discussed AFO.  Will wait for MRI results and sports medicine input before addressing this.  Thank you for allowing me participate in the care of this patient.        Jose De La Paz DPM, FACFAS

## 2024-02-22 NOTE — LETTER
2/22/2024         RE: Christine Hu  [address]  [place] [address]        Dear Colleague,    Thank you for referring your patient, Christine Hu, to the [facility]. Please see a copy of my visit note below.    Subjective:    Pt is seen today in consult from Sarah Rogers for chronic left foot pain.  Said this for at least a year.  Pain aggravated by activity and relieved by rest.  She points to central lateral foot.  Denies ecchymosis erythema blistering or drainage.  History of chronic use of opioids and being seen in pain clinic.  History of cervical fusion.  Also has left knee pain.  Patient also complains she has low back pain.  History of alcohol abuse.  Father has history of heart attack.    ROS:  A 10-point review of systems was performed and is positive for that noted in the HPI and as seen above.  All other areas are negative.        Allergies   Allergen Reactions     Morphine And Related GI Disturbance     Nausea with use of Tylenol #3- not morphine     Clindamycin Other (See Comments)     C diff, hospitalized      Penicillins Rash       Current Outpatient Medications   Medication Sig Dispense Refill     Buprenorphine HCl (BELBUCA) 300 MCG FILM buccal film Place 1 Film (300 mcg) inside cheek every 12 hours Fill 2/20/2024 and start on 2/23/2024. 15 day script for chronic pain, may adjust dosing in future. 30 each 0     cloNIDine (CATAPRES) 0.1 MG tablet Take 0.1 mg by mouth 2 times daily (Patient not taking: Reported on 2/7/2024)       cyclobenzaprine (FLEXERIL) 10 MG tablet Take 1 tablet (10 mg) by mouth nightly as needed for muscle spasms (Patient not taking: Reported on 2/14/2024) 30 tablet 1     diclofenac (VOLTAREN) 1 % topical gel May use 2 grams to right shoulder and 4 grams to left knee and left ankle up to 4 times daily as needed. 200 g 2     DULoxetine (CYMBALTA) 60 MG capsule Take 1 capsule (60 mg) by mouth daily 90 capsule 0     gabapentin  (NEURONTIN) 300 MG capsule Take 300mg twice daily for 7 days, then 300mg three times daily for 7 days, then 300mg in the AM and afternoon and 600mg at bedtime for 7 days, then 600mg in the AM, 300mg in the afternoon and 600mg at bedtime for 7 days, then 600mg three times daily. If side effects, then reduce to last tolerable dosage. 180 capsule 0     guanFACINE (INTUNIV) 1 MG TB24 24 hr tablet Take 1 tablet by mouth daily at 2 pm       hydrOXYzine (ATARAX) 25 MG tablet Take 1-2 tablets (25-50 mg) by mouth every 6 hours as needed for anxiety (insomnia) (Patient not taking: Reported on 2/7/2024) 180 tablet 0     ibuprofen (ADVIL/MOTRIN) 200 MG tablet Take 200 mg by mouth every 4 hours as needed for pain (Patient not taking: Reported on 2/14/2024)       naloxone (NARCAN) 4 MG/0.1ML nasal spray Spray 1 spray (4 mg) into one nostril alternating nostrils as needed for opioid reversal every 2-3 minutes until assistance arrives (Patient not taking: Reported on 2/14/2024) 0.2 mL 1     omeprazole (PRILOSEC OTC) 20 MG EC tablet Take 20 mg by mouth daily (Patient not taking: Reported on 2/14/2024)       ondansetron (ZOFRAN ODT) 8 MG ODT tab Take 1 tablet (8 mg) by mouth every 8 hours as needed for nausea 45 tablet 1     topiramate (TOPAMAX) 25 MG tablet Take 25 mg by mouth daily at 2 pm         Patient Active Problem List   Diagnosis     Degeneration of cervical intervertebral disc     Contraceptive management     History of abnormal Pap smear     Depressive disorder, not elsewhere classified     Thyrotoxicosis     Low back pain     Generalized anxiety disorder     Mastoiditis     Overdose     Neck pain     Tobacco use     Primary insomnia     History of Clostridium difficile     Heart murmur     Acute gastritis     Anxiety state     Bladder retention of urine     Hepatitis     Hypomagnesemia     Palpitations     Sprain of ankle     Myalgia     Rupture of anterior cruciate ligament of left knee, initial encounter     S/P spinal  surgery     Cervical radiculopathy     History of fusion of cervical spine     Generalized abdominal pain     Epigastric pain     Colitis      delivery delivered       Past Medical History:   Diagnosis Date     Acute alcoholic intoxication (H24) 2011    Seen in emergency room 2020 after 14 months sobriety     Alcohol dependence (H) 2012     Alcohol withdrawal (H) 2011     Alcohol withdrawal (H) 10/19/2023    hospitalized 3 days at [facility]     ASCUS favor benign 2015    Neg HPV     Degeneration of cervical intervertebral disc      Knee effusion, left      Lumbago      Ménière's disease      Overdose 2009    TRAZADONE       Past Surgical History:   Procedure Laterality Date     ARTHROSCOPIC RECONSTRUCTION ANTERIOR CRUCIATE LIGAMENT HAMSTRING AUTOGRAFT Left 2023    Procedure: left knee examination under anesthesia, knee arthroscopy, anterior cruciate ligament reconstruction hamstring autograft;  Surgeon: Mc Peterson MD;  Location: [facility] OR     C/SECTION, LOW TRANSVERSE  2005    , Low Transverse     COLONOSCOPY      5 yrs     DECOMPRESSION, FUSION CERVICAL ANTERIOR TWO LEVELS, COMBINED N/A 2022    Procedure: Cervical 4 to 6 Anterior Cervical Decompression and Fusion with [employer]onic Plate and Screws, Interbody Device, Use of Fluoroscopy, Microscope;  Surgeon: Wilbur Murray MD;  Location: [facility] OR     GRAFT BONE FROM ILIAC CREST Left 2022    Procedure: Left Sided Iliac Crest Autograft;  Surgeon: Wilbur Murray MD;  Location: [facility] OR     LAMINECTOMY CERIVCAL POSTERIOR THREE+ LEVELS N/A 2022    Procedure: Right cervical 4-5 posterior foraminotomy;  Surgeon: Wilbur Murray MD;  Location: [facility] OR     MASTOIDECTOMY       TONSILLECTOMY         Family History   Problem Relation Age of Onset     Allergies Mother      Psychotic Disorder Father         depression     Heart Failure Father          heart attack in NOV. 2015?     Allergies Sister      Allergies Sister      Psychotic Disorder Sister         anxiety     Psychotic Disorder Sister         depression     Psychotic Disorder Maternal Uncle         anxiety     Asthma Other      C.A.D. No family hx of      Diabetes No family hx of      Hypertension No family hx of      Cerebrovascular Disease No family hx of      Breast Cancer No family hx of      Cancer - colorectal No family hx of      Prostate Cancer No family hx of      Anesthesia Reaction No family hx of        Social History     Tobacco Use     Smoking status: Every Day     Packs/day: .25     Types: Cigarettes     Passive exposure: Current     Smokeless tobacco: Never     Tobacco comments:     Reduced to 6 per day started 10/25/22   Substance Use Topics     Alcohol use: Not Currently         Exam:    Vitals: BP (!) 152/82   Pulse 98   Wt 69.9 kg (154 lb)   BMI 24.86 kg/m    BMI: Body mass index is 24.86 kg/m .  Height: Data Unavailable    Constitutional/ general:  Pt is in no apparent distress, appears well-nourished.  Cooperative with history and physical exam.     Psych:  The patient answered questions appropriately.  Normal affect.  Seems to have reasonable expectations, in terms of treatment.     Eyes:  Visual scanning/ tracking without deficit.     Ears:  Response to auditory stimuli is normal.  negative hearing aid devices.  Auricles in proper alignment.     Lymphatic:  Popliteal lymph nodes not enlarged.     Lungs:  Non labored breathing, non labored speech. No cough.  No audible wheezing. Even, quiet breathing.       Vascular:  positive pedal pulses bilaterally for both the DP and PT arteries.  CFT < 3 sec.  negative ankle edema.  positive pedal hair growth.    Neuro:  Alert and oriented x 3. Light touch sensation is intact     Derm: Normal texture and turgor.  No erythema, ecchymosis, or cyanosis.      Musculoskeletal:     Patient is ambulatory without an assistive device or brace.  No  gross deformities.   Cavus foot with weightbearing on the left and right foot has normal arch.  Muscle strength 5/5 in all compartments on the right.  Muscle strength 5/5 on medial compartment on the left, posterior anterior lateral muscle compartment strength 4/5.  No pain with stressing peroneal tendons.  Pain in the area of lateral tarsometatarsal joint and calcaneocuboid joint.  No pain over fibula.  I see no edema erythema ecchymosis or masses.    Radiographic Exam:  X-Ray Findings:  I personally reviewed the films.    Narrative & Impression   XR ANKLE LEFT G/E 3 VIEWS 8/28/2023 11:19 AM      HISTORY: 42F PMH left ankle fractures, ankle collapsed and developed  pain and swelling; Acute left ankle pain     COMPARISON: None.                                                                      IMPRESSION: No fractures are evident. The ankle mortise is intact. The  talar dome is unremarkable.                Component  Ref Range & Units 12 mo ago  (2/28/23) 6 yr ago  (1/18/18) 16 yr ago  (7/9/07) 16 yr ago  (6/20/07)    Erythrocyte Sedimentation Rate  0 - 20 mm/hr 14 10 R 8 R 12 R               Component  Ref Range & Units 12 mo ago    CRP Inflammation  <5.00 mg/L <3.00          Component  Ref Range & Units 1 yr ago    Uric Acid  2.6 - 6.0 mg/dL 3.8             Component  Ref Range & Units 1 yr ago 6 yr ago    RONAL interpretation  Negative Borderline Positive Abnormal  Negative R, CM                Component  Ref Range & Units 1 yr ago 16 yr ago    Rheumatoid Factor  <12 IU/mL 9 <20 R                Component  Ref Range & Units 1 yr ago 4 yr ago    Lyme Disease Antibodies Total  <0.90 0.04 0.05 R, CM        A:    Left lower extremity weakness  Left lateral foot pain    Plan:  Personally reviewed past x-rays.  Reviewed various labs over the last few years as noted above.  Reviewed recent pain medicine note.  Explained to patient she has left lower extremity weakness.  Discussed how this will overload her lateral  column causing pain.  Discussed weakness could be from lumbar radiculopathy and possible other causes.  Will refer to sports medicine for further evaluation of this.  Patient would like MRI to confirm that there is nothing mechanical causing weakness.  We placed order for MRI.  Will call her with the results.  Discussed treatment options for lateral overuse in the lower extremity weakness.  Discussed orthotics with good shoes.  Discussed AFO.  Will wait for MRI results and sports medicine input before addressing this.  Thank you for allowing me participate in the care of this patient.        Jose De La Paz DPM, FACFAS          Again, thank you for allowing me to participate in the care of your patient.        Sincerely,        Jose De La Paz DPM

## 2024-03-04 ENCOUNTER — MYC MEDICAL ADVICE (OUTPATIENT)
Dept: PALLIATIVE MEDICINE | Facility: CLINIC | Age: 44
End: 2024-03-04
Payer: COMMERCIAL

## 2024-03-04 DIAGNOSIS — Z98.1 S/P CERVICAL SPINAL FUSION: ICD-10-CM

## 2024-03-04 DIAGNOSIS — F11.90 CHRONIC, CONTINUOUS USE OF OPIOIDS: ICD-10-CM

## 2024-03-04 DIAGNOSIS — Z98.890 S/P REPAIR OF ANTERIOR CRUCIATE LIGAMENT: ICD-10-CM

## 2024-03-04 DIAGNOSIS — M25.562 CHRONIC PAIN OF LEFT KNEE: ICD-10-CM

## 2024-03-04 DIAGNOSIS — G89.29 CHRONIC INTRACTABLE PAIN: ICD-10-CM

## 2024-03-04 DIAGNOSIS — F41.9 ANXIETY: ICD-10-CM

## 2024-03-04 DIAGNOSIS — G89.29 CHRONIC NECK PAIN: ICD-10-CM

## 2024-03-04 DIAGNOSIS — Z79.891 ENCOUNTER FOR LONG-TERM USE OF OPIATE ANALGESIC: ICD-10-CM

## 2024-03-04 DIAGNOSIS — G89.29 CHRONIC PAIN OF LEFT KNEE: ICD-10-CM

## 2024-03-04 DIAGNOSIS — M75.111 INCOMPLETE TEAR OF RIGHT ROTATOR CUFF, UNSPECIFIED WHETHER TRAUMATIC: ICD-10-CM

## 2024-03-04 DIAGNOSIS — M54.2 CHRONIC NECK PAIN: ICD-10-CM

## 2024-03-04 DIAGNOSIS — Z98.890 S/P SPINAL SURGERY: ICD-10-CM

## 2024-03-04 DIAGNOSIS — M25.511 CHRONIC RIGHT SHOULDER PAIN: ICD-10-CM

## 2024-03-04 DIAGNOSIS — G89.29 CHRONIC RIGHT SHOULDER PAIN: ICD-10-CM

## 2024-03-04 NOTE — TELEPHONE ENCOUNTER
Per patient myChart message:  Christine FLOOD Pain Nurse (supporting Sarah Ramya Rogers, APRN CNP)3 hours ago (11:31 AM)   Sarah,   As I previously stated in a message my family is leaving for a [place] on March 7th[place] 18th.  I have enough Cymbalta until we get back but will not have enough gabapentin or the belbuca.  The new dose is working slightly better,  but due to the smaller film size, they don't always stick to my cheek and fall off.  Can you please send in these 2 prescriptions and enough until I return home on March 18th.  It typically takes a couple days to fill due to my insurance so please send them today to ensure they will be ready in time for our cruise.   Thanks   Christine Hu   _________________________________    Mychart sent to pt:  You  Christine Tolliver now (3:18 PM)   Presley King,     A couple of questions before we can process:     Current dose of gabapentin 300mg:  Taking 2 tabs (600mg) three times a day.  Is this correct?  What pharmacy do you want to use?     Yamilka, RN-BSN  [facility]   [phone]

## 2024-03-04 NOTE — TELEPHONE ENCOUNTER
Per patient myChart message:  Christine FLOOD Pain Nurse (supporting Sarah Rogers, APRN CNP)11 minutes ago (3:24 PM)   Gabapentin 300mg, 2 tabs twice daily.  I'm not up to 3 times yet .  [facility] [place] [facility]   Thanks   Christine

## 2024-03-04 NOTE — TELEPHONE ENCOUNTER
Routed to the MA pool to process refill(s) of belbuca and gabapentin.  Note: pt will be on a  [employer] [place] [employer] [place] on [place] 7th- 18th     Yamilka López RN-BSN  [facility]

## 2024-03-05 NOTE — TELEPHONE ENCOUNTER
Received call from patient requesting refill(s) of      Buprenorphine HCl (BELBUCA) 300 MCG FILM buccal film   Last dispensed from pharmacy on 02/08/24  gabapentin (NEURONTIN) 300 MG capsule   Last dispensed from pharmacy on 10/25/23    Patient's last office/virtual visit by prescribing provider on 02/07/24  Next office/virtual appointment scheduled for None    Last urine drug screen date 04/10/23  Current opioid agreement on file (completed within the last year) Yes Date of opioid agreement: 04/10/23    E-prescribe to   [id number] - [place], [place] - [address]  [address]  [place] [address]  Phone: [phone] Fax: [phone]    Will route to nursing Harris for review and preparation of prescription(s).   --------------------------  Note: pt will be on a  [employer] and [place] on March 7th- 18th        CHANT[facility] [place]

## 2024-03-06 RX ORDER — GABAPENTIN 300 MG/1
CAPSULE ORAL
Qty: 180 CAPSULE | Refills: 0 | Status: SHIPPED | OUTPATIENT
Start: 2024-03-06

## 2024-03-06 NOTE — TELEPHONE ENCOUNTER
Medication refill information reviewed.     Ranjeet 300mcg last due:  Fill 2/20/2024 and start on 2/23/2024. 15 day script for chronic pain, may adjust dosing in future     Note: pt will be on a  [employer] and [place] on March 7th- 18th       Pt med update:   The new dose is working slightly better,  but due to the smaller film size, they don't always stick to my cheek and fall off.     Due date:  3/8/24      Prescriptions prepped for review.     Yamilka RN-BSN  [facility]

## 2024-03-06 NOTE — TELEPHONE ENCOUNTER
Signed Prescriptions:                        Disp   Refills    gabapentin (NEURONTIN) 300 MG capsule      180 ca*0        Sig: Take 2 tabs (600mg) BID for 7 days. then 2 tabs           (600mg) QAM,  1 tab (300mg) in  afternoon &  2           tabs (600mg) QHS for 7 days, then 2 tabs(600mg)           three times daily. If side effects, then reduce           to last tolerable dosage.  Authorizing Provider: SARAH CASTILLO    Buprenorphine HCl (BELBUCA) 300 MCG FILM b*60 each0        Sig: Place 1 Film (300 mcg) inside cheek every 12 hours           Fill 3/6/2024 and start on 3/8/2024. 30 day           script for chronic pain  Authorizing Provider: SARAH CASTILLO        Reviewed [place]  March 6, 2024- no concerning fills.    Sarah LIEBERMAN, RN CNP, FNP  [facility]  [facility] [place]

## 2024-03-22 ENCOUNTER — ANCILLARY PROCEDURE (OUTPATIENT)
Dept: GENERAL RADIOLOGY | Facility: CLINIC | Age: 44
End: 2024-03-22
Attending: FAMILY MEDICINE
Payer: COMMERCIAL

## 2024-03-22 ENCOUNTER — OFFICE VISIT (OUTPATIENT)
Dept: URGENT CARE | Facility: URGENT CARE | Age: 44
End: 2024-03-22
Payer: COMMERCIAL

## 2024-03-22 VITALS
WEIGHT: 145 LBS | TEMPERATURE: 98.4 F | RESPIRATION RATE: 19 BRPM | HEART RATE: 82 BPM | SYSTOLIC BLOOD PRESSURE: 124 MMHG | DIASTOLIC BLOOD PRESSURE: 79 MMHG | BODY MASS INDEX: 23.4 KG/M2 | OXYGEN SATURATION: 96 %

## 2024-03-22 DIAGNOSIS — J40 BRONCHITIS: ICD-10-CM

## 2024-03-22 DIAGNOSIS — J98.8 RESPIRATORY INFECTION: Primary | ICD-10-CM

## 2024-03-22 PROCEDURE — 99213 OFFICE O/P EST LOW 20 MIN: CPT | Performed by: FAMILY MEDICINE

## 2024-03-22 PROCEDURE — 71046 X-RAY EXAM CHEST 2 VIEWS: CPT | Mod: TC | Performed by: RADIOLOGY

## 2024-03-22 RX ORDER — AZITHROMYCIN 250 MG/1
TABLET, FILM COATED ORAL
Qty: 6 TABLET | Refills: 0 | Status: SHIPPED | OUTPATIENT
Start: 2024-03-22 | End: 2024-03-27

## 2024-03-22 RX ORDER — CEPHALEXIN 500 MG/1
500 CAPSULE ORAL 3 TIMES DAILY
Qty: 21 CAPSULE | Refills: 0 | Status: SHIPPED | OUTPATIENT
Start: 2024-03-22

## 2024-03-22 NOTE — PROGRESS NOTES
(J98.8) Respiratory infection  (primary encounter diagnosis)  Comment:   Plan: XR Chest 2 Views            (J40) Bronchitis  Comment:   Plan: cephALEXin (KEFLEX) 500 MG capsule,         azithromycin (ZITHROMAX) 250 MG tablet                    CHIEF COMPLAINT    Cough.      HISTORY    This is a 43-year-old woman with an 8-day history of cough.  She has had intermittent low-grade fevers.  She gets a little bit of sputum, some of it greenish.  It sounds like this started out as an influenza-like illness.    She is a smoker.  She does not have asthma.    Several years ago she had a prolonged episode of pneumonia.      Patient Active Problem List   Diagnosis    Degeneration of cervical intervertebral disc    Contraceptive management    History of abnormal Pap smear    Depressive disorder, not elsewhere classified    Thyrotoxicosis    Low back pain    Generalized anxiety disorder    Mastoiditis    Overdose    Neck pain    Tobacco use    Primary insomnia    History of Clostridium difficile    Heart murmur    Acute gastritis    Anxiety state    Bladder retention of urine    Hepatitis    Hypomagnesemia    Palpitations    Sprain of ankle    Myalgia    Rupture of anterior cruciate ligament of left knee, initial encounter    S/P spinal surgery    Cervical radiculopathy    History of fusion of cervical spine    Generalized abdominal pain    Epigastric pain    Colitis     delivery delivered       Current Outpatient Medications   Medication Sig Dispense Refill    DULoxetine (CYMBALTA) 60 MG capsule Take 1 capsule (60 mg) by mouth daily 90 capsule 0    gabapentin (NEURONTIN) 300 MG capsule Take 2 tabs (600mg) BID for 7 days. then 2 tabs (600mg) QAM,  1 tab (300mg) in  afternoon &  2 tabs (600mg) QHS for 7 days, then 2 tabs(600mg) three times daily. If side effects, then reduce to last tolerable dosage. 180 capsule 0    guanFACINE (INTUNIV) 1 MG TB24 24 hr tablet Take 1 tablet by mouth daily at 2 pm      ibuprofen  (ADVIL/MOTRIN) 200 MG tablet Take 200 mg by mouth every 4 hours as needed for pain      topiramate (TOPAMAX) 25 MG tablet Take 25 mg by mouth daily at 2 pm      Buprenorphine HCl (BELBUCA) 300 MCG FILM buccal film Place 1 Film (300 mcg) inside cheek every 12 hours Fill 3/6/2024 and start on 3/8/2024. 30 day script for chronic pain (Patient not taking: Reported on 3/22/2024) 60 each 0    cloNIDine (CATAPRES) 0.1 MG tablet Take 0.1 mg by mouth 2 times daily (Patient not taking: Reported on 2/7/2024)      cyclobenzaprine (FLEXERIL) 10 MG tablet Take 1 tablet (10 mg) by mouth nightly as needed for muscle spasms (Patient not taking: Reported on 2/14/2024) 30 tablet 1    diclofenac (VOLTAREN) 1 % topical gel May use 2 grams to right shoulder and 4 grams to left knee and left ankle up to 4 times daily as needed. (Patient not taking: Reported on 3/22/2024) 200 g 2    hydrOXYzine (ATARAX) 25 MG tablet Take 1-2 tablets (25-50 mg) by mouth every 6 hours as needed for anxiety (insomnia) (Patient not taking: Reported on 2/7/2024) 180 tablet 0    naloxone (NARCAN) 4 MG/0.1ML nasal spray Spray 1 spray (4 mg) into one nostril alternating nostrils as needed for opioid reversal every 2-3 minutes until assistance arrives (Patient not taking: Reported on 2/14/2024) 0.2 mL 1    omeprazole (PRILOSEC OTC) 20 MG EC tablet Take 20 mg by mouth daily (Patient not taking: Reported on 2/14/2024)      ondansetron (ZOFRAN ODT) 8 MG ODT tab Take 1 tablet (8 mg) by mouth every 8 hours as needed for nausea (Patient not taking: Reported on 3/22/2024) 45 tablet 1       REVIEW OF SYSTEMS    Occasional fever.  No sore throat.  No chest pain.  No nausea or abdominal pain.  No rash.  No swelling.          EXAM  /79   Pulse 82   Temp 98.4  F (36.9  C) (Tympanic)   Resp 19   Wt 65.8 kg (145 lb)   SpO2 96%   BMI 23.40 kg/m      Patient is thin and looks well in general.  TMs are WNL.  Pharynx not inflamed, absent tonsils.  No cervical  adenopathy.  Chest some rales are present at the left lung base which are reproducible.  Extremities without edema.      Results for orders placed or performed in visit on 03/22/24   XR Chest 2 Views     Status: None (Preliminary result)    Narrative    CHEST TWO VIEWS   3/22/2024 10:52 AM     HISTORY: Cough, rales left base. Respiratory infection.    COMPARISON: 12/9/2020.      Impression    IMPRESSION: No acute cardiopulmonary disease.

## 2024-03-22 NOTE — LETTER
March 22, 2024          Christine Hu        Seen today.    Unable to work today, possible March 23.          Lul Tafoya MD on 3/22/2024 at 11:13 AM

## 2024-03-22 NOTE — LETTER
March 22, 2024        Christine Hu        Seen today. Unable to work.    Might need to be off Feb 23.        Lul Tafoya MD on 3/22/2024 at 11:12 AM

## 2024-04-15 DIAGNOSIS — F41.1 GENERALIZED ANXIETY DISORDER: ICD-10-CM

## 2024-04-15 RX ORDER — DULOXETIN HYDROCHLORIDE 60 MG/1
60 CAPSULE, DELAYED RELEASE ORAL DAILY
Qty: 90 CAPSULE | Refills: 0 | Status: SHIPPED | OUTPATIENT
Start: 2024-04-15 | End: 2024-06-17

## 2024-04-15 NOTE — TELEPHONE ENCOUNTER
Signed Prescriptions:                        Disp   Refills    DULoxetine (CYMBALTA) 60 MG capsule        90 cap*0        Sig: Take 1 capsule (60 mg) by mouth daily  Authorizing Provider: KARLENE CASTILLO, RN CNP, FNP  [facility]

## 2024-04-15 NOTE — TELEPHONE ENCOUNTER
Received fax request from pharmacy requesting refill(s) for DULoxetine (CYMBALTA) 60 MG capsule    Last refilled on 1/16/2024 per pharmacy (90 day)     Pt last seen on 2/7/2024  Next appt scheduled for NONE.     Will facilitate refill.

## 2024-09-07 ENCOUNTER — HEALTH MAINTENANCE LETTER (OUTPATIENT)
Age: 44
End: 2024-09-07

## 2024-09-13 DIAGNOSIS — F41.1 GENERALIZED ANXIETY DISORDER: ICD-10-CM

## 2024-09-13 RX ORDER — DULOXETIN HYDROCHLORIDE 60 MG/1
60 CAPSULE, DELAYED RELEASE ORAL DAILY
Qty: 90 CAPSULE | Refills: 0 | Status: SHIPPED | OUTPATIENT
Start: 2024-09-13

## 2024-09-13 NOTE — TELEPHONE ENCOUNTER
Received fax from pharmacy requesting refill(s) for DULoxetine (CYMBALTA) 60 MG capsule    Date last filled 01/17/2024    Last Appt Date:02/07/2024    Next Appt scheduled: None    Pharmacy:   [id number] - [place] - [address] for processing    Wen Lees MA  [facility] [place]

## 2024-09-13 NOTE — TELEPHONE ENCOUNTER
Signed Prescriptions:                        Disp   Refills    DULoxetine (CYMBALTA) 60 MG capsule        90 cap*0        Sig: Take 1 capsule (60 mg) by mouth daily. Patient needs           to be seen in clinic prior to future refills.  Authorizing Provider: KARLENE CASTILLO, RN CNP, FNP  [facility]  [facility] [place]

## 2024-12-12 ENCOUNTER — MYC MEDICAL ADVICE (OUTPATIENT)
Dept: PALLIATIVE MEDICINE | Facility: CLINIC | Age: 44
End: 2024-12-12
Payer: COMMERCIAL

## 2024-12-12 DIAGNOSIS — F41.1 GENERALIZED ANXIETY DISORDER: ICD-10-CM

## 2024-12-13 NOTE — TELEPHONE ENCOUNTER
Please see MyChart below.    Pended a 10 day supply of Duloxetine 60 MG for provider review.     Follow up with PCP is scheduled for 12/16/24. There is no follow up scheduled with pain management.     DANIEL AllenN, RN  Care Coordinator  [facility] [place]

## 2024-12-16 ENCOUNTER — VIRTUAL VISIT (OUTPATIENT)
Dept: FAMILY MEDICINE | Facility: CLINIC | Age: 44
End: 2024-12-16
Payer: COMMERCIAL

## 2024-12-16 ENCOUNTER — TELEPHONE (OUTPATIENT)
Dept: NEUROSURGERY | Facility: CLINIC | Age: 44
End: 2024-12-16

## 2024-12-16 DIAGNOSIS — R63.5 WEIGHT GAIN: ICD-10-CM

## 2024-12-16 DIAGNOSIS — Z13.1 SCREENING FOR DIABETES MELLITUS: ICD-10-CM

## 2024-12-16 DIAGNOSIS — F10.11 HISTORY OF ALCOHOL ABUSE: ICD-10-CM

## 2024-12-16 DIAGNOSIS — M54.2 CERVICALGIA: ICD-10-CM

## 2024-12-16 DIAGNOSIS — G89.4 CHRONIC PAIN SYNDROME: Primary | ICD-10-CM

## 2024-12-16 DIAGNOSIS — F41.1 GENERALIZED ANXIETY DISORDER: ICD-10-CM

## 2024-12-16 DIAGNOSIS — Z98.1 HISTORY OF FUSION OF CERVICAL SPINE: ICD-10-CM

## 2024-12-16 PROCEDURE — 99215 OFFICE O/P EST HI 40 MIN: CPT | Mod: 95 | Performed by: PHYSICIAN ASSISTANT

## 2024-12-16 PROCEDURE — G2211 COMPLEX E/M VISIT ADD ON: HCPCS | Mod: 95 | Performed by: PHYSICIAN ASSISTANT

## 2024-12-16 RX ORDER — DULOXETIN HYDROCHLORIDE 60 MG/1
60 CAPSULE, DELAYED RELEASE ORAL DAILY
Qty: 10 CAPSULE | Refills: 0 | Status: SHIPPED | OUTPATIENT
Start: 2024-12-16 | End: 2024-12-16

## 2024-12-16 RX ORDER — CYCLOBENZAPRINE HCL 10 MG
10 TABLET ORAL AT BEDTIME
COMMUNITY
Start: 2024-12-16

## 2024-12-16 RX ORDER — DULOXETIN HYDROCHLORIDE 60 MG/1
60 CAPSULE, DELAYED RELEASE ORAL DAILY
Qty: 90 CAPSULE | Refills: 0 | Status: SHIPPED | OUTPATIENT
Start: 2024-12-16

## 2024-12-16 NOTE — TELEPHONE ENCOUNTER
Other: Christine called she is having pain and bulging in areas prior to the surgery she had in 10/2022. She said when she presses on the area she can feel it move up towards her head and down towards her back mainly on the left. She wants to know if she needs imaging prior to seeing Dr. Murray. Please call patient to discuss and assist with scheduling.     Could we send this information to you in [facility] or would you prefer to receive a phone call?:   Patient would prefer a phone call   Okay to leave a detailed message?: Yes at Cell number on file:    Telephone Information:   Mobile [phone]

## 2024-12-16 NOTE — PROGRESS NOTES
Christine is a 44 year old who is being evaluated via a billable video visit.    How would you like to obtain your AVS? MyChart  If the video visit is dropped, the invitation should be resent by: Text to cell phone: [phone]  Will anyone else be joining your video visit? No      Assessment & Plan     Chronic pain syndrome  Needs to keep regular appointment with pain management we discussed this multiple times today and the reasons why  With her history of complex pain, anxiety/depression, alcohol abuse, and history of overdoses I do not feel comfortable managing her pain or anxiety with any controlled substances. I do not feel this would be safe for her.  She needs to make sure to always make appointments ahead of time with pain management. She did ask for narcotics today as neck is acting up, but for the reasons previously mentioned I cannot do this and patient is understanding.    To ER with severe worsening symptoms.       Generalized anxiety disorder  stable  - DULoxetine (CYMBALTA) 60 MG capsule; Take 1 capsule (60 mg) by mouth daily. Schedule with pain clinic right away. Fill this and not the 10 capsules please.  - Hemoglobin A1c; Future    Cervicalgia  She plans to f/u with her previous surgeon for this , continue flexeril at bedtime    Weight gain  See hpi, will get labs, refer to weight management in future if needed  - TSH with free T4 reflex; Future  - Hemoglobin A1c; Future  - Comprehensive metabolic panel (BMP + Alb, Alk Phos, ALT, AST, Total. Bili, TP); Future    History of alcohol abuse  See hpi  - Comprehensive metabolic panel (BMP + Alb, Alk Phos, ALT, AST, Total. Bili, TP); Future    Screening for diabetes mellitus      History of fusion of cervical spine    I will follow up with labs     The longitudinal plan of care for the diagnosis(es)/condition(s) as documented were addressed during this visit. Due to the added complexity in care, I will continue to support Christine in the subsequent management  and with ongoing continuity of care.        52 min spent on patient today including extensive chart review, history,   exam, and explaining treatment plan and follow-up.         Rajni King is a 44 year old, presenting for the following health issues:  RECHECK      12/16/2024     2:19 PM   Additional Questions   Roomed by Patient completed echeck in via CallAround     Video Start Time: 2:42 PM    History of Present Illness       Back Pain:  She presents for follow up of back pain. Patient's back pain is a chronic problem.  Location of back pain:  Right lower back, left lower back, left middle of back, right upper back, left upper back, right side of neck, left side of neck, right shoulder, left shoulder, right hip and left hip  Description of back pain: burning, sharp, shooting and stabbing  Back pain spreads: left shoulder and left side of neck    Since patient first noticed back pain, pain is: rapidly worsening  Does back pain interfere with her job:  Yes       Reason for visit:  Discuss medication. severe body pain. recent weight gain    She eats 2-3 servings of fruits and vegetables daily.She consumes 0 sweetened beverage(s) daily.She exercises with enough effort to increase her heart rate 9 or less minutes per day.  She exercises with enough effort to increase her heart rate 3 or less days per week.   She is taking medications regularly.      Complicated chronic pain patient previously seen by myself at [facility] for many years:        Chronic conditions:    Weight gain-  Gained 40 pounds in 4 months. Not exercising as much due to pain.  Discussed perimenopause and lack of exercise can contribute, but also checking thyroid and a1c.    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 Discussed due to pain worsening I would recommend getting pain clinics input first on that and stay on the same dose for now.  Copied from my previous notes: 2020-in  "hospital with blood alcohol of over 0.40. h/o alcohol and tobacco abuse. 2009 had trazadone overdose.   Has been on narcotics off and on for years. I had helped wean her off but then she had surgeries and it was challenging to Wearn off. She has since seen pain management through [place] and was supposed to see them a month after their last visit but she did not and is overdue for visit.   Previously seen at [facility] for neck pain and had emg through them for nerve issues in hands. Had mri of back in 2007 that showed mild space narrowing. Had chronic neck and back pain in 2007 as well. Belbuca wasn't really covered and didn't stay in her mouth very well.   Will be scheduling a f/u with pain clinic has to call per patient they don't let her schedule online.  Also plans to schedule with neck specialist. Feels a lot of neck pain again.      H/o depression and anxiety: on Cymbalta for now and feeling good starting new business.   Previous psych meds-paxil, sertaline, effexor, xanax, clonopin. Had side effects with effexor. Atarax, guanfacine, topamax, gabapentin.  Denies suicidal or homicidal thoughts.  Patient instructed to go to the emergency room or call 911 if these occur.  Was in hospital again for alcohol ketoacidosis october 2023. Also was in treatment for this per pain note.  [place] one [place] in march. 5 lele of belbuca through pain management last fill was in march of that.no other fills in past year.      I reviewed last  pain clinic note which was from 2-7-24.   Copied from those notes:  \"Interval history:  Christine Bahena Mayte 43 year old female is known to me for:  Chronic right shoulder pain  Incomplete tear of right rotator cuff, unspecified whether traumatic  Chronic neck pain  S/p cervical spinal fusion  Chronic pain of left knee  S/p repair of anterior cruciate ligament  Muscle spasm  Myofascial pain  Chronic continuous use of opioids  Previous alcohol dependence in 2011 during her " divorce  Encounter for long term use of opiates   PMHx includes: overdose with Trazodone (2009 at age 28), acute alcoholic intoxication (2011, seen in ER), alcohol withdrawal (2011), alcohol dependence (), ASCUS favor benign (), degeneration of cervical disc, lumbago, Meniere's disease, left knee effusion  PSHx includes: low transverse  (2005), Cervical 4 to 6 Anterior Cervical Decompression and Fusion with Medtronic Plate and Screws, Interbody Device, Use of Fluoroscopy, Microscope (2022 by Wilbur Murray MD), graft bone from left iliac crest (2022), Right cervical 4-5 posterior foraminotomy (2022 by Wilbur Murray MD), arthroscopic reconstruction ACL Left knee with hamstring autograft (2023, by Mc Peterson MD), tonsillectomy, mastoidectomy, colonoscopy  Interval history 2023  -will be going to [facility] for treatment for alcohol issues and mental health issues including severe anxiety.  -was seen on Monday by a new primary care provider, her Duloxetine was increased to 60mg/day  -back on gabapentin for pain  -on hydroxyzine for anxiety, very helpful but makes her sleepy and gives her a dry mouth  -patient was hospitalized for alcoholic ketoacidosis and generalized abdominal pain at [facility] from 10/16 until 10/19/2023.   -discussed switch from Norco to Buprenorphine, patient wants to stay on her current regimen while in treatment. She is aware that at our next appt, we will transition to buprenorphine for improved pain control and to reduce safety risks should she have any alcohol relapses.   Plan:   Physical Therapy: none, has finished PHYSICAL THERAPY   You could check out Gustavo Kuhn, he can be found on [employer], Jeremy Dumont FB reels. He also has his own website   Continue gentle home exercises  Clinical Health Psychologist to address issues of relaxation, behavioral  change, coping style, and other factors important to improvement: none  Diagnostic Studies: none  Referral to Sports Med re: knee and ankle pain  Procedures: schedule trigger point injections. Our office will contact you.   Medication Management:   START Belbuca (buprenorphine) 150mcg buccal film every 12 hours. We will have you start use of a quarter-film every 6 hours, if you tolerate this, then trial one-half film twice daily about every 12 hours, if you tolerate this then try one-half film once per day and one full film at night for 2 days, then may use a max of 1 full film twice daily.  Remember that the yellow side needs to be against the wet bumpy part of the inside of cheek for 20-30  minutes, then wipe or spit out.   START ondansetron 8mg oral disinegrating tabs, may use 1 tab every 8 hours.   continue cyclobenzaprine 10mg up to 3 times daily for muscle spasms, caution sedation   Continue Voltaren gel (diclofenac sodium 1%) this is found over-the-counter and you may use 2 grams to the right shoulder and 4 grams to left knee up to 4 times daily.   continue duloxetine to 60mg/day Further procedures recommended: trigger point injections. Our office will contact you  Hydroxyzine 25-50mg every 6 hours as needed for anxiety or insomnia  Recommendations/follow-up for PCP:  See above  Release of information: none  Follow up: 1 month in-person or video  ASSESSMENT AND PLAN:  (M25.50) Multiple joint pain  (primary encounter diagnosis)  Comment:   Plan: Orthopedic  Referral, CRP         inflammation, Erythrocyte sedimentation rate         auto, Rheumatoid factor, Anti Nuclear Nichole IgG         by IFA with Reflex, Adult Pain Clinic Follow-Up        Order, Buprenorphine HCl (BELBUCA) 150 MCG FILM        buccal film             (M25.561,  M25.562,  G89.29) Chronic pain of both knees  Comment:   Plan: Orthopedic  Referral, CRP         inflammation, Erythrocyte sedimentation rate         auto, Rheumatoid  "factor, Anti Nuclear Nichole IgG         by IFA with Reflex, Adult Pain Clinic Follow-Up        Order, Buprenorphine HCl (BELBUCA) 150 MCG FILM        buccal film             (M25.571,  G89.29,  M25.572) Chronic pain of both ankles  Comment:   Plan: Orthopedic  Referral, CRP         inflammation, Erythrocyte sedimentation rate         auto, Rheumatoid factor, Anti Nuclear Nichole IgG         by IFA with Reflex, Adult Pain Clinic Follow-Up        Order, Buprenorphine HCl (BELBUCA) 150 MCG FILM        buccal film             (M62.838) Muscle spasm  Comment:   Plan: PAIN INJECTION EVAL/TREAT/FOLLOW UP, Adult Pain        Clinic Follow-Up Order, Buprenorphine HCl         (BELBUCA) 150 MCG FILM buccal film             (M79.18) Myofascial pain  Comment:   Plan: PAIN INJECTION EVAL/TREAT/FOLLOW UP, Adult Pain        Clinic Follow-Up Order, Buprenorphine HCl         (BELBUCA) 150 MCG FILM buccal film             (M54.2) Neck pain  Comment:   Plan: PAIN INJECTION EVAL/TREAT/FOLLOW UP, Adult Pain        Clinic Follow-Up Order, Buprenorphine HCl         (BELBUCA) 150 MCG FILM buccal film             (G89.29) Chronic intractable pain  Comment:   Plan: Adult Pain Clinic Follow-Up Order,         Buprenorphine HCl (BELBUCA) 150 MCG FILM buccal        film             (R11.0) Nausea  Comment:   Plan: Adult Pain Clinic Follow-Up Order,          ondansetron (ZOFRAN ODT) 8 MG ODT        tab                     Face to face time: 70 minutes\"              Sarah LIEBERMAN RN CNP, FNP  [facility]             Objective           Vitals:  No vitals were obtained today due to virtual visit.    Physical Exam   GENERAL: alert and no distress  EYES: Eyes grossly normal to inspection.  No discharge or erythema, or obvious scleral/conjunctival abnormalities.  RESP: No audible wheeze, cough, or visible cyanosis.    SKIN: Visible skin clear. No significant rash, abnormal pigmentation or " lesions.  NEURO: Cranial nerves grossly intact.  Mentation and speech appropriate for age.  PSYCH: Appropriate affect, tone, and pace of words          Video-Visit Details    Type of service:  Video Visit   Video End Time:  3:00 pm  Originating Location (pt. Location): Home    Distant Location (provider location):  On-site  Platform used for Video Visit: Latanya  Signed Electronically by: Angi Marroquin PA-C

## 2024-12-16 NOTE — TELEPHONE ENCOUNTER
Signed Prescriptions:                        Disp   Refills    DULoxetine (CYMBALTA) 60 MG capsule        10 cap*0        Sig: Take 1 capsule (60 mg) by mouth daily. Patient needs           to be seen in clinic prior to future refills.  Authorizing Provider: KARLENE CASTILLO, RN CNP, FNP  [facility]  [facility] [place]

## 2024-12-16 NOTE — PATIENT INSTRUCTIONS
Schedule future labs for weight gain    Schedule with pain management for ongoing pain concerns, I will not be able to prescribe controlled substances for you going forward pain will need to be managed by pain clinic only.

## 2024-12-17 ENCOUNTER — MYC MEDICAL ADVICE (OUTPATIENT)
Dept: ORTHOPEDICS | Facility: CLINIC | Age: 44
End: 2024-12-17
Payer: COMMERCIAL

## 2024-12-17 NOTE — TELEPHONE ENCOUNTER
Left Voicemail (1st Attempt) and Sent Mychart (1st Attempt) for the patient to call back and schedule the following:    Appointment type: Return Surgical Spine  Provider:   Return date: Next Available  Specialty phone number: [phone]

## 2024-12-19 NOTE — TELEPHONE ENCOUNTER
Left Voicemail (2nd Attempt) and Sent Mychart (2nd Attempt) for the patient to call back and schedule the following:    Appointment type: Return Surgical Spine  Provider: Dr. Murray  Return date: next available

## 2025-02-02 NOTE — PROGRESS NOTES
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 Polyarthropathy     Comparison: 8/11/2022     Findings:     ROTATOR CUFF and ASSOCIATED STRUCTURES  Rotator cuff:      On a background of tendinosis, low-grade articular sided tear of the  supraspinatus middle fibers at the footprint with approximately 10 mm  medial retraction of torn fibers. Infraspinatus and subscapularis  tendinosis. Teres minor tendon is intact.     Bursa: No subacromial or subdeltoid bursal fluid.     Musculature: Muscle bulk of rotator cuff is preserved.  Deltoid muscle  bulk is also preserved.  No muscle edema.     Acromioclavicular joint  There are mild degenerative changes of the acromioclavicular joint.  Acromion is type 2 in sagittal morphology.  Coracoacromial ligament is  not visualized.     OSSEOUS STRUCTURES  No fracture, marrow contusion.      Nonspecific focal T2 hyperintense intramedullary lesion involving the  coracoid process, which is contiguous with subchondral edema like  marrow signal  intensity at the area of superior labral tearing,  finding most likely representing developing intraosseous ganglion  cyst.     LONG BICIPITAL TENDON  The long head of the biceps tendon is normally situated within the  bicipital groove. No complete or partial biceps tendon tear is  present.     GLENOHUMERAL JOINT  Joint fluid: Physiologic amount of joint fluid is  present.     Cartilage and subarticular bone:  No focal hyaline cartilage defects  are noted. No Hill-Sachs, reverse Hill-Sachs, or bony Bankart lesions  are seen. Posterior subluxation alignment of the humeral head relative  to glenoid.     Labrum: Limited assessment on this study with relative lack of joint  distention shows tearing of the superior and posterosuperior labrum.     ANCILLARY FINDINGS:                                                                      Impression:  1. Low-grade articular sided tear of the supraspinatus middle fibers  at the footprint with approximately 10 mm medial retraction of torn  fibers.   2. SLAP tear.  3. 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     BEST WALLACE     3 views bilateral shoulder radiographs 8/11/2022 3:49 PM     History: Right shoulder pain, unspecified chronicity; Chronic left  shoulder pain; Chronic left shoulder pain; Polyarthropathy      Comparison: None     Findings:     AP, Grashey, transscapular Y views of the shoulders were obtained.      Left: No acute osseous abnormality. Glenohumeral and acromioclavicular  joints are congruent.     Mild degenerative changes of the acromioclavicular joint. No  substantial degenerative change of the glenohumeral joint.     Soft tissue is unremarkable. The visualized lung is clear.           Right: No acute osseous  abnormality. Glenohumeral and  acromioclavicular joints are congruent.     Mild degenerative changes of the acromioclavicular joint. No  substantial degenerative change of the glenohumeral joint.     Soft tissue is unremarkable. The visualized lung is clear.                                                                      Impression:  1. No acute osseous abnormality.  2. No substantial degenerative change.     BRIDGETTE FRANCO MD (Joe)       Other testing (labs, diagnostics):  10/16/2023 in [facility]  Cr. 0.67  Est GFR >90      Screening tools:     DIRE Score for ongoing opioid management is calculated as follows:    Diagnosis = 2    Intractability = 2    Risk: Psych = 2  Chem Hlth = 1  Reliability = 2  Social = 2    Efficacy = 2    Total DIRE Score = 13 (14 or higher predicts good candidate for ongoing opioid management; 13 or lower predicts poor candidate for opioid management)       [place]  [place]ed [place]  2/4/2025- no concerning fills.  Sarah LIEBERMAN, RN CNP, FNP  [facility]  [place] location      Assessment:   Chronic neck pain  Cervical spondylosis without myelopathy  Pain of cervical facet joint  S/p cervical spinal fusion  Chronic bilateral low back pain without sciatica  Lumbar facet joint pain  Spondylosis of lumbar region without myelopathy or radiculopathy  Muscle spasm  Myofascial pain  Chronic intractable pain  Class 1 obesity due to excess calories with serious comorbidity and body mass index of 31.0-31.9    Multiple joint pain  Chronic pain of both knees  Chronic pain of both ankles  chronic right shoulder pain  Incomplete tear of right rotator cuff, unspecified whether traumatic  Status postrepair of anterior cruciate ligament  Chronic pain of the left knee  Pain in joint involving ankle and foot left  Status post spinal surgery  Anxiety  Persistent insomnia  Nausea   Chronic continuous use of opioids  Encounter for long-term use of opiate  analgesic  Previous alcohol dependence in  during her divorce  PMHx includes: overdose with Trazodone (2009 at age 28), acute alcoholic intoxication (2011, seen in ER), alcohol withdrawal (2011), alcohol dependence (), ASCUS favor benign (), degeneration of cervical disc, lumbago, Meniere's disease, left knee effusion  PSHx includes: low transverse  (2005), Cervical 4 to 6 Anterior Cervical Decompression and Fusion with Medtronic Plate and Screws, Interbody Device, Use of Fluoroscopy, Microscope (2022 by Wilbur Murray MD), graft bone from left iliac crest (2022), Right cervical 4-5 posterior foraminotomy (2022 by Wilbur Murray MD), arthroscopic reconstruction ACL Left knee with hamstring autograft (2023, by Mc Peterson MD), tonsillectomy, mastoidectomy, colonoscopy        Plan:   Physical Therapy: ordered  Ordered TENS unit, bring to a PHYSICAL THERAPY appt  You could check out Gustavo Kuhn, he can be found on [employer], Jeremy Dumont FB reels. He also has his own website   Continue gentle home exercises  Clinical Health Psychologist to address issues of relaxation, behavioral change, coping style, and other factors important to improvement: none  Diagnostic Studies: cervical x-rays today in clinic  Procedures:schedule cervical medial branch blocks to radiofrequency ablation as indicated. Our office will contact you  Medication Management:   TRIAL Voltaren gel (diclofenac sodium 1%) this is found over-the-counter and you may use 2 grams to the right shoulder and 4 grams to left knee up to 4 times daily.   Reduce Cymbalta to 40mg once daily by taking 2 of the 20mg capsules for a  month, then contact me and we can reduce further  START Topamax, slow increase to 50mg twice daily, let me know how you feel    STOP Flexeril  START metaxalone 400-800mg three times daily as needed for muscle spasms/pain  OK to use ibuprofen, don't use more than 12 tabs  per day (either 3 tabs 4 times daily or 4 tabs 3 times daily)  Recommendations/follow-up for PCP:  See above  Release of information: none  Follow up: 2 months in-person      ASSESSMENT AND PLAN:  (M54.2,  G89.29) Chronic neck pain  (primary encounter diagnosis)  Comment:   Plan: DULoxetine (CYMBALTA) 20 MG capsule, topiramate        (TOPAMAX) 25 MG tablet, metaxalone (SKELAXIN)         800 MG tablet, XR Cervical Spine G/E 4 Views,         PAIN INJECTION EVAL/TREAT/FOLLOW UP, Adult Pain        Clinic Follow-Up Order, Physical Therapy          Referral, Tens Unit/Supplies Order         for DME - ONLY FOR DME, CANCELED: XR Cervical         Spine G/E 4 Views, CANCELED: Physical Therapy          Referral            (M47.812) Cervical spondylosis without myelopathy  Comment:   Plan: DULoxetine (CYMBALTA) 20 MG capsule, topiramate        (TOPAMAX) 25 MG tablet, metaxalone (SKELAXIN)         800 MG tablet, XR Cervical Spine G/E 4 Views,         PAIN INJECTION EVAL/TREAT/FOLLOW UP, Adult Pain        Clinic Follow-Up Order, Physical Therapy          Referral, Tens Unit/Supplies Order         for DME - ONLY FOR DME, CANCELED: XR Cervical         Spine G/E 4 Views, CANCELED: Physical Therapy          Referral            (M54.2) Pain of cervical facet joint  Comment:   Plan: DULoxetine (CYMBALTA) 20 MG capsule, topiramate        (TOPAMAX) 25 MG tablet, metaxalone (SKELAXIN)         800 MG tablet, XR Cervical Spine G/E 4 Views,         PAIN INJECTION EVAL/TREAT/FOLLOW UP, Adult Pain        Clinic Follow-Up Order, Physical Therapy          Referral, Tens Unit/Supplies Order         for DME - ONLY FOR DME, CANCELED: XR Cervical         Spine G/E 4 Views, CANCELED: Physical Therapy          Referral            (Z98.1) S/P cervical spinal fusion  Comment:   Plan: DULoxetine (CYMBALTA) 20 MG capsule, topiramate        (TOPAMAX) 25 MG tablet, XR Cervical Spine G/E 4        Views,  PAIN INJECTION EVAL/TREAT/FOLLOW UP,         Adult Pain Clinic Follow-Up Order, Physical         Therapy  Referral, Tens Unit/Supplies         Order for DME - ONLY FOR DME, CANCELED: XR         Cervical Spine G/E 4 Views, CANCELED: Physical         Therapy  Referral            (M54.50,  G89.29) Chronic bilateral low back pain without sciatica  Comment:   Plan: DULoxetine (CYMBALTA) 20 MG capsule, topiramate        (TOPAMAX) 25 MG tablet, Adult Pain Clinic         Follow-Up Order            (M54.59) Lumbar facet joint pain  Comment:   Plan: DULoxetine (CYMBALTA) 20 MG capsule, topiramate        (TOPAMAX) 25 MG tablet, Adult Pain Clinic         Follow-Up Order, Tens Unit/Supplies Order for         DME - ONLY FOR DME            (M47.816) Spondylosis of lumbar region without myelopathy or radiculopathy  Comment:   Plan: DULoxetine (CYMBALTA) 20 MG capsule, topiramate        (TOPAMAX) 25 MG tablet, Adult Pain Clinic         Follow-Up Order, Tens Unit/Supplies Order for         DME - ONLY FOR DME            (M62.838) Muscle spasm  Comment:   Plan: metaxalone (SKELAXIN) 800 MG tablet, Adult Pain        Clinic Follow-Up Order, Tens Unit/Supplies         Order for DME - ONLY FOR DME            (M79.18) Myofascial pain  Comment:   Plan: metaxalone (SKELAXIN) 800 MG tablet, Adult Pain        Clinic Follow-Up Order, Tens Unit/Supplies         Order for DME - ONLY FOR DME            (G89.29) Chronic intractable pain  Comment:   Plan: Vitamin D Deficiency, Adult Comprehensive         Weight Management  Referral,         DULoxetine (CYMBALTA) 20 MG capsule, topiramate        (TOPAMAX) 25 MG tablet, Adult Pain Clinic         Follow-Up Order            (E66.811,  E66.09,  Z68.31) Class 1 obesity due to excess calories with serious comorbidity and body mass index (BMI) of 31.0 to 31.9 in adult  Comment:   Plan: Adult Comprehensive Weight Management         Referral, topiramate (TOPAMAX) 25 MG  tablet,         Adult Pain Clinic Follow-Up Order              BILLING TIME DOCUMENTATION:   TOTAL TIME includes:   Time spent preparing to see the patient: 2 minutes (reviewing records and tests)  Time spend face to face with the patient: 80 minutes  Time spent ordering tests, medications, procedures and referrals: 0 minutes  Time spent Referring and communicating with other healthcare professionals: 0 minutes  Documenting clinical information in Epic: 10 minutes    The total TIME spent on this patient on the day of the appointment was 92 minutes.                                  Sarah LIEBERMAN, RN CNP, FNP  [facility]  [facility] [place]

## 2025-02-04 ENCOUNTER — ANCILLARY PROCEDURE (OUTPATIENT)
Dept: GENERAL RADIOLOGY | Facility: CLINIC | Age: 45
End: 2025-02-04
Attending: NURSE PRACTITIONER
Payer: COMMERCIAL

## 2025-02-04 ENCOUNTER — OFFICE VISIT (OUTPATIENT)
Dept: PALLIATIVE MEDICINE | Facility: CLINIC | Age: 45
End: 2025-02-04
Payer: COMMERCIAL

## 2025-02-04 VITALS — DIASTOLIC BLOOD PRESSURE: 85 MMHG | SYSTOLIC BLOOD PRESSURE: 138 MMHG | HEART RATE: 82 BPM

## 2025-02-04 DIAGNOSIS — G89.29 CHRONIC NECK PAIN: ICD-10-CM

## 2025-02-04 DIAGNOSIS — M54.2 CHRONIC NECK PAIN: Primary | ICD-10-CM

## 2025-02-04 DIAGNOSIS — M47.812 CERVICAL SPONDYLOSIS WITHOUT MYELOPATHY: ICD-10-CM

## 2025-02-04 DIAGNOSIS — M62.838 MUSCLE SPASM: ICD-10-CM

## 2025-02-04 DIAGNOSIS — M54.2 PAIN OF CERVICAL FACET JOINT: ICD-10-CM

## 2025-02-04 DIAGNOSIS — Z98.1 S/P CERVICAL SPINAL FUSION: ICD-10-CM

## 2025-02-04 DIAGNOSIS — M47.816 SPONDYLOSIS OF LUMBAR REGION WITHOUT MYELOPATHY OR RADICULOPATHY: ICD-10-CM

## 2025-02-04 DIAGNOSIS — M54.2 CHRONIC NECK PAIN: ICD-10-CM

## 2025-02-04 DIAGNOSIS — E66.811 CLASS 1 OBESITY DUE TO EXCESS CALORIES WITH SERIOUS COMORBIDITY AND BODY MASS INDEX (BMI) OF 31.0 TO 31.9 IN ADULT: ICD-10-CM

## 2025-02-04 DIAGNOSIS — E66.09 CLASS 1 OBESITY DUE TO EXCESS CALORIES WITH SERIOUS COMORBIDITY AND BODY MASS INDEX (BMI) OF 31.0 TO 31.9 IN ADULT: ICD-10-CM

## 2025-02-04 DIAGNOSIS — M54.50 CHRONIC BILATERAL LOW BACK PAIN WITHOUT SCIATICA: ICD-10-CM

## 2025-02-04 DIAGNOSIS — M79.18 MYOFASCIAL PAIN: ICD-10-CM

## 2025-02-04 DIAGNOSIS — G89.29 CHRONIC INTRACTABLE PAIN: ICD-10-CM

## 2025-02-04 DIAGNOSIS — M54.59 LUMBAR FACET JOINT PAIN: ICD-10-CM

## 2025-02-04 DIAGNOSIS — G89.29 CHRONIC BILATERAL LOW BACK PAIN WITHOUT SCIATICA: ICD-10-CM

## 2025-02-04 DIAGNOSIS — G89.29 CHRONIC NECK PAIN: Primary | ICD-10-CM

## 2025-02-04 PROCEDURE — 72050 X-RAY EXAM NECK SPINE 4/5VWS: CPT | Mod: TC | Performed by: STUDENT IN AN ORGANIZED HEALTH CARE EDUCATION/TRAINING PROGRAM

## 2025-02-04 PROCEDURE — 99417 PROLNG OP E/M EACH 15 MIN: CPT | Performed by: NURSE PRACTITIONER

## 2025-02-04 PROCEDURE — 99215 OFFICE O/P EST HI 40 MIN: CPT | Performed by: NURSE PRACTITIONER

## 2025-02-04 PROCEDURE — G2211 COMPLEX E/M VISIT ADD ON: HCPCS | Performed by: NURSE PRACTITIONER

## 2025-02-04 RX ORDER — METAXALONE 800 MG/1
400-800 TABLET ORAL 3 TIMES DAILY PRN
Qty: 30 TABLET | Refills: 1 | Status: SHIPPED | OUTPATIENT
Start: 2025-02-04

## 2025-02-04 RX ORDER — TOPIRAMATE 25 MG/1
TABLET, FILM COATED ORAL
Qty: 120 TABLET | Refills: 0 | Status: SHIPPED | OUTPATIENT
Start: 2025-02-04

## 2025-02-04 RX ORDER — DULOXETIN HYDROCHLORIDE 20 MG/1
40 CAPSULE, DELAYED RELEASE ORAL DAILY
Qty: 60 CAPSULE | Refills: 1 | Status: SHIPPED | OUTPATIENT
Start: 2025-02-04

## 2025-02-04 ASSESSMENT — PAIN SCALES - GENERAL: PAINLEVEL_OUTOF10: MODERATE PAIN (6)

## 2025-02-04 NOTE — RESULT ENCOUNTER NOTE
Mild to moderate arthritic changes in the cervical spine. Patient with symptoms of cervical facet joint pain, have recommended cervical medial branch blocks to cervical RFA as indicated.     Sarah LIEBERMAN, RN CNP, FNP  [facility]  [facility] [place]

## 2025-02-04 NOTE — PATIENT INSTRUCTIONS
Plan:   Physical Therapy: ordered  Ordered TENS unit, bring to a PHYSICAL THERAPY appt  You could check out Gustavo Kuhn, he can be found on [employer], Jeremy Dumont FB reels. He also has his own website   Continue gentle home exercises  Clinical Health Psychologist to address issues of relaxation, behavioral change, coping style, and other factors important to improvement: none  Diagnostic Studies: cervical x-rays today in clinic  Procedures:schedule cervical medial branch blocks to radiofrequency ablation as indicated. Our office will contact you  Medication Management:   TRIAL Voltaren gel (diclofenac sodium 1%) this is found over-the-counter and you may use 2 grams to the right shoulder and 4 grams to left knee up to 4 times daily.   Reduce Cymbalta to 40mg once daily by taking 2 of the 20mg capsules for a  month, then contact me and we can reduce further  START Topamax, slow increase to 50mg twice daily, let me know how you feel    STOP Flexeril  START metaxalone 400-800mg three times daily as needed for muscle spasms/pain  OK to use ibuprofen, don't use more than 12 tabs per day (either 3 tabs 4 times daily or 4 tabs 3 times daily)  Recommendations/follow-up for PCP:  See above  Release of information: none  Follow up: 2 months in-person    ----------------------------------------------------------------  Clinic Number:  [phone]   Call with any questions about your care and for scheduling assistance.   Calls are returned Monday through Friday between 8 AM and 4:30 PM. We usually get back to you within 2 business days depending on the issue/request.    If we are prescribing your medications:  For opioid medication refills, call the clinic or send a [employer] message 7 days in advance.  Please include:  Name of requested medication  Name of the pharmacy.  For non-opioid medications, call your pharmacy directly to request a refill. Please allow 3-4 days to be processed.   Per [place] State Law:  All controlled  substance prescriptions must be filled within 30 days of being written.    For those controlled substances allowing refills, pickup must occur within 30 days of last fill.      We believe regular attendance is key to your success in our program!    Any time you are unable to keep your appointment we ask that you call us at least 24 hours in advance to cancel.This will allow us to offer the appointment time to another patient.   Multiple missed appointments may lead to dismissal from the clinic.

## 2025-02-21 DIAGNOSIS — M62.838 MUSCLE SPASM: Primary | ICD-10-CM

## 2025-02-21 DIAGNOSIS — M79.18 MYOFASCIAL PAIN: ICD-10-CM

## 2025-02-21 NOTE — TELEPHONE ENCOUNTER
Prior Authorization Retail Medication Request    Medication/Dose: metaxalone (SKELAXIN) 800 MG tablet   Diagnosis and ICD code   Chronic neck pain [M54.2, G89.29]  - Primary      Pain of cervical facet joint [M54.2]      Cervical spondylosis without myelopathy [M47.812]      Myofascial pain [M79.18]      Muscle spasm [M62.838]        New/renewal/insurance change PA/secondary ins. PA:  Previously Tried and Failed:    Rationale:

## 2025-02-25 NOTE — TELEPHONE ENCOUNTER
Retail Pharmacy Prior Authorization Team   Phone: [phone]    PA Initiation    Medication: METAXALONE 800 MG PO TABS  Insurance Company: [employer] - Phone [phone] Fax [phone]  Pharmacy Filling the Rx: [id number] - [place] - [address] [facility]  Filling Pharmacy Phone: [phone]  Filling Pharmacy Fax:    Start Date: 2/25/2025        Note: Due to record-high volumes, our turn-around time is taking longer than usual . We are currently 4  business days behind in the pools.   We are working diligently to submit all requests in a timely manner and in the order they are received. Please only flag TRUE URGENT requests as high priority to the pool at this time.   If you have questions on status of PA's,  please send a note/message in the active PA encounter and send back to the [facility] PA pool [[id number]].    If you have questions about the turn-around time or about our process, please reach out to our supervisor Arminda Barr.   Thank you!   RPPA (Retail Pharmacy Prior Authorization) team

## 2025-02-26 NOTE — TELEPHONE ENCOUNTER
Retail Pharmacy Prior Authorization Team   Phone: [phone]    PRIOR AUTHORIZATION DENIED    Medication: METAXALONE 800 MG PO TABS  Insurance Company: [employer] - Phone [phone] Fax [phone]  Denial Date: 2/26/2025  Denial Reason(s): MUST TRY AND FAIL THREE PREFERRED ALTERNATIVES, PT HAS TRIED TWO. MUST TRY ONE MORE: ORPHENADRINE ER, CHLORZOXAZONE      Appeal Information:

## 2025-02-27 RX ORDER — CHLORZOXAZONE 500 MG/1
500 TABLET ORAL 3 TIMES DAILY PRN
Qty: 45 TABLET | Refills: 0 | Status: SHIPPED | OUTPATIENT
Start: 2025-02-27

## 2025-02-27 NOTE — TELEPHONE ENCOUNTER
2/27/2025 12:26 PM     REFILL REQUEST:  I am signing this on behalf of my colleague MIO Hi, CNP who is out of the office. Please see their previous documentation regarding the appropriateness of these prescriptions. Chart reviewed - Request appears appropriate. PDMP reviewed for all controlled substance refills. If any concerns are found, it will be noted.     Trial of Metaxalone 400mg-800mg TID was denied by insurance. Patient will need to try other formulary option. Sending Rx for Chlorzoxazone 500mg.     Refilled for 15 day supply.  Script e-Prescribed to pharmacy    MIO Cotton, DNP, FNP-C

## 2025-02-27 NOTE — TELEPHONE ENCOUNTER
Medication: METAXALONE 800 MG PO TABS PRIOR AUTHORIZATION DENIED    Formulary options are:    chlorzoxazone 500 MG tablets (Pended for provider review)  Orphenadrine ER tablets    She uses:     [id number] - [place], [place] - [address] [address]  [address] [address]  Phone: [phone] Fax: [phone]    Routing to provider to review options above.

## 2025-03-18 DIAGNOSIS — F41.1 GENERALIZED ANXIETY DISORDER: ICD-10-CM

## 2025-03-20 RX ORDER — DULOXETIN HYDROCHLORIDE 60 MG/1
60 CAPSULE, DELAYED RELEASE ORAL DAILY
Qty: 90 CAPSULE | Refills: 3 | Status: SHIPPED | OUTPATIENT
Start: 2025-03-20

## 2025-03-31 ENCOUNTER — LAB (OUTPATIENT)
Dept: LAB | Facility: CLINIC | Age: 45
End: 2025-03-31
Payer: COMMERCIAL

## 2025-03-31 DIAGNOSIS — F41.1 GENERALIZED ANXIETY DISORDER: ICD-10-CM

## 2025-03-31 DIAGNOSIS — G89.29 CHRONIC INTRACTABLE PAIN: ICD-10-CM

## 2025-03-31 DIAGNOSIS — F10.11 HISTORY OF ALCOHOL ABUSE: ICD-10-CM

## 2025-03-31 DIAGNOSIS — R63.5 WEIGHT GAIN: ICD-10-CM

## 2025-03-31 LAB
EST. AVERAGE GLUCOSE BLD GHB EST-MCNC: 111 MG/DL
HBA1C MFR BLD: 5.5 % (ref 0–5.6)

## 2025-03-31 PROCEDURE — 83036 HEMOGLOBIN GLYCOSYLATED A1C: CPT

## 2025-03-31 PROCEDURE — 80053 COMPREHEN METABOLIC PANEL: CPT

## 2025-03-31 PROCEDURE — 84443 ASSAY THYROID STIM HORMONE: CPT

## 2025-03-31 PROCEDURE — 36415 COLL VENOUS BLD VENIPUNCTURE: CPT

## 2025-03-31 PROCEDURE — 82306 VITAMIN D 25 HYDROXY: CPT

## 2025-04-01 LAB
ALBUMIN SERPL BCG-MCNC: 4.5 G/DL (ref 3.5–5.2)
ALP SERPL-CCNC: 87 U/L (ref 40–150)
ALT SERPL W P-5'-P-CCNC: 13 U/L (ref 0–50)
ANION GAP SERPL CALCULATED.3IONS-SCNC: 11 MMOL/L (ref 7–15)
AST SERPL W P-5'-P-CCNC: 18 U/L (ref 0–45)
BILIRUB SERPL-MCNC: 0.3 MG/DL
BUN SERPL-MCNC: 11.7 MG/DL (ref 6–20)
CALCIUM SERPL-MCNC: 9.8 MG/DL (ref 8.8–10.4)
CHLORIDE SERPL-SCNC: 106 MMOL/L (ref 98–107)
CREAT SERPL-MCNC: 0.66 MG/DL (ref 0.51–0.95)
EGFRCR SERPLBLD CKD-EPI 2021: >90 ML/MIN/1.73M2
GLUCOSE SERPL-MCNC: 90 MG/DL (ref 70–99)
HCO3 SERPL-SCNC: 24 MMOL/L (ref 22–29)
POTASSIUM SERPL-SCNC: 4.2 MMOL/L (ref 3.4–5.3)
PROT SERPL-MCNC: 6.9 G/DL (ref 6.4–8.3)
SODIUM SERPL-SCNC: 141 MMOL/L (ref 135–145)
TSH SERPL DL<=0.005 MIU/L-ACNC: 1.59 UIU/ML (ref 0.3–4.2)
VIT D+METAB SERPL-MCNC: 29 NG/ML (ref 20–50)

## 2025-04-01 NOTE — RESULT ENCOUNTER NOTE
Christine,       Your recent test results are attached. Thyroid hormone normal.  Sodium, potassium, kidney and liver function normal.  Glucose (blood sugar) normal. A1c is normal no diabetes.           Sincerely,  Angi Marroquin PA-C

## 2025-04-02 ENCOUNTER — MYC MEDICAL ADVICE (OUTPATIENT)
Dept: PALLIATIVE MEDICINE | Facility: CLINIC | Age: 45
End: 2025-04-02
Payer: COMMERCIAL

## 2025-04-02 DIAGNOSIS — Z98.1 S/P CERVICAL SPINAL FUSION: ICD-10-CM

## 2025-04-02 DIAGNOSIS — E66.09 CLASS 1 OBESITY DUE TO EXCESS CALORIES WITH SERIOUS COMORBIDITY AND BODY MASS INDEX (BMI) OF 31.0 TO 31.9 IN ADULT: ICD-10-CM

## 2025-04-02 DIAGNOSIS — M54.50 CHRONIC BILATERAL LOW BACK PAIN WITHOUT SCIATICA: ICD-10-CM

## 2025-04-02 DIAGNOSIS — G89.29 CHRONIC NECK PAIN: ICD-10-CM

## 2025-04-02 DIAGNOSIS — M54.2 CHRONIC NECK PAIN: ICD-10-CM

## 2025-04-02 DIAGNOSIS — M54.59 LUMBAR FACET JOINT PAIN: ICD-10-CM

## 2025-04-02 DIAGNOSIS — M47.812 CERVICAL SPONDYLOSIS WITHOUT MYELOPATHY: ICD-10-CM

## 2025-04-02 DIAGNOSIS — E66.811 CLASS 1 OBESITY DUE TO EXCESS CALORIES WITH SERIOUS COMORBIDITY AND BODY MASS INDEX (BMI) OF 31.0 TO 31.9 IN ADULT: ICD-10-CM

## 2025-04-02 DIAGNOSIS — M54.2 PAIN OF CERVICAL FACET JOINT: ICD-10-CM

## 2025-04-02 DIAGNOSIS — G89.29 CHRONIC BILATERAL LOW BACK PAIN WITHOUT SCIATICA: ICD-10-CM

## 2025-04-02 DIAGNOSIS — M47.816 SPONDYLOSIS OF LUMBAR REGION WITHOUT MYELOPATHY OR RADICULOPATHY: ICD-10-CM

## 2025-04-02 DIAGNOSIS — G89.29 CHRONIC INTRACTABLE PAIN: ICD-10-CM

## 2025-04-02 RX ORDER — TOPIRAMATE 50 MG/1
50 TABLET, FILM COATED ORAL 2 TIMES DAILY
Qty: 180 TABLET | Refills: 0 | Status: SHIPPED | OUTPATIENT
Start: 2025-04-02

## 2025-04-02 NOTE — TELEPHONE ENCOUNTER
Received fax request from [facility] pharmacy requesting refill(s) for topiramate (TOPAMAX) 25 MG tablet     Last refilled on 02/21/25    Pt last seen on 02/04/25  Next appt scheduled for none    Will facilitate refill.    Titration questions sent to patient via [email or web address]. Please prep quantity for refill according to patient response:      Name of medication and strength topiramate (TOPAMAX) 25 MG tablet     When did you start taking?02/21/25    How many times are you taking per day?...    How many mg s of the medication are you taking at each time?....    Do you feel the medication is helping?....    Are you having any side effects?....    If so, what are those side effects?...

## 2025-04-02 NOTE — TELEPHONE ENCOUNTER
Signed Prescriptions:                        Disp   Refills    topiramate (TOPAMAX) 50 MG tablet          180 ta*0        Sig: Take 1 tablet (50 mg) by mouth 2 times daily. 90 day           supply.  Authorizing Provider: KARLENE CASTILLO, RN CNP, FNP  [facility]  [facility] [place]

## 2025-04-02 NOTE — TELEPHONE ENCOUNTER
She is taking 50 MG twice daily. She is noticing some pain relief and is having no side effects. I did pend a 50 MG Topamax tablet for 1 tab bid and sent this information to her in a MyChart.     topiramate (TOPAMAX) 50 MG tablet pended for review.     DANIEL AllenN, RN  Care Coordinator  [facility] [place]

## 2025-04-11 ENCOUNTER — OFFICE VISIT (OUTPATIENT)
Dept: ORTHOPEDICS | Facility: CLINIC | Age: 45
End: 2025-04-11
Payer: COMMERCIAL

## 2025-04-11 ENCOUNTER — ANCILLARY PROCEDURE (OUTPATIENT)
Dept: GENERAL RADIOLOGY | Facility: CLINIC | Age: 45
End: 2025-04-11
Attending: STUDENT IN AN ORGANIZED HEALTH CARE EDUCATION/TRAINING PROGRAM
Payer: COMMERCIAL

## 2025-04-11 DIAGNOSIS — S89.90XA INJURY OF MEDIAL COLLATERAL LIGAMENT (MCL) OF KNEE: Primary | ICD-10-CM

## 2025-04-11 DIAGNOSIS — S89.92XA LEFT KNEE INJURY: ICD-10-CM

## 2025-04-11 PROCEDURE — 99213 OFFICE O/P EST LOW 20 MIN: CPT | Performed by: STUDENT IN AN ORGANIZED HEALTH CARE EDUCATION/TRAINING PROGRAM

## 2025-04-11 NOTE — LETTER
"2025      Christine Hu  [address]  [place] [address]      Dear Colleague,    Thank you for referring your patient, Christine Hu, to the [facility] [place]. Please see a copy of my visit note below.    Christine Hu  :  1980  DOS: 2025  MRN: [id number]  PCP: No Ref-Primary, Physician    Sports Medicine Clinic Visit      HPI  Christine Hu is a 44 year old female who is seen as a self referral presenting with left knee pain    - Mechanism of Injury:    -  2 days ago, was doing a walk exercise video at home and symptoms started that night . No acute injury moment  - Pertinent history and prior evaluations:     -2024 bilateral knee xray shows postoperative changes ligamentous reconstruction left knee. Both knees negative for fracture or compartmental narrowing.  Tiny left knee joint effusion.   - Saw Dr. Grant on 24 and discussed generalized pain with starting a new job. Got an injection that day.   - 2023 ACL reconstruction with Dr. Peterson    - Pain Character:    - Location:  left medial knee  - Character:  aching, stabbing with certain movements  - Duration:  2 days  - Course:  lingering  - Endorses:    - Severe pain in the medial knee with certain activities/movements, unsure which movements bring on pain.  Feeling of instability/laxity that makes her concern for a rerupture of her ACL.  Medial knee swelling.  - Denies:    - Mechanical locking symptoms, falls, numbness, tingling, radicular shooting pain, neurogenic weakness.  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       Review of Systems  Musculoskeletal: as above  Remainder of review of systems is negative including constitutional, CV, pulmonary, GI, Skin and Neurologic except as noted in HPI or medical history.    Past Medical History:   Diagnosis Date     Acute alcoholic intoxication 2011    Seen in emergency room 2020 after 14 months sobriety     Alcohol dependence (H) 2012     Alcohol withdrawal (H) 2011     Alcohol withdrawal (H) 10/19/2023    hospitalized 3 days at [facility]     ASCUS favor benign 2015    Neg HPV     Degeneration of cervical intervertebral disc      Knee effusion, left      Lumbago      Ménière's disease      Overdose 2009    TRAZADONE     Past Surgical History:   Procedure Laterality Date     ARTHROSCOPIC RECONSTRUCTION ANTERIOR CRUCIATE LIGAMENT HAMSTRING AUTOGRAFT Left 2023    Procedure: left knee examination under anesthesia, knee arthroscopy, anterior cruciate ligament reconstruction hamstring autograft;  Surgeon: Mc Peterson MD;  Location: [facility] OR     C/SECTION, LOW TRANSVERSE  2005    , Low Transverse     COLONOSCOPY      5 yrs     DECOMPRESSION, FUSION CERVICAL ANTERIOR TWO LEVELS, COMBINED N/A 2022    Procedure: Cervical 4 to 6 Anterior Cervical Decompression and Fusion with Medtronic Plate and Screws, Interbody Device, Use of Fluoroscopy, Microscope;  Surgeon: Wilbur Murray MD;  [facility]     GRAFT BONE FROM ILIAC CREST Left 2022    Procedure: Left Sided Iliac Crest Autograft;  Surgeon: Wilbur Murray MD;  Location: [facility] OR     LAMINECTOMY CERIVCAL POSTERIOR THREE+ LEVELS N/A 2022    Procedure: Right cervical 4-5 posterior foraminotomy;  Surgeon: Wilbur Murray MD;  [facility]: [facility] OR     MASTOIDECTOMY       TONSILLECTOMY       Family History   Problem Relation Age of Onset      Allergies Mother      Psychotic Disorder Father         depression     Heart Failure Father         heart attack in NOV. 2015?     Allergies Sister      Allergies Sister      Psychotic Disorder Sister         anxiety     Psychotic Disorder Sister         depression     Psychotic Disorder Maternal Uncle         anxiety     Asthma Other      C.A.D. No family hx of      Diabetes No family hx of      Hypertension No family hx of      Cerebrovascular Disease No family hx of      Breast Cancer No family hx of      Cancer - colorectal No family hx of      Prostate Cancer No family hx of      Anesthesia Reaction No family hx of          Objective  There were no vitals taken for this visit.    General: healthy, alert and in no acute distress.    HEENT: no scleral icterus or conjunctival erythema.   Skin: no suspicious lesions or rash. No jaundice.   CV: regular rhythm by palpation, 2+ distal pulses.  Resp: normal respiratory effort without conversational dyspnea.   Psych: normal mood and affect.    Gait: antalgic, appropriate coordination and balance.     Neuro:        - Sensation to light touch:    - Intact throughout the BLE including all peripheral nerve distributions.     MSK - Knee:       - Inspection:    - Mild effusion present without surrounding erythema, ecchymosis, lesion.       - ROM:    - Limited in knee flexion by swelling and medial knee pain.        - Palpation:    - TTP at the medial joint line, MCL.   - NTTP elsewhere.        - Strength:  (*antalgic)   - Knee Flexion  5-*  - Knee Extension 5-*         - Special tests:        - Lachman:  Neg        - A/P drawer:  Neg        - Pivot shift:  Neg    - Mariama: Equivocal due to guarding and pain     - Varus stress:  Neg for laxity or pain     - Valgus stress: Positive for pain and laxity   - Patellar grind:  Neg     Radiology  I independently reviewed the available relevant imaging in the chart with my interpretations as above in HPI.     I independently  reviewed today's new relevant imaging, with the following interpretation:  - XR L knee 4/11/2025 shows intact postoperative hardware from ACL reconstruction.  There is a mild knee joint effusion, no fracture or dislocation, no major degenerative changes.      Assessment  1. Injury of medial collateral ligament (MCL) of knee        Plan  Christine Hu is a pleasant 44 year old female that presents with acute left knee pain.  She describes pain in the medial knee that has felt severe for the past 2 days.  No specific inciting injury but she does note the pain came on after increased activity including a walking exercise video earlier that day.  Pain is well localized to the medial knee and hurts worse with particular motions/activities.  She has had some swelling in the knee and feelings of instability as well.  She has a significant history for a previously reconstructed ACL in this knee and is concerned for possible rerupture of the ligament.    On exam, she is tender over the MCL and medial joint line.  She has reproduction of her pain with valgus stress and mild increased laxity with MCL testing.  She has an equivocal Mariama's due to pain and guarding.  Negative Lachman and AP drawer testing today.    Based on her history, imaging, and physical exam, I do not believe that she has sustained a rerupture of her ACL, but rather is dealing with a sprain of her MCL.  We discussed the diagnosis in detail and how it correlates with her symptoms.     We discussed the nature of the condition and available treatment options, and mutually agreed upon the following plan:    - Imaging:          - Reviewed and independently interpreted the relevant imaging in the chart, including any imaging ordered for today's clinic.  - Reviewed results and images with patient.   - MRI may be considered for persistent pain and instability.  - Medications:          - Discussed pharmacologic options for pain relief.   - May use NSAIDs  (Ibuprofen, Naproxen) or Acetaminophen (Tylenol) as needed for pain control.   - Do not take these if previously advised to avoid them for other medical conditions.  - May also use topical medications such as lidocaine, IcyHot, BioFreeze, or Voltaren gel as needed for pain control.    - Voltaren gel is an anti-inflammatory cream that may be used up to 4 times per day over the painful area.   - Injections:          - Discussed possible injection options and alternatives.    - Injection options include: May consider an intra-articular steroid injection in the future for persistent symptoms.    - Deferred injections today and will consider them in the future as needed.   - Therapy:          - Discussed the benefits of therapy vs home exercise program for optimization of range of motion, flexibility, strength, stability and function.   - Preference is for a home exercise program.  Will focus on RICE protocol and rest for now.  - Home Exercise Program given today in clinic and recommendation given to perform HEP daily and after exacerbations.  - Modalities:          - May use ice, heat, massage or other modalities as needed.   - Bracing:          - Discussed bracing options and recommend using her current hinged knee stability brace for comfort and stability with ambulation until next follow-up..    - Surgery:          - Discussed non-operative and operative treatment options for the patient's condition.  No immediate indication for surgery, will monitor her recovery and consider referral in the future if needed.  - Activity:     - Encouraged to rest and protect the injured area from further injury.  Avoid exacerbating activities and activities that are high-risk for falls.  She will wear her brace for any ambulation.  - Follow up:          - In 2 to 3 weeks if needed for re-evaluation and update to treatment plan.  - May follow up sooner for new/worsening symptoms.  - May contact clinic by phone or MyChart for questions  or concerns.       Mikie Rodriguez DO, CAQSM  [facility] - Department of Orthopedic Surgery       Disclaimer:  This note was prepared and written using Dragon Medical dictation software. As a result, there may be errors in the script that have gone undetected. Please consider this when interpreting the information in this note.        Again, thank you for allowing me to participate in the care of your patient.        Sincerely,        Mikie Rodriguez DO    Electronically signed

## 2025-04-11 NOTE — PROGRESS NOTES
"Christine Hu  :  1980  DOS: 2025  MRN: [id number]  PCP: No Ref-Primary, Physician    Sports Medicine Clinic Visit      HPI  Christine Hu is a 44 year old female who is seen as a self referral presenting with left knee pain    - Mechanism of Injury:    -  2 days ago, was doing a walk exercise video at home and symptoms started that night . No acute injury moment  - Pertinent history and prior evaluations:     -2024 bilateral knee xray shows postoperative changes ligamentous reconstruction left knee. Both knees negative for fracture or compartmental narrowing.  Tiny left knee joint effusion.   - Saw Dr. Grant on 24 and discussed generalized pain with starting a new job. Got an injection that day.   - 2023 ACL reconstruction with Dr. Peterson    - Pain Character:    - Location:  left medial knee  - Character:  aching, stabbing with certain movements  - Duration:  2 days  - Course:  lingering  - Endorses:    - Severe pain in the medial knee with certain activities/movements, unsure which movements bring on pain.  Feeling of instability/laxity that makes her concern for a rerupture of her ACL.  Medial knee swelling.  - Denies:    - Mechanical locking symptoms, falls, numbness, tingling, radicular shooting pain, neurogenic weakness.  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       Review of Systems  Musculoskeletal: as above  Remainder of review of systems is negative including constitutional, CV, pulmonary, GI, Skin and Neurologic except as noted in HPI or " medical history.    Past Medical History:   Diagnosis Date    Acute alcoholic intoxication 2011    Seen in emergency room 2020 after 14 months sobriety    Alcohol dependence (H) 2012    Alcohol withdrawal (H) 2011    Alcohol withdrawal (H) 10/19/2023    hospitalized 3 days at [facility]    ASCUS favor benign 2015    Neg HPV    Degeneration of cervical intervertebral disc     Knee effusion, left     Lumbago     Ménière's disease     Overdose 2009    TRAZADONE     Past Surgical History:   Procedure Laterality Date    ARTHROSCOPIC RECONSTRUCTION ANTERIOR CRUCIATE LIGAMENT HAMSTRING AUTOGRAFT Left 2023    Procedure: left knee examination under anesthesia, knee arthroscopy, anterior cruciate ligament reconstruction hamstring autograft;  Surgeon: Mc Peterson MD;  Location: [facility] OR    C/SECTION, LOW TRANSVERSE  2005    , Low Transverse    COLONOSCOPY      5 yrs    DECOMPRESSION, FUSION CERVICAL ANTERIOR TWO LEVELS, COMBINED N/A 2022    Procedure: Cervical 4 to 6 Anterior Cervical Decompression and Fusion with Medtronic Plate and Screws, Interbody Device, Use of Fluoroscopy, Microscope;  Surgeon: Wilbur Murray MD;  [facility]    GRAFT BONE FROM ILIAC CREST Left 2022    Procedure: Left Sided Iliac Crest Autograft;  Surgeon: Wilbur Murray MD;  [facility]: [facility] OR    LAMINECTOMY CERIVCAL POSTERIOR THREE+ LEVELS N/A 2022    Procedure: Right cervical 4-5 posterior foraminotomy;  Surgeon: Wilbur Murray MD;  [facility]: [facility] OR    MASTOIDECTOMY      TONSILLECTOMY       Family History   Problem Relation Age of Onset    Allergies Mother     Psychotic Disorder Father         depression    Heart Failure Father         heart attack in 2015?    Allergies Sister     Allergies Sister     Psychotic Disorder Sister         anxiety    Psychotic Disorder Sister         depression    Psychotic Disorder  Maternal Uncle         anxiety    Asthma Other     C.A.D. No family hx of     Diabetes No family hx of     Hypertension No family hx of     Cerebrovascular Disease No family hx of     Breast Cancer No family hx of     Cancer - colorectal No family hx of     Prostate Cancer No family hx of     Anesthesia Reaction No family hx of          Objective  There were no vitals taken for this visit.    General: healthy, alert and in no acute distress.    HEENT: no scleral icterus or conjunctival erythema.   Skin: no suspicious lesions or rash. No jaundice.   CV: regular rhythm by palpation, 2+ distal pulses.  Resp: normal respiratory effort without conversational dyspnea.   Psych: normal mood and affect.    Gait: antalgic, appropriate coordination and balance.     Neuro:        - Sensation to light touch:    - Intact throughout the BLE including all peripheral nerve distributions.     MSK - Knee:       - Inspection:    - Mild effusion present without surrounding erythema, ecchymosis, lesion.       - ROM:    - Limited in knee flexion by swelling and medial knee pain.        - Palpation:    - TTP at the medial joint line, MCL.   - NTTP elsewhere.        - Strength:  (*antalgic)   - Knee Flexion  5-*  - Knee Extension 5-*         - Special tests:        - Lachman:  Neg        - A/P drawer:  Neg        - Pivot shift:  Neg    - Mariama: Equivocal due to guarding and pain     - Varus stress:  Neg for laxity or pain     - Valgus stress: Positive for pain and laxity   - Patellar grind:  Neg     Radiology  I independently reviewed the available relevant imaging in the chart with my interpretations as above in HPI.     I independently reviewed today's new relevant imaging, with the following interpretation:  - XR L knee 4/11/2025 shows intact postoperative hardware from ACL reconstruction.  There is a mild knee joint effusion, no fracture or dislocation, no major degenerative changes.      Assessment  1. Injury of medial collateral  ligament (MCL) of knee        Plan  Christine Hu is a pleasant 44 year old female that presents with acute left knee pain.  She describes pain in the medial knee that has felt severe for the past 2 days.  No specific inciting injury but she does note the pain came on after increased activity including a walking exercise video earlier that day.  Pain is well localized to the medial knee and hurts worse with particular motions/activities.  She has had some swelling in the knee and feelings of instability as well.  She has a significant history for a previously reconstructed ACL in this knee and is concerned for possible rerupture of the ligament.    On exam, she is tender over the MCL and medial joint line.  She has reproduction of her pain with valgus stress and mild increased laxity with MCL testing.  She has an equivocal Mariama's due to pain and guarding.  Negative Lachman and AP drawer testing today.    Based on her history, imaging, and physical exam, I do not believe that she has sustained a rerupture of her ACL, but rather is dealing with a sprain of her MCL.  We discussed the diagnosis in detail and how it correlates with her symptoms.     We discussed the nature of the condition and available treatment options, and mutually agreed upon the following plan:    - Imaging:          - Reviewed and independently interpreted the relevant imaging in the chart, including any imaging ordered for today's clinic.  - Reviewed results and images with patient.   - MRI may be considered for persistent pain and instability.  - Medications:          - Discussed pharmacologic options for pain relief.   - May use NSAIDs (Ibuprofen, Naproxen) or Acetaminophen (Tylenol) as needed for pain control.   - Do not take these if previously advised to avoid them for other medical conditions.  - May also use topical medications such as lidocaine, IcyHot, BioFreeze, or Voltaren gel as needed for pain control.    - Voltaren gel  is an anti-inflammatory cream that may be used up to 4 times per day over the painful area.   - Injections:          - Discussed possible injection options and alternatives.    - Injection options include: May consider an intra-articular steroid injection in the future for persistent symptoms.    - Deferred injections today and will consider them in the future as needed.   - Therapy:          - Discussed the benefits of therapy vs home exercise program for optimization of range of motion, flexibility, strength, stability and function.   - Preference is for a home exercise program.  Will focus on RICE protocol and rest for now.  - Home Exercise Program given today in clinic and recommendation given to perform HEP daily and after exacerbations.  - Modalities:          - May use ice, heat, massage or other modalities as needed.   - Bracing:          - Discussed bracing options and recommend using her current hinged knee stability brace for comfort and stability with ambulation until next follow-up..    - Surgery:          - Discussed non-operative and operative treatment options for the patient's condition.  No immediate indication for surgery, will monitor her recovery and consider referral in the future if needed.  - Activity:     - Encouraged to rest and protect the injured area from further injury.  Avoid exacerbating activities and activities that are high-risk for falls.  She will wear her brace for any ambulation.  - Follow up:          - In 2 to 3 weeks if needed for re-evaluation and update to treatment plan.  - May follow up sooner for new/worsening symptoms.  - May contact clinic by phone or MyChart for questions or concerns.       Mikie Rodriguez DO, YASMEENM  [facility] - Sports Medicine  [facility] - Department of Orthopedic Surgery       Disclaimer:  This note was prepared and written using Dragon Medical dictation software. As a result, there may be errors in the script that  have gone undetected. Please consider this when interpreting the information in this note.

## 2025-04-23 ENCOUNTER — APPOINTMENT (OUTPATIENT)
Dept: URBAN - METROPOLITAN AREA CLINIC 252 | Age: 45
Setting detail: DERMATOLOGY
End: 2025-04-28

## 2025-04-23 VITALS — HEIGHT: 66 IN | WEIGHT: 190 LBS | RESPIRATION RATE: 18 BRPM

## 2025-04-23 DIAGNOSIS — L91.8 OTHER HYPERTROPHIC DISORDERS OF THE SKIN: ICD-10-CM

## 2025-04-23 DIAGNOSIS — L57.0 ACTINIC KERATOSIS: ICD-10-CM

## 2025-04-23 DIAGNOSIS — D49.2 NEOPLASM OF UNSPECIFIED BEHAVIOR OF BONE, SOFT TISSUE, AND SKIN: ICD-10-CM

## 2025-04-23 PROCEDURE — OTHER BIOPSY BY SHAVE METHOD: OTHER

## 2025-04-23 PROCEDURE — OTHER LIQUID NITROGEN: OTHER

## 2025-04-23 PROCEDURE — OTHER COUNSELING: OTHER

## 2025-04-23 PROCEDURE — 99212 OFFICE O/P EST SF 10 MIN: CPT | Mod: 25

## 2025-04-23 PROCEDURE — 17000 DESTRUCT PREMALG LESION: CPT | Mod: 59

## 2025-04-23 PROCEDURE — 17003 DESTRUCT PREMALG LES 2-14: CPT

## 2025-04-23 PROCEDURE — 11102 TANGNTL BX SKIN SINGLE LES: CPT

## 2025-04-23 ASSESSMENT — LOCATION ZONE DERM
LOCATION ZONE: EYELID
LOCATION ZONE: FACE
LOCATION ZONE: SCALP

## 2025-04-23 ASSESSMENT — LOCATION DETAILED DESCRIPTION DERM
LOCATION DETAILED: LEFT MEDIAL FOREHEAD
LOCATION DETAILED: LEFT MEDIAL SUPERIOR EYELID
LOCATION DETAILED: RIGHT CENTRAL FRONTAL SCALP
LOCATION DETAILED: RIGHT MEDIAL FRONTAL SCALP

## 2025-04-23 ASSESSMENT — LOCATION SIMPLE DESCRIPTION DERM
LOCATION SIMPLE: RIGHT SCALP
LOCATION SIMPLE: LEFT SUPERIOR EYELID
LOCATION SIMPLE: LEFT FOREHEAD

## 2025-04-23 NOTE — HPI: SKIN LESION
What Type Of Note Output Would You Prefer (Optional)?: Bullet Format
How Severe Is Your Skin Lesion?: moderate
Has Your Skin Lesion Been Treated?: been treated
Is This A New Presentation, Or A Follow-Up?: Skin Lesions
Additional History: History of LN2

## 2025-04-23 NOTE — HPI: DISCOLORATION (HYPERPIGMENTATION)
Is This A New Presentation, Or A Follow-Up?: Discoloration
Additional History: Recent weight gain this year d/t increased stress and left knee injury

## 2025-04-23 NOTE — PROCEDURE: BIOPSY BY SHAVE METHOD
Detail Level: Detailed
Depth Of Biopsy: dermis
Was A Bandage Applied: Yes
Size Of Lesion In Cm: 0
Biopsy Type: H and E
Biopsy Method: Dermablade
Anesthesia Type: 1% lidocaine with epinephrine
Anesthesia Volume In Cc: 0.5
Hemostasis: Drysol
Wound Care: Petrolatum
Dressing: bandage
Destruction After The Procedure: No
Type Of Destruction Used: Curettage
Curettage Text: The wound bed was treated with curettage after the biopsy was performed.
Cryotherapy Text: The wound bed was treated with cryotherapy after the biopsy was performed.
Electrodesiccation Text: The wound bed was treated with electrodesiccation after the biopsy was performed.
Electrodesiccation And Curettage Text: The wound bed was treated with electrodesiccation and curettage after the biopsy was performed.
Silver Nitrate Text: The wound bed was treated with silver nitrate after the biopsy was performed.
Lab: [id number]
Medical Necessity Information: It is in your best interest to select a reason for this procedure from the list below. All of these items fulfill various CMS LCD requirements except the new and changing color options.
Consent: Written consent was obtained and risks were reviewed including but not limited to scarring, infection, bleeding, scabbing, incomplete removal, nerve damage and allergy to anesthesia.
Post-Care Instructions: I reviewed with the patient in detail post-care instructions. Patient is to keep the biopsy site dry overnight, and then apply bacitracin twice daily until healed. Patient may apply hydrogen peroxide soaks to remove any crusting.
Notification Instructions: Patient will be notified of biopsy results. However, patient instructed to call the office if not contacted within 2 weeks.
Billing Type: Third-Party Bill
Information: Selecting Yes will display possible errors in your note based on the variables you have selected. This validation is only offered as a suggestion for you. PLEASE NOTE THAT THE VALIDATION TEXT WILL BE REMOVED WHEN YOU FINALIZE YOUR NOTE. IF YOU WANT TO FAX A PRELIMINARY NOTE YOU WILL NEED TO TOGGLE THIS TO 'NO' IF YOU DO NOT WANT IT IN YOUR FAXED NOTE.

## 2025-04-23 NOTE — PROCEDURE: COUNSELING
Detail Level: Detailed
Patient Specific Counseling (Will Not Stick From Patient To Patient): Discussed cosmetic removal options as well as pricing.

## 2025-04-30 ENCOUNTER — APPOINTMENT (OUTPATIENT)
Dept: URBAN - METROPOLITAN AREA CLINIC 252 | Age: 45
Setting detail: DERMATOLOGY
End: 2025-04-30

## 2025-04-30 PROBLEM — D48.5 NEOPLASM OF UNCERTAIN BEHAVIOR OF SKIN: Status: ACTIVE | Noted: 2025-04-30

## 2025-04-30 PROCEDURE — 11102 TANGNTL BX SKIN SINGLE LES: CPT | Mod: 79

## 2025-04-30 PROCEDURE — OTHER ADDITIONAL NOTES: OTHER

## 2025-04-30 PROCEDURE — OTHER COUNSELING: OTHER

## 2025-04-30 PROCEDURE — OTHER BIOPSY BY SHAVE METHOD: OTHER

## 2025-04-30 NOTE — PROCEDURE: BIOPSY BY SHAVE METHOD
Detail Level: Detailed
Depth Of Biopsy: dermis
Was A Bandage Applied: Yes
Size Of Lesion In Cm: 0
Biopsy Type: H and E
Biopsy Method: Dermablade
Anesthesia Type: 1% lidocaine with epinephrine
Anesthesia Volume In Cc: 0.5
Hemostasis: Drysol
Wound Care: Petrolatum
Dressing: bandage
Destruction After The Procedure: No
Type Of Destruction Used: Curettage
Curettage Text: The wound bed was treated with curettage after the biopsy was performed.
Cryotherapy Text: The wound bed was treated with cryotherapy after the biopsy was performed.
Electrodesiccation Text: The wound bed was treated with electrodesiccation after the biopsy was performed.
Electrodesiccation And Curettage Text: The wound bed was treated with electrodesiccation and curettage after the biopsy was performed.
Silver Nitrate Text: The wound bed was treated with silver nitrate after the biopsy was performed.
Lab: -[id number]
Medical Necessity Information: It is in your best interest to select a reason for this procedure from the list below. All of these items fulfill various CMS LCD requirements except the new and changing color options.
Consent: Written consent was obtained and risks were reviewed including but not limited to scarring, infection, bleeding, scabbing, incomplete removal, nerve damage and allergy to anesthesia.
Post-Care Instructions: I reviewed with the patient in detail post-care instructions. Patient is to keep the biopsy site dry overnight, and then apply bacitracin twice daily until healed. Patient may apply hydrogen peroxide soaks to remove any crusting.
Notification Instructions: Patient will be notified of biopsy results. However, patient instructed to call the office if not contacted within 2 weeks.
Billing Type: Third-Party Bill
Information: Selecting Yes will display possible errors in your note based on the variables you have selected. This validation is only offered as a suggestion for you. PLEASE NOTE THAT THE VALIDATION TEXT WILL BE REMOVED WHEN YOU FINALIZE YOUR NOTE. IF YOU WANT TO FAX A PRELIMINARY NOTE YOU WILL NEED TO TOGGLE THIS TO 'NO' IF YOU DO NOT WANT IT IN YOUR FAXED NOTE.

## 2025-04-30 NOTE — PROCEDURE: ADDITIONAL NOTES
Render Risk Assessment In Note?: no
Detail Level: Simple
Additional Notes: ****Addendum made today- lab correction was made to [facility]

## 2025-05-07 DIAGNOSIS — M54.2 PAIN OF CERVICAL FACET JOINT: ICD-10-CM

## 2025-05-07 DIAGNOSIS — M54.2 CHRONIC NECK PAIN: ICD-10-CM

## 2025-05-07 DIAGNOSIS — M47.816 SPONDYLOSIS OF LUMBAR REGION WITHOUT MYELOPATHY OR RADICULOPATHY: ICD-10-CM

## 2025-05-07 DIAGNOSIS — M54.59 LUMBAR FACET JOINT PAIN: ICD-10-CM

## 2025-05-07 DIAGNOSIS — M47.812 CERVICAL SPONDYLOSIS WITHOUT MYELOPATHY: ICD-10-CM

## 2025-05-07 DIAGNOSIS — G89.29 CHRONIC INTRACTABLE PAIN: ICD-10-CM

## 2025-05-07 DIAGNOSIS — G89.29 CHRONIC NECK PAIN: ICD-10-CM

## 2025-05-07 DIAGNOSIS — M54.50 CHRONIC BILATERAL LOW BACK PAIN WITHOUT SCIATICA: ICD-10-CM

## 2025-05-07 DIAGNOSIS — Z98.1 S/P CERVICAL SPINAL FUSION: ICD-10-CM

## 2025-05-07 DIAGNOSIS — G89.29 CHRONIC BILATERAL LOW BACK PAIN WITHOUT SCIATICA: ICD-10-CM

## 2025-05-07 RX ORDER — DULOXETIN HYDROCHLORIDE 20 MG/1
40 CAPSULE, DELAYED RELEASE ORAL DAILY
Qty: 60 CAPSULE | Refills: 1 | Status: SHIPPED | OUTPATIENT
Start: 2025-05-07

## 2025-05-07 NOTE — TELEPHONE ENCOUNTER
Signed Prescriptions:                        Disp   Refills    DULoxetine (CYMBALTA) 20 MG capsule        60 cap*1        Sig: Take 2 capsules (40 mg) by mouth daily.  Authorizing Provider: KARLENE CASTILLO, RN CNP, FNP  [facility]

## 2025-05-07 NOTE — TELEPHONE ENCOUNTER
Received fax from pharmacy requesting refill(s) for     DULoxetine (CYMBALTA) 20 MG capsule    Date last filled 4/10/2025    Last Appt Date:2/4/2025    Next Appt scheduled: None on File    Pharmacy:   [id number] - [place], [place] - [address] [facility] route for processing    Janny Tadeo [facility]

## 2025-05-13 ENCOUNTER — TELEPHONE (OUTPATIENT)
Dept: ORTHOPEDICS | Facility: CLINIC | Age: 45
End: 2025-05-13
Payer: COMMERCIAL

## 2025-05-13 DIAGNOSIS — Z98.890 S/P ACL RECONSTRUCTION: ICD-10-CM

## 2025-05-13 DIAGNOSIS — S89.90XA INJURY OF MEDIAL COLLATERAL LIGAMENT (MCL) OF KNEE: Primary | ICD-10-CM

## 2025-05-13 NOTE — PATIENT INSTRUCTIONS
If you have any questions regarding your visit, Please contact your care team.     [employer] [facility]: [phone]  To Schedule an Appointment 24/7  Call: [phone]. [facility] HOURS TELEPHONE NUMBER     Renetta Diane- APRN CNP      [facility]                   Monday 7:30am-2:00pm    Tuesday 7:30am-4:00pm    Wednesday 7:30am-2:00pm    Thursday 7:30am-11:30am    Friday 7:30am-12:00pm [address] [address]  Phone- [phone]   Fax- [phone]     Imaging Scheduling all locations  [phone]    [facility]  [address] [place] [address] [place] [address] [phone]         Urgent Care locations:  [facility]   Monday-Friday  10 am - 8 pm  Saturday and Sunday   9 am - 5 pm     [phone]   If you need a medication refill, please contact your pharmacy. Please allow 3 business days for your refill to be completed.  As always, Thank you for trusting us with your healthcare needs!      see additional instructions from your care team below

## 2025-05-13 NOTE — TELEPHONE ENCOUNTER
Patient is still painful and is hoping for possible MRI for the left knee for possible ACL tear. Please advise and place order for MRI. Mariana Vidal, ATC

## 2025-05-14 NOTE — TELEPHONE ENCOUNTER
Placed order for an updated MRI L Knee. Please use the instructions below for scheduling the MRI and follow up with me 1-2 days after the MRI is done.     Mikie Rodriguez DO, CAQSM  [facility]  [facility] - Department of Orthopedic Surgery     ------------------------------------    MRI Scheduling Instructions  Please follow both steps below    1.  Advanced imaging is done by appointment. Please call [facility] ([facility]/[place]) [phone] to schedule your MRI at your earliest convenience.   - Some insurance companies may require a prior authorization to be completed which can delay the time until you are able to schedule your appointment.     - If you are active on [employer], you may have access to your test results before your provider is able to review the study and advise on next steps.      2. After the date of your MRI has been scheduled, please call [phone] to get on my schedule for an in-person or telephone follow up appointment to discuss the results and updated treatment recommendations. This follow up should be scheduled for 1-2 days after the date of your MRI.

## 2025-05-16 ENCOUNTER — OFFICE VISIT (OUTPATIENT)
Dept: OBGYN | Facility: CLINIC | Age: 45
End: 2025-05-16
Payer: COMMERCIAL

## 2025-05-16 DIAGNOSIS — Z13.6 SCREENING FOR CARDIOVASCULAR CONDITION: ICD-10-CM

## 2025-05-16 DIAGNOSIS — Z01.419 ENCOUNTER FOR GYNECOLOGICAL EXAMINATION WITHOUT ABNORMAL FINDING: Primary | ICD-10-CM

## 2025-05-16 DIAGNOSIS — Z12.11 SCREEN FOR COLON CANCER: ICD-10-CM

## 2025-05-17 LAB
CHOLEST SERPL-MCNC: 238 MG/DL
ESTRADIOL SERPL-MCNC: <5 PG/ML
FASTING STATUS PATIENT QL REPORTED: YES
FSH SERPL IRP2-ACNC: 55.6 MIU/ML
HCG INTACT+B SERPL-ACNC: 1 MIU/ML
HDLC SERPL-MCNC: 56 MG/DL
LDLC SERPL CALC-MCNC: 164 MG/DL
NONHDLC SERPL-MCNC: 182 MG/DL
PROLACTIN SERPL 3RD IS-MCNC: 10 NG/ML (ref 5–23)
TRIGL SERPL-MCNC: 92 MG/DL
TSH SERPL DL<=0.005 MIU/L-ACNC: 1.05 UIU/ML (ref 0.3–4.2)

## 2025-05-19 ENCOUNTER — PATIENT OUTREACH (OUTPATIENT)
Dept: CARE COORDINATION | Facility: CLINIC | Age: 45
End: 2025-05-19
Payer: COMMERCIAL

## 2025-05-19 ENCOUNTER — RESULTS FOLLOW-UP (OUTPATIENT)
Dept: OBGYN | Facility: CLINIC | Age: 45
End: 2025-05-19

## 2025-05-19 LAB
HSV1 IGG SERPL QL IA: 5.13 INDEX
HSV1 IGG SERPL QL IA: ABNORMAL
HSV2 IGG SERPL QL IA: 4.1 INDEX
HSV2 IGG SERPL QL IA: ABNORMAL

## 2025-05-30 ENCOUNTER — OFFICE VISIT (OUTPATIENT)
Dept: ORTHOPEDICS | Facility: CLINIC | Age: 45
End: 2025-05-30
Payer: COMMERCIAL

## 2025-05-30 DIAGNOSIS — T14.8XXA CONTUSION OF BONE: ICD-10-CM

## 2025-05-30 DIAGNOSIS — Z98.890 S/P ACL RECONSTRUCTION: ICD-10-CM

## 2025-05-30 DIAGNOSIS — M17.12 PRIMARY OSTEOARTHRITIS OF LEFT KNEE: Primary | ICD-10-CM

## 2025-05-30 PROCEDURE — 99213 OFFICE O/P EST LOW 20 MIN: CPT | Performed by: STUDENT IN AN ORGANIZED HEALTH CARE EDUCATION/TRAINING PROGRAM

## 2025-05-30 NOTE — LETTER
2025      Christine Hu  [address]  [place] [address]      Dear Colleague,    Thank you for referring your patient, Christine Hu, to the [facility] [place]. Please see a copy of my visit note below.    Christine Hu  :  1980  DOS: 2025  MRN: [id number]  PCP: No Ref-Primary, Physician    Sports Medicine Clinic Visit      Interim History - May 30, 2025  - Last seen on 2025 for left knee pain. She also wants to discuss her left ankle.   - Since the last visit, she has been trying to bike and walking more. She notices it's not buckling on her as much.   - No interim injury.   - She obtained an MRI with the results below.  Presents today to discuss results of the MRI and next steps in treatment plan.    - MRI L Knee 25:   Impression:  1. New subchondral edema like marrow signal intensity at the posterior  weightbearing surface of the medial femoral condyle, query contusion.  2. Grade 3 patellofemoral compartment chondromalacia, improved since  prior.  3. New focal grade III chondromalacia lateral compartment.  4. Intact ACL graft, PCL, menisci, medial and lateral supporting  structures.      HPI  Christine Hu is a 44 year old female who is seen as a self referral presenting with left knee pain    - Mechanism of Injury:    - 2 days ago, was doing a walk exercise video at home and symptoms started that night. No acute injury moment  - Pertinent history and prior evaluations:     -2024 bilateral knee xray shows postoperative changes ligamentous reconstruction left knee. Both knees negative for fracture or compartmental narrowing.  Tiny left knee joint effusion.   - Saw Dr. Grant on 24 and discussed generalized pain with starting a new job. Got an injection that day.   - 2023 ACL reconstruction with Dr. Peterson    - Pain Character:    - Location:  left medial knee  - Character:  aching, stabbing with  "certain movements  - Duration:  2 days  - Course:  lingering  - Endorses:    - Severe pain in the medial knee with certain activities/movements, unsure which movements bring on pain.  Feeling of instability/laxity that makes her concern for a rerupture of her ACL.  Medial knee swelling.  - Denies:    - Mechanical locking symptoms, falls, numbness, tingling, radicular shooting pain, neurogenic weakness.  - Alleviating factors:    - Hinged knee stability brace, rest, icing, activity modifications  - Aggravating factors:    - ambulation, constant feeling \"that ACL has re-ruptured\"    - Patient Goals:    - get a formal diagnosis, discuss treatment options  - Social History:   - Employed. Owns own business starting Fall 2024. Notes has gained 50 pounds since then, which has affected biomechanics and she was starting to work on more activity before this injury.       Review of Systems  Musculoskeletal: as above  Remainder of review of systems is negative including constitutional, CV, pulmonary, GI, Skin and Neurologic except as noted in HPI or medical history.    Past Medical History:   Diagnosis Date     Acute alcoholic intoxication 05/28/2011    Seen in emergency room 2020 after 14 months sobriety     Alcohol dependence (H) 11/30/2012     Alcohol withdrawal (H) 06/29/2011     Alcohol withdrawal (H) 10/19/2023    hospitalized 3 days at [facility]     ASCUS favor benign 01/2015    Neg HPV     Degeneration of cervical intervertebral disc      Knee effusion, left      Lumbago      Ménière's disease      Overdose 06/2009    TRAZADONE     Past Surgical History:   Procedure Laterality Date     ARTHROSCOPIC RECONSTRUCTION ANTERIOR CRUCIATE LIGAMENT HAMSTRING AUTOGRAFT Left 1/27/2023    Procedure: left knee examination under anesthesia, knee arthroscopy, anterior cruciate ligament reconstruction hamstring autograft;  Surgeon: Mc Peterson MD;  Location: [facility] OR     C/SECTION, LOW TRANSVERSE  " 2005    , Low Transverse     COLONOSCOPY      5 yrs     DECOMPRESSION, FUSION CERVICAL ANTERIOR TWO LEVELS, COMBINED N/A 2022    Procedure: Cervical 4 to 6 Anterior Cervical Decompression and Fusion with Medtronic Plate and Screws, Interbody Device, Use of Fluoroscopy, Microscope;  Surgeon: Wilbur Murray MD;  Location: [facility] OR     GRAFT BONE FROM ILIAC CREST Left 2022    Procedure: Left Sided Iliac Crest Autograft;  Surgeon: Wilbur Murray MD;  Location: [facility] OR     LAMINECTOMY CERIVCAL POSTERIOR THREE+ LEVELS N/A 2022    Procedure: Right cervical 4-5 posterior foraminotomy;  Surgeon: Wilbur Murray MD;  Location: [facility] OR     MASTOIDECTOMY       TONSILLECTOMY       Family History   Problem Relation Age of Onset     Allergies Mother      Psychotic Disorder Father         depression     Heart Failure Father         heart attack in 2015?     Allergies Sister      Allergies Sister      Psychotic Disorder Sister         anxiety     Psychotic Disorder Sister         depression     Psychotic Disorder Maternal Uncle         anxiety     Asthma Other      C.A.D. No family hx of      Diabetes No family hx of      Hypertension No family hx of      Cerebrovascular Disease No family hx of      Breast Cancer No family hx of      Cancer - colorectal No family hx of      Prostate Cancer No family hx of      Anesthesia Reaction No family hx of          Objective  There were no vitals taken for this visit.    General: healthy, alert and in no acute distress.    HEENT: no scleral icterus or conjunctival erythema.   Skin: no suspicious lesions or rash. No jaundice.   CV: regular rhythm by palpation, 2+ distal pulses.  Resp: normal respiratory effort without conversational dyspnea.   Psych: normal mood and affect.    Gait: antalgic, appropriate coordination and balance.     Neuro:        - Sensation to light touch:    - Intact throughout the BLE including all peripheral  nerve distributions.     MSK - Knee:       - Inspection:    - Mild effusion present without surrounding erythema, ecchymosis, lesion.       - ROM:    - Limited in knee flexion by swelling and medial knee pain. Not as severe as prior exam.        - Palpation:    - TTP at the medial joint line slightly, more TTP at the medial femoral condyle  - NTTP elsewhere.        - Strength:  (*antalgic)   - Knee Flexion  5-*  - Knee Extension 5-*         - Special tests:        - Lachman:  Neg        - A/P drawer:  Neg        - Pivot shift:  Neg    - Mariama: Slightly painful     - Varus stress:  Neg for laxity or pain     - Valgus stress: Positive for pain, improved from prior.   - Patellar grind:  Neg     Radiology  I independently reviewed the available relevant imaging in the chart with my interpretations as above in HPI.   - XR L knee 4/11/2025 shows intact postoperative hardware from ACL reconstruction.  There is a mild knee joint effusion, no fracture or dislocation, no major degenerative changes.    I independently reviewed today's new relevant imaging, with the following interpretation:  - MRI L knee 5/29/2025 shows an osseous contusion of the posterior weightbearing surface of the medial femoral condyle, grade III chondromalacia of the patellofemoral and lateral compartments, and no other internal derangement.      Assessment  1. Primary osteoarthritis of left knee    2. Contusion of bone    3. S/P ACL reconstruction          Plan  Christine Hu is a pleasant 44 year old female that presents with acute left knee pain.  She describes pain in the medial knee that has felt severe for the past 2 days.  No specific inciting injury but she does note the pain came on after increased activity including a walking exercise video earlier that day.  Pain is well localized to the medial knee and hurts worse with particular motions/activities.  She has had some swelling in the knee and feelings of instability as well.  She  has a significant history for a previously reconstructed ACL in this knee and is concerned for possible rerupture of the ligament.    On exam, she is tender over the MCL and medial joint line.  She has reproduction of her pain with valgus stress and mild increased laxity with MCL testing.  She has an equivocal Mariama's due to pain and guarding.  Negative Lachman and AP drawer testing today.    Based on her history, imaging, and physical exam, I do not believe that she has sustained a rerupture of her ACL, but rather is dealing with a sprain of her MCL.  We discussed the diagnosis in detail and how it correlates with her symptoms.     We discussed the nature of the condition and available treatment options, and mutually agreed upon the following plan:    - Imaging:          - Reviewed and independently interpreted the relevant imaging in the chart, including any imaging ordered for today's clinic.  - Reviewed results and images with patient.   - MRI may be considered for persistent pain and instability.  - Medications:          - Discussed pharmacologic options for pain relief.   - May use NSAIDs (Ibuprofen, Naproxen) or Acetaminophen (Tylenol) as needed for pain control.   - Do not take these if previously advised to avoid them for other medical conditions.  - May also use topical medications such as lidocaine, IcyHot, BioFreeze, or Voltaren gel as needed for pain control.    - Voltaren gel is an anti-inflammatory cream that may be used up to 4 times per day over the painful area.   - Injections:          - Discussed possible injection options and alternatives.    - Injection options include: May consider an intra-articular steroid injection in the future for persistent symptoms.    - Deferred injections today and will consider them in the future as needed.   - Therapy:          - Discussed the benefits of therapy vs home exercise program for optimization of range of motion, flexibility, strength, stability and  function.   - Preference is for a home exercise program.  Will focus on RICE protocol and rest for now.  - Home Exercise Program given today in clinic and recommendation given to perform HEP daily and after exacerbations.  - Modalities:          - May use ice, heat, massage or other modalities as needed.   - Bracing:          - Discussed bracing options and recommend using her current hinged knee stability brace for comfort and stability with ambulation until next follow-up..    - Surgery:          - Discussed non-operative and operative treatment options for the patient's condition.  No immediate indication for surgery, will monitor her recovery and consider referral in the future if needed.  - Activity:     - Encouraged to rest and protect the injured area from further injury.  Avoid exacerbating activities and activities that are high-risk for falls.  She will wear her brace for any ambulation.  - Follow up:          - In 2 to 3 weeks if needed for re-evaluation and update to treatment plan.  - May follow up sooner for new/worsening symptoms.  - May contact clinic by phone or MyChart for questions or concerns.       Update - 5/30/25:  Christine presents today for follow-up of her left knee pain and to discuss the results of her recent MRI.  The MRI did show an osseous contusion of the posterior weightbearing surface of the medial femoral condyle, which likely explains the vast majority of her symptoms that she has been experiencing.  Fortunately, the MCL and other ligamentous structures including her ACL graft appear intact.  No major meniscal tear.  There is grade III chondromalacia in both the patellofemoral and lateral compartments that can also be contributing somewhat to her pain.  Exam is stable and improved today with some TTP at the medial femoral condyle and some slight pain with valgus force.  We discussed the treatment plan and I recommend continuing with activity modifications, rest, and her knee brace  during exacerbating activities.  Physical therapy can be considered, she would prefer to go with her home exercise program.  I expect her pain to improve.  If she would like to consider a corticosteroid injection in the future for additional pain control, she may follow-up in clinic for this.      Mikie Rodriguez DO, CAQSM  [facility]  [facility] - Department of Orthopedic Surgery       Disclaimer:  This note was prepared and written using Dragon Medical dictation software. As a result, there may be errors in the script that have gone undetected. Please consider this when interpreting the information in this note.          Again, thank you for allowing me to participate in the care of your patient.        Sincerely,        Mikie Rodriguez DO    Electronically signed

## 2025-05-30 NOTE — PROGRESS NOTES
Christine Hu  :  1980  DOS: 2025  MRN: [id number]  PCP: No Ref-Primary, Physician    Sports Medicine Clinic Visit      Interim History - May 30, 2025  - Last seen on 2025 for left knee pain. She also wants to discuss her left ankle.   - Since the last visit, she has been trying to bike and walking more. She notices it's not buckling on her as much.   - No interim injury.   - She obtained an MRI with the results below.  Presents today to discuss results of the MRI and next steps in treatment plan.    - MRI L Knee 25:   Impression:  1. New subchondral edema like marrow signal intensity at the posterior  weightbearing surface of the medial femoral condyle, query contusion.  2. Grade 3 patellofemoral compartment chondromalacia, improved since  prior.  3. New focal grade III chondromalacia lateral compartment.  4. Intact ACL graft, PCL, menisci, medial and lateral supporting  structures.      HPI  Christine Hu is a 44 year old female who is seen as a self referral presenting with left knee pain    - Mechanism of Injury:    - 2 days ago, was doing a walk exercise video at home and symptoms started that night. No acute injury moment  - Pertinent history and prior evaluations:     -2024 bilateral knee xray shows postoperative changes ligamentous reconstruction left knee. Both knees negative for fracture or compartmental narrowing.  Tiny left knee joint effusion.   - Saw Dr. Grant on 24 and discussed generalized pain with starting a new job. Got an injection that day.   - 2023 ACL reconstruction with Dr. Peterson    - Pain Character:    - Location:  left medial knee  - Character:  aching, stabbing with certain movements  - Duration:  2 days  - Course:  lingering  - Endorses:    - Severe pain in the medial knee with certain activities/movements, unsure which movements bring on pain.  Feeling of instability/laxity that makes her concern for a rerupture of her ACL.   "Medial knee swelling.  - Denies:    - Mechanical locking symptoms, falls, numbness, tingling, radicular shooting pain, neurogenic weakness.  - Alleviating factors:    - Hinged knee stability brace, rest, icing, activity modifications  - Aggravating factors:    - ambulation, constant feeling \"that ACL has re-ruptured\"    - Patient Goals:    - get a formal diagnosis, discuss treatment options  - Social History:   - Employed. Owns own business starting Fall . Notes has gained 50 pounds since then, which has affected biomechanics and she was starting to work on more activity before this injury.       Review of Systems  Musculoskeletal: as above  Remainder of review of systems is negative including constitutional, CV, pulmonary, GI, Skin and Neurologic except as noted in HPI or medical history.    Past Medical History:   Diagnosis Date    Acute alcoholic intoxication 2011    Seen in emergency room 2020 after 14 months sobriety    Alcohol dependence (H) 2012    Alcohol withdrawal (H) 2011    Alcohol withdrawal (H) 10/19/2023    hospitalized 3 days at [facility]    ASCUS favor benign 2015    Neg HPV    Degeneration of cervical intervertebral disc     Knee effusion, left     Lumbago     Ménière's disease     Overdose 2009    TRAZADONE     Past Surgical History:   Procedure Laterality Date    ARTHROSCOPIC RECONSTRUCTION ANTERIOR CRUCIATE LIGAMENT HAMSTRING AUTOGRAFT Left 2023    Procedure: left knee examination under anesthesia, knee arthroscopy, anterior cruciate ligament reconstruction hamstring autograft;  Surgeon: Mc Peterson MD;  Location: [facility] OR    C/SECTION, LOW TRANSVERSE  2005    , Low Transverse    COLONOSCOPY      5 yrs    DECOMPRESSION, FUSION CERVICAL ANTERIOR TWO LEVELS, COMBINED N/A 2022    Procedure: Cervical 4 to 6 Anterior Cervical Decompression and Fusion with [employer] Plate and Screws, Interbody Device, Use of " Fluoroscopy, Microscope;  Surgeon: Wilbur Murray MD;  Location: [facility] OR    GRAFT BONE FROM ILIAC CREST Left 11/26/2022    Procedure: Left Sided Iliac Crest Autograft;  Surgeon: Wilbur Murray MD;  Location: [facility] OR    LAMINECTOMY CERIVCAL POSTERIOR THREE+ LEVELS N/A 11/29/2022    Procedure: Right cervical 4-5 posterior foraminotomy;  Surgeon: Wilbur Murray MD;  Location: [facility] OR    MASTOIDECTOMY      TONSILLECTOMY       Family History   Problem Relation Age of Onset    Allergies Mother     Psychotic Disorder Father         depression    Heart Failure Father         heart attack in NOV. 2015?    Allergies Sister     Allergies Sister     Psychotic Disorder Sister         anxiety    Psychotic Disorder Sister         depression    Psychotic Disorder Maternal Uncle         anxiety    Asthma Other     C.A.D. No family hx of     Diabetes No family hx of     Hypertension No family hx of     Cerebrovascular Disease No family hx of     Breast Cancer No family hx of     Cancer - colorectal No family hx of     Prostate Cancer No family hx of     Anesthesia Reaction No family hx of          Objective  There were no vitals taken for this visit.    General: healthy, alert and in no acute distress.    HEENT: no scleral icterus or conjunctival erythema.   Skin: no suspicious lesions or rash. No jaundice.   CV: regular rhythm by palpation, 2+ distal pulses.  Resp: normal respiratory effort without conversational dyspnea.   Psych: normal mood and affect.    Gait: antalgic, appropriate coordination and balance.     Neuro:        - Sensation to light touch:    - Intact throughout the BLE including all peripheral nerve distributions.     MSK - Knee:       - Inspection:    - Mild effusion present without surrounding erythema, ecchymosis, lesion.       - ROM:    - Limited in knee flexion by swelling and medial knee pain. Not as severe as prior exam.        - Palpation:    - TTP at the medial joint line  slightly, more TTP at the medial femoral condyle  - NTTP elsewhere.        - Strength:  (*antalgic)   - Knee Flexion  5-*  - Knee Extension 5-*         - Special tests:        - Lachman:  Neg        - A/P drawer:  Neg        - Pivot shift:  Neg    - Mariama: Slightly painful     - Varus stress:  Neg for laxity or pain     - Valgus stress: Positive for pain, improved from prior.   - Patellar grind:  Neg     Radiology  I independently reviewed the available relevant imaging in the chart with my interpretations as above in HPI.   - XR L knee 4/11/2025 shows intact postoperative hardware from ACL reconstruction.  There is a mild knee joint effusion, no fracture or dislocation, no major degenerative changes.    I independently reviewed today's new relevant imaging, with the following interpretation:  - MRI L knee 5/29/2025 shows an osseous contusion of the posterior weightbearing surface of the medial femoral condyle, grade III chondromalacia of the patellofemoral and lateral compartments, and no other internal derangement.      Assessment  1. Primary osteoarthritis of left knee    2. Contusion of bone    3. S/P ACL reconstruction          Plan  Christine Hu is a pleasant 44 year old female that presents with acute left knee pain.  She describes pain in the medial knee that has felt severe for the past 2 days.  No specific inciting injury but she does note the pain came on after increased activity including a walking exercise video earlier that day.  Pain is well localized to the medial knee and hurts worse with particular motions/activities.  She has had some swelling in the knee and feelings of instability as well.  She has a significant history for a previously reconstructed ACL in this knee and is concerned for possible rerupture of the ligament.    On exam, she is tender over the MCL and medial joint line.  She has reproduction of her pain with valgus stress and mild increased laxity with MCL testing.   She has an equivocal Mariama's due to pain and guarding.  Negative Lachman and AP drawer testing today.    Based on her history, imaging, and physical exam, I do not believe that she has sustained a rerupture of her ACL, but rather is dealing with a sprain of her MCL.  We discussed the diagnosis in detail and how it correlates with her symptoms.     We discussed the nature of the condition and available treatment options, and mutually agreed upon the following plan:    - Imaging:          - Reviewed and independently interpreted the relevant imaging in the chart, including any imaging ordered for today's clinic.  - Reviewed results and images with patient.   - MRI may be considered for persistent pain and instability.  - Medications:          - Discussed pharmacologic options for pain relief.   - May use NSAIDs (Ibuprofen, Naproxen) or Acetaminophen (Tylenol) as needed for pain control.   - Do not take these if previously advised to avoid them for other medical conditions.  - May also use topical medications such as lidocaine, IcyHot, BioFreeze, or Voltaren gel as needed for pain control.    - Voltaren gel is an anti-inflammatory cream that may be used up to 4 times per day over the painful area.   - Injections:          - Discussed possible injection options and alternatives.    - Injection options include: May consider an intra-articular steroid injection in the future for persistent symptoms.    - Deferred injections today and will consider them in the future as needed.   - Therapy:          - Discussed the benefits of therapy vs home exercise program for optimization of range of motion, flexibility, strength, stability and function.   - Preference is for a home exercise program.  Will focus on RICE protocol and rest for now.  - Home Exercise Program given today in clinic and recommendation given to perform HEP daily and after exacerbations.  - Modalities:          - May use ice, heat, massage or other  modalities as needed.   - Bracing:          - Discussed bracing options and recommend using her current hinged knee stability brace for comfort and stability with ambulation until next follow-up..    - Surgery:          - Discussed non-operative and operative treatment options for the patient's condition.  No immediate indication for surgery, will monitor her recovery and consider referral in the future if needed.  - Activity:     - Encouraged to rest and protect the injured area from further injury.  Avoid exacerbating activities and activities that are high-risk for falls.  She will wear her brace for any ambulation.  - Follow up:          - In 2 to 3 weeks if needed for re-evaluation and update to treatment plan.  - May follow up sooner for new/worsening symptoms.  - May contact clinic by phone or MyChart for questions or concerns.       Update - 5/30/25:  Christine presents today for follow-up of her left knee pain and to discuss the results of her recent MRI.  The MRI did show an osseous contusion of the posterior weightbearing surface of the medial femoral condyle, which likely explains the vast majority of her symptoms that she has been experiencing.  Fortunately, the MCL and other ligamentous structures including her ACL graft appear intact.  No major meniscal tear.  There is grade III chondromalacia in both the patellofemoral and lateral compartments that can also be contributing somewhat to her pain.  Exam is stable and improved today with some TTP at the medial femoral condyle and some slight pain with valgus force.  We discussed the treatment plan and I recommend continuing with activity modifications, rest, and her knee brace during exacerbating activities.  Physical therapy can be considered, she would prefer to go with her home exercise program.  I expect her pain to improve.  If she would like to consider a corticosteroid injection in the future for additional pain control, she may follow-up in clinic  for this.      Mikie Rodriguez DO, CAQSM  [facility] - Department of Orthopedic Surgery       Disclaimer:  This note was prepared and written using Dragon Medical dictation software. As a result, there may be errors in the script that have gone undetected. Please consider this when interpreting the information in this note.

## (undated) DEVICE — ESU PENCIL SMOKE EVAC W/ROCKER SWITCH 0703-047-000

## (undated) DEVICE — DRAPE TIBURON TOP SHEET 100X60" 29352

## (undated) DEVICE — SU ETHIBOND 1 CT-1 30" X425H

## (undated) DEVICE — PIN SKULL MAYFIELD ADULT TITANIUM 3/PK A1120

## (undated) DEVICE — SOL ISOPROPYL RUBBING ALCOHOL USP 70% 4OZ HDX-20 I0020

## (undated) DEVICE — PREP CHLORAPREP 26ML TINTED ORANGE  260815

## (undated) DEVICE — DRSG PRIMAPORE 03 1/8X6" 66000318

## (undated) DEVICE — DRSG STERI STRIP 1/2X4" R1547

## (undated) DEVICE — SU MONOCRYL 3-0 PS-1 27" Y936H

## (undated) DEVICE — TUBING SUCTION MEDI-VAC 1/4"X20' N620A

## (undated) DEVICE — DRSG DRAIN 2X2" 7087

## (undated) DEVICE — LINEN TOWEL PACK X5 5464

## (undated) DEVICE — SU VICRYL 2-0 CT-2 8X18" UND D8144

## (undated) DEVICE — PACK ARTHROSCOPY CUSTOM ASC

## (undated) DEVICE — SYR 50ML LL W/O NDL 309653

## (undated) DEVICE — DRAPE C-ARMOR 5 SIDED 5523

## (undated) DEVICE — POSITIONER ARMBOARD FOAM 1PAIR LF FP-ARMB1

## (undated) DEVICE — BUR ARTHREX COOLCUT SABRE 4.0MMX13CM AR-8400SR

## (undated) DEVICE — SUCTION MANIFOLD NEPTUNE 2 SYS 4 PORT 0702-020-000

## (undated) DEVICE — PREP DURAPREP 26ML APL 8630

## (undated) DEVICE — GLOVE BIOGEL PI MICRO SZ 7.5 48575

## (undated) DEVICE — DRAPE STERI TOWEL LG 1010

## (undated) DEVICE — LINEN ORTHO PACK 5446

## (undated) DEVICE — SU VICRYL 2-0 CT-1 27" UND J259H

## (undated) DEVICE — NDL COUNTER 40CT  31142311

## (undated) DEVICE — ESU GROUND PAD ADULT W/CORD E7507

## (undated) DEVICE — PACK ACL SUPPLEMENT CUSTOM ASC

## (undated) DEVICE — GLOVE BIOGEL PI MICRO SZ 6.0 48560

## (undated) DEVICE — POSITIONER HEAD DONUT FOAM 9" LF FP-HEAD9

## (undated) DEVICE — DRSG TEGADERM 4X4 3/4" 1626W

## (undated) DEVICE — SOL NACL 0.9% IRRIG 3000ML BAG 2B7477

## (undated) DEVICE — BRUSH SURGICAL SCRUB W/4% CHLORHEXIDINE GLUCONATE SOL 4458A

## (undated) DEVICE — GLOVE BIOGEL PI MICRO INDICATOR UNDERGLOVE SZ 8.0 48980

## (undated) DEVICE — RESTRAINT LIMB HOLDER ANKLE/WRIST FOAM W/QUICK RELEASE 2533

## (undated) DEVICE — GLOVE BIOGEL PI MICRO INDICATOR UNDERGLOVE SZ 6.5 48965

## (undated) DEVICE — TAPE DURAPORE 3" SILK 1538-3

## (undated) DEVICE — DECANTER TRANSFER DEVICE 2008S

## (undated) DEVICE — DRSG KERLIX FLUFFS X5

## (undated) DEVICE — STRAP KNEE/BODY 31143004

## (undated) DEVICE — Device

## (undated) DEVICE — IOM SUPPLIES/CASE FEE

## (undated) DEVICE — PIN GUIDE ARTHREX 2.4MM DRILL  AR-1250L

## (undated) DEVICE — LINEN GOWN XLG 5407

## (undated) DEVICE — ABLATOR ARTHREX APOLLO RF MP90 ASPIRATING 90DEG AR-9811

## (undated) DEVICE — LINEN BACK PACK 5440

## (undated) DEVICE — SU VICRYL 1 CT-1 CR 8X18" J741D

## (undated) DEVICE — SUCTION MINISQUAIR SMOKE EVAC CAPTURE DEVICE SQ20012-01

## (undated) DEVICE — DRAPE C-ARM W/STRAPS 42X72" 07-CA104

## (undated) DEVICE — SOL WATER IRRIG 1000ML BOTTLE 2F7114

## (undated) DEVICE — SU ETHILON 3-0 PS-1 18" 1663H

## (undated) DEVICE — PEN MARKING SKIN W/PAPER RULER 31145785

## (undated) DEVICE — GLOVE BIOGEL PI MICRO SZ 8.0 48580

## (undated) DEVICE — DRAPE MICROSCOPE MICRO-KOVER LEICA 48"X120" 09-MK651

## (undated) DEVICE — DRSG DRAIN 4X4" 7086

## (undated) DEVICE — SU MONOCRYL 3-0 PS-2 18" UND Y497G

## (undated) DEVICE — DRSG TEGADERM 4X10" 1627

## (undated) DEVICE — ADH SKIN CLOSURE PREMIERPRO EXOFIN 1.0ML 3470

## (undated) DEVICE — SOL NACL 0.9% IRRIG 1000ML BOTTLE 2F7124

## (undated) DEVICE — TUBING SYSTEM ARTHREX PATIENT REDEUCE AR-6421

## (undated) DEVICE — SPONGE SURGIFOAM 100 1974

## (undated) DEVICE — DRSG GAUZE 4X8" NON21842

## (undated) DEVICE — TOOL DISSECT MIDAS MR8 14CM MATCH HEAD 3MM MR8-14MH30

## (undated) DEVICE — PAD ARMBOARD FOAM EGGCRATE COVIDEN 3114367

## (undated) DEVICE — SU VICRYL 1 CT-1 36" UND J947H

## (undated) DEVICE — DRAPE IOBAN INCISE 23X17" 6650EZ

## (undated) RX ORDER — HYDROCODONE BITARTRATE AND ACETAMINOPHEN 5; 325 MG/1; MG/1
TABLET ORAL
Status: DISPENSED
Start: 2023-01-27

## (undated) RX ORDER — PROPOFOL 10 MG/ML
INJECTION, EMULSION INTRAVENOUS
Status: DISPENSED
Start: 2022-11-29

## (undated) RX ORDER — FENTANYL CITRATE 50 UG/ML
INJECTION, SOLUTION INTRAMUSCULAR; INTRAVENOUS
Status: DISPENSED
Start: 2023-01-27

## (undated) RX ORDER — ONDANSETRON 2 MG/ML
INJECTION INTRAMUSCULAR; INTRAVENOUS
Status: DISPENSED
Start: 2022-11-26

## (undated) RX ORDER — FENTANYL CITRATE-0.9 % NACL/PF 10 MCG/ML
PLASTIC BAG, INJECTION (ML) INTRAVENOUS
Status: DISPENSED
Start: 2022-11-29

## (undated) RX ORDER — DEXAMETHASONE SODIUM PHOSPHATE 4 MG/ML
INJECTION, SOLUTION INTRA-ARTICULAR; INTRALESIONAL; INTRAMUSCULAR; INTRAVENOUS; SOFT TISSUE
Status: DISPENSED
Start: 2022-11-26

## (undated) RX ORDER — ACETAMINOPHEN 325 MG/1
TABLET ORAL
Status: DISPENSED
Start: 2022-11-29

## (undated) RX ORDER — DIAZEPAM 10 MG/2ML
INJECTION, SOLUTION INTRAMUSCULAR; INTRAVENOUS
Status: DISPENSED
Start: 2022-11-29

## (undated) RX ORDER — HYDROMORPHONE HYDROCHLORIDE 1 MG/ML
INJECTION, SOLUTION INTRAMUSCULAR; INTRAVENOUS; SUBCUTANEOUS
Status: DISPENSED
Start: 2022-11-29

## (undated) RX ORDER — HYDROXYZINE HYDROCHLORIDE 25 MG/1
TABLET, FILM COATED ORAL
Status: DISPENSED
Start: 2023-01-27

## (undated) RX ORDER — HYDROMORPHONE HCL IN WATER/PF 6 MG/30 ML
PATIENT CONTROLLED ANALGESIA SYRINGE INTRAVENOUS
Status: DISPENSED
Start: 2022-11-29

## (undated) RX ORDER — HYDROMORPHONE HYDROCHLORIDE 1 MG/ML
INJECTION, SOLUTION INTRAMUSCULAR; INTRAVENOUS; SUBCUTANEOUS
Status: DISPENSED
Start: 2023-01-27

## (undated) RX ORDER — PROPOFOL 10 MG/ML
INJECTION, EMULSION INTRAVENOUS
Status: DISPENSED
Start: 2023-01-27

## (undated) RX ORDER — FENTANYL CITRATE 50 UG/ML
INJECTION, SOLUTION INTRAMUSCULAR; INTRAVENOUS
Status: DISPENSED
Start: 2022-11-26

## (undated) RX ORDER — METHOCARBAMOL 750 MG/1
TABLET, FILM COATED ORAL
Status: DISPENSED
Start: 2022-11-26

## (undated) RX ORDER — ONDANSETRON 2 MG/ML
INJECTION INTRAMUSCULAR; INTRAVENOUS
Status: DISPENSED
Start: 2022-11-29

## (undated) RX ORDER — ONDANSETRON 2 MG/ML
INJECTION INTRAMUSCULAR; INTRAVENOUS
Status: DISPENSED
Start: 2023-01-27

## (undated) RX ORDER — FENTANYL CITRATE 50 UG/ML
INJECTION, SOLUTION INTRAMUSCULAR; INTRAVENOUS
Status: DISPENSED
Start: 2022-11-29

## (undated) RX ORDER — CEFAZOLIN SODIUM 2 G/50ML
SOLUTION INTRAVENOUS
Status: DISPENSED
Start: 2023-01-27

## (undated) RX ORDER — HYDROMORPHONE HCL IN WATER/PF 6 MG/30 ML
PATIENT CONTROLLED ANALGESIA SYRINGE INTRAVENOUS
Status: DISPENSED
Start: 2022-11-26

## (undated) RX ORDER — CEFAZOLIN SODIUM 1 G/3ML
INJECTION, POWDER, FOR SOLUTION INTRAMUSCULAR; INTRAVENOUS
Status: DISPENSED
Start: 2022-11-26

## (undated) RX ORDER — PROPOFOL 10 MG/ML
INJECTION, EMULSION INTRAVENOUS
Status: DISPENSED
Start: 2022-11-26

## (undated) RX ORDER — VANCOMYCIN HYDROCHLORIDE 1 G/20ML
INJECTION, POWDER, LYOPHILIZED, FOR SOLUTION INTRAVENOUS
Status: DISPENSED
Start: 2022-11-26

## (undated) RX ORDER — METHOCARBAMOL 750 MG/1
TABLET, FILM COATED ORAL
Status: DISPENSED
Start: 2022-11-29

## (undated) RX ORDER — TRIAMCINOLONE ACETONIDE 40 MG/ML
INJECTION, SUSPENSION INTRA-ARTICULAR; INTRAMUSCULAR
Status: DISPENSED
Start: 2022-12-06

## (undated) RX ORDER — ACETAMINOPHEN 325 MG/1
TABLET ORAL
Status: DISPENSED
Start: 2022-11-26

## (undated) RX ORDER — ESMOLOL HYDROCHLORIDE 10 MG/ML
INJECTION INTRAVENOUS
Status: DISPENSED
Start: 2022-11-29

## (undated) RX ORDER — CEFAZOLIN SODIUM/WATER 2 G/20 ML
SYRINGE (ML) INTRAVENOUS
Status: DISPENSED
Start: 2022-11-26

## (undated) RX ORDER — BUPIVACAINE HYDROCHLORIDE AND EPINEPHRINE 2.5; 5 MG/ML; UG/ML
INJECTION, SOLUTION INFILTRATION; PERINEURAL
Status: DISPENSED
Start: 2022-11-29

## (undated) RX ORDER — ACETAMINOPHEN 325 MG/1
TABLET ORAL
Status: DISPENSED
Start: 2023-01-27

## (undated) RX ORDER — LABETALOL HYDROCHLORIDE 5 MG/ML
INJECTION, SOLUTION INTRAVENOUS
Status: DISPENSED
Start: 2022-11-26

## (undated) RX ORDER — LIDOCAINE HYDROCHLORIDE 10 MG/ML
INJECTION, SOLUTION EPIDURAL; INFILTRATION; INTRACAUDAL; PERINEURAL
Status: DISPENSED
Start: 2022-12-06

## (undated) RX ORDER — DEXAMETHASONE SODIUM PHOSPHATE 4 MG/ML
INJECTION, SOLUTION INTRA-ARTICULAR; INTRALESIONAL; INTRAMUSCULAR; INTRAVENOUS; SOFT TISSUE
Status: DISPENSED
Start: 2023-01-27

## (undated) RX ORDER — BUPIVACAINE HYDROCHLORIDE AND EPINEPHRINE 2.5; 5 MG/ML; UG/ML
INJECTION, SOLUTION INFILTRATION; PERINEURAL
Status: DISPENSED
Start: 2022-11-26

## (undated) RX ORDER — OXYCODONE HYDROCHLORIDE 5 MG/1
TABLET ORAL
Status: DISPENSED
Start: 2022-11-26

## (undated) RX ORDER — HYDROMORPHONE HYDROCHLORIDE 1 MG/ML
INJECTION, SOLUTION INTRAMUSCULAR; INTRAVENOUS; SUBCUTANEOUS
Status: DISPENSED
Start: 2022-11-26